# Patient Record
Sex: MALE | Race: WHITE | Employment: OTHER | URBAN - METROPOLITAN AREA
[De-identification: names, ages, dates, MRNs, and addresses within clinical notes are randomized per-mention and may not be internally consistent; named-entity substitution may affect disease eponyms.]

---

## 2017-01-19 ENCOUNTER — OFFICE VISIT (OUTPATIENT)
Dept: ONCOLOGY | Facility: CLINIC | Age: 57
End: 2017-01-19

## 2017-01-19 VITALS
RESPIRATION RATE: 20 BRPM | TEMPERATURE: 98 F | BODY MASS INDEX: 37.11 KG/M2 | WEIGHT: 304.9 LBS | DIASTOLIC BLOOD PRESSURE: 107 MMHG | HEART RATE: 92 BPM | SYSTOLIC BLOOD PRESSURE: 141 MMHG

## 2017-01-19 DIAGNOSIS — J41.0 SIMPLE CHRONIC BRONCHITIS (HCC): ICD-10-CM

## 2017-01-19 DIAGNOSIS — C34.31 MALIGNANT NEOPLASM OF LOWER LOBE OF RIGHT LUNG (HCC): Primary | ICD-10-CM

## 2017-01-19 PROCEDURE — 99214 OFFICE O/P EST MOD 30 MIN: CPT | Performed by: INTERNAL MEDICINE

## 2017-01-19 RX ORDER — OXYCODONE HYDROCHLORIDE AND ACETAMINOPHEN 5; 325 MG/1; MG/1
1 TABLET ORAL EVERY 6 HOURS PRN
Qty: 60 TABLET | Refills: 0 | Status: SHIPPED | OUTPATIENT
Start: 2017-01-19 | End: 2017-02-18

## 2017-01-25 ENCOUNTER — TELEPHONE (OUTPATIENT)
Dept: ONCOLOGY | Facility: CLINIC | Age: 57
End: 2017-01-25

## 2017-02-14 RX ORDER — SODIUM CHLORIDE 0.9 % (FLUSH) 0.9 %
20 SYRINGE (ML) INJECTION PRN
Status: CANCELLED | OUTPATIENT
Start: 2017-02-16

## 2017-02-16 ENCOUNTER — HOSPITAL ENCOUNTER (OUTPATIENT)
Dept: INFUSION THERAPY | Age: 57
Discharge: HOME | End: 2017-02-16
Payer: MEDICARE

## 2017-02-16 ENCOUNTER — OFFICE VISIT (OUTPATIENT)
Dept: ONCOLOGY | Facility: CLINIC | Age: 57
End: 2017-02-16

## 2017-02-16 VITALS
HEART RATE: 89 BPM | SYSTOLIC BLOOD PRESSURE: 126 MMHG | WEIGHT: 308.5 LBS | DIASTOLIC BLOOD PRESSURE: 82 MMHG | RESPIRATION RATE: 20 BRPM | BODY MASS INDEX: 37.55 KG/M2 | TEMPERATURE: 97.9 F

## 2017-02-16 DIAGNOSIS — F17.200 SMOKING ADDICTION: ICD-10-CM

## 2017-02-16 DIAGNOSIS — J41.0 SIMPLE CHRONIC BRONCHITIS (HCC): ICD-10-CM

## 2017-02-16 DIAGNOSIS — C34.31 MALIGNANT NEOPLASM OF LOWER LOBE OF RIGHT LUNG (HCC): ICD-10-CM

## 2017-02-16 DIAGNOSIS — C34.31 MALIGNANT NEOPLASM OF LOWER LOBE OF RIGHT LUNG (HCC): Primary | ICD-10-CM

## 2017-02-16 LAB
ABSOLUTE EOS #: 0.3 K/UL (ref 0–0.4)
ABSOLUTE LYMPH #: 2.2 K/UL (ref 1–4.8)
ABSOLUTE MONO #: 0.65 K/UL (ref 0.1–1.2)
ALBUMIN SERPL-MCNC: 4.1 G/DL (ref 3.5–5.2)
ALBUMIN/GLOBULIN RATIO: 1.1 (ref 1–2.5)
ALP BLD-CCNC: 75 U/L (ref 40–129)
ALT SERPL-CCNC: 27 U/L (ref 5–41)
ANION GAP SERPL CALCULATED.3IONS-SCNC: 14 MMOL/L (ref 9–17)
AST SERPL-CCNC: 22 U/L
BASOPHILS # BLD: 1 % (ref 0–2)
BASOPHILS ABSOLUTE: 0.08 K/UL (ref 0–0.2)
BILIRUB SERPL-MCNC: 0.27 MG/DL (ref 0.3–1.2)
BUN BLDV-MCNC: 26 MG/DL (ref 6–20)
BUN/CREAT BLD: ABNORMAL (ref 9–20)
CALCIUM SERPL-MCNC: 9.4 MG/DL (ref 8.6–10.4)
CHLORIDE BLD-SCNC: 99 MMOL/L (ref 98–107)
CO2: 25 MMOL/L (ref 20–31)
CREAT SERPL-MCNC: 1.12 MG/DL (ref 0.7–1.2)
DIFFERENTIAL TYPE: ABNORMAL
EOSINOPHILS RELATIVE PERCENT: 4 % (ref 1–4)
GFR AFRICAN AMERICAN: >60 ML/MIN
GFR NON-AFRICAN AMERICAN: >60 ML/MIN
GFR SERPL CREATININE-BSD FRML MDRD: ABNORMAL ML/MIN/{1.73_M2}
GFR SERPL CREATININE-BSD FRML MDRD: ABNORMAL ML/MIN/{1.73_M2}
GLUCOSE BLD-MCNC: 193 MG/DL (ref 70–99)
HCT VFR BLD CALC: 37.8 % (ref 41–53)
HEMOGLOBIN: 12.6 G/DL (ref 13.5–17.5)
LYMPHOCYTES # BLD: 28 % (ref 24–44)
MCH RBC QN AUTO: 31.6 PG (ref 26–34)
MCHC RBC AUTO-ENTMCNC: 33.3 G/DL (ref 31–37)
MCV RBC AUTO: 94.7 FL (ref 80–100)
MONOCYTES # BLD: 8 % (ref 2–11)
PDW BLD-RTO: 16.4 % (ref 12.5–15.4)
PLATELET # BLD: 166 K/UL (ref 140–450)
PLATELET ESTIMATE: ABNORMAL
PMV BLD AUTO: 9 FL (ref 8–14)
POTASSIUM SERPL-SCNC: 4.5 MMOL/L (ref 3.7–5.3)
RBC # BLD: 3.99 M/UL (ref 4.5–5.9)
RBC # BLD: ABNORMAL 10*6/UL
SEG NEUTROPHILS: 59 % (ref 36–66)
SEGMENTED NEUTROPHILS ABSOLUTE COUNT: 4.75 K/UL (ref 1.8–7.7)
SODIUM BLD-SCNC: 138 MMOL/L (ref 135–144)
TOTAL PROTEIN: 8 G/DL (ref 6.4–8.3)
WBC # BLD: 8 K/UL (ref 3.5–11)
WBC # BLD: ABNORMAL 10*3/UL

## 2017-02-16 PROCEDURE — 80053 COMPREHEN METABOLIC PANEL: CPT

## 2017-02-16 PROCEDURE — 85025 COMPLETE CBC W/AUTO DIFF WBC: CPT

## 2017-02-16 PROCEDURE — 2580000003 HC RX 258: Performed by: INTERNAL MEDICINE

## 2017-02-16 PROCEDURE — 36591 DRAW BLOOD OFF VENOUS DEVICE: CPT

## 2017-02-16 PROCEDURE — 99214 OFFICE O/P EST MOD 30 MIN: CPT | Performed by: INTERNAL MEDICINE

## 2017-02-16 PROCEDURE — 6360000002 HC RX W HCPCS: Performed by: INTERNAL MEDICINE

## 2017-02-16 RX ORDER — SODIUM CHLORIDE 0.9 % (FLUSH) 0.9 %
10 SYRINGE (ML) INJECTION PRN
Status: DISCONTINUED | OUTPATIENT
Start: 2017-02-16 | End: 2017-02-17 | Stop reason: HOSPADM

## 2017-02-16 RX ORDER — SODIUM CHLORIDE 0.9 % (FLUSH) 0.9 %
20 SYRINGE (ML) INJECTION PRN
Status: CANCELLED | OUTPATIENT
Start: 2017-02-16

## 2017-02-16 RX ORDER — SODIUM CHLORIDE 0.9 % (FLUSH) 0.9 %
10 SYRINGE (ML) INJECTION PRN
Status: CANCELLED | OUTPATIENT
Start: 2017-02-16

## 2017-02-16 RX ORDER — HEPARIN SODIUM (PORCINE) LOCK FLUSH IV SOLN 100 UNIT/ML 100 UNIT/ML
500 SOLUTION INTRAVENOUS PRN
Status: DISCONTINUED | OUTPATIENT
Start: 2017-02-16 | End: 2017-02-17 | Stop reason: HOSPADM

## 2017-02-16 RX ORDER — HEPARIN SODIUM (PORCINE) LOCK FLUSH IV SOLN 100 UNIT/ML 100 UNIT/ML
500 SOLUTION INTRAVENOUS PRN
Status: CANCELLED | OUTPATIENT
Start: 2017-02-16

## 2017-02-16 RX ADMIN — Medication 10 ML: at 10:15

## 2017-02-16 RX ADMIN — SODIUM CHLORIDE, PRESERVATIVE FREE 500 UNITS: 5 INJECTION INTRAVENOUS at 10:39

## 2017-02-16 RX ADMIN — Medication 10 ML: at 10:13

## 2017-02-16 NOTE — FLOWSHEET NOTE
encountered patient and family in the waiting area. Patient shared that he is feeling \"pretty good. \"  offered support and presence. Chaplains will remain available to offer spiritual and emotional support as needed.      02/16/17 1317   Encounter Summary   Services provided to: Patient and family together   Referral/Consult From: [facility]   Continue Visiting (02/16/17)   Complexity of Encounter Low   Length of Encounter 15 minutes   Routine   Type Follow up   Assessment Approachable   Intervention Active listening;Explored feelings, thoughts, concerns;Explored coping resources;Nurtured hope;Sustaining presence/ Ministry of presence   Outcome Engaged in conversation

## 2017-03-10 ENCOUNTER — HOSPITAL ENCOUNTER (OUTPATIENT)
Dept: INFUSION THERAPY | Age: 57
Discharge: HOME | End: 2017-03-10
Payer: MEDICARE

## 2017-03-10 DIAGNOSIS — C34.31 MALIGNANT NEOPLASM OF LOWER LOBE OF RIGHT LUNG (HCC): ICD-10-CM

## 2017-03-10 PROCEDURE — 2580000003 HC RX 258: Performed by: INTERNAL MEDICINE

## 2017-03-10 PROCEDURE — 6360000002 HC RX W HCPCS: Performed by: INTERNAL MEDICINE

## 2017-03-10 PROCEDURE — 96523 IRRIG DRUG DELIVERY DEVICE: CPT

## 2017-03-10 RX ORDER — SODIUM CHLORIDE 0.9 % (FLUSH) 0.9 %
10 SYRINGE (ML) INJECTION PRN
Status: DISCONTINUED | OUTPATIENT
Start: 2017-03-10 | End: 2017-03-11 | Stop reason: HOSPADM

## 2017-03-10 RX ORDER — SODIUM CHLORIDE 0.9 % (FLUSH) 0.9 %
10 SYRINGE (ML) INJECTION PRN
Status: CANCELLED | OUTPATIENT
Start: 2017-03-10

## 2017-03-10 RX ORDER — HEPARIN SODIUM (PORCINE) LOCK FLUSH IV SOLN 100 UNIT/ML 100 UNIT/ML
500 SOLUTION INTRAVENOUS PRN
Status: DISCONTINUED | OUTPATIENT
Start: 2017-03-10 | End: 2017-03-11 | Stop reason: HOSPADM

## 2017-03-10 RX ORDER — SODIUM CHLORIDE 0.9 % (FLUSH) 0.9 %
20 SYRINGE (ML) INJECTION PRN
Status: CANCELLED | OUTPATIENT
Start: 2017-03-10

## 2017-03-10 RX ORDER — HEPARIN SODIUM (PORCINE) LOCK FLUSH IV SOLN 100 UNIT/ML 100 UNIT/ML
500 SOLUTION INTRAVENOUS PRN
Status: CANCELLED | OUTPATIENT
Start: 2017-03-10

## 2017-03-10 RX ADMIN — Medication 10 ML: at 08:04

## 2017-03-10 RX ADMIN — Medication 10 ML: at 08:05

## 2017-03-10 RX ADMIN — SODIUM CHLORIDE, PRESERVATIVE FREE 500 UNITS: 5 INJECTION INTRAVENOUS at 08:05

## 2017-03-10 NOTE — PROGRESS NOTES
Pt is here for port flush. Denies any problems at this time. Pt was discharged in stable condition and will return in 6 weeks for port flush.

## 2017-04-18 ENCOUNTER — OFFICE VISIT (OUTPATIENT)
Dept: PRIMARY CARE CLINIC | Age: 57
End: 2017-04-18
Payer: MEDICARE

## 2017-04-18 VITALS
SYSTOLIC BLOOD PRESSURE: 138 MMHG | RESPIRATION RATE: 18 BRPM | DIASTOLIC BLOOD PRESSURE: 85 MMHG | HEIGHT: 76 IN | BODY MASS INDEX: 37.26 KG/M2 | HEART RATE: 76 BPM | WEIGHT: 306 LBS

## 2017-04-18 DIAGNOSIS — E11.9 TYPE 2 DIABETES MELLITUS WITHOUT COMPLICATION, WITHOUT LONG-TERM CURRENT USE OF INSULIN (HCC): ICD-10-CM

## 2017-04-18 DIAGNOSIS — E03.8 OTHER SPECIFIED HYPOTHYROIDISM: ICD-10-CM

## 2017-04-18 DIAGNOSIS — M62.838 MUSCLE SPASM: ICD-10-CM

## 2017-04-18 DIAGNOSIS — E11.59 TYPE 2 DIABETES MELLITUS WITH OTHER CIRCULATORY COMPLICATION: Primary | ICD-10-CM

## 2017-04-18 LAB — HBA1C MFR BLD: 6 %

## 2017-04-18 PROCEDURE — 99214 OFFICE O/P EST MOD 30 MIN: CPT | Performed by: INTERNAL MEDICINE

## 2017-04-18 PROCEDURE — 90732 PPSV23 VACC 2 YRS+ SUBQ/IM: CPT | Performed by: INTERNAL MEDICINE

## 2017-04-18 PROCEDURE — 83036 HEMOGLOBIN GLYCOSYLATED A1C: CPT | Performed by: INTERNAL MEDICINE

## 2017-04-18 PROCEDURE — G0009 ADMIN PNEUMOCOCCAL VACCINE: HCPCS | Performed by: INTERNAL MEDICINE

## 2017-04-18 RX ORDER — LISINOPRIL 5 MG/1
5 TABLET ORAL DAILY
Qty: 90 TABLET | Refills: 3 | Status: SHIPPED | OUTPATIENT
Start: 2017-04-18 | End: 2017-10-03 | Stop reason: SDUPTHER

## 2017-04-18 RX ORDER — CYCLOBENZAPRINE HCL 10 MG
10 TABLET ORAL 2 TIMES DAILY PRN
Qty: 60 TABLET | Refills: 3 | Status: SHIPPED | OUTPATIENT
Start: 2017-04-18 | End: 2017-10-10 | Stop reason: ALTCHOICE

## 2017-04-18 RX ORDER — LEVOTHYROXINE SODIUM 0.07 MG/1
75 TABLET ORAL DAILY
Qty: 60 TABLET | Refills: 3 | Status: SHIPPED | OUTPATIENT
Start: 2017-04-18 | End: 2018-05-21 | Stop reason: SDUPTHER

## 2017-04-18 RX ORDER — OXYCODONE HYDROCHLORIDE AND ACETAMINOPHEN 5; 325 MG/1; MG/1
1 TABLET ORAL EVERY 4 HOURS PRN
Status: CANCELLED | OUTPATIENT
Start: 2017-04-18

## 2017-04-18 RX ORDER — OXYCODONE HYDROCHLORIDE AND ACETAMINOPHEN 5; 325 MG/1; MG/1
1 TABLET ORAL EVERY 4 HOURS PRN
COMMUNITY
End: 2017-05-11 | Stop reason: SDUPTHER

## 2017-04-18 ASSESSMENT — PATIENT HEALTH QUESTIONNAIRE - PHQ9
9. THOUGHTS THAT YOU WOULD BE BETTER OFF DEAD, OR OF HURTING YOURSELF: 1
1. LITTLE INTEREST OR PLEASURE IN DOING THINGS: 1
8. MOVING OR SPEAKING SO SLOWLY THAT OTHER PEOPLE COULD HAVE NOTICED. OR THE OPPOSITE, BEING SO FIGETY OR RESTLESS THAT YOU HAVE BEEN MOVING AROUND A LOT MORE THAN USUAL: 0
3. TROUBLE FALLING OR STAYING ASLEEP: 1
4. FEELING TIRED OR HAVING LITTLE ENERGY: 3
SUM OF ALL RESPONSES TO PHQ9 QUESTIONS 1 & 2: 4
5. POOR APPETITE OR OVEREATING: 0
7. TROUBLE CONCENTRATING ON THINGS, SUCH AS READING THE NEWSPAPER OR WATCHING TELEVISION: 0
6. FEELING BAD ABOUT YOURSELF - OR THAT YOU ARE A FAILURE OR HAVE LET YOURSELF OR YOUR FAMILY DOWN: 1
10. IF YOU CHECKED OFF ANY PROBLEMS, HOW DIFFICULT HAVE THESE PROBLEMS MADE IT FOR YOU TO DO YOUR WORK, TAKE CARE OF THINGS AT HOME, OR GET ALONG WITH OTHER PEOPLE: 1
SUM OF ALL RESPONSES TO PHQ QUESTIONS 1-9: 10
2. FEELING DOWN, DEPRESSED OR HOPELESS: 3

## 2017-04-19 ASSESSMENT — ENCOUNTER SYMPTOMS
TROUBLE SWALLOWING: 0
VOMITING: 0
NAUSEA: 0
EYE DISCHARGE: 0
ABDOMINAL PAIN: 0
SHORTNESS OF BREATH: 0
EYE REDNESS: 0
COUGH: 0
SORE THROAT: 0
BACK PAIN: 0

## 2017-04-21 ENCOUNTER — HOSPITAL ENCOUNTER (OUTPATIENT)
Dept: INFUSION THERAPY | Age: 57
Discharge: HOME | End: 2017-04-21
Payer: MEDICARE

## 2017-04-21 DIAGNOSIS — C34.31 MALIGNANT NEOPLASM OF LOWER LOBE OF RIGHT LUNG (HCC): ICD-10-CM

## 2017-04-21 PROCEDURE — 2580000003 HC RX 258: Performed by: INTERNAL MEDICINE

## 2017-04-21 PROCEDURE — 6360000002 HC RX W HCPCS: Performed by: INTERNAL MEDICINE

## 2017-04-21 PROCEDURE — 96523 IRRIG DRUG DELIVERY DEVICE: CPT

## 2017-04-21 RX ORDER — HEPARIN SODIUM (PORCINE) LOCK FLUSH IV SOLN 100 UNIT/ML 100 UNIT/ML
500 SOLUTION INTRAVENOUS PRN
Status: ACTIVE | OUTPATIENT
Start: 2017-04-21 | End: 2017-04-22

## 2017-04-21 RX ORDER — SODIUM CHLORIDE 0.9 % (FLUSH) 0.9 %
20 SYRINGE (ML) INJECTION PRN
Status: CANCELLED | OUTPATIENT
Start: 2017-04-21

## 2017-04-21 RX ORDER — HEPARIN SODIUM (PORCINE) LOCK FLUSH IV SOLN 100 UNIT/ML 100 UNIT/ML
500 SOLUTION INTRAVENOUS PRN
Status: CANCELLED | OUTPATIENT
Start: 2017-04-21

## 2017-04-21 RX ORDER — SODIUM CHLORIDE 0.9 % (FLUSH) 0.9 %
10 SYRINGE (ML) INJECTION PRN
Status: CANCELLED | OUTPATIENT
Start: 2017-04-21

## 2017-04-21 RX ORDER — SODIUM CHLORIDE 0.9 % (FLUSH) 0.9 %
20 SYRINGE (ML) INJECTION PRN
Status: ACTIVE | OUTPATIENT
Start: 2017-04-21 | End: 2017-04-22

## 2017-04-21 RX ADMIN — Medication 20 ML: at 08:18

## 2017-04-21 RX ADMIN — SODIUM CHLORIDE, PRESERVATIVE FREE 500 UNITS: 5 INJECTION INTRAVENOUS at 08:18

## 2017-05-11 ENCOUNTER — HOSPITAL ENCOUNTER (OUTPATIENT)
Age: 57
Discharge: HOME | End: 2017-05-11
Payer: MEDICARE

## 2017-05-11 ENCOUNTER — OFFICE VISIT (OUTPATIENT)
Dept: ONCOLOGY | Age: 57
End: 2017-05-11
Payer: MEDICARE

## 2017-05-11 VITALS
TEMPERATURE: 97.7 F | SYSTOLIC BLOOD PRESSURE: 109 MMHG | HEART RATE: 80 BPM | BODY MASS INDEX: 37.49 KG/M2 | WEIGHT: 308 LBS | DIASTOLIC BLOOD PRESSURE: 81 MMHG

## 2017-05-11 DIAGNOSIS — C34.31 MALIGNANT NEOPLASM OF LOWER LOBE OF RIGHT LUNG (HCC): Primary | ICD-10-CM

## 2017-05-11 PROCEDURE — 99214 OFFICE O/P EST MOD 30 MIN: CPT | Performed by: INTERNAL MEDICINE

## 2017-05-11 RX ORDER — OXYCODONE HYDROCHLORIDE AND ACETAMINOPHEN 5; 325 MG/1; MG/1
1 TABLET ORAL EVERY 6 HOURS PRN
Qty: 60 TABLET | Refills: 0 | Status: SHIPPED | OUTPATIENT
Start: 2017-05-11 | End: 2017-08-09 | Stop reason: SDUPTHER

## 2017-05-18 ENCOUNTER — HOSPITAL ENCOUNTER (OUTPATIENT)
Dept: INFUSION THERAPY | Age: 57
Discharge: HOME | End: 2017-05-18
Payer: MEDICARE

## 2017-05-18 DIAGNOSIS — C34.31 MALIGNANT NEOPLASM OF LOWER LOBE OF RIGHT LUNG (HCC): ICD-10-CM

## 2017-05-18 DIAGNOSIS — F17.200 SMOKING ADDICTION: ICD-10-CM

## 2017-05-18 LAB
ABSOLUTE EOS #: 0.3 K/UL (ref 0–0.4)
ABSOLUTE LYMPH #: 2.4 K/UL (ref 1–4.8)
ABSOLUTE MONO #: 0.6 K/UL (ref 0.1–1.2)
ALBUMIN SERPL-MCNC: 4.2 G/DL (ref 3.5–5.2)
ALBUMIN/GLOBULIN RATIO: 1.1 (ref 1–2.5)
ALP BLD-CCNC: 67 U/L (ref 40–129)
ALT SERPL-CCNC: 26 U/L (ref 5–41)
ANION GAP SERPL CALCULATED.3IONS-SCNC: 13 MMOL/L (ref 9–17)
AST SERPL-CCNC: 21 U/L
BASOPHILS # BLD: 1 %
BASOPHILS ABSOLUTE: 0.1 K/UL (ref 0–0.2)
BILIRUB SERPL-MCNC: 0.25 MG/DL (ref 0.3–1.2)
BUN BLDV-MCNC: 22 MG/DL (ref 6–20)
BUN/CREAT BLD: ABNORMAL (ref 9–20)
CALCIUM SERPL-MCNC: 8.8 MG/DL (ref 8.6–10.4)
CHLORIDE BLD-SCNC: 105 MMOL/L (ref 98–107)
CO2: 24 MMOL/L (ref 20–31)
CREAT SERPL-MCNC: 1.2 MG/DL (ref 0.7–1.2)
DIFFERENTIAL TYPE: NORMAL
EOSINOPHILS RELATIVE PERCENT: 3 %
GFR AFRICAN AMERICAN: >60 ML/MIN
GFR NON-AFRICAN AMERICAN: >60 ML/MIN
GFR SERPL CREATININE-BSD FRML MDRD: ABNORMAL ML/MIN/{1.73_M2}
GFR SERPL CREATININE-BSD FRML MDRD: ABNORMAL ML/MIN/{1.73_M2}
GLUCOSE BLD-MCNC: 131 MG/DL (ref 70–99)
HCT VFR BLD CALC: 45.8 % (ref 41–53)
HEMOGLOBIN: 15.7 G/DL (ref 13.5–17.5)
LYMPHOCYTES # BLD: 27 %
MCH RBC QN AUTO: 33.7 PG (ref 26–34)
MCHC RBC AUTO-ENTMCNC: 34.3 G/DL (ref 31–37)
MCV RBC AUTO: 98.1 FL (ref 80–100)
MONOCYTES # BLD: 7 %
PDW BLD-RTO: 13.3 % (ref 12.5–15.4)
PLATELET # BLD: 182 K/UL (ref 140–450)
PLATELET ESTIMATE: NORMAL
PMV BLD AUTO: 7.3 FL (ref 6–12)
POTASSIUM SERPL-SCNC: 4.6 MMOL/L (ref 3.7–5.3)
RBC # BLD: 4.66 M/UL (ref 4.5–5.9)
RBC # BLD: NORMAL 10*6/UL
SEG NEUTROPHILS: 62 %
SEGMENTED NEUTROPHILS ABSOLUTE COUNT: 5.5 K/UL (ref 1.8–7.7)
SODIUM BLD-SCNC: 142 MMOL/L (ref 135–144)
TOTAL PROTEIN: 7.9 G/DL (ref 6.4–8.3)
WBC # BLD: 8.8 K/UL (ref 3.5–11)
WBC # BLD: NORMAL 10*3/UL

## 2017-05-18 PROCEDURE — 6360000002 HC RX W HCPCS: Performed by: INTERNAL MEDICINE

## 2017-05-18 PROCEDURE — 85025 COMPLETE CBC W/AUTO DIFF WBC: CPT

## 2017-05-18 PROCEDURE — 36591 DRAW BLOOD OFF VENOUS DEVICE: CPT

## 2017-05-18 PROCEDURE — 80053 COMPREHEN METABOLIC PANEL: CPT

## 2017-05-18 PROCEDURE — 2580000003 HC RX 258: Performed by: INTERNAL MEDICINE

## 2017-05-18 RX ORDER — SODIUM CHLORIDE 0.9 % (FLUSH) 0.9 %
20 SYRINGE (ML) INJECTION PRN
Status: CANCELLED | OUTPATIENT
Start: 2017-05-18

## 2017-05-18 RX ORDER — SODIUM CHLORIDE 0.9 % (FLUSH) 0.9 %
20 SYRINGE (ML) INJECTION PRN
Status: DISCONTINUED | OUTPATIENT
Start: 2017-05-18 | End: 2017-05-19 | Stop reason: HOSPADM

## 2017-05-18 RX ORDER — SODIUM CHLORIDE 0.9 % (FLUSH) 0.9 %
10 SYRINGE (ML) INJECTION PRN
Status: CANCELLED | OUTPATIENT
Start: 2017-05-18

## 2017-05-18 RX ORDER — HEPARIN SODIUM (PORCINE) LOCK FLUSH IV SOLN 100 UNIT/ML 100 UNIT/ML
500 SOLUTION INTRAVENOUS PRN
Status: DISCONTINUED | OUTPATIENT
Start: 2017-05-18 | End: 2017-05-19 | Stop reason: HOSPADM

## 2017-05-18 RX ORDER — HEPARIN SODIUM (PORCINE) LOCK FLUSH IV SOLN 100 UNIT/ML 100 UNIT/ML
500 SOLUTION INTRAVENOUS PRN
Status: CANCELLED | OUTPATIENT
Start: 2017-05-18

## 2017-05-18 RX ADMIN — Medication 20 ML: at 08:49

## 2017-05-18 RX ADMIN — SODIUM CHLORIDE, PRESERVATIVE FREE 500 UNITS: 5 INJECTION INTRAVENOUS at 08:49

## 2017-06-10 ENCOUNTER — HOSPITAL ENCOUNTER (INPATIENT)
Age: 57
LOS: 9 days | Discharge: HOME | DRG: 871 | End: 2017-06-19
Attending: EMERGENCY MEDICINE | Admitting: INTERNAL MEDICINE
Payer: COMMERCIAL

## 2017-06-10 ENCOUNTER — APPOINTMENT (OUTPATIENT)
Dept: GENERAL RADIOLOGY | Age: 57
DRG: 871 | End: 2017-06-10
Payer: COMMERCIAL

## 2017-06-10 DIAGNOSIS — J18.9 PNEUMONIA OF RIGHT LOWER LOBE DUE TO INFECTIOUS ORGANISM: Primary | ICD-10-CM

## 2017-06-10 PROBLEM — N17.9 ACUTE KIDNEY INJURY (HCC): Status: ACTIVE | Noted: 2017-06-10

## 2017-06-10 PROBLEM — R73.9 HYPERGLYCEMIA: Status: ACTIVE | Noted: 2017-06-10

## 2017-06-10 LAB
ABSOLUTE EOS #: 0 K/UL (ref 0–0.4)
ABSOLUTE LYMPH #: 1.2 K/UL (ref 1–4.8)
ABSOLUTE MONO #: 1 K/UL (ref 0.2–0.8)
ANION GAP SERPL CALCULATED.3IONS-SCNC: 17 MMOL/L (ref 9–17)
BASOPHILS # BLD: 0 %
BASOPHILS ABSOLUTE: 0 K/UL (ref 0–0.2)
BUN BLDV-MCNC: 24 MG/DL (ref 6–20)
BUN/CREAT BLD: 20 (ref 9–20)
CALCIUM SERPL-MCNC: 9.3 MG/DL (ref 8.6–10.4)
CHLORIDE BLD-SCNC: 95 MMOL/L (ref 98–107)
CO2: 21 MMOL/L (ref 20–31)
CREAT SERPL-MCNC: 1.21 MG/DL (ref 0.7–1.2)
DIFFERENTIAL TYPE: ABNORMAL
EOSINOPHILS RELATIVE PERCENT: 0 %
GFR AFRICAN AMERICAN: >60 ML/MIN
GFR NON-AFRICAN AMERICAN: >60 ML/MIN
GFR SERPL CREATININE-BSD FRML MDRD: ABNORMAL ML/MIN/{1.73_M2}
GFR SERPL CREATININE-BSD FRML MDRD: ABNORMAL ML/MIN/{1.73_M2}
GLUCOSE BLD-MCNC: 214 MG/DL (ref 75–110)
GLUCOSE BLD-MCNC: 225 MG/DL (ref 70–99)
HCT VFR BLD CALC: 44 % (ref 41–53)
HEMOGLOBIN: 14.9 G/DL (ref 13.5–17.5)
LACTIC ACID: 1.5 MMOL/L (ref 0.5–2.2)
LYMPHOCYTES # BLD: 6 %
MCH RBC QN AUTO: 33.3 PG (ref 26–34)
MCHC RBC AUTO-ENTMCNC: 33.8 G/DL (ref 31–37)
MCV RBC AUTO: 98.4 FL (ref 80–100)
MONOCYTES # BLD: 5 %
PDW BLD-RTO: 12.9 % (ref 11.5–14.5)
PLATELET # BLD: 192 K/UL (ref 130–400)
PLATELET ESTIMATE: ABNORMAL
PMV BLD AUTO: 6.7 FL (ref 6–12)
POTASSIUM SERPL-SCNC: 4.2 MMOL/L (ref 3.7–5.3)
RBC # BLD: 4.47 M/UL (ref 4.5–5.9)
RBC # BLD: ABNORMAL 10*6/UL
SEG NEUTROPHILS: 89 %
SEGMENTED NEUTROPHILS ABSOLUTE COUNT: 16.1 K/UL (ref 1.8–7.7)
SODIUM BLD-SCNC: 133 MMOL/L (ref 135–144)
WBC # BLD: 18.3 K/UL (ref 3.5–11)
WBC # BLD: ABNORMAL 10*3/UL

## 2017-06-10 PROCEDURE — 82043 UR ALBUMIN QUANTITATIVE: CPT

## 2017-06-10 PROCEDURE — 6360000002 HC RX W HCPCS: Performed by: INTERNAL MEDICINE

## 2017-06-10 PROCEDURE — 94761 N-INVAS EAR/PLS OXIMETRY MLT: CPT

## 2017-06-10 PROCEDURE — 96375 TX/PRO/DX INJ NEW DRUG ADDON: CPT

## 2017-06-10 PROCEDURE — 6360000002 HC RX W HCPCS: Performed by: EMERGENCY MEDICINE

## 2017-06-10 PROCEDURE — 85025 COMPLETE CBC W/AUTO DIFF WBC: CPT

## 2017-06-10 PROCEDURE — 2580000003 HC RX 258: Performed by: EMERGENCY MEDICINE

## 2017-06-10 PROCEDURE — 2700000000 HC OXYGEN THERAPY PER DAY

## 2017-06-10 PROCEDURE — 71020 XR CHEST STANDARD TWO VW: CPT

## 2017-06-10 PROCEDURE — 87205 SMEAR GRAM STAIN: CPT

## 2017-06-10 PROCEDURE — 96376 TX/PRO/DX INJ SAME DRUG ADON: CPT

## 2017-06-10 PROCEDURE — 82570 ASSAY OF URINE CREATININE: CPT

## 2017-06-10 PROCEDURE — 87077 CULTURE AEROBIC IDENTIFY: CPT

## 2017-06-10 PROCEDURE — 87186 SC STD MICRODIL/AGAR DIL: CPT

## 2017-06-10 PROCEDURE — 36415 COLL VENOUS BLD VENIPUNCTURE: CPT

## 2017-06-10 PROCEDURE — 2060000000 HC ICU INTERMEDIATE R&B

## 2017-06-10 PROCEDURE — 82947 ASSAY GLUCOSE BLOOD QUANT: CPT

## 2017-06-10 PROCEDURE — 87149 DNA/RNA DIRECT PROBE: CPT

## 2017-06-10 PROCEDURE — 83605 ASSAY OF LACTIC ACID: CPT

## 2017-06-10 PROCEDURE — 94640 AIRWAY INHALATION TREATMENT: CPT

## 2017-06-10 PROCEDURE — 6370000000 HC RX 637 (ALT 250 FOR IP): Performed by: INTERNAL MEDICINE

## 2017-06-10 PROCEDURE — 80048 BASIC METABOLIC PNL TOTAL CA: CPT

## 2017-06-10 PROCEDURE — 87040 BLOOD CULTURE FOR BACTERIA: CPT

## 2017-06-10 PROCEDURE — 99284 EMERGENCY DEPT VISIT MOD MDM: CPT

## 2017-06-10 PROCEDURE — 94760 N-INVAS EAR/PLS OXIMETRY 1: CPT

## 2017-06-10 PROCEDURE — 83036 HEMOGLOBIN GLYCOSYLATED A1C: CPT

## 2017-06-10 PROCEDURE — 96374 THER/PROPH/DIAG INJ IV PUSH: CPT

## 2017-06-10 RX ORDER — ALBUTEROL SULFATE 90 UG/1
2 AEROSOL, METERED RESPIRATORY (INHALATION)
Status: DISCONTINUED | OUTPATIENT
Start: 2017-06-10 | End: 2017-06-10

## 2017-06-10 RX ORDER — FLUOXETINE HYDROCHLORIDE 20 MG/1
40 CAPSULE ORAL DAILY
Status: DISCONTINUED | OUTPATIENT
Start: 2017-06-10 | End: 2017-06-19 | Stop reason: HOSPADM

## 2017-06-10 RX ORDER — OXYCODONE HYDROCHLORIDE AND ACETAMINOPHEN 5; 325 MG/1; MG/1
1 TABLET ORAL EVERY 6 HOURS PRN
Status: DISCONTINUED | OUTPATIENT
Start: 2017-06-10 | End: 2017-06-10

## 2017-06-10 RX ORDER — PANTOPRAZOLE SODIUM 40 MG/1
40 TABLET, DELAYED RELEASE ORAL
Status: DISCONTINUED | OUTPATIENT
Start: 2017-06-11 | End: 2017-06-19 | Stop reason: HOSPADM

## 2017-06-10 RX ORDER — ALBUTEROL SULFATE 2.5 MG/3ML
2.5 SOLUTION RESPIRATORY (INHALATION) EVERY 4 HOURS
Status: DISCONTINUED | OUTPATIENT
Start: 2017-06-10 | End: 2017-06-11

## 2017-06-10 RX ORDER — ONDANSETRON 4 MG/1
8 TABLET, ORALLY DISINTEGRATING ORAL EVERY 8 HOURS PRN
Status: DISCONTINUED | OUTPATIENT
Start: 2017-06-10 | End: 2017-06-19 | Stop reason: HOSPADM

## 2017-06-10 RX ORDER — CYCLOBENZAPRINE HCL 10 MG
10 TABLET ORAL 2 TIMES DAILY PRN
Status: DISCONTINUED | OUTPATIENT
Start: 2017-06-10 | End: 2017-06-19 | Stop reason: HOSPADM

## 2017-06-10 RX ORDER — MORPHINE SULFATE 4 MG/ML
4 INJECTION, SOLUTION INTRAMUSCULAR; INTRAVENOUS ONCE
Status: COMPLETED | OUTPATIENT
Start: 2017-06-10 | End: 2017-06-10

## 2017-06-10 RX ORDER — LEVOTHYROXINE SODIUM 0.07 MG/1
75 TABLET ORAL DAILY
Status: DISCONTINUED | OUTPATIENT
Start: 2017-06-11 | End: 2017-06-19 | Stop reason: HOSPADM

## 2017-06-10 RX ORDER — OMEPRAZOLE 20 MG/1
20 CAPSULE, DELAYED RELEASE ORAL DAILY
Status: DISCONTINUED | OUTPATIENT
Start: 2017-06-10 | End: 2017-06-10 | Stop reason: CLARIF

## 2017-06-10 RX ORDER — NICOTINE POLACRILEX 4 MG
15 LOZENGE BUCCAL PRN
Status: DISCONTINUED | OUTPATIENT
Start: 2017-06-10 | End: 2017-06-19 | Stop reason: HOSPADM

## 2017-06-10 RX ORDER — IPRATROPIUM BROMIDE AND ALBUTEROL SULFATE 2.5; .5 MG/3ML; MG/3ML
1 SOLUTION RESPIRATORY (INHALATION)
Status: DISCONTINUED | OUTPATIENT
Start: 2017-06-10 | End: 2017-06-10

## 2017-06-10 RX ORDER — DEXTROSE MONOHYDRATE 50 MG/ML
100 INJECTION, SOLUTION INTRAVENOUS PRN
Status: DISCONTINUED | OUTPATIENT
Start: 2017-06-10 | End: 2017-06-19 | Stop reason: HOSPADM

## 2017-06-10 RX ORDER — LISINOPRIL 5 MG/1
5 TABLET ORAL DAILY
Status: DISCONTINUED | OUTPATIENT
Start: 2017-06-10 | End: 2017-06-11

## 2017-06-10 RX ORDER — ALBUTEROL SULFATE 2.5 MG/3ML
5 SOLUTION RESPIRATORY (INHALATION)
Status: DISCONTINUED | OUTPATIENT
Start: 2017-06-10 | End: 2017-06-10

## 2017-06-10 RX ORDER — ALBUTEROL SULFATE 2.5 MG/3ML
2.5 SOLUTION RESPIRATORY (INHALATION)
Status: DISCONTINUED | OUTPATIENT
Start: 2017-06-10 | End: 2017-06-10

## 2017-06-10 RX ORDER — DEXTROSE MONOHYDRATE 25 G/50ML
12.5 INJECTION, SOLUTION INTRAVENOUS PRN
Status: DISCONTINUED | OUTPATIENT
Start: 2017-06-10 | End: 2017-06-19 | Stop reason: HOSPADM

## 2017-06-10 RX ORDER — OXYCODONE HYDROCHLORIDE AND ACETAMINOPHEN 5; 325 MG/1; MG/1
1 TABLET ORAL EVERY 4 HOURS PRN
Status: DISCONTINUED | OUTPATIENT
Start: 2017-06-10 | End: 2017-06-13

## 2017-06-10 RX ORDER — ALBUTEROL SULFATE 2.5 MG/3ML
2.5 SOLUTION RESPIRATORY (INHALATION) EVERY 6 HOURS PRN
Status: DISCONTINUED | OUTPATIENT
Start: 2017-06-10 | End: 2017-06-19 | Stop reason: HOSPADM

## 2017-06-10 RX ADMIN — ALBUTEROL SULFATE 2.5 MG: 2.5 SOLUTION RESPIRATORY (INHALATION) at 22:29

## 2017-06-10 RX ADMIN — ALBUTEROL SULFATE 5 MG: 5 SOLUTION RESPIRATORY (INHALATION) at 15:12

## 2017-06-10 RX ADMIN — MORPHINE SULFATE 4 MG: 4 INJECTION, SOLUTION INTRAMUSCULAR; INTRAVENOUS at 15:04

## 2017-06-10 RX ADMIN — MORPHINE SULFATE 4 MG: 4 INJECTION, SOLUTION INTRAMUSCULAR; INTRAVENOUS at 16:54

## 2017-06-10 RX ADMIN — OXYCODONE HYDROCHLORIDE AND ACETAMINOPHEN 1 TABLET: 5; 325 TABLET ORAL at 22:44

## 2017-06-10 RX ADMIN — INSULIN LISPRO 2 UNITS: 100 INJECTION, SOLUTION INTRAVENOUS; SUBCUTANEOUS at 21:34

## 2017-06-10 RX ADMIN — CEFTRIAXONE SODIUM 1 G: 1 INJECTION, POWDER, FOR SOLUTION INTRAMUSCULAR; INTRAVENOUS at 16:54

## 2017-06-10 RX ADMIN — AZITHROMYCIN MONOHYDRATE 500 MG: 500 INJECTION, POWDER, LYOPHILIZED, FOR SOLUTION INTRAVENOUS at 17:36

## 2017-06-10 RX ADMIN — ONDANSETRON 8 MG: 4 TABLET, ORALLY DISINTEGRATING ORAL at 18:46

## 2017-06-10 RX ADMIN — OXYCODONE HYDROCHLORIDE AND ACETAMINOPHEN 1 TABLET: 5; 325 TABLET ORAL at 18:45

## 2017-06-10 ASSESSMENT — PAIN DESCRIPTION - PAIN TYPE
TYPE: ACUTE PAIN
TYPE: CHRONIC PAIN
TYPE: ACUTE PAIN
TYPE: ACUTE PAIN;CHRONIC PAIN
TYPE: ACUTE PAIN

## 2017-06-10 ASSESSMENT — PAIN DESCRIPTION - FREQUENCY
FREQUENCY: INTERMITTENT
FREQUENCY: INTERMITTENT

## 2017-06-10 ASSESSMENT — PAIN DESCRIPTION - PROGRESSION
CLINICAL_PROGRESSION: GRADUALLY IMPROVING
CLINICAL_PROGRESSION: GRADUALLY IMPROVING

## 2017-06-10 ASSESSMENT — ENCOUNTER SYMPTOMS
SHORTNESS OF BREATH: 1
COUGH: 1
EYE REDNESS: 0
FACIAL SWELLING: 0
COLOR CHANGE: 0
WHEEZING: 1
CONSTIPATION: 0
DIARRHEA: 0
ABDOMINAL PAIN: 0
VOMITING: 0
EYE DISCHARGE: 0

## 2017-06-10 ASSESSMENT — PAIN SCALES - GENERAL
PAINLEVEL_OUTOF10: 7
PAINLEVEL_OUTOF10: 6
PAINLEVEL_OUTOF10: 5
PAINLEVEL_OUTOF10: 7
PAINLEVEL_OUTOF10: 9
PAINLEVEL_OUTOF10: 9
PAINLEVEL_OUTOF10: 5
PAINLEVEL_OUTOF10: 7

## 2017-06-10 ASSESSMENT — PAIN DESCRIPTION - LOCATION
LOCATION: CHEST;FLANK
LOCATION: CHEST
LOCATION: CHEST;FLANK

## 2017-06-10 ASSESSMENT — PAIN DESCRIPTION - ONSET
ONSET: ON-GOING
ONSET: ON-GOING

## 2017-06-10 ASSESSMENT — PAIN DESCRIPTION - DESCRIPTORS
DESCRIPTORS: SHARP
DESCRIPTORS: STABBING
DESCRIPTORS: ACHING;DISCOMFORT

## 2017-06-10 ASSESSMENT — PAIN DESCRIPTION - ORIENTATION
ORIENTATION: RIGHT
ORIENTATION: RIGHT

## 2017-06-11 PROBLEM — E11.29 MICROALBUMINURIA DUE TO TYPE 2 DIABETES MELLITUS (HCC): Status: ACTIVE | Noted: 2017-06-11

## 2017-06-11 PROBLEM — R80.9 MICROALBUMINURIA DUE TO TYPE 2 DIABETES MELLITUS (HCC): Status: ACTIVE | Noted: 2017-06-11

## 2017-06-11 LAB
ABSOLUTE EOS #: 0 K/UL (ref 0–0.4)
ABSOLUTE LYMPH #: 1.16 K/UL (ref 1–4.8)
ABSOLUTE MONO #: 0.87 K/UL (ref 0.2–0.8)
ANION GAP SERPL CALCULATED.3IONS-SCNC: 17 MMOL/L (ref 9–17)
BASOPHILS # BLD: 0 %
BASOPHILS ABSOLUTE: 0 K/UL (ref 0–0.2)
BUN BLDV-MCNC: 31 MG/DL (ref 6–20)
CHLORIDE BLD-SCNC: 92 MMOL/L (ref 98–107)
CHLORIDE, UR: 20 MMOL/L
CO2: 21 MMOL/L (ref 20–31)
COMPLEMENT C3: 140 MG/DL (ref 90–180)
COMPLEMENT C3: 149 MG/DL (ref 90–180)
COMPLEMENT C4: 22 MG/DL (ref 10–40)
COMPLEMENT C4: 24 MG/DL (ref 10–40)
CREAT SERPL-MCNC: 1.71 MG/DL (ref 0.7–1.2)
CREATININE URINE: 537.8 MG/DL (ref 39–259)
DIFFERENTIAL TYPE: ABNORMAL
EOSINOPHIL,URINE: NORMAL
EOSINOPHILS RELATIVE PERCENT: 0 %
ESTIMATED AVERAGE GLUCOSE: 154 MG/DL
GFR AFRICAN AMERICAN: 50 ML/MIN
GFR NON-AFRICAN AMERICAN: 42 ML/MIN
GFR SERPL CREATININE-BSD FRML MDRD: ABNORMAL ML/MIN/{1.73_M2}
GFR SERPL CREATININE-BSD FRML MDRD: ABNORMAL ML/MIN/{1.73_M2}
GLUCOSE BLD-MCNC: 186 MG/DL (ref 75–110)
GLUCOSE BLD-MCNC: 191 MG/DL (ref 75–110)
GLUCOSE BLD-MCNC: 201 MG/DL (ref 75–110)
GLUCOSE BLD-MCNC: 204 MG/DL (ref 75–110)
HBA1C MFR BLD: 7 % (ref 4–6)
HCT VFR BLD CALC: 45.5 % (ref 41–53)
HEMOGLOBIN: 15.3 G/DL (ref 13.5–17.5)
LACTIC ACID: 1 MMOL/L (ref 0.5–2.2)
LACTIC ACID: 1.3 MMOL/L (ref 0.5–2.2)
LACTIC ACID: 1.3 MMOL/L (ref 0.5–2.2)
LYMPHOCYTES # BLD: 4 %
MCH RBC QN AUTO: 33.1 PG (ref 26–34)
MCHC RBC AUTO-ENTMCNC: 33.6 G/DL (ref 31–37)
MCV RBC AUTO: 98.8 FL (ref 80–100)
MICROALBUMIN/CREAT 24H UR: 324 MG/L
MICROALBUMIN/CREAT UR-RTO: 60 MCG/MG CREAT
MONOCYTES # BLD: 3 %
MORPHOLOGY: ABNORMAL
PDW BLD-RTO: 13.3 % (ref 11.5–14.5)
PLATELET # BLD: 175 K/UL (ref 130–400)
PLATELET ESTIMATE: ABNORMAL
PMV BLD AUTO: 6.8 FL (ref 6–12)
POTASSIUM SERPL-SCNC: 4.7 MMOL/L (ref 3.7–5.3)
RBC # BLD: 4.6 M/UL (ref 4.5–5.9)
RBC # BLD: ABNORMAL 10*6/UL
SEDIMENTATION RATE, ERYTHROCYTE: 69 MM (ref 0–15)
SEG NEUTROPHILS: 93 %
SEGMENTED NEUTROPHILS ABSOLUTE COUNT: 26.87 K/UL (ref 1.8–7.7)
SODIUM BLD-SCNC: 130 MMOL/L (ref 135–144)
SODIUM,UR: <20 MMOL/L
WBC # BLD: 28.9 K/UL (ref 3.5–11)
WBC # BLD: ABNORMAL 10*3/UL

## 2017-06-11 PROCEDURE — 82436 ASSAY OF URINE CHLORIDE: CPT

## 2017-06-11 PROCEDURE — 94640 AIRWAY INHALATION TREATMENT: CPT

## 2017-06-11 PROCEDURE — 94667 MNPJ CHEST WALL 1ST: CPT

## 2017-06-11 PROCEDURE — 84300 ASSAY OF URINE SODIUM: CPT

## 2017-06-11 PROCEDURE — 86038 ANTINUCLEAR ANTIBODIES: CPT

## 2017-06-11 PROCEDURE — 2700000000 HC OXYGEN THERAPY PER DAY

## 2017-06-11 PROCEDURE — 84520 ASSAY OF UREA NITROGEN: CPT

## 2017-06-11 PROCEDURE — 82947 ASSAY GLUCOSE BLOOD QUANT: CPT

## 2017-06-11 PROCEDURE — 85651 RBC SED RATE NONAUTOMATED: CPT

## 2017-06-11 PROCEDURE — 2580000003 HC RX 258: Performed by: INTERNAL MEDICINE

## 2017-06-11 PROCEDURE — 87070 CULTURE OTHR SPECIMN AEROBIC: CPT

## 2017-06-11 PROCEDURE — 83605 ASSAY OF LACTIC ACID: CPT

## 2017-06-11 PROCEDURE — 6360000002 HC RX W HCPCS: Performed by: INTERNAL MEDICINE

## 2017-06-11 PROCEDURE — 85025 COMPLETE CBC W/AUTO DIFF WBC: CPT

## 2017-06-11 PROCEDURE — 36415 COLL VENOUS BLD VENIPUNCTURE: CPT

## 2017-06-11 PROCEDURE — 86160 COMPLEMENT ANTIGEN: CPT

## 2017-06-11 PROCEDURE — 82565 ASSAY OF CREATININE: CPT

## 2017-06-11 PROCEDURE — 6370000000 HC RX 637 (ALT 250 FOR IP): Performed by: INTERNAL MEDICINE

## 2017-06-11 PROCEDURE — 83516 IMMUNOASSAY NONANTIBODY: CPT

## 2017-06-11 PROCEDURE — 80051 ELECTROLYTE PANEL: CPT

## 2017-06-11 PROCEDURE — 2060000000 HC ICU INTERMEDIATE R&B

## 2017-06-11 PROCEDURE — 87205 SMEAR GRAM STAIN: CPT

## 2017-06-11 PROCEDURE — 94761 N-INVAS EAR/PLS OXIMETRY MLT: CPT

## 2017-06-11 RX ORDER — LEVOFLOXACIN 5 MG/ML
500 INJECTION, SOLUTION INTRAVENOUS EVERY 24 HOURS
Status: DISCONTINUED | OUTPATIENT
Start: 2017-06-11 | End: 2017-06-14

## 2017-06-11 RX ORDER — SODIUM CHLORIDE 9 MG/ML
INJECTION, SOLUTION INTRAVENOUS CONTINUOUS
Status: DISCONTINUED | OUTPATIENT
Start: 2017-06-11 | End: 2017-06-17

## 2017-06-11 RX ORDER — ALBUTEROL SULFATE 2.5 MG/3ML
2.5 SOLUTION RESPIRATORY (INHALATION)
Status: DISCONTINUED | OUTPATIENT
Start: 2017-06-12 | End: 2017-06-18

## 2017-06-11 RX ORDER — ALBUTEROL SULFATE 2.5 MG/3ML
2.5 SOLUTION RESPIRATORY (INHALATION)
Status: DISCONTINUED | OUTPATIENT
Start: 2017-06-11 | End: 2017-06-12

## 2017-06-11 RX ORDER — NICOTINE 21 MG/24HR
1 PATCH, TRANSDERMAL 24 HOURS TRANSDERMAL DAILY
Status: DISCONTINUED | OUTPATIENT
Start: 2017-06-11 | End: 2017-06-19 | Stop reason: HOSPADM

## 2017-06-11 RX ADMIN — Medication 4 UNITS: at 08:21

## 2017-06-11 RX ADMIN — PIPERACILLIN SODIUM,TAZOBACTAM SODIUM 3.38 G: 3; .375 INJECTION, POWDER, FOR SOLUTION INTRAVENOUS at 21:03

## 2017-06-11 RX ADMIN — PANTOPRAZOLE SODIUM 40 MG: 40 TABLET, DELAYED RELEASE ORAL at 06:14

## 2017-06-11 RX ADMIN — OXYCODONE HYDROCHLORIDE AND ACETAMINOPHEN 1 TABLET: 5; 325 TABLET ORAL at 08:20

## 2017-06-11 RX ADMIN — ALBUTEROL SULFATE 2.5 MG: 2.5 SOLUTION RESPIRATORY (INHALATION) at 03:00

## 2017-06-11 RX ADMIN — OXYCODONE HYDROCHLORIDE AND ACETAMINOPHEN 1 TABLET: 5; 325 TABLET ORAL at 03:06

## 2017-06-11 RX ADMIN — FLUOXETINE 40 MG: 20 CAPSULE ORAL at 08:22

## 2017-06-11 RX ADMIN — Medication 2 UNITS: at 12:35

## 2017-06-11 RX ADMIN — INSULIN LISPRO 2 UNITS: 100 INJECTION, SOLUTION INTRAVENOUS; SUBCUTANEOUS at 21:03

## 2017-06-11 RX ADMIN — OXYCODONE HYDROCHLORIDE AND ACETAMINOPHEN 1 TABLET: 5; 325 TABLET ORAL at 14:28

## 2017-06-11 RX ADMIN — LEVOTHYROXINE SODIUM 75 MCG: 75 TABLET ORAL at 06:14

## 2017-06-11 RX ADMIN — SODIUM CHLORIDE: 9 INJECTION, SOLUTION INTRAVENOUS at 06:14

## 2017-06-11 RX ADMIN — OXYCODONE HYDROCHLORIDE AND ACETAMINOPHEN 1 TABLET: 5; 325 TABLET ORAL at 19:31

## 2017-06-11 RX ADMIN — ALBUTEROL SULFATE 2.5 MG: 2.5 SOLUTION RESPIRATORY (INHALATION) at 09:40

## 2017-06-11 RX ADMIN — ALBUTEROL SULFATE 2.5 MG: 2.5 SOLUTION RESPIRATORY (INHALATION) at 19:12

## 2017-06-11 RX ADMIN — LEVOFLOXACIN 500 MG: 5 INJECTION, SOLUTION INTRAVENOUS at 12:36

## 2017-06-11 RX ADMIN — PIPERACILLIN SODIUM,TAZOBACTAM SODIUM 3.38 G: 3; .375 INJECTION, POWDER, FOR SOLUTION INTRAVENOUS at 14:39

## 2017-06-11 RX ADMIN — Medication 2 UNITS: at 17:43

## 2017-06-11 RX ADMIN — ALBUTEROL SULFATE 2.5 MG: 2.5 SOLUTION RESPIRATORY (INHALATION) at 15:07

## 2017-06-11 ASSESSMENT — PAIN SCALES - GENERAL
PAINLEVEL_OUTOF10: 4
PAINLEVEL_OUTOF10: 7
PAINLEVEL_OUTOF10: 5
PAINLEVEL_OUTOF10: 7
PAINLEVEL_OUTOF10: 5
PAINLEVEL_OUTOF10: 4

## 2017-06-11 ASSESSMENT — PAIN DESCRIPTION - ORIENTATION
ORIENTATION: RIGHT
ORIENTATION: RIGHT

## 2017-06-11 ASSESSMENT — ENCOUNTER SYMPTOMS
EYES NEGATIVE: 1
RESPIRATORY NEGATIVE: 1
GASTROINTESTINAL NEGATIVE: 1

## 2017-06-11 ASSESSMENT — PAIN DESCRIPTION - PROGRESSION: CLINICAL_PROGRESSION: GRADUALLY IMPROVING

## 2017-06-11 ASSESSMENT — PAIN DESCRIPTION - ONSET
ONSET: ON-GOING
ONSET: ON-GOING

## 2017-06-11 ASSESSMENT — PAIN DESCRIPTION - DESCRIPTORS
DESCRIPTORS: ACHING;DISCOMFORT
DESCRIPTORS: ACHING;DISCOMFORT

## 2017-06-11 ASSESSMENT — PAIN DESCRIPTION - LOCATION
LOCATION: CHEST;FLANK
LOCATION: FLANK
LOCATION: BACK

## 2017-06-11 ASSESSMENT — PAIN DESCRIPTION - FREQUENCY
FREQUENCY: INTERMITTENT
FREQUENCY: INTERMITTENT

## 2017-06-11 ASSESSMENT — PAIN DESCRIPTION - PAIN TYPE
TYPE: ACUTE PAIN
TYPE: ACUTE PAIN

## 2017-06-12 ENCOUNTER — APPOINTMENT (OUTPATIENT)
Dept: ULTRASOUND IMAGING | Age: 57
DRG: 871 | End: 2017-06-12
Payer: COMMERCIAL

## 2017-06-12 ENCOUNTER — APPOINTMENT (OUTPATIENT)
Dept: GENERAL RADIOLOGY | Age: 57
DRG: 871 | End: 2017-06-12
Payer: COMMERCIAL

## 2017-06-12 ENCOUNTER — APPOINTMENT (OUTPATIENT)
Dept: INTERVENTIONAL RADIOLOGY/VASCULAR | Age: 57
DRG: 871 | End: 2017-06-12
Payer: COMMERCIAL

## 2017-06-12 LAB
ABSOLUTE EOS #: 0.1 K/UL (ref 0–0.4)
ABSOLUTE LYMPH #: 1.1 K/UL (ref 1–4.8)
ABSOLUTE MONO #: 1 K/UL (ref 0.2–0.8)
ANION GAP SERPL CALCULATED.3IONS-SCNC: 14 MMOL/L (ref 9–17)
ANTI-NUCLEAR ANTIBODY (ANA): NEGATIVE
BASOPHILS # BLD: 1 %
BASOPHILS ABSOLUTE: 0.2 K/UL (ref 0–0.2)
BUN BLDV-MCNC: 24 MG/DL (ref 6–20)
CHLORIDE BLD-SCNC: 94 MMOL/L (ref 98–107)
CO2: 23 MMOL/L (ref 20–31)
CREAT SERPL-MCNC: 1.23 MG/DL (ref 0.7–1.2)
DIFFERENTIAL TYPE: ABNORMAL
DIRECT EXAM: NORMAL
DIRECT EXAM: NORMAL
EOSINOPHILS RELATIVE PERCENT: 1 %
GFR AFRICAN AMERICAN: >60 ML/MIN
GFR NON-AFRICAN AMERICAN: >60 ML/MIN
GFR SERPL CREATININE-BSD FRML MDRD: ABNORMAL ML/MIN/{1.73_M2}
GFR SERPL CREATININE-BSD FRML MDRD: ABNORMAL ML/MIN/{1.73_M2}
GLUCOSE BLD-MCNC: 152 MG/DL (ref 75–110)
GLUCOSE BLD-MCNC: 166 MG/DL (ref 75–110)
GLUCOSE BLD-MCNC: 182 MG/DL (ref 75–110)
GLUCOSE BLD-MCNC: 192 MG/DL (ref 75–110)
HCT VFR BLD CALC: 39.7 % (ref 41–53)
HEMOGLOBIN: 13.4 G/DL (ref 13.5–17.5)
INR BLD: 1.3
INR BLD: 1.3
LACTATE DEHYDROGENASE, FLUID: 961 U/L
LACTIC ACID: 1 MMOL/L (ref 0.5–2.2)
LACTIC ACID: 1.1 MMOL/L (ref 0.5–2.2)
LYMPHOCYTES # BLD: 5 %
Lab: NORMAL
MCH RBC QN AUTO: 33.4 PG (ref 26–34)
MCHC RBC AUTO-ENTMCNC: 33.7 G/DL (ref 31–37)
MCV RBC AUTO: 99.1 FL (ref 80–100)
MONOCYTES # BLD: 5 %
PARTIAL THROMBOPLASTIN TIME: 30.3 SEC (ref 23–31)
PARTIAL THROMBOPLASTIN TIME: 31.6 SEC (ref 23–31)
PDW BLD-RTO: 13.2 % (ref 11.5–14.5)
PLATELET # BLD: 187 K/UL (ref 130–400)
PLATELET ESTIMATE: ABNORMAL
PMV BLD AUTO: 6.7 FL (ref 6–12)
POTASSIUM SERPL-SCNC: 4.2 MMOL/L (ref 3.7–5.3)
PROTHROMBIN TIME: 13.6 SEC (ref 9.7–11.6)
PROTHROMBIN TIME: 13.9 SEC (ref 9.7–11.6)
RBC # BLD: 4 M/UL (ref 4.5–5.9)
RBC # BLD: ABNORMAL 10*6/UL
SEG NEUTROPHILS: 88 %
SEGMENTED NEUTROPHILS ABSOLUTE COUNT: 17.2 K/UL (ref 1.8–7.7)
SODIUM BLD-SCNC: 131 MMOL/L (ref 135–144)
SPECIMEN DESCRIPTION: NORMAL
SPECIMEN DESCRIPTION: NORMAL
SPECIMEN TYPE: NORMAL
SPECIMEN TYPE: NORMAL
STATUS: NORMAL
TOTAL PROTEIN, BODY FLUID: 5.9 G/DL
WBC # BLD: 19.6 K/UL (ref 3.5–11)
WBC # BLD: ABNORMAL 10*3/UL

## 2017-06-12 PROCEDURE — 88305 TISSUE EXAM BY PATHOLOGIST: CPT

## 2017-06-12 PROCEDURE — 87205 SMEAR GRAM STAIN: CPT

## 2017-06-12 PROCEDURE — 76775 US EXAM ABDO BACK WALL LIM: CPT

## 2017-06-12 PROCEDURE — 84520 ASSAY OF UREA NITROGEN: CPT

## 2017-06-12 PROCEDURE — 0W993ZZ DRAINAGE OF RIGHT PLEURAL CAVITY, PERCUTANEOUS APPROACH: ICD-10-PCS | Performed by: RADIOLOGY

## 2017-06-12 PROCEDURE — 87116 MYCOBACTERIA CULTURE: CPT

## 2017-06-12 PROCEDURE — 82565 ASSAY OF CREATININE: CPT

## 2017-06-12 PROCEDURE — 6360000002 HC RX W HCPCS: Performed by: INTERNAL MEDICINE

## 2017-06-12 PROCEDURE — 87075 CULTR BACTERIA EXCEPT BLOOD: CPT

## 2017-06-12 PROCEDURE — 71010 XR CHEST LIMITED: CPT

## 2017-06-12 PROCEDURE — 71010 XR CHEST PORTABLE: CPT

## 2017-06-12 PROCEDURE — 94761 N-INVAS EAR/PLS OXIMETRY MLT: CPT

## 2017-06-12 PROCEDURE — 85025 COMPLETE CBC W/AUTO DIFF WBC: CPT

## 2017-06-12 PROCEDURE — 80051 ELECTROLYTE PANEL: CPT

## 2017-06-12 PROCEDURE — C1729 CATH, DRAINAGE: HCPCS

## 2017-06-12 PROCEDURE — 2580000003 HC RX 258: Performed by: INTERNAL MEDICINE

## 2017-06-12 PROCEDURE — 87206 SMEAR FLUORESCENT/ACID STAI: CPT

## 2017-06-12 PROCEDURE — 36415 COLL VENOUS BLD VENIPUNCTURE: CPT

## 2017-06-12 PROCEDURE — 83605 ASSAY OF LACTIC ACID: CPT

## 2017-06-12 PROCEDURE — 2700000000 HC OXYGEN THERAPY PER DAY

## 2017-06-12 PROCEDURE — 32555 ASPIRATE PLEURA W/ IMAGING: CPT

## 2017-06-12 PROCEDURE — 87070 CULTURE OTHR SPECIMN AEROBIC: CPT

## 2017-06-12 PROCEDURE — 83615 LACTATE (LD) (LDH) ENZYME: CPT

## 2017-06-12 PROCEDURE — 94640 AIRWAY INHALATION TREATMENT: CPT

## 2017-06-12 PROCEDURE — 2060000000 HC ICU INTERMEDIATE R&B

## 2017-06-12 PROCEDURE — 85610 PROTHROMBIN TIME: CPT

## 2017-06-12 PROCEDURE — 82947 ASSAY GLUCOSE BLOOD QUANT: CPT

## 2017-06-12 PROCEDURE — 94760 N-INVAS EAR/PLS OXIMETRY 1: CPT

## 2017-06-12 PROCEDURE — 85730 THROMBOPLASTIN TIME PARTIAL: CPT

## 2017-06-12 PROCEDURE — 6370000000 HC RX 637 (ALT 250 FOR IP): Performed by: INTERNAL MEDICINE

## 2017-06-12 PROCEDURE — 88112 CYTOPATH CELL ENHANCE TECH: CPT

## 2017-06-12 PROCEDURE — 84157 ASSAY OF PROTEIN OTHER: CPT

## 2017-06-12 PROCEDURE — 99222 1ST HOSP IP/OBS MODERATE 55: CPT | Performed by: INTERNAL MEDICINE

## 2017-06-12 PROCEDURE — 87015 SPECIMEN INFECT AGNT CONCNTJ: CPT

## 2017-06-12 RX ADMIN — ALBUTEROL SULFATE 2.5 MG: 2.5 SOLUTION RESPIRATORY (INHALATION) at 11:01

## 2017-06-12 RX ADMIN — ALBUTEROL SULFATE 2.5 MG: 2.5 SOLUTION RESPIRATORY (INHALATION) at 15:32

## 2017-06-12 RX ADMIN — PIPERACILLIN SODIUM,TAZOBACTAM SODIUM 3.38 G: 3; .375 INJECTION, POWDER, FOR SOLUTION INTRAVENOUS at 09:00

## 2017-06-12 RX ADMIN — OXYCODONE HYDROCHLORIDE AND ACETAMINOPHEN 1 TABLET: 5; 325 TABLET ORAL at 09:32

## 2017-06-12 RX ADMIN — ALBUTEROL SULFATE 2.5 MG: 2.5 SOLUTION RESPIRATORY (INHALATION) at 20:02

## 2017-06-12 RX ADMIN — PANTOPRAZOLE SODIUM 40 MG: 40 TABLET, DELAYED RELEASE ORAL at 06:22

## 2017-06-12 RX ADMIN — OXYCODONE HYDROCHLORIDE AND ACETAMINOPHEN 1 TABLET: 5; 325 TABLET ORAL at 14:09

## 2017-06-12 RX ADMIN — Medication 2 UNITS: at 18:07

## 2017-06-12 RX ADMIN — LEVOFLOXACIN 500 MG: 5 INJECTION, SOLUTION INTRAVENOUS at 12:22

## 2017-06-12 RX ADMIN — FLUOXETINE 40 MG: 20 CAPSULE ORAL at 09:34

## 2017-06-12 RX ADMIN — OXYCODONE HYDROCHLORIDE AND ACETAMINOPHEN 1 TABLET: 5; 325 TABLET ORAL at 01:04

## 2017-06-12 RX ADMIN — ALBUTEROL SULFATE 2.5 MG: 2.5 SOLUTION RESPIRATORY (INHALATION) at 08:14

## 2017-06-12 RX ADMIN — PIPERACILLIN SODIUM,TAZOBACTAM SODIUM 3.38 G: 3; .375 INJECTION, POWDER, FOR SOLUTION INTRAVENOUS at 18:25

## 2017-06-12 RX ADMIN — LEVOTHYROXINE SODIUM 75 MCG: 75 TABLET ORAL at 06:22

## 2017-06-12 RX ADMIN — CYCLOBENZAPRINE HYDROCHLORIDE 10 MG: 10 TABLET, FILM COATED ORAL at 09:32

## 2017-06-12 RX ADMIN — CYCLOBENZAPRINE HYDROCHLORIDE 10 MG: 10 TABLET, FILM COATED ORAL at 01:01

## 2017-06-12 RX ADMIN — OXYCODONE HYDROCHLORIDE AND ACETAMINOPHEN 1 TABLET: 5; 325 TABLET ORAL at 18:43

## 2017-06-12 RX ADMIN — Medication 2 UNITS: at 12:14

## 2017-06-12 ASSESSMENT — PAIN SCALES - GENERAL
PAINLEVEL_OUTOF10: 6
PAINLEVEL_OUTOF10: 4
PAINLEVEL_OUTOF10: 7
PAINLEVEL_OUTOF10: 8
PAINLEVEL_OUTOF10: 8
PAINLEVEL_OUTOF10: 0

## 2017-06-13 ENCOUNTER — APPOINTMENT (OUTPATIENT)
Dept: CT IMAGING | Age: 57
DRG: 871 | End: 2017-06-13
Payer: COMMERCIAL

## 2017-06-13 PROBLEM — A41.9 SEPSIS (HCC): Status: ACTIVE | Noted: 2017-06-13

## 2017-06-13 PROBLEM — R78.81 BACTEREMIA: Status: ACTIVE | Noted: 2017-06-13

## 2017-06-13 LAB
ABSOLUTE EOS #: 0.1 K/UL (ref 0–0.4)
ABSOLUTE LYMPH #: 1 K/UL (ref 1–4.8)
ABSOLUTE MONO #: 0.8 K/UL (ref 0.2–0.8)
ANCA MYELOPEROXIDASE: 9 AU/ML
ANCA PROTEINASE 3: 10 AU/ML
ANION GAP SERPL CALCULATED.3IONS-SCNC: 14 MMOL/L (ref 9–17)
BASOPHILS # BLD: 0 %
BASOPHILS ABSOLUTE: 0 K/UL (ref 0–0.2)
BUN BLDV-MCNC: 20 MG/DL (ref 6–20)
CASE NUMBER:: NORMAL
CHLORIDE BLD-SCNC: 96 MMOL/L (ref 98–107)
CO2: 24 MMOL/L (ref 20–31)
CREAT SERPL-MCNC: 1.18 MG/DL (ref 0.7–1.2)
CULTURE: 12
CULTURE: ABNORMAL
CULTURE: NORMAL
CULTURE: NORMAL
DIFFERENTIAL TYPE: ABNORMAL
DIRECT EXAM: NORMAL
EOSINOPHILS RELATIVE PERCENT: 0 %
GFR AFRICAN AMERICAN: >60 ML/MIN
GFR NON-AFRICAN AMERICAN: >60 ML/MIN
GFR SERPL CREATININE-BSD FRML MDRD: NORMAL ML/MIN/{1.73_M2}
GFR SERPL CREATININE-BSD FRML MDRD: NORMAL ML/MIN/{1.73_M2}
GLUCOSE BLD-MCNC: 123 MG/DL (ref 75–110)
GLUCOSE BLD-MCNC: 140 MG/DL (ref 75–110)
GLUCOSE BLD-MCNC: 173 MG/DL (ref 75–110)
GLUCOSE BLD-MCNC: 99 MG/DL (ref 75–110)
HCT VFR BLD CALC: 38.6 % (ref 41–53)
HEMOGLOBIN: 13 G/DL (ref 13.5–17.5)
LACTIC ACID: 0.8 MMOL/L (ref 0.5–2.2)
LYMPHOCYTES # BLD: 7 %
Lab: ABNORMAL
Lab: ABNORMAL
Lab: NORMAL
MCH RBC QN AUTO: 33.4 PG (ref 26–34)
MCHC RBC AUTO-ENTMCNC: 33.7 G/DL (ref 31–37)
MCV RBC AUTO: 99 FL (ref 80–100)
MONOCYTES # BLD: 5 %
ORGANISM: ABNORMAL
PDW BLD-RTO: 13.3 % (ref 11.5–14.5)
PLATELET # BLD: 203 K/UL (ref 130–400)
PLATELET ESTIMATE: ABNORMAL
PMV BLD AUTO: 6.8 FL (ref 6–12)
POTASSIUM SERPL-SCNC: 4.6 MMOL/L (ref 3.7–5.3)
RBC # BLD: 3.9 M/UL (ref 4.5–5.9)
RBC # BLD: ABNORMAL 10*6/UL
SEG NEUTROPHILS: 88 %
SEGMENTED NEUTROPHILS ABSOLUTE COUNT: 13 K/UL (ref 1.8–7.7)
SODIUM BLD-SCNC: 134 MMOL/L (ref 135–144)
SPECIMEN DESCRIPTION: ABNORMAL
SPECIMEN DESCRIPTION: ABNORMAL
SPECIMEN DESCRIPTION: NORMAL
SPECIMEN DESCRIPTION: NORMAL
STATUS: ABNORMAL
STATUS: NORMAL
WBC # BLD: 14.9 K/UL (ref 3.5–11)
WBC # BLD: ABNORMAL 10*3/UL

## 2017-06-13 PROCEDURE — 6360000004 HC RX CONTRAST MEDICATION: Performed by: INTERNAL MEDICINE

## 2017-06-13 PROCEDURE — 0W9930Z DRAINAGE OF RIGHT PLEURAL CAVITY WITH DRAINAGE DEVICE, PERCUTANEOUS APPROACH: ICD-10-PCS | Performed by: RADIOLOGY

## 2017-06-13 PROCEDURE — 80051 ELECTROLYTE PANEL: CPT

## 2017-06-13 PROCEDURE — 2060000000 HC ICU INTERMEDIATE R&B

## 2017-06-13 PROCEDURE — 71260 CT THORAX DX C+: CPT

## 2017-06-13 PROCEDURE — 7100000001 HC PACU RECOVERY - ADDTL 15 MIN

## 2017-06-13 PROCEDURE — 6370000000 HC RX 637 (ALT 250 FOR IP): Performed by: INTERNAL MEDICINE

## 2017-06-13 PROCEDURE — 82947 ASSAY GLUCOSE BLOOD QUANT: CPT

## 2017-06-13 PROCEDURE — C1729 CATH, DRAINAGE: HCPCS

## 2017-06-13 PROCEDURE — 94760 N-INVAS EAR/PLS OXIMETRY 1: CPT

## 2017-06-13 PROCEDURE — 2700000000 HC OXYGEN THERAPY PER DAY

## 2017-06-13 PROCEDURE — 94640 AIRWAY INHALATION TREATMENT: CPT

## 2017-06-13 PROCEDURE — 6360000002 HC RX W HCPCS: Performed by: INTERNAL MEDICINE

## 2017-06-13 PROCEDURE — 99232 SBSQ HOSP IP/OBS MODERATE 35: CPT | Performed by: INTERNAL MEDICINE

## 2017-06-13 PROCEDURE — 2580000003 HC RX 258: Performed by: INTERNAL MEDICINE

## 2017-06-13 PROCEDURE — 85025 COMPLETE CBC W/AUTO DIFF WBC: CPT

## 2017-06-13 PROCEDURE — 84520 ASSAY OF UREA NITROGEN: CPT

## 2017-06-13 PROCEDURE — 83605 ASSAY OF LACTIC ACID: CPT

## 2017-06-13 PROCEDURE — 36415 COLL VENOUS BLD VENIPUNCTURE: CPT

## 2017-06-13 PROCEDURE — 94761 N-INVAS EAR/PLS OXIMETRY MLT: CPT

## 2017-06-13 PROCEDURE — 6360000002 HC RX W HCPCS: Performed by: RADIOLOGY

## 2017-06-13 PROCEDURE — 82565 ASSAY OF CREATININE: CPT

## 2017-06-13 PROCEDURE — 7100000000 HC PACU RECOVERY - FIRST 15 MIN

## 2017-06-13 PROCEDURE — 94668 MNPJ CHEST WALL SBSQ: CPT

## 2017-06-13 RX ORDER — FENTANYL CITRATE 50 UG/ML
INJECTION, SOLUTION INTRAMUSCULAR; INTRAVENOUS
Status: COMPLETED | OUTPATIENT
Start: 2017-06-13 | End: 2017-06-13

## 2017-06-13 RX ORDER — MIDAZOLAM HYDROCHLORIDE 1 MG/ML
INJECTION INTRAMUSCULAR; INTRAVENOUS
Status: COMPLETED | OUTPATIENT
Start: 2017-06-13 | End: 2017-06-13

## 2017-06-13 RX ORDER — IPRATROPIUM BROMIDE AND ALBUTEROL SULFATE 2.5; .5 MG/3ML; MG/3ML
1 SOLUTION RESPIRATORY (INHALATION) EVERY 6 HOURS PRN
COMMUNITY
End: 2018-03-15 | Stop reason: SDUPTHER

## 2017-06-13 RX ORDER — OXYCODONE HYDROCHLORIDE AND ACETAMINOPHEN 5; 325 MG/1; MG/1
2 TABLET ORAL EVERY 4 HOURS PRN
Status: DISCONTINUED | OUTPATIENT
Start: 2017-06-13 | End: 2017-06-19 | Stop reason: HOSPADM

## 2017-06-13 RX ADMIN — MIDAZOLAM HYDROCHLORIDE 1 MG: 1 INJECTION, SOLUTION INTRAMUSCULAR; INTRAVENOUS at 15:25

## 2017-06-13 RX ADMIN — ALBUTEROL SULFATE 2.5 MG: 2.5 SOLUTION RESPIRATORY (INHALATION) at 19:50

## 2017-06-13 RX ADMIN — MIDAZOLAM HYDROCHLORIDE 1 MG: 1 INJECTION, SOLUTION INTRAMUSCULAR; INTRAVENOUS at 15:32

## 2017-06-13 RX ADMIN — FENTANYL CITRATE 50 MCG: 50 INJECTION, SOLUTION INTRAMUSCULAR; INTRAVENOUS at 15:32

## 2017-06-13 RX ADMIN — INSULIN LISPRO 1 UNITS: 100 INJECTION, SOLUTION INTRAVENOUS; SUBCUTANEOUS at 21:43

## 2017-06-13 RX ADMIN — FENTANYL CITRATE 50 MCG: 50 INJECTION, SOLUTION INTRAMUSCULAR; INTRAVENOUS at 15:25

## 2017-06-13 RX ADMIN — PIPERACILLIN SODIUM,TAZOBACTAM SODIUM 3.38 G: 3; .375 INJECTION, POWDER, FOR SOLUTION INTRAVENOUS at 10:17

## 2017-06-13 RX ADMIN — IOVERSOL 100 ML: 741 INJECTION INTRA-ARTERIAL; INTRAVENOUS at 16:10

## 2017-06-13 RX ADMIN — OXYCODONE HYDROCHLORIDE AND ACETAMINOPHEN 2 TABLET: 5; 325 TABLET ORAL at 17:21

## 2017-06-13 RX ADMIN — OXYCODONE HYDROCHLORIDE AND ACETAMINOPHEN 1 TABLET: 5; 325 TABLET ORAL at 00:12

## 2017-06-13 RX ADMIN — PIPERACILLIN SODIUM,TAZOBACTAM SODIUM 3.38 G: 3; .375 INJECTION, POWDER, FOR SOLUTION INTRAVENOUS at 01:03

## 2017-06-13 RX ADMIN — OXYCODONE HYDROCHLORIDE AND ACETAMINOPHEN 2 TABLET: 5; 325 TABLET ORAL at 10:17

## 2017-06-13 RX ADMIN — OXYCODONE HYDROCHLORIDE AND ACETAMINOPHEN 2 TABLET: 5; 325 TABLET ORAL at 21:42

## 2017-06-13 RX ADMIN — FENTANYL CITRATE 50 MCG: 50 INJECTION, SOLUTION INTRAMUSCULAR; INTRAVENOUS at 15:19

## 2017-06-13 RX ADMIN — FLUOXETINE 40 MG: 20 CAPSULE ORAL at 09:17

## 2017-06-13 RX ADMIN — LEVOFLOXACIN 500 MG: 5 INJECTION, SOLUTION INTRAVENOUS at 17:31

## 2017-06-13 RX ADMIN — PANTOPRAZOLE SODIUM 40 MG: 40 TABLET, DELAYED RELEASE ORAL at 06:26

## 2017-06-13 RX ADMIN — LEVOTHYROXINE SODIUM 75 MCG: 75 TABLET ORAL at 06:26

## 2017-06-13 RX ADMIN — ALBUTEROL SULFATE 2.5 MG: 2.5 SOLUTION RESPIRATORY (INHALATION) at 11:29

## 2017-06-13 RX ADMIN — PIPERACILLIN SODIUM,TAZOBACTAM SODIUM 3.38 G: 3; .375 INJECTION, POWDER, FOR SOLUTION INTRAVENOUS at 18:31

## 2017-06-13 RX ADMIN — ALBUTEROL SULFATE 2.5 MG: 2.5 SOLUTION RESPIRATORY (INHALATION) at 07:38

## 2017-06-13 RX ADMIN — OXYCODONE HYDROCHLORIDE AND ACETAMINOPHEN 1 TABLET: 5; 325 TABLET ORAL at 06:26

## 2017-06-13 RX ADMIN — MIDAZOLAM HYDROCHLORIDE 1 MG: 1 INJECTION, SOLUTION INTRAMUSCULAR; INTRAVENOUS at 15:19

## 2017-06-13 RX ADMIN — CYCLOBENZAPRINE HYDROCHLORIDE 10 MG: 10 TABLET, FILM COATED ORAL at 09:26

## 2017-06-13 ASSESSMENT — PAIN DESCRIPTION - PAIN TYPE
TYPE: ACUTE PAIN
TYPE: ACUTE PAIN;SURGICAL PAIN
TYPE: ACUTE PAIN
TYPE: CHRONIC PAIN

## 2017-06-13 ASSESSMENT — PAIN SCALES - GENERAL
PAINLEVEL_OUTOF10: 3
PAINLEVEL_OUTOF10: 6
PAINLEVEL_OUTOF10: 7
PAINLEVEL_OUTOF10: 8
PAINLEVEL_OUTOF10: 6
PAINLEVEL_OUTOF10: 0
PAINLEVEL_OUTOF10: 7

## 2017-06-13 ASSESSMENT — PAIN DESCRIPTION - FREQUENCY: FREQUENCY: CONTINUOUS

## 2017-06-13 ASSESSMENT — PAIN DESCRIPTION - DESCRIPTORS
DESCRIPTORS: CONSTANT
DESCRIPTORS: CONSTANT

## 2017-06-13 ASSESSMENT — PAIN DESCRIPTION - LOCATION
LOCATION: BACK
LOCATION: GENERALIZED;BACK
LOCATION: BACK
LOCATION: BACK
LOCATION: CHEST
LOCATION: BACK

## 2017-06-13 ASSESSMENT — PAIN DESCRIPTION - ORIENTATION
ORIENTATION: RIGHT

## 2017-06-13 ASSESSMENT — PAIN DESCRIPTION - ONSET: ONSET: ON-GOING

## 2017-06-14 LAB
ABSOLUTE EOS #: 0.1 K/UL (ref 0–0.4)
ABSOLUTE LYMPH #: 1 K/UL (ref 1–4.8)
ABSOLUTE MONO #: 0.6 K/UL (ref 0.2–0.8)
ANION GAP SERPL CALCULATED.3IONS-SCNC: 14 MMOL/L (ref 9–17)
BASOPHILS # BLD: 0 %
BASOPHILS ABSOLUTE: 0.1 K/UL (ref 0–0.2)
BUN BLDV-MCNC: 18 MG/DL (ref 6–20)
CHLORIDE BLD-SCNC: 97 MMOL/L (ref 98–107)
CO2: 24 MMOL/L (ref 20–31)
CREAT SERPL-MCNC: 1.13 MG/DL (ref 0.7–1.2)
DIFFERENTIAL TYPE: ABNORMAL
EOSINOPHILS RELATIVE PERCENT: 1 %
GFR AFRICAN AMERICAN: >60 ML/MIN
GFR NON-AFRICAN AMERICAN: >60 ML/MIN
GFR SERPL CREATININE-BSD FRML MDRD: NORMAL ML/MIN/{1.73_M2}
GFR SERPL CREATININE-BSD FRML MDRD: NORMAL ML/MIN/{1.73_M2}
GLUCOSE BLD-MCNC: 122 MG/DL (ref 75–110)
GLUCOSE BLD-MCNC: 133 MG/DL (ref 75–110)
GLUCOSE BLD-MCNC: 137 MG/DL (ref 75–110)
GLUCOSE BLD-MCNC: 160 MG/DL (ref 75–110)
HCT VFR BLD CALC: 37.3 % (ref 41–53)
HEMOGLOBIN: 12.6 G/DL (ref 13.5–17.5)
LACTIC ACID: 1 MMOL/L (ref 0.5–2.2)
LYMPHOCYTES # BLD: 10 %
MCH RBC QN AUTO: 33.6 PG (ref 26–34)
MCHC RBC AUTO-ENTMCNC: 33.8 G/DL (ref 31–37)
MCV RBC AUTO: 99.2 FL (ref 80–100)
MONOCYTES # BLD: 6 %
PDW BLD-RTO: 13.3 % (ref 11.5–14.5)
PLATELET # BLD: 239 K/UL (ref 130–400)
PLATELET ESTIMATE: ABNORMAL
PMV BLD AUTO: ABNORMAL FL (ref 6–12)
POTASSIUM SERPL-SCNC: 3.9 MMOL/L (ref 3.7–5.3)
RBC # BLD: 3.76 M/UL (ref 4.5–5.9)
RBC # BLD: ABNORMAL 10*6/UL
SEG NEUTROPHILS: 83 %
SEGMENTED NEUTROPHILS ABSOLUTE COUNT: 8.9 K/UL (ref 1.8–7.7)
SODIUM BLD-SCNC: 135 MMOL/L (ref 135–144)
SURGICAL PATHOLOGY REPORT: NORMAL
WBC # BLD: 10.8 K/UL (ref 3.5–11)
WBC # BLD: ABNORMAL 10*3/UL

## 2017-06-14 PROCEDURE — 94640 AIRWAY INHALATION TREATMENT: CPT

## 2017-06-14 PROCEDURE — 85025 COMPLETE CBC W/AUTO DIFF WBC: CPT

## 2017-06-14 PROCEDURE — 94761 N-INVAS EAR/PLS OXIMETRY MLT: CPT

## 2017-06-14 PROCEDURE — 2700000000 HC OXYGEN THERAPY PER DAY

## 2017-06-14 PROCEDURE — 6360000002 HC RX W HCPCS: Performed by: INTERNAL MEDICINE

## 2017-06-14 PROCEDURE — 2060000000 HC ICU INTERMEDIATE R&B

## 2017-06-14 PROCEDURE — 82565 ASSAY OF CREATININE: CPT

## 2017-06-14 PROCEDURE — 99232 SBSQ HOSP IP/OBS MODERATE 35: CPT | Performed by: INTERNAL MEDICINE

## 2017-06-14 PROCEDURE — 6370000000 HC RX 637 (ALT 250 FOR IP): Performed by: INTERNAL MEDICINE

## 2017-06-14 PROCEDURE — 84520 ASSAY OF UREA NITROGEN: CPT

## 2017-06-14 PROCEDURE — 82947 ASSAY GLUCOSE BLOOD QUANT: CPT

## 2017-06-14 PROCEDURE — 2580000003 HC RX 258: Performed by: INTERNAL MEDICINE

## 2017-06-14 PROCEDURE — 80051 ELECTROLYTE PANEL: CPT

## 2017-06-14 PROCEDURE — 36415 COLL VENOUS BLD VENIPUNCTURE: CPT

## 2017-06-14 PROCEDURE — 94760 N-INVAS EAR/PLS OXIMETRY 1: CPT

## 2017-06-14 PROCEDURE — 83605 ASSAY OF LACTIC ACID: CPT

## 2017-06-14 RX ORDER — HYDROMORPHONE HCL 110MG/55ML
2 PATIENT CONTROLLED ANALGESIA SYRINGE INTRAVENOUS EVERY 4 HOURS PRN
Status: DISCONTINUED | OUTPATIENT
Start: 2017-06-14 | End: 2017-06-19 | Stop reason: HOSPADM

## 2017-06-14 RX ORDER — LEVOFLOXACIN 5 MG/ML
750 INJECTION, SOLUTION INTRAVENOUS EVERY 24 HOURS
Status: DISCONTINUED | OUTPATIENT
Start: 2017-06-15 | End: 2017-06-19

## 2017-06-14 RX ADMIN — OXYCODONE HYDROCHLORIDE AND ACETAMINOPHEN 2 TABLET: 5; 325 TABLET ORAL at 03:54

## 2017-06-14 RX ADMIN — OXYCODONE HYDROCHLORIDE AND ACETAMINOPHEN 2 TABLET: 5; 325 TABLET ORAL at 14:00

## 2017-06-14 RX ADMIN — FLUOXETINE 40 MG: 20 CAPSULE ORAL at 07:54

## 2017-06-14 RX ADMIN — OXYCODONE HYDROCHLORIDE AND ACETAMINOPHEN 2 TABLET: 5; 325 TABLET ORAL at 19:07

## 2017-06-14 RX ADMIN — OXYCODONE HYDROCHLORIDE AND ACETAMINOPHEN 2 TABLET: 5; 325 TABLET ORAL at 07:54

## 2017-06-14 RX ADMIN — ALBUTEROL SULFATE 2.5 MG: 2.5 SOLUTION RESPIRATORY (INHALATION) at 11:38

## 2017-06-14 RX ADMIN — ALBUTEROL SULFATE 2.5 MG: 2.5 SOLUTION RESPIRATORY (INHALATION) at 07:31

## 2017-06-14 RX ADMIN — HYDROMORPHONE HYDROCHLORIDE 2 MG: 2 INJECTION, SOLUTION INTRAMUSCULAR; INTRAVENOUS; SUBCUTANEOUS at 12:02

## 2017-06-14 RX ADMIN — PIPERACILLIN SODIUM,TAZOBACTAM SODIUM 3.38 G: 3; .375 INJECTION, POWDER, FOR SOLUTION INTRAVENOUS at 07:54

## 2017-06-14 RX ADMIN — PANTOPRAZOLE SODIUM 40 MG: 40 TABLET, DELAYED RELEASE ORAL at 05:51

## 2017-06-14 RX ADMIN — ALBUTEROL SULFATE 2.5 MG: 2.5 SOLUTION RESPIRATORY (INHALATION) at 21:50

## 2017-06-14 RX ADMIN — HYDROMORPHONE HYDROCHLORIDE 2 MG: 2 INJECTION, SOLUTION INTRAMUSCULAR; INTRAVENOUS; SUBCUTANEOUS at 16:59

## 2017-06-14 RX ADMIN — LEVOFLOXACIN 500 MG: 5 INJECTION, SOLUTION INTRAVENOUS at 12:02

## 2017-06-14 RX ADMIN — PIPERACILLIN SODIUM,TAZOBACTAM SODIUM 3.38 G: 3; .375 INJECTION, POWDER, FOR SOLUTION INTRAVENOUS at 16:59

## 2017-06-14 RX ADMIN — Medication 2 UNITS: at 12:31

## 2017-06-14 RX ADMIN — ALBUTEROL SULFATE 2.5 MG: 2.5 SOLUTION RESPIRATORY (INHALATION) at 15:16

## 2017-06-14 RX ADMIN — LEVOTHYROXINE SODIUM 75 MCG: 75 TABLET ORAL at 05:52

## 2017-06-14 RX ADMIN — PIPERACILLIN SODIUM,TAZOBACTAM SODIUM 3.38 G: 3; .375 INJECTION, POWDER, FOR SOLUTION INTRAVENOUS at 00:34

## 2017-06-14 ASSESSMENT — PAIN SCALES - GENERAL
PAINLEVEL_OUTOF10: 6
PAINLEVEL_OUTOF10: 6
PAINLEVEL_OUTOF10: 5
PAINLEVEL_OUTOF10: 1
PAINLEVEL_OUTOF10: 4
PAINLEVEL_OUTOF10: 5
PAINLEVEL_OUTOF10: 5
PAINLEVEL_OUTOF10: 6
PAINLEVEL_OUTOF10: 7
PAINLEVEL_OUTOF10: 4
PAINLEVEL_OUTOF10: 7
PAINLEVEL_OUTOF10: 6
PAINLEVEL_OUTOF10: 4
PAINLEVEL_OUTOF10: 5
PAINLEVEL_OUTOF10: 4

## 2017-06-14 ASSESSMENT — PAIN DESCRIPTION - LOCATION: LOCATION: CHEST

## 2017-06-15 PROBLEM — J13 PNEUMONIA DUE TO STREPTOCOCCUS PNEUMONIAE (HCC): Status: ACTIVE | Noted: 2017-06-10

## 2017-06-15 PROBLEM — J86.9 EMPYEMA LUNG (HCC): Status: ACTIVE | Noted: 2017-06-15

## 2017-06-15 LAB
ABSOLUTE EOS #: 0.27 K/UL (ref 0–0.4)
ABSOLUTE LYMPH #: 1.73 K/UL (ref 1–4.8)
ABSOLUTE MONO #: 0.4 K/UL (ref 0.2–0.8)
ANION GAP SERPL CALCULATED.3IONS-SCNC: 14 MMOL/L (ref 9–17)
BASOPHILS # BLD: 0 %
BASOPHILS ABSOLUTE: 0 K/UL (ref 0–0.2)
BUN BLDV-MCNC: 14 MG/DL (ref 6–20)
CHLORIDE BLD-SCNC: 98 MMOL/L (ref 98–107)
CO2: 25 MMOL/L (ref 20–31)
CREAT SERPL-MCNC: 1.28 MG/DL (ref 0.7–1.2)
DIFFERENTIAL TYPE: ABNORMAL
EOSINOPHILS RELATIVE PERCENT: 2 %
GFR AFRICAN AMERICAN: >60 ML/MIN
GFR NON-AFRICAN AMERICAN: 58 ML/MIN
GFR SERPL CREATININE-BSD FRML MDRD: ABNORMAL ML/MIN/{1.73_M2}
GFR SERPL CREATININE-BSD FRML MDRD: ABNORMAL ML/MIN/{1.73_M2}
GLUCOSE BLD-MCNC: 106 MG/DL (ref 75–110)
GLUCOSE BLD-MCNC: 140 MG/DL (ref 75–110)
GLUCOSE BLD-MCNC: 141 MG/DL (ref 75–110)
GLUCOSE BLD-MCNC: 150 MG/DL (ref 75–110)
HCT VFR BLD CALC: 39.3 % (ref 41–53)
HEMOGLOBIN: 12.9 G/DL (ref 13.5–17.5)
LACTIC ACID: 0.7 MMOL/L (ref 0.5–2.2)
LYMPHOCYTES # BLD: 13 %
MCH RBC QN AUTO: 32.2 PG (ref 26–34)
MCHC RBC AUTO-ENTMCNC: 32.8 G/DL (ref 31–37)
MCV RBC AUTO: 98.1 FL (ref 80–100)
MONOCYTES # BLD: 3 %
PDW BLD-RTO: 12.7 % (ref 11.5–14.5)
PLATELET # BLD: 315 K/UL (ref 130–400)
PLATELET ESTIMATE: ABNORMAL
PMV BLD AUTO: ABNORMAL FL (ref 6–12)
POTASSIUM SERPL-SCNC: 4.3 MMOL/L (ref 3.7–5.3)
RBC # BLD: 4.01 M/UL (ref 4.5–5.9)
RBC # BLD: ABNORMAL 10*6/UL
SEG NEUTROPHILS: 82 %
SEGMENTED NEUTROPHILS ABSOLUTE COUNT: 10.9 K/UL (ref 1.8–7.7)
SODIUM BLD-SCNC: 137 MMOL/L (ref 135–144)
WBC # BLD: 13.3 K/UL (ref 3.5–11)
WBC # BLD: ABNORMAL 10*3/UL

## 2017-06-15 PROCEDURE — 2060000000 HC ICU INTERMEDIATE R&B

## 2017-06-15 PROCEDURE — 94760 N-INVAS EAR/PLS OXIMETRY 1: CPT

## 2017-06-15 PROCEDURE — 85025 COMPLETE CBC W/AUTO DIFF WBC: CPT

## 2017-06-15 PROCEDURE — 6370000000 HC RX 637 (ALT 250 FOR IP): Performed by: INTERNAL MEDICINE

## 2017-06-15 PROCEDURE — 2700000000 HC OXYGEN THERAPY PER DAY

## 2017-06-15 PROCEDURE — 2580000003 HC RX 258: Performed by: INTERNAL MEDICINE

## 2017-06-15 PROCEDURE — 82565 ASSAY OF CREATININE: CPT

## 2017-06-15 PROCEDURE — 82947 ASSAY GLUCOSE BLOOD QUANT: CPT

## 2017-06-15 PROCEDURE — 6360000002 HC RX W HCPCS: Performed by: INTERNAL MEDICINE

## 2017-06-15 PROCEDURE — 99232 SBSQ HOSP IP/OBS MODERATE 35: CPT | Performed by: INTERNAL MEDICINE

## 2017-06-15 PROCEDURE — 84520 ASSAY OF UREA NITROGEN: CPT

## 2017-06-15 PROCEDURE — 83605 ASSAY OF LACTIC ACID: CPT

## 2017-06-15 PROCEDURE — 36415 COLL VENOUS BLD VENIPUNCTURE: CPT

## 2017-06-15 PROCEDURE — 80051 ELECTROLYTE PANEL: CPT

## 2017-06-15 PROCEDURE — 94640 AIRWAY INHALATION TREATMENT: CPT

## 2017-06-15 RX ADMIN — OXYCODONE HYDROCHLORIDE AND ACETAMINOPHEN 2 TABLET: 5; 325 TABLET ORAL at 16:22

## 2017-06-15 RX ADMIN — SODIUM CHLORIDE: 9 INJECTION, SOLUTION INTRAVENOUS at 04:05

## 2017-06-15 RX ADMIN — OXYCODONE HYDROCHLORIDE AND ACETAMINOPHEN 2 TABLET: 5; 325 TABLET ORAL at 01:25

## 2017-06-15 RX ADMIN — ALBUTEROL SULFATE 2.5 MG: 2.5 SOLUTION RESPIRATORY (INHALATION) at 15:24

## 2017-06-15 RX ADMIN — HYDROMORPHONE HYDROCHLORIDE 1 MG: 1 INJECTION, SOLUTION INTRAMUSCULAR; INTRAVENOUS; SUBCUTANEOUS at 14:30

## 2017-06-15 RX ADMIN — OXYCODONE HYDROCHLORIDE AND ACETAMINOPHEN 2 TABLET: 5; 325 TABLET ORAL at 06:22

## 2017-06-15 RX ADMIN — Medication 2 UNITS: at 12:54

## 2017-06-15 RX ADMIN — ALBUTEROL SULFATE 2.5 MG: 2.5 SOLUTION RESPIRATORY (INHALATION) at 07:42

## 2017-06-15 RX ADMIN — ALBUTEROL SULFATE 2.5 MG: 2.5 SOLUTION RESPIRATORY (INHALATION) at 20:20

## 2017-06-15 RX ADMIN — FLUOXETINE 40 MG: 20 CAPSULE ORAL at 08:22

## 2017-06-15 RX ADMIN — MAGNESIUM HYDROXIDE 30 ML: 400 SUSPENSION ORAL at 23:24

## 2017-06-15 RX ADMIN — LEVOTHYROXINE SODIUM 75 MCG: 75 TABLET ORAL at 06:03

## 2017-06-15 RX ADMIN — PANTOPRAZOLE SODIUM 40 MG: 40 TABLET, DELAYED RELEASE ORAL at 06:03

## 2017-06-15 RX ADMIN — OXYCODONE HYDROCHLORIDE AND ACETAMINOPHEN 2 TABLET: 5; 325 TABLET ORAL at 11:25

## 2017-06-15 RX ADMIN — HYDROMORPHONE HYDROCHLORIDE 1 MG: 1 INJECTION, SOLUTION INTRAMUSCULAR; INTRAVENOUS; SUBCUTANEOUS at 08:26

## 2017-06-15 RX ADMIN — OXYCODONE HYDROCHLORIDE AND ACETAMINOPHEN 2 TABLET: 5; 325 TABLET ORAL at 23:17

## 2017-06-15 RX ADMIN — LEVOFLOXACIN 750 MG: 5 INJECTION, SOLUTION INTRAVENOUS at 11:56

## 2017-06-15 RX ADMIN — Medication 2 UNITS: at 08:28

## 2017-06-15 RX ADMIN — ALBUTEROL SULFATE 2.5 MG: 2.5 SOLUTION RESPIRATORY (INHALATION) at 11:42

## 2017-06-15 RX ADMIN — ENOXAPARIN SODIUM 40 MG: 40 INJECTION SUBCUTANEOUS at 14:30

## 2017-06-15 ASSESSMENT — PAIN SCALES - GENERAL
PAINLEVEL_OUTOF10: 6
PAINLEVEL_OUTOF10: 5
PAINLEVEL_OUTOF10: 5
PAINLEVEL_OUTOF10: 6
PAINLEVEL_OUTOF10: 5
PAINLEVEL_OUTOF10: 5
PAINLEVEL_OUTOF10: 7
PAINLEVEL_OUTOF10: 7
PAINLEVEL_OUTOF10: 5
PAINLEVEL_OUTOF10: 5
PAINLEVEL_OUTOF10: 6
PAINLEVEL_OUTOF10: 5
PAINLEVEL_OUTOF10: 5
PAINLEVEL_OUTOF10: 7

## 2017-06-16 LAB
ABSOLUTE EOS #: 0.3 K/UL (ref 0–0.4)
ABSOLUTE LYMPH #: 1.4 K/UL (ref 1–4.8)
ABSOLUTE MONO #: 0.8 K/UL (ref 0.2–0.8)
ANION GAP SERPL CALCULATED.3IONS-SCNC: 14 MMOL/L (ref 9–17)
BASOPHILS # BLD: 1 %
BASOPHILS ABSOLUTE: 0.1 K/UL (ref 0–0.2)
BUN BLDV-MCNC: 13 MG/DL (ref 6–20)
CHLORIDE BLD-SCNC: 99 MMOL/L (ref 98–107)
CO2: 24 MMOL/L (ref 20–31)
CREAT SERPL-MCNC: 1.06 MG/DL (ref 0.7–1.2)
CULTURE: NORMAL
CULTURE: NORMAL
DIFFERENTIAL TYPE: ABNORMAL
EOSINOPHILS RELATIVE PERCENT: 2 %
GFR AFRICAN AMERICAN: >60 ML/MIN
GFR NON-AFRICAN AMERICAN: >60 ML/MIN
GFR SERPL CREATININE-BSD FRML MDRD: NORMAL ML/MIN/{1.73_M2}
GFR SERPL CREATININE-BSD FRML MDRD: NORMAL ML/MIN/{1.73_M2}
GLUCOSE BLD-MCNC: 124 MG/DL (ref 75–110)
GLUCOSE BLD-MCNC: 129 MG/DL (ref 75–110)
GLUCOSE BLD-MCNC: 136 MG/DL (ref 75–110)
GLUCOSE BLD-MCNC: 197 MG/DL (ref 75–110)
HCT VFR BLD CALC: 37.9 % (ref 41–53)
HEMOGLOBIN: 12.9 G/DL (ref 13.5–17.5)
LACTIC ACID: 1.1 MMOL/L (ref 0.5–2.2)
LYMPHOCYTES # BLD: 9 %
Lab: NORMAL
MCH RBC QN AUTO: 33.5 PG (ref 26–34)
MCHC RBC AUTO-ENTMCNC: 34 G/DL (ref 31–37)
MCV RBC AUTO: 98.4 FL (ref 80–100)
MONOCYTES # BLD: 6 %
PDW BLD-RTO: 13.4 % (ref 11.5–14.5)
PLATELET # BLD: 310 K/UL (ref 130–400)
PLATELET ESTIMATE: ABNORMAL
PMV BLD AUTO: 6.4 FL (ref 6–12)
POTASSIUM SERPL-SCNC: 4.4 MMOL/L (ref 3.7–5.3)
RBC # BLD: 3.86 M/UL (ref 4.5–5.9)
RBC # BLD: ABNORMAL 10*6/UL
SEG NEUTROPHILS: 82 %
SEGMENTED NEUTROPHILS ABSOLUTE COUNT: 11.9 K/UL (ref 1.8–7.7)
SODIUM BLD-SCNC: 137 MMOL/L (ref 135–144)
SPECIMEN DESCRIPTION: NORMAL
SPECIMEN DESCRIPTION: NORMAL
STATUS: NORMAL
WBC # BLD: 14.5 K/UL (ref 3.5–11)
WBC # BLD: ABNORMAL 10*3/UL

## 2017-06-16 PROCEDURE — 6360000002 HC RX W HCPCS: Performed by: INTERNAL MEDICINE

## 2017-06-16 PROCEDURE — 6370000000 HC RX 637 (ALT 250 FOR IP): Performed by: INTERNAL MEDICINE

## 2017-06-16 PROCEDURE — 94760 N-INVAS EAR/PLS OXIMETRY 1: CPT

## 2017-06-16 PROCEDURE — 99232 SBSQ HOSP IP/OBS MODERATE 35: CPT | Performed by: INTERNAL MEDICINE

## 2017-06-16 PROCEDURE — 94640 AIRWAY INHALATION TREATMENT: CPT

## 2017-06-16 PROCEDURE — 83605 ASSAY OF LACTIC ACID: CPT

## 2017-06-16 PROCEDURE — 82565 ASSAY OF CREATININE: CPT

## 2017-06-16 PROCEDURE — 84520 ASSAY OF UREA NITROGEN: CPT

## 2017-06-16 PROCEDURE — 80051 ELECTROLYTE PANEL: CPT

## 2017-06-16 PROCEDURE — 2060000000 HC ICU INTERMEDIATE R&B

## 2017-06-16 PROCEDURE — 36415 COLL VENOUS BLD VENIPUNCTURE: CPT

## 2017-06-16 PROCEDURE — 2580000003 HC RX 258: Performed by: INTERNAL MEDICINE

## 2017-06-16 PROCEDURE — 82947 ASSAY GLUCOSE BLOOD QUANT: CPT

## 2017-06-16 PROCEDURE — 85025 COMPLETE CBC W/AUTO DIFF WBC: CPT

## 2017-06-16 PROCEDURE — 2700000000 HC OXYGEN THERAPY PER DAY

## 2017-06-16 RX ADMIN — SODIUM CHLORIDE: 9 INJECTION, SOLUTION INTRAVENOUS at 05:55

## 2017-06-16 RX ADMIN — OXYCODONE HYDROCHLORIDE AND ACETAMINOPHEN 2 TABLET: 5; 325 TABLET ORAL at 16:44

## 2017-06-16 RX ADMIN — ENOXAPARIN SODIUM 40 MG: 40 INJECTION SUBCUTANEOUS at 08:55

## 2017-06-16 RX ADMIN — LEVOFLOXACIN 750 MG: 5 INJECTION, SOLUTION INTRAVENOUS at 12:15

## 2017-06-16 RX ADMIN — LEVOTHYROXINE SODIUM 75 MCG: 75 TABLET ORAL at 08:55

## 2017-06-16 RX ADMIN — ALBUTEROL SULFATE 2.5 MG: 2.5 SOLUTION RESPIRATORY (INHALATION) at 15:35

## 2017-06-16 RX ADMIN — PANTOPRAZOLE SODIUM 40 MG: 40 TABLET, DELAYED RELEASE ORAL at 08:55

## 2017-06-16 RX ADMIN — FLUOXETINE 40 MG: 20 CAPSULE ORAL at 08:55

## 2017-06-16 RX ADMIN — ALBUTEROL SULFATE 2.5 MG: 2.5 SOLUTION RESPIRATORY (INHALATION) at 11:37

## 2017-06-16 RX ADMIN — ALBUTEROL SULFATE 2.5 MG: 2.5 SOLUTION RESPIRATORY (INHALATION) at 07:48

## 2017-06-16 RX ADMIN — Medication 2 UNITS: at 12:13

## 2017-06-16 RX ADMIN — ALBUTEROL SULFATE 2.5 MG: 2.5 SOLUTION RESPIRATORY (INHALATION) at 20:05

## 2017-06-16 RX ADMIN — HYDROMORPHONE HYDROCHLORIDE 1 MG: 1 INJECTION, SOLUTION INTRAMUSCULAR; INTRAVENOUS; SUBCUTANEOUS at 20:39

## 2017-06-16 RX ADMIN — OXYCODONE HYDROCHLORIDE AND ACETAMINOPHEN 2 TABLET: 5; 325 TABLET ORAL at 05:54

## 2017-06-16 RX ADMIN — OXYCODONE HYDROCHLORIDE AND ACETAMINOPHEN 2 TABLET: 5; 325 TABLET ORAL at 12:12

## 2017-06-16 ASSESSMENT — PAIN SCALES - GENERAL
PAINLEVEL_OUTOF10: 6
PAINLEVEL_OUTOF10: 6
PAINLEVEL_OUTOF10: 5
PAINLEVEL_OUTOF10: 6
PAINLEVEL_OUTOF10: 5
PAINLEVEL_OUTOF10: 7
PAINLEVEL_OUTOF10: 6

## 2017-06-16 ASSESSMENT — PAIN DESCRIPTION - ORIENTATION: ORIENTATION: LEFT

## 2017-06-16 ASSESSMENT — PAIN DESCRIPTION - LOCATION: LOCATION: SHOULDER

## 2017-06-16 ASSESSMENT — PAIN DESCRIPTION - PAIN TYPE: TYPE: DEEP SOMATIC PAIN

## 2017-06-17 ENCOUNTER — APPOINTMENT (OUTPATIENT)
Dept: GENERAL RADIOLOGY | Age: 57
DRG: 871 | End: 2017-06-17
Payer: COMMERCIAL

## 2017-06-17 LAB
ABSOLUTE EOS #: 0.4 K/UL (ref 0–0.4)
ABSOLUTE LYMPH #: 1.4 K/UL (ref 1–4.8)
ABSOLUTE MONO #: 0.5 K/UL (ref 0.2–0.8)
ANION GAP SERPL CALCULATED.3IONS-SCNC: 13 MMOL/L (ref 9–17)
BASOPHILS # BLD: 0 %
BASOPHILS ABSOLUTE: 0 K/UL (ref 0–0.2)
BUN BLDV-MCNC: 15 MG/DL (ref 6–20)
CHLORIDE BLD-SCNC: 97 MMOL/L (ref 98–107)
CO2: 25 MMOL/L (ref 20–31)
CREAT SERPL-MCNC: 1.05 MG/DL (ref 0.7–1.2)
DIFFERENTIAL TYPE: ABNORMAL
EOSINOPHILS RELATIVE PERCENT: 3 %
GFR AFRICAN AMERICAN: >60 ML/MIN
GFR NON-AFRICAN AMERICAN: >60 ML/MIN
GFR SERPL CREATININE-BSD FRML MDRD: NORMAL ML/MIN/{1.73_M2}
GFR SERPL CREATININE-BSD FRML MDRD: NORMAL ML/MIN/{1.73_M2}
GLUCOSE BLD-MCNC: 111 MG/DL (ref 75–110)
GLUCOSE BLD-MCNC: 131 MG/DL (ref 75–110)
GLUCOSE BLD-MCNC: 143 MG/DL (ref 75–110)
HCT VFR BLD CALC: 37.2 % (ref 41–53)
HEMOGLOBIN: 12.7 G/DL (ref 13.5–17.5)
LACTIC ACID: 0.9 MMOL/L (ref 0.5–2.2)
LYMPHOCYTES # BLD: 9 %
MCH RBC QN AUTO: 33.2 PG (ref 26–34)
MCHC RBC AUTO-ENTMCNC: 34 G/DL (ref 31–37)
MCV RBC AUTO: 97.7 FL (ref 80–100)
MONOCYTES # BLD: 3 %
PDW BLD-RTO: 13.4 % (ref 11.5–14.5)
PLATELET # BLD: 346 K/UL (ref 130–400)
PLATELET ESTIMATE: ABNORMAL
PMV BLD AUTO: 6 FL (ref 6–12)
POTASSIUM SERPL-SCNC: 4.4 MMOL/L (ref 3.7–5.3)
RBC # BLD: 3.81 M/UL (ref 4.5–5.9)
RBC # BLD: ABNORMAL 10*6/UL
SEG NEUTROPHILS: 85 %
SEGMENTED NEUTROPHILS ABSOLUTE COUNT: 13 K/UL (ref 1.8–7.7)
SODIUM BLD-SCNC: 135 MMOL/L (ref 135–144)
WBC # BLD: 15.3 K/UL (ref 3.5–11)
WBC # BLD: ABNORMAL 10*3/UL

## 2017-06-17 PROCEDURE — 83605 ASSAY OF LACTIC ACID: CPT

## 2017-06-17 PROCEDURE — 94640 AIRWAY INHALATION TREATMENT: CPT

## 2017-06-17 PROCEDURE — 2060000000 HC ICU INTERMEDIATE R&B

## 2017-06-17 PROCEDURE — 85025 COMPLETE CBC W/AUTO DIFF WBC: CPT

## 2017-06-17 PROCEDURE — 6370000000 HC RX 637 (ALT 250 FOR IP): Performed by: INTERNAL MEDICINE

## 2017-06-17 PROCEDURE — 2580000003 HC RX 258: Performed by: INTERNAL MEDICINE

## 2017-06-17 PROCEDURE — 82565 ASSAY OF CREATININE: CPT

## 2017-06-17 PROCEDURE — 71010 XR CHEST PORTABLE: CPT

## 2017-06-17 PROCEDURE — 6360000002 HC RX W HCPCS: Performed by: INTERNAL MEDICINE

## 2017-06-17 PROCEDURE — 6360000002 HC RX W HCPCS: Performed by: NURSE PRACTITIONER

## 2017-06-17 PROCEDURE — 80051 ELECTROLYTE PANEL: CPT

## 2017-06-17 PROCEDURE — 99232 SBSQ HOSP IP/OBS MODERATE 35: CPT | Performed by: INTERNAL MEDICINE

## 2017-06-17 PROCEDURE — 82947 ASSAY GLUCOSE BLOOD QUANT: CPT

## 2017-06-17 PROCEDURE — 84520 ASSAY OF UREA NITROGEN: CPT

## 2017-06-17 PROCEDURE — 94760 N-INVAS EAR/PLS OXIMETRY 1: CPT

## 2017-06-17 PROCEDURE — 36415 COLL VENOUS BLD VENIPUNCTURE: CPT

## 2017-06-17 RX ORDER — FUROSEMIDE 10 MG/ML
20 INJECTION INTRAMUSCULAR; INTRAVENOUS ONCE
Status: COMPLETED | OUTPATIENT
Start: 2017-06-17 | End: 2017-06-17

## 2017-06-17 RX ORDER — NICOTINE 21 MG/24HR
1 PATCH, TRANSDERMAL 24 HOURS TRANSDERMAL DAILY
Qty: 30 PATCH | Refills: 3 | Status: SHIPPED | OUTPATIENT
Start: 2017-06-17 | End: 2017-08-17 | Stop reason: ALTCHOICE

## 2017-06-17 RX ORDER — LEVOFLOXACIN 750 MG/1
750 TABLET ORAL DAILY
Qty: 28 TABLET | Refills: 0 | Status: SHIPPED | OUTPATIENT
Start: 2017-06-17 | End: 2017-06-19 | Stop reason: HOSPADM

## 2017-06-17 RX ADMIN — HYDROMORPHONE HYDROCHLORIDE 1 MG: 1 INJECTION, SOLUTION INTRAMUSCULAR; INTRAVENOUS; SUBCUTANEOUS at 12:07

## 2017-06-17 RX ADMIN — PANTOPRAZOLE SODIUM 40 MG: 40 TABLET, DELAYED RELEASE ORAL at 09:46

## 2017-06-17 RX ADMIN — LEVOTHYROXINE SODIUM 75 MCG: 75 TABLET ORAL at 09:46

## 2017-06-17 RX ADMIN — SODIUM CHLORIDE: 9 INJECTION, SOLUTION INTRAVENOUS at 09:54

## 2017-06-17 RX ADMIN — ALBUTEROL SULFATE 2.5 MG: 2.5 SOLUTION RESPIRATORY (INHALATION) at 20:05

## 2017-06-17 RX ADMIN — HYDROMORPHONE HYDROCHLORIDE 1.5 MG: 2 INJECTION, SOLUTION INTRAMUSCULAR; INTRAVENOUS; SUBCUTANEOUS at 23:47

## 2017-06-17 RX ADMIN — FUROSEMIDE 20 MG: 10 INJECTION, SOLUTION INTRAVENOUS at 18:05

## 2017-06-17 RX ADMIN — OXYCODONE HYDROCHLORIDE AND ACETAMINOPHEN 2 TABLET: 5; 325 TABLET ORAL at 17:14

## 2017-06-17 RX ADMIN — ALBUTEROL SULFATE 2.5 MG: 2.5 SOLUTION RESPIRATORY (INHALATION) at 11:31

## 2017-06-17 RX ADMIN — Medication 2 UNITS: at 12:08

## 2017-06-17 RX ADMIN — OXYCODONE HYDROCHLORIDE AND ACETAMINOPHEN 2 TABLET: 5; 325 TABLET ORAL at 03:17

## 2017-06-17 RX ADMIN — OXYCODONE HYDROCHLORIDE AND ACETAMINOPHEN 2 TABLET: 5; 325 TABLET ORAL at 10:00

## 2017-06-17 RX ADMIN — HYDROMORPHONE HYDROCHLORIDE 1 MG: 1 INJECTION, SOLUTION INTRAMUSCULAR; INTRAVENOUS; SUBCUTANEOUS at 19:20

## 2017-06-17 RX ADMIN — ALBUTEROL SULFATE 2.5 MG: 2.5 SOLUTION RESPIRATORY (INHALATION) at 08:12

## 2017-06-17 RX ADMIN — LEVOFLOXACIN 750 MG: 5 INJECTION, SOLUTION INTRAVENOUS at 12:07

## 2017-06-17 RX ADMIN — Medication 2 UNITS: at 16:57

## 2017-06-17 RX ADMIN — ENOXAPARIN SODIUM 40 MG: 40 INJECTION SUBCUTANEOUS at 09:45

## 2017-06-17 RX ADMIN — ALBUTEROL SULFATE 2.5 MG: 2.5 SOLUTION RESPIRATORY (INHALATION) at 15:24

## 2017-06-17 RX ADMIN — FLUOXETINE 40 MG: 20 CAPSULE ORAL at 09:46

## 2017-06-17 ASSESSMENT — PAIN SCALES - GENERAL
PAINLEVEL_OUTOF10: 2
PAINLEVEL_OUTOF10: 8
PAINLEVEL_OUTOF10: 7
PAINLEVEL_OUTOF10: 4
PAINLEVEL_OUTOF10: 8
PAINLEVEL_OUTOF10: 5
PAINLEVEL_OUTOF10: 4
PAINLEVEL_OUTOF10: 6
PAINLEVEL_OUTOF10: 6
PAINLEVEL_OUTOF10: 4
PAINLEVEL_OUTOF10: 7

## 2017-06-17 ASSESSMENT — PAIN DESCRIPTION - LOCATION
LOCATION: CHEST
LOCATION: BACK
LOCATION: BACK
LOCATION: CHEST

## 2017-06-17 ASSESSMENT — PAIN DESCRIPTION - PAIN TYPE
TYPE: CHRONIC PAIN
TYPE: SURGICAL PAIN

## 2017-06-17 ASSESSMENT — PAIN DESCRIPTION - ORIENTATION
ORIENTATION: LOWER
ORIENTATION: RIGHT;POSTERIOR

## 2017-06-17 ASSESSMENT — PAIN DESCRIPTION - FREQUENCY
FREQUENCY: INTERMITTENT

## 2017-06-17 ASSESSMENT — PAIN DESCRIPTION - DESCRIPTORS
DESCRIPTORS: JABBING
DESCRIPTORS: ACHING;DISCOMFORT
DESCRIPTORS: ACHING;DISCOMFORT
DESCRIPTORS: SHARP

## 2017-06-17 ASSESSMENT — PAIN DESCRIPTION - PROGRESSION: CLINICAL_PROGRESSION: GRADUALLY IMPROVING

## 2017-06-17 ASSESSMENT — PAIN DESCRIPTION - ONSET: ONSET: ON-GOING

## 2017-06-18 LAB
ABSOLUTE EOS #: 0.2 K/UL (ref 0–0.4)
ABSOLUTE LYMPH #: 1.2 K/UL (ref 1–4.8)
ABSOLUTE MONO #: 0.6 K/UL (ref 0.2–0.8)
ANION GAP SERPL CALCULATED.3IONS-SCNC: 13 MMOL/L (ref 9–17)
BASOPHILS # BLD: 0 %
BASOPHILS ABSOLUTE: 0 K/UL (ref 0–0.2)
BUN BLDV-MCNC: 16 MG/DL (ref 6–20)
CHLORIDE BLD-SCNC: 96 MMOL/L (ref 98–107)
CO2: 25 MMOL/L (ref 20–31)
CREAT SERPL-MCNC: 1.1 MG/DL (ref 0.7–1.2)
DIFFERENTIAL TYPE: ABNORMAL
EOSINOPHILS RELATIVE PERCENT: 2 %
GFR AFRICAN AMERICAN: >60 ML/MIN
GFR NON-AFRICAN AMERICAN: >60 ML/MIN
GFR SERPL CREATININE-BSD FRML MDRD: NORMAL ML/MIN/{1.73_M2}
GFR SERPL CREATININE-BSD FRML MDRD: NORMAL ML/MIN/{1.73_M2}
GLUCOSE BLD-MCNC: 121 MG/DL (ref 75–110)
GLUCOSE BLD-MCNC: 141 MG/DL (ref 75–110)
GLUCOSE BLD-MCNC: 146 MG/DL (ref 75–110)
GLUCOSE BLD-MCNC: 154 MG/DL (ref 75–110)
GLUCOSE BLD-MCNC: 159 MG/DL (ref 75–110)
HCT VFR BLD CALC: 39.6 % (ref 41–53)
HEMOGLOBIN: 13.4 G/DL (ref 13.5–17.5)
LACTIC ACID: 1.2 MMOL/L (ref 0.5–2.2)
LYMPHOCYTES # BLD: 9 %
MCH RBC QN AUTO: 33.1 PG (ref 26–34)
MCHC RBC AUTO-ENTMCNC: 33.9 G/DL (ref 31–37)
MCV RBC AUTO: 97.8 FL (ref 80–100)
MONOCYTES # BLD: 4 %
PDW BLD-RTO: 13.4 % (ref 11.5–14.5)
PLATELET # BLD: 392 K/UL (ref 130–400)
PLATELET ESTIMATE: ABNORMAL
PMV BLD AUTO: 5.9 FL (ref 6–12)
POTASSIUM SERPL-SCNC: 4.4 MMOL/L (ref 3.7–5.3)
RBC # BLD: 4.05 M/UL (ref 4.5–5.9)
RBC # BLD: ABNORMAL 10*6/UL
SEG NEUTROPHILS: 85 %
SEGMENTED NEUTROPHILS ABSOLUTE COUNT: 11.9 K/UL (ref 1.8–7.7)
SODIUM BLD-SCNC: 134 MMOL/L (ref 135–144)
WBC # BLD: 13.9 K/UL (ref 3.5–11)
WBC # BLD: ABNORMAL 10*3/UL

## 2017-06-18 PROCEDURE — 85025 COMPLETE CBC W/AUTO DIFF WBC: CPT

## 2017-06-18 PROCEDURE — 6360000002 HC RX W HCPCS: Performed by: NURSE PRACTITIONER

## 2017-06-18 PROCEDURE — 6360000002 HC RX W HCPCS: Performed by: INTERNAL MEDICINE

## 2017-06-18 PROCEDURE — 36415 COLL VENOUS BLD VENIPUNCTURE: CPT

## 2017-06-18 PROCEDURE — 80051 ELECTROLYTE PANEL: CPT

## 2017-06-18 PROCEDURE — 6370000000 HC RX 637 (ALT 250 FOR IP): Performed by: INTERNAL MEDICINE

## 2017-06-18 PROCEDURE — 99232 SBSQ HOSP IP/OBS MODERATE 35: CPT | Performed by: INTERNAL MEDICINE

## 2017-06-18 PROCEDURE — 84520 ASSAY OF UREA NITROGEN: CPT

## 2017-06-18 PROCEDURE — 82947 ASSAY GLUCOSE BLOOD QUANT: CPT

## 2017-06-18 PROCEDURE — 83605 ASSAY OF LACTIC ACID: CPT

## 2017-06-18 PROCEDURE — 82565 ASSAY OF CREATININE: CPT

## 2017-06-18 PROCEDURE — 94760 N-INVAS EAR/PLS OXIMETRY 1: CPT

## 2017-06-18 PROCEDURE — 94640 AIRWAY INHALATION TREATMENT: CPT

## 2017-06-18 PROCEDURE — 2060000000 HC ICU INTERMEDIATE R&B

## 2017-06-18 RX ORDER — LEVOFLOXACIN 750 MG/1
750 TABLET ORAL DAILY
Qty: 22 TABLET | Refills: 0
Start: 2017-06-18 | End: 2017-07-10

## 2017-06-18 RX ORDER — ALBUTEROL SULFATE 2.5 MG/3ML
2.5 SOLUTION RESPIRATORY (INHALATION) 3 TIMES DAILY
Status: DISCONTINUED | OUTPATIENT
Start: 2017-06-18 | End: 2017-06-19 | Stop reason: HOSPADM

## 2017-06-18 RX ORDER — FUROSEMIDE 10 MG/ML
20 INJECTION INTRAMUSCULAR; INTRAVENOUS ONCE
Status: COMPLETED | OUTPATIENT
Start: 2017-06-18 | End: 2017-06-18

## 2017-06-18 RX ADMIN — HYDROMORPHONE HYDROCHLORIDE 2 MG: 2 INJECTION, SOLUTION INTRAMUSCULAR; INTRAVENOUS; SUBCUTANEOUS at 19:48

## 2017-06-18 RX ADMIN — ENOXAPARIN SODIUM 40 MG: 40 INJECTION SUBCUTANEOUS at 08:52

## 2017-06-18 RX ADMIN — Medication 2 UNITS: at 12:56

## 2017-06-18 RX ADMIN — FUROSEMIDE 20 MG: 10 INJECTION, SOLUTION INTRAVENOUS at 16:11

## 2017-06-18 RX ADMIN — OXYCODONE HYDROCHLORIDE AND ACETAMINOPHEN 2 TABLET: 5; 325 TABLET ORAL at 06:55

## 2017-06-18 RX ADMIN — LEVOFLOXACIN 750 MG: 5 INJECTION, SOLUTION INTRAVENOUS at 12:48

## 2017-06-18 RX ADMIN — OXYCODONE HYDROCHLORIDE AND ACETAMINOPHEN 2 TABLET: 5; 325 TABLET ORAL at 16:46

## 2017-06-18 RX ADMIN — OXYCODONE HYDROCHLORIDE AND ACETAMINOPHEN 2 TABLET: 5; 325 TABLET ORAL at 12:55

## 2017-06-18 RX ADMIN — ALBUTEROL SULFATE 2.5 MG: 2.5 SOLUTION RESPIRATORY (INHALATION) at 11:47

## 2017-06-18 RX ADMIN — FLUOXETINE 40 MG: 20 CAPSULE ORAL at 08:53

## 2017-06-18 RX ADMIN — PANTOPRAZOLE SODIUM 40 MG: 40 TABLET, DELAYED RELEASE ORAL at 06:52

## 2017-06-18 RX ADMIN — ALBUTEROL SULFATE 2.5 MG: 2.5 SOLUTION RESPIRATORY (INHALATION) at 07:53

## 2017-06-18 RX ADMIN — INSULIN LISPRO 1 UNITS: 100 INJECTION, SOLUTION INTRAVENOUS; SUBCUTANEOUS at 21:59

## 2017-06-18 RX ADMIN — ALBUTEROL SULFATE 2.5 MG: 2.5 SOLUTION RESPIRATORY (INHALATION) at 15:42

## 2017-06-18 RX ADMIN — Medication 2 UNITS: at 18:08

## 2017-06-18 RX ADMIN — LEVOTHYROXINE SODIUM 75 MCG: 75 TABLET ORAL at 06:52

## 2017-06-18 ASSESSMENT — PAIN SCALES - GENERAL
PAINLEVEL_OUTOF10: 4
PAINLEVEL_OUTOF10: 6
PAINLEVEL_OUTOF10: 7
PAINLEVEL_OUTOF10: 2
PAINLEVEL_OUTOF10: 5
PAINLEVEL_OUTOF10: 6
PAINLEVEL_OUTOF10: 6

## 2017-06-18 ASSESSMENT — PAIN DESCRIPTION - PAIN TYPE
TYPE: SURGICAL PAIN

## 2017-06-18 ASSESSMENT — PAIN DESCRIPTION - DESCRIPTORS: DESCRIPTORS: ACHING;DISCOMFORT

## 2017-06-18 ASSESSMENT — PAIN DESCRIPTION - LOCATION: LOCATION: CHEST

## 2017-06-19 ENCOUNTER — APPOINTMENT (OUTPATIENT)
Dept: GENERAL RADIOLOGY | Age: 57
DRG: 871 | End: 2017-06-19
Payer: COMMERCIAL

## 2017-06-19 VITALS
HEIGHT: 76 IN | DIASTOLIC BLOOD PRESSURE: 82 MMHG | TEMPERATURE: 98.2 F | OXYGEN SATURATION: 97 % | HEART RATE: 85 BPM | WEIGHT: 300 LBS | RESPIRATION RATE: 18 BRPM | SYSTOLIC BLOOD PRESSURE: 138 MMHG | BODY MASS INDEX: 36.53 KG/M2

## 2017-06-19 LAB
ABSOLUTE EOS #: 0.4 K/UL (ref 0–0.4)
ABSOLUTE LYMPH #: 2.1 K/UL (ref 1–4.8)
ABSOLUTE MONO #: 0.7 K/UL (ref 0.2–0.8)
ANION GAP SERPL CALCULATED.3IONS-SCNC: 13 MMOL/L (ref 9–17)
BASOPHILS # BLD: 0 %
BASOPHILS ABSOLUTE: 0 K/UL (ref 0–0.2)
BUN BLDV-MCNC: 19 MG/DL (ref 6–20)
CHLORIDE BLD-SCNC: 95 MMOL/L (ref 98–107)
CO2: 26 MMOL/L (ref 20–31)
CREAT SERPL-MCNC: 1.18 MG/DL (ref 0.7–1.2)
DIFFERENTIAL TYPE: ABNORMAL
EOSINOPHILS RELATIVE PERCENT: 3 %
GFR AFRICAN AMERICAN: >60 ML/MIN
GFR NON-AFRICAN AMERICAN: >60 ML/MIN
GFR SERPL CREATININE-BSD FRML MDRD: NORMAL ML/MIN/{1.73_M2}
GFR SERPL CREATININE-BSD FRML MDRD: NORMAL ML/MIN/{1.73_M2}
GLUCOSE BLD-MCNC: 112 MG/DL (ref 75–110)
GLUCOSE BLD-MCNC: 144 MG/DL (ref 75–110)
HCT VFR BLD CALC: 39.8 % (ref 41–53)
HEMOGLOBIN: 13.4 G/DL (ref 13.5–17.5)
LACTIC ACID: 1.3 MMOL/L (ref 0.5–2.2)
LYMPHOCYTES # BLD: 16 %
MCH RBC QN AUTO: 33 PG (ref 26–34)
MCHC RBC AUTO-ENTMCNC: 33.6 G/DL (ref 31–37)
MCV RBC AUTO: 98.1 FL (ref 80–100)
MONOCYTES # BLD: 6 %
PDW BLD-RTO: 13.3 % (ref 11.5–14.5)
PLATELET # BLD: 389 K/UL (ref 130–400)
PLATELET ESTIMATE: ABNORMAL
PMV BLD AUTO: 5.9 FL (ref 6–12)
POTASSIUM SERPL-SCNC: 4.3 MMOL/L (ref 3.7–5.3)
RBC # BLD: 4.06 M/UL (ref 4.5–5.9)
RBC # BLD: ABNORMAL 10*6/UL
SEG NEUTROPHILS: 75 %
SEGMENTED NEUTROPHILS ABSOLUTE COUNT: 9.6 K/UL (ref 1.8–7.7)
SODIUM BLD-SCNC: 134 MMOL/L (ref 135–144)
WBC # BLD: 12.8 K/UL (ref 3.5–11)
WBC # BLD: ABNORMAL 10*3/UL

## 2017-06-19 PROCEDURE — 99232 SBSQ HOSP IP/OBS MODERATE 35: CPT | Performed by: INTERNAL MEDICINE

## 2017-06-19 PROCEDURE — 85025 COMPLETE CBC W/AUTO DIFF WBC: CPT

## 2017-06-19 PROCEDURE — 71010 XR CHEST PORTABLE: CPT

## 2017-06-19 PROCEDURE — 6370000000 HC RX 637 (ALT 250 FOR IP): Performed by: INTERNAL MEDICINE

## 2017-06-19 PROCEDURE — 82947 ASSAY GLUCOSE BLOOD QUANT: CPT

## 2017-06-19 PROCEDURE — 94640 AIRWAY INHALATION TREATMENT: CPT

## 2017-06-19 PROCEDURE — 6360000002 HC RX W HCPCS: Performed by: INTERNAL MEDICINE

## 2017-06-19 PROCEDURE — 83605 ASSAY OF LACTIC ACID: CPT

## 2017-06-19 PROCEDURE — 80051 ELECTROLYTE PANEL: CPT

## 2017-06-19 PROCEDURE — 36415 COLL VENOUS BLD VENIPUNCTURE: CPT

## 2017-06-19 PROCEDURE — 84520 ASSAY OF UREA NITROGEN: CPT

## 2017-06-19 PROCEDURE — 82565 ASSAY OF CREATININE: CPT

## 2017-06-19 RX ORDER — LEVOFLOXACIN 750 MG/1
750 TABLET ORAL ONCE
Status: COMPLETED | OUTPATIENT
Start: 2017-06-19 | End: 2017-06-19

## 2017-06-19 RX ADMIN — OXYCODONE HYDROCHLORIDE AND ACETAMINOPHEN 2 TABLET: 5; 325 TABLET ORAL at 08:12

## 2017-06-19 RX ADMIN — FLUOXETINE 40 MG: 20 CAPSULE ORAL at 08:12

## 2017-06-19 RX ADMIN — ALBUTEROL SULFATE 2.5 MG: 2.5 SOLUTION RESPIRATORY (INHALATION) at 09:52

## 2017-06-19 RX ADMIN — PANTOPRAZOLE SODIUM 40 MG: 40 TABLET, DELAYED RELEASE ORAL at 08:12

## 2017-06-19 RX ADMIN — LEVOTHYROXINE SODIUM 75 MCG: 75 TABLET ORAL at 08:12

## 2017-06-19 RX ADMIN — ENOXAPARIN SODIUM 40 MG: 40 INJECTION SUBCUTANEOUS at 08:13

## 2017-06-19 RX ADMIN — HYDROMORPHONE HYDROCHLORIDE 2 MG: 2 INJECTION, SOLUTION INTRAMUSCULAR; INTRAVENOUS; SUBCUTANEOUS at 02:28

## 2017-06-19 RX ADMIN — Medication 2 UNITS: at 13:00

## 2017-06-19 RX ADMIN — LEVOFLOXACIN 750 MG: 750 TABLET, FILM COATED ORAL at 12:59

## 2017-06-19 ASSESSMENT — PAIN SCALES - GENERAL
PAINLEVEL_OUTOF10: 7
PAINLEVEL_OUTOF10: 4
PAINLEVEL_OUTOF10: 7

## 2017-06-19 ASSESSMENT — PAIN DESCRIPTION - LOCATION: LOCATION: BACK

## 2017-06-19 ASSESSMENT — PAIN DESCRIPTION - PAIN TYPE: TYPE: CHRONIC PAIN

## 2017-06-19 ASSESSMENT — PAIN DESCRIPTION - ORIENTATION: ORIENTATION: LOWER

## 2017-06-20 LAB
CULTURE: NORMAL
CULTURE: NORMAL
DIRECT EXAM: NORMAL
DIRECT EXAM: NORMAL
Lab: NORMAL
SPECIMEN DESCRIPTION: NORMAL
SPECIMEN DESCRIPTION: NORMAL
STATUS: NORMAL

## 2017-06-22 ENCOUNTER — HOSPITAL ENCOUNTER (OUTPATIENT)
Dept: INFUSION THERAPY | Age: 57
Discharge: HOME | End: 2017-06-22
Payer: COMMERCIAL

## 2017-06-22 ENCOUNTER — OFFICE VISIT (OUTPATIENT)
Dept: PRIMARY CARE CLINIC | Age: 57
End: 2017-06-22
Payer: MEDICARE

## 2017-06-22 VITALS
SYSTOLIC BLOOD PRESSURE: 122 MMHG | HEIGHT: 76 IN | DIASTOLIC BLOOD PRESSURE: 74 MMHG | HEART RATE: 96 BPM | WEIGHT: 296 LBS | BODY MASS INDEX: 36.04 KG/M2 | OXYGEN SATURATION: 98 %

## 2017-06-22 DIAGNOSIS — Z13.220 SCREENING FOR HYPERLIPIDEMIA: ICD-10-CM

## 2017-06-22 DIAGNOSIS — C34.31 MALIGNANT NEOPLASM OF LOWER LOBE OF RIGHT LUNG (HCC): ICD-10-CM

## 2017-06-22 DIAGNOSIS — Z12.11 SCREENING FOR COLON CANCER: ICD-10-CM

## 2017-06-22 DIAGNOSIS — Z11.59 NEED FOR HEPATITIS C SCREENING TEST: ICD-10-CM

## 2017-06-22 DIAGNOSIS — Z11.4 SCREENING FOR HIV WITHOUT PRESENCE OF RISK FACTORS: ICD-10-CM

## 2017-06-22 DIAGNOSIS — R07.81 CHEST PAIN, PLEURITIC: Primary | ICD-10-CM

## 2017-06-22 DIAGNOSIS — J42 CHRONIC BRONCHITIS, UNSPECIFIED CHRONIC BRONCHITIS TYPE (HCC): ICD-10-CM

## 2017-06-22 PROCEDURE — 6360000002 HC RX W HCPCS: Performed by: INTERNAL MEDICINE

## 2017-06-22 PROCEDURE — 96523 IRRIG DRUG DELIVERY DEVICE: CPT

## 2017-06-22 PROCEDURE — 2580000003 HC RX 258: Performed by: INTERNAL MEDICINE

## 2017-06-22 PROCEDURE — 99214 OFFICE O/P EST MOD 30 MIN: CPT | Performed by: INTERNAL MEDICINE

## 2017-06-22 RX ORDER — HEPARIN SODIUM (PORCINE) LOCK FLUSH IV SOLN 100 UNIT/ML 100 UNIT/ML
500 SOLUTION INTRAVENOUS PRN
Status: DISCONTINUED | OUTPATIENT
Start: 2017-06-22 | End: 2017-06-23 | Stop reason: HOSPADM

## 2017-06-22 RX ORDER — SODIUM CHLORIDE 0.9 % (FLUSH) 0.9 %
20 SYRINGE (ML) INJECTION PRN
Status: CANCELLED | OUTPATIENT
Start: 2017-06-22

## 2017-06-22 RX ORDER — HEPARIN SODIUM (PORCINE) LOCK FLUSH IV SOLN 100 UNIT/ML 100 UNIT/ML
500 SOLUTION INTRAVENOUS PRN
Status: CANCELLED | OUTPATIENT
Start: 2017-06-22

## 2017-06-22 RX ORDER — SODIUM CHLORIDE 0.9 % (FLUSH) 0.9 %
10 SYRINGE (ML) INJECTION PRN
Status: DISCONTINUED | OUTPATIENT
Start: 2017-06-22 | End: 2017-06-23 | Stop reason: HOSPADM

## 2017-06-22 RX ORDER — SODIUM CHLORIDE 0.9 % (FLUSH) 0.9 %
10 SYRINGE (ML) INJECTION PRN
Status: CANCELLED | OUTPATIENT
Start: 2017-06-22

## 2017-06-22 RX ORDER — HYDROCODONE BITARTRATE AND ACETAMINOPHEN 5; 325 MG/1; MG/1
1 TABLET ORAL EVERY 8 HOURS PRN
Qty: 15 TABLET | Refills: 0 | Status: SHIPPED | OUTPATIENT
Start: 2017-06-22 | End: 2017-06-29

## 2017-06-22 RX ADMIN — Medication 10 ML: at 08:09

## 2017-06-22 RX ADMIN — SODIUM CHLORIDE, PRESERVATIVE FREE 500 UNITS: 5 INJECTION INTRAVENOUS at 08:09

## 2017-06-22 RX ADMIN — Medication 10 ML: at 08:08

## 2017-06-22 ASSESSMENT — ENCOUNTER SYMPTOMS
VOMITING: 0
SORE THROAT: 0
ABDOMINAL PAIN: 0
SHORTNESS OF BREATH: 1
NAUSEA: 0
BACK PAIN: 0
EYE DISCHARGE: 0
TROUBLE SWALLOWING: 0
EYE REDNESS: 0
COUGH: 1

## 2017-06-22 NOTE — PROGRESS NOTES
Pt is here for port flush. Pt was discharged in stable condition and will return in 2 months for port access.

## 2017-07-18 DIAGNOSIS — F32.A DEPRESSION, UNSPECIFIED DEPRESSION TYPE: ICD-10-CM

## 2017-07-18 RX ORDER — FLUOXETINE HYDROCHLORIDE 40 MG/1
CAPSULE ORAL
Qty: 60 CAPSULE | Refills: 3 | Status: SHIPPED | OUTPATIENT
Start: 2017-07-18 | End: 2018-08-22 | Stop reason: SDUPTHER

## 2017-07-31 LAB
CULTURE: NORMAL
CULTURE: NORMAL
DIRECT EXAM: NORMAL
Lab: NORMAL
SPECIMEN DESCRIPTION: NORMAL
SPECIMEN DESCRIPTION: NORMAL
STATUS: NORMAL

## 2017-08-09 ENCOUNTER — HOSPITAL ENCOUNTER (OUTPATIENT)
Dept: CT IMAGING | Age: 57
Discharge: HOME | End: 2017-08-09
Payer: MEDICARE

## 2017-08-09 ENCOUNTER — HOSPITAL ENCOUNTER (OUTPATIENT)
Dept: INFUSION THERAPY | Age: 57
Discharge: HOME | End: 2017-08-09
Payer: MEDICARE

## 2017-08-09 VITALS
HEART RATE: 75 BPM | TEMPERATURE: 98.1 F | DIASTOLIC BLOOD PRESSURE: 86 MMHG | SYSTOLIC BLOOD PRESSURE: 132 MMHG | RESPIRATION RATE: 16 BRPM

## 2017-08-09 DIAGNOSIS — C34.31 MALIGNANT NEOPLASM OF LOWER LOBE OF RIGHT LUNG (HCC): ICD-10-CM

## 2017-08-09 DIAGNOSIS — F17.200 SMOKING ADDICTION: ICD-10-CM

## 2017-08-09 LAB
ABSOLUTE EOS #: 0.3 K/UL (ref 0–0.4)
ABSOLUTE LYMPH #: 2.4 K/UL (ref 1–4.8)
ABSOLUTE MONO #: 0.7 K/UL (ref 0.1–1.2)
ALBUMIN SERPL-MCNC: 3.9 G/DL (ref 3.5–5.2)
ALBUMIN/GLOBULIN RATIO: 0.9 (ref 1–2.5)
ALP BLD-CCNC: 74 U/L (ref 40–129)
ALT SERPL-CCNC: 19 U/L (ref 5–41)
ANION GAP SERPL CALCULATED.3IONS-SCNC: 15 MMOL/L (ref 9–17)
AST SERPL-CCNC: 16 U/L
BASOPHILS # BLD: 2 %
BASOPHILS ABSOLUTE: 0.2 K/UL (ref 0–0.2)
BILIRUB SERPL-MCNC: 0.18 MG/DL (ref 0.3–1.2)
BUN BLDV-MCNC: 21 MG/DL (ref 6–20)
BUN/CREAT BLD: ABNORMAL (ref 9–20)
CALCIUM SERPL-MCNC: 9.3 MG/DL (ref 8.6–10.4)
CHLORIDE BLD-SCNC: 103 MMOL/L (ref 98–107)
CO2: 23 MMOL/L (ref 20–31)
CREAT SERPL-MCNC: 1.03 MG/DL (ref 0.7–1.2)
DIFFERENTIAL TYPE: ABNORMAL
EOSINOPHILS RELATIVE PERCENT: 3 %
GFR AFRICAN AMERICAN: >60 ML/MIN
GFR NON-AFRICAN AMERICAN: >60 ML/MIN
GFR SERPL CREATININE-BSD FRML MDRD: ABNORMAL ML/MIN/{1.73_M2}
GFR SERPL CREATININE-BSD FRML MDRD: ABNORMAL ML/MIN/{1.73_M2}
GLUCOSE BLD-MCNC: 129 MG/DL (ref 70–99)
HCT VFR BLD CALC: 39.3 % (ref 41–53)
HEMOGLOBIN: 13.2 G/DL (ref 13.5–17.5)
LYMPHOCYTES # BLD: 23 %
MCH RBC QN AUTO: 31.2 PG (ref 26–34)
MCHC RBC AUTO-ENTMCNC: 33.5 G/DL (ref 31–37)
MCV RBC AUTO: 93.2 FL (ref 80–100)
MONOCYTES # BLD: 7 %
PDW BLD-RTO: 15.3 % (ref 12.5–15.4)
PLATELET # BLD: 227 K/UL (ref 140–450)
PLATELET ESTIMATE: ABNORMAL
PMV BLD AUTO: 6.9 FL (ref 6–12)
POTASSIUM SERPL-SCNC: 4.5 MMOL/L (ref 3.7–5.3)
RBC # BLD: 4.22 M/UL (ref 4.5–5.9)
RBC # BLD: ABNORMAL 10*6/UL
SEG NEUTROPHILS: 65 %
SEGMENTED NEUTROPHILS ABSOLUTE COUNT: 6.7 K/UL (ref 1.8–7.7)
SODIUM BLD-SCNC: 141 MMOL/L (ref 135–144)
TOTAL PROTEIN: 8.3 G/DL (ref 6.4–8.3)
WBC # BLD: 10.2 K/UL (ref 3.5–11)
WBC # BLD: ABNORMAL 10*3/UL

## 2017-08-09 PROCEDURE — 6360000002 HC RX W HCPCS: Performed by: INTERNAL MEDICINE

## 2017-08-09 PROCEDURE — 2580000003 HC RX 258: Performed by: INTERNAL MEDICINE

## 2017-08-09 PROCEDURE — 36591 DRAW BLOOD OFF VENOUS DEVICE: CPT

## 2017-08-09 PROCEDURE — 80053 COMPREHEN METABOLIC PANEL: CPT

## 2017-08-09 PROCEDURE — 85025 COMPLETE CBC W/AUTO DIFF WBC: CPT

## 2017-08-09 PROCEDURE — 71260 CT THORAX DX C+: CPT

## 2017-08-09 PROCEDURE — 6360000004 HC RX CONTRAST MEDICATION: Performed by: INTERNAL MEDICINE

## 2017-08-09 RX ORDER — 0.9 % SODIUM CHLORIDE 0.9 %
100 INTRAVENOUS SOLUTION INTRAVENOUS ONCE
Status: COMPLETED | OUTPATIENT
Start: 2017-08-09 | End: 2017-08-09

## 2017-08-09 RX ORDER — HEPARIN SODIUM (PORCINE) LOCK FLUSH IV SOLN 100 UNIT/ML 100 UNIT/ML
500 SOLUTION INTRAVENOUS PRN
Status: DISCONTINUED | OUTPATIENT
Start: 2017-08-09 | End: 2017-08-10 | Stop reason: HOSPADM

## 2017-08-09 RX ORDER — SODIUM CHLORIDE 0.9 % (FLUSH) 0.9 %
10 SYRINGE (ML) INJECTION PRN
Status: DISCONTINUED | OUTPATIENT
Start: 2017-08-09 | End: 2017-08-12 | Stop reason: HOSPADM

## 2017-08-09 RX ORDER — SODIUM CHLORIDE 0.9 % (FLUSH) 0.9 %
10 SYRINGE (ML) INJECTION PRN
Status: DISCONTINUED | OUTPATIENT
Start: 2017-08-09 | End: 2017-08-10 | Stop reason: HOSPADM

## 2017-08-09 RX ORDER — SODIUM CHLORIDE 0.9 % (FLUSH) 0.9 %
20 SYRINGE (ML) INJECTION PRN
Status: CANCELLED | OUTPATIENT
Start: 2017-08-09

## 2017-08-09 RX ORDER — HEPARIN SODIUM (PORCINE) LOCK FLUSH IV SOLN 100 UNIT/ML 100 UNIT/ML
500 SOLUTION INTRAVENOUS PRN
Status: CANCELLED | OUTPATIENT
Start: 2017-08-09

## 2017-08-09 RX ORDER — OXYCODONE HYDROCHLORIDE AND ACETAMINOPHEN 5; 325 MG/1; MG/1
1 TABLET ORAL EVERY 6 HOURS PRN
Qty: 60 TABLET | Refills: 0 | Status: SHIPPED | OUTPATIENT
Start: 2017-08-09 | End: 2017-09-28 | Stop reason: SDUPTHER

## 2017-08-09 RX ORDER — SODIUM CHLORIDE 0.9 % (FLUSH) 0.9 %
10 SYRINGE (ML) INJECTION PRN
Status: CANCELLED | OUTPATIENT
Start: 2017-08-09

## 2017-08-09 RX ADMIN — Medication 10 ML: at 09:21

## 2017-08-09 RX ADMIN — IOVERSOL 80 ML: 741 INJECTION INTRA-ARTERIAL; INTRAVENOUS at 09:18

## 2017-08-09 RX ADMIN — Medication 10 ML: at 09:31

## 2017-08-09 RX ADMIN — SODIUM CHLORIDE, PRESERVATIVE FREE 500 UNITS: 5 INJECTION INTRAVENOUS at 09:31

## 2017-08-09 RX ADMIN — Medication 10 ML: at 08:37

## 2017-08-09 RX ADMIN — Medication 20 ML: at 08:38

## 2017-08-09 RX ADMIN — SODIUM CHLORIDE 60 ML: 9 INJECTION, SOLUTION INTRAVENOUS at 09:21

## 2017-08-09 ASSESSMENT — PAIN DESCRIPTION - FREQUENCY: FREQUENCY: INTERMITTENT

## 2017-08-09 ASSESSMENT — PAIN DESCRIPTION - PAIN TYPE: TYPE: SURGICAL PAIN

## 2017-08-09 ASSESSMENT — PAIN DESCRIPTION - PROGRESSION: CLINICAL_PROGRESSION: GRADUALLY WORSENING

## 2017-08-09 ASSESSMENT — PAIN DESCRIPTION - LOCATION: LOCATION: CHEST

## 2017-08-09 ASSESSMENT — PAIN DESCRIPTION - DESCRIPTORS: DESCRIPTORS: ACHING

## 2017-08-09 ASSESSMENT — PAIN DESCRIPTION - ORIENTATION: ORIENTATION: RIGHT

## 2017-08-09 ASSESSMENT — PAIN SCALES - GENERAL: PAINLEVEL_OUTOF10: 4

## 2017-08-09 NOTE — PROGRESS NOTES
Patient left chest port was accessed for CT without difficulty and was flushed, heparinized and de-accessed after CT. Patient requested refill on Percocet when he was here. Refill request sent to Dr. Tahir Hutchins. Patient discharged with C/O. Will return on 8/17/17 for appt.  with Dr. Tahir Hutchins

## 2017-08-17 ENCOUNTER — OFFICE VISIT (OUTPATIENT)
Dept: ONCOLOGY | Age: 57
End: 2017-08-17
Payer: MEDICARE

## 2017-08-17 VITALS
HEART RATE: 76 BPM | TEMPERATURE: 98 F | WEIGHT: 296.7 LBS | BODY MASS INDEX: 36.12 KG/M2 | DIASTOLIC BLOOD PRESSURE: 80 MMHG | SYSTOLIC BLOOD PRESSURE: 128 MMHG

## 2017-08-17 DIAGNOSIS — C34.31 MALIGNANT NEOPLASM OF LOWER LOBE OF RIGHT LUNG (HCC): Primary | ICD-10-CM

## 2017-08-17 PROCEDURE — 99214 OFFICE O/P EST MOD 30 MIN: CPT | Performed by: INTERNAL MEDICINE

## 2017-08-17 PROCEDURE — 99211 OFF/OP EST MAY X REQ PHY/QHP: CPT

## 2017-09-20 ENCOUNTER — HOSPITAL ENCOUNTER (OUTPATIENT)
Dept: INFUSION THERAPY | Age: 57
Discharge: HOME | End: 2017-09-20
Payer: MEDICARE

## 2017-09-20 DIAGNOSIS — C34.31 MALIGNANT NEOPLASM OF LOWER LOBE OF RIGHT LUNG (HCC): ICD-10-CM

## 2017-09-20 PROCEDURE — 96523 IRRIG DRUG DELIVERY DEVICE: CPT

## 2017-09-20 PROCEDURE — 6360000002 HC RX W HCPCS: Performed by: INTERNAL MEDICINE

## 2017-09-20 PROCEDURE — 2580000003 HC RX 258: Performed by: INTERNAL MEDICINE

## 2017-09-20 RX ORDER — HEPARIN SODIUM (PORCINE) LOCK FLUSH IV SOLN 100 UNIT/ML 100 UNIT/ML
500 SOLUTION INTRAVENOUS PRN
Status: CANCELLED | OUTPATIENT
Start: 2017-09-20

## 2017-09-20 RX ORDER — SODIUM CHLORIDE 0.9 % (FLUSH) 0.9 %
20 SYRINGE (ML) INJECTION PRN
Status: CANCELLED | OUTPATIENT
Start: 2017-09-20

## 2017-09-20 RX ORDER — HEPARIN SODIUM (PORCINE) LOCK FLUSH IV SOLN 100 UNIT/ML 100 UNIT/ML
500 SOLUTION INTRAVENOUS PRN
Status: DISCONTINUED | OUTPATIENT
Start: 2017-09-20 | End: 2017-09-21 | Stop reason: HOSPADM

## 2017-09-20 RX ORDER — SODIUM CHLORIDE 0.9 % (FLUSH) 0.9 %
10 SYRINGE (ML) INJECTION PRN
Status: CANCELLED | OUTPATIENT
Start: 2017-09-20

## 2017-09-20 RX ORDER — SODIUM CHLORIDE 0.9 % (FLUSH) 0.9 %
20 SYRINGE (ML) INJECTION PRN
Status: DISCONTINUED | OUTPATIENT
Start: 2017-09-20 | End: 2017-09-21 | Stop reason: HOSPADM

## 2017-09-20 RX ADMIN — Medication 20 ML: at 08:20

## 2017-09-20 RX ADMIN — SODIUM CHLORIDE, PRESERVATIVE FREE 500 UNITS: 5 INJECTION INTRAVENOUS at 08:20

## 2017-09-20 NOTE — PROGRESS NOTES
Pt here today for port flush port was flushed and in good working order. Pt d/c in stable condition.

## 2017-09-28 RX ORDER — OXYCODONE HYDROCHLORIDE AND ACETAMINOPHEN 5; 325 MG/1; MG/1
1 TABLET ORAL EVERY 6 HOURS PRN
Qty: 60 TABLET | Refills: 0 | Status: SHIPPED | OUTPATIENT
Start: 2017-09-28 | End: 2017-11-16 | Stop reason: SDUPTHER

## 2017-10-03 ENCOUNTER — OFFICE VISIT (OUTPATIENT)
Dept: PRIMARY CARE CLINIC | Age: 57
End: 2017-10-03
Payer: MEDICARE

## 2017-10-03 VITALS
HEART RATE: 68 BPM | DIASTOLIC BLOOD PRESSURE: 80 MMHG | OXYGEN SATURATION: 98 % | WEIGHT: 295.2 LBS | SYSTOLIC BLOOD PRESSURE: 132 MMHG | HEIGHT: 76 IN | RESPIRATION RATE: 16 BRPM | BODY MASS INDEX: 35.95 KG/M2

## 2017-10-03 DIAGNOSIS — L40.9 SCALP PSORIASIS: ICD-10-CM

## 2017-10-03 DIAGNOSIS — I10 ESSENTIAL HYPERTENSION: ICD-10-CM

## 2017-10-03 DIAGNOSIS — G89.29 CHRONIC LOW BACK PAIN, UNSPECIFIED BACK PAIN LATERALITY, WITH SCIATICA PRESENCE UNSPECIFIED: ICD-10-CM

## 2017-10-03 DIAGNOSIS — E11.9 TYPE 2 DIABETES MELLITUS WITHOUT COMPLICATION, WITHOUT LONG-TERM CURRENT USE OF INSULIN (HCC): Primary | ICD-10-CM

## 2017-10-03 DIAGNOSIS — Z23 NEED FOR INFLUENZA VACCINATION: ICD-10-CM

## 2017-10-03 DIAGNOSIS — M54.5 CHRONIC LOW BACK PAIN, UNSPECIFIED BACK PAIN LATERALITY, WITH SCIATICA PRESENCE UNSPECIFIED: ICD-10-CM

## 2017-10-03 PROCEDURE — G0008 ADMIN INFLUENZA VIRUS VAC: HCPCS | Performed by: INTERNAL MEDICINE

## 2017-10-03 PROCEDURE — 99214 OFFICE O/P EST MOD 30 MIN: CPT | Performed by: INTERNAL MEDICINE

## 2017-10-03 PROCEDURE — 90688 IIV4 VACCINE SPLT 0.5 ML IM: CPT | Performed by: INTERNAL MEDICINE

## 2017-10-03 RX ORDER — CLOBETASOL PROPIONATE 0.5 MG/G
AEROSOL, FOAM TOPICAL
Qty: 100 G | Refills: 3 | Status: SHIPPED | OUTPATIENT
Start: 2017-10-03 | End: 2017-10-10

## 2017-10-03 RX ORDER — ALBUTEROL SULFATE 2.5 MG/3ML
SOLUTION RESPIRATORY (INHALATION)
Refills: 0 | COMMUNITY
Start: 2017-09-28 | End: 2021-01-01 | Stop reason: ALTCHOICE

## 2017-10-03 RX ORDER — OXYCODONE HYDROCHLORIDE AND ACETAMINOPHEN 5; 325 MG/1; MG/1
1 TABLET ORAL EVERY 6 HOURS PRN
Qty: 60 TABLET | Refills: 0 | Status: CANCELLED | OUTPATIENT
Start: 2017-10-03

## 2017-10-03 RX ORDER — LISINOPRIL 20 MG/1
20 TABLET ORAL DAILY
Qty: 90 TABLET | Refills: 3 | Status: SHIPPED | OUTPATIENT
Start: 2017-10-03 | End: 2020-06-30 | Stop reason: SDUPTHER

## 2017-10-03 RX ORDER — TIZANIDINE 4 MG/1
4 TABLET ORAL EVERY 8 HOURS PRN
Qty: 60 TABLET | Refills: 3 | Status: SHIPPED | OUTPATIENT
Start: 2017-10-03 | End: 2018-04-16 | Stop reason: ALTCHOICE

## 2017-10-03 ASSESSMENT — ENCOUNTER SYMPTOMS
COUGH: 0
EYE DISCHARGE: 0
EYE REDNESS: 0
VOMITING: 0
SORE THROAT: 0
NAUSEA: 0
ABDOMINAL PAIN: 0
SHORTNESS OF BREATH: 0
BACK PAIN: 0
TROUBLE SWALLOWING: 0

## 2017-10-03 NOTE — PROGRESS NOTES
anesthesia     SOB (shortness of breath)     Urine frequency       Past Surgical History:   Procedure Laterality Date    LAMINECTOMY      LUNG REMOVAL, PARTIAL Right 2016    lower lobe    OTHER SURGICAL HISTORY Right 06/13/2017    CT guided placement of right pleural drainage catheter     TESTICLE REMOVAL Left 1982       Family History   Problem Relation Age of Onset    Cancer Mother     Cancer Father     Mental Illness Sister     Mental Illness Brother     Mental Illness Sister     Mental Illness Sister     Mental Illness Sister     Mental Illness Brother     Other Brother        Social History   Substance Use Topics    Smoking status: Current Every Day Smoker     Packs/day: 0.25     Years: 40.00     Types: Cigarettes    Smokeless tobacco: Never Used    Alcohol use No      Current Outpatient Prescriptions   Medication Sig Dispense Refill    albuterol (PROVENTIL) (2.5 MG/3ML) 0.083% nebulizer solution   0    lisinopril (PRINIVIL;ZESTRIL) 20 MG tablet Take 1 tablet by mouth daily 90 tablet 3    tiZANidine (ZANAFLEX) 4 MG tablet Take 1 tablet by mouth every 8 hours as needed (muscle spasm) 60 tablet 3    clobetasol (OLUX) 0.05 % foam Apply topically 2 times daily. 100 g 3    oxyCODONE-acetaminophen (PERCOCET) 5-325 MG per tablet Take 1 tablet by mouth every 6 hours as needed for Pain .  60 tablet 0    FLUoxetine (PROZAC) 40 MG capsule take 1 capsule by mouth once daily 60 capsule 3    ipratropium-albuterol (DUONEB) 0.5-2.5 (3) MG/3ML SOLN nebulizer solution Inhale 1 vial into the lungs every 6 hours as needed for Shortness of Breath      lovastatin (ALTOPREV) 60 MG extended release tablet Take 60 mg by mouth nightly      metFORMIN (GLUCOPHAGE) 500 MG tablet Take 1 tablet by mouth 2 times daily (with meals) 120 tablet 3    levothyroxine (SYNTHROID) 75 MCG tablet Take 1 tablet by mouth daily 60 tablet 3    cyclobenzaprine (FLEXERIL) 10 MG tablet Take 1 tablet by mouth 2 times daily as needed for Muscle spasms 60 tablet 3    omeprazole (PRILOSEC) 20 MG delayed release capsule Take 1 capsule by mouth Daily 60 capsule 3     No current facility-administered medications for this visit. Allergies   Allergen Reactions    Emend [Fosaprepitant Dimeglumine] Other (See Comments)     Reported back pain, warmth all over, nausea & SOB       Health Maintenance   Topic Date Due    Hepatitis C screen  1960    Diabetic retinal exam  12/02/1970    Lipid screen  12/02/1970    HIV screen  12/02/1975    Colon cancer screen colonoscopy  12/02/2010    DTaP/Tdap/Td vaccine (1 - Tdap) 10/25/2017 (Originally 12/2/1979)    Diabetic hemoglobin A1C test  06/10/2018    Diabetic foot exam  10/03/2018    Flu vaccine  Completed    Pneumococcal med risk  Completed       Subjective:      Review of Systems   Constitutional: Negative for activity change, appetite change, fatigue, fever and unexpected weight change. HENT: Negative for postnasal drip, sore throat and trouble swallowing. Scalp itching sec to scalp psoriasis    Eyes: Negative for discharge and redness. Respiratory: Negative for cough and shortness of breath. Cardiovascular: Negative for chest pain and palpitations. Gastrointestinal: Negative for abdominal pain, nausea and vomiting. Endocrine: Negative for cold intolerance and heat intolerance. Genitourinary: Negative for difficulty urinating and dysuria. Musculoskeletal: Negative for arthralgias, back pain and myalgias. Skin: Negative for rash and wound. Neurological: Negative for dizziness and headaches. Hematological: Negative for adenopathy. Psychiatric/Behavioral: Negative for behavioral problems. The patient is not nervous/anxious. Objective:     Physical Exam   Constitutional: He is oriented to person, place, and time. He appears well-developed and well-nourished. No distress. HENT:   Head: Normocephalic and atraumatic.    Right Ear: External ear normal.   Left Answer:   12 hr fasting    Hemoglobin A1C     Standing Status:   Future     Standing Expiration Date:   10/3/2018   Bud Sandy MD, Pain Management [place]*     Referral Priority:   Routine     Referral Type:   Consult for Advice and Opinion     Referral Reason:   Specialty Services Required     Referred to Provider:   Guerline Gaspar MD     Requested Specialty:   Pain Management     Number of Visits Requested:   1     DIABETES FOOT EXAM     Orders Placed This Encounter   Medications    lisinopril (PRINIVIL;ZESTRIL) 20 MG tablet     Sig: Take 1 tablet by mouth daily     Dispense:  90 tablet     Refill:  3    tiZANidine (ZANAFLEX) 4 MG tablet     Sig: Take 1 tablet by mouth every 8 hours as needed (muscle spasm)     Dispense:  60 tablet     Refill:  3    clobetasol (OLUX) 0.05 % foam     Sig: Apply topically 2 times daily. Dispense:  100 g     Refill:  3       Patient given educational materials - see patient instructions. Discussed use, benefit, and side effects of prescribed medications. All patient questions answered. Pt voiced understanding. Reviewed health maintenance. Instructed to continue current medications, diet and exercise. Patient agreed with treatment plan. Follow up as directed.      Electronically signed by Yue Rees MD on 10/3/2017 at 12:37 PM

## 2017-10-03 NOTE — MR AVS SNAPSHOT
After Visit Summary             Ricky Hayes   10/3/2017 10:40 AM   Office Visit    Description:  Male : 1960   Provider:  Willi Sloan MD   Department:  [facility] Internal Medicine              Your Follow-Up and Future Appointments         Below is a list of your follow-up and future appointments. This may not be a complete list as you may have made appointments directly with providers that we are not aware of or your providers may have made some for you. Please call your providers to confirm appointments. It is important to keep your appointments. Please bring your current insurance card, photo ID, co-pay, and all medication bottles to your appointment. If self-pay, payment is expected at the time of service. Your To-Do List     Future Appointments Provider Department Dept Phone    2017 8:00 AM MICHAEL ALEJANDRA MED ONC CHAIR 03 [facility] Patrick Med Onc [phone]    2017 8:00 AM Mulu Rayo MD [address] [facility] [phone]    Please arrive 15 minutes prior to appointment, bring photo ID and insurance card. 2017 8:00 AM Willi Sloan MD [facility] Internal Medicine [phone]    Please arrive 15 minutes prior to appointment, bring photo ID and insurance card. Future Orders Complete By Expires    Hemoglobin A1C [LAB90 Custom]  12/3/2017 10/3/2018    Lipid Panel [LAB18 Custom]  12/3/2017 10/3/2018    Follow-Up    Return if symptoms worsen or fail to improve.          Information from Your Visit        Department     Name Address Phone [facility] Internal Medicine [address] [place] [address]  [address]  [facility] [address] [facility] [phone]      You Were Seen for:         Comments    Type 2 diabetes mellitus without complication, without long-term current use of insulin ([facility]   [[id number]]         Vital Signs     Blood Pressure Pulse Respirations Height Weight Oxygen Saturation    132/80 68 16 6' 4\" (1.93 m) 295 lb 3.2 oz (133.9 kg) 98% Body Mass Index Smoking Status                35.93 kg/m2 Current Every Day Smoker          Additional Information about your Body Mass Index (BMI)           Your BMI as listed above is considered obese (30 or more). BMI is an estimate of body fat, calculated from your height and weight. The higher your BMI, the greater your risk of heart disease, high blood pressure, type 2 diabetes, stroke, gallstones, arthritis, sleep apnea, and certain cancers. BMI is not perfect. It may overestimate body fat in athletes and people who are more muscular. Even a small weight loss (between 5 and 10 percent of your current weight) by decreasing your calorie intake and becoming more physically active will help lower your risk of developing or worsening diseases associated with obesity. Learn more at: [employer]             Today's Medication Changes          These changes are accurate as of: 10/3/17 11:11 AM.  If you have any questions, ask your nurse or doctor. START taking these medications           clobetasol 0.05 % foam   Commonly known as:  OLUX   Instructions:  Apply topically 2 times daily. Quantity:  100 g   Refills:  3   Started by:  Anthony Salas MD       tiZANidine 4 MG tablet   Commonly known as:  ZANAFLEX   Instructions: Take 1 tablet by mouth every 8 hours as needed (muscle spasm)   Quantity:  60 tablet   Refills:  3   Started by:  Anthony Salas MD         CHANGE how you take these medications           lisinopril 20 MG tablet   Commonly known as:  PRINIVIL;ZESTRIL   Instructions:   Take 1 tablet by mouth daily   Quantity:  90 tablet   Refills:  3   What changed:    - medication strength  - how much to take   Changed by:  Anthony Salas MD            Where to Get Your Medications      These medications were sent to [address] [place] [address] [phone] - [address] [facility] [address]     Phone:  [phone]     clobetasol 0.05 % foam    lisinopril 20 MG tablet    tiZANidine 4 MG tablet               Your Current Medications Are              albuterol (PROVENTIL) (2.5 MG/3ML) 0.083% nebulizer solution     lisinopril (PRINIVIL;ZESTRIL) 20 MG tablet Take 1 tablet by mouth daily    tiZANidine (ZANAFLEX) 4 MG tablet Take 1 tablet by mouth every 8 hours as needed (muscle spasm)    clobetasol (OLUX) 0.05 % foam Apply topically 2 times daily. oxyCODONE-acetaminophen (PERCOCET) 5-325 MG per tablet Take 1 tablet by mouth every 6 hours as needed for Pain . FLUoxetine (PROZAC) 40 MG capsule take 1 capsule by mouth once daily    ipratropium-albuterol (DUONEB) 0.5-2.5 (3) MG/3ML SOLN nebulizer solution Inhale 1 vial into the lungs every 6 hours as needed for Shortness of Breath    lovastatin (ALTOPREV) 60 MG extended release tablet Take 60 mg by mouth nightly    metFORMIN (GLUCOPHAGE) 500 MG tablet Take 1 tablet by mouth 2 times daily (with meals)    levothyroxine (SYNTHROID) 75 MCG tablet Take 1 tablet by mouth daily    cyclobenzaprine (FLEXERIL) 10 MG tablet Take 1 tablet by mouth 2 times daily as needed for Muscle spasms    omeprazole (PRILOSEC) 20 MG delayed release capsule Take 1 capsule by mouth Daily      Allergies              Emend [Fosaprepitant Dimeglumine] Other (See Comments)    Reported back pain, warmth all over, nausea & SOB      We Ordered/Performed the following            DIABETES FOOT EXAM     INFLUENZA, QUADV, 3 YRS AND OLDER, IM, MDV, 0.5ML (Tarah Gar)     [facility] Coby Palma MD, Pain Management [place]*     Scheduling Instructions:    [address] [address], [address] [place] [address], [address] [place] [address], [address] [facility]  [phone]    Comments: The patient can be scheduled with any member of the group, including the provider with the first available appointments. Additional Information        Basic Information     Date Of Birth Sex Race Ethnicity Preferred Language    1960 Male White Non-/Non  English      Problem List as of 10/3/2017  Date Reviewed: 6/22/2017                Pneumonia of right lower lobe due to infectious organism    Empyema lung ([facility]    Bacteremia    Sepsis ([address]    Microalbuminuria due to type 2 diabetes mellitus ([address]    Pneumonia of lower lobe due to Streptococcus pneumoniae ([address]    Acute kidney injury ([address]    Diabetes type 2, uncontrolled ([address]    Simple chronic bronchitis ([address]    Malignant neoplasm of lower lobe of right lung ([address]    Smoking addiction      Immunizations as of 10/3/2017     Name Date    Influenza Vaccine, unspecified formulation 9/24/2015, 10/15/2012, 12/12/2011    Influenza, Julio C Mauricio, 3 Years and older, IM 10/3/2017, 9/23/2016    Pneumococcal 13-valent Conjugate (Dneidgn51) 8/2/2016    Pneumococcal Polysaccharide (Frhcinrmb67) 4/18/2017      Preventive Care        Date Due    Hepatitis C screening is recommended for all adults regardless of risk factors born between [facility] at least once (lifetime) who have never been tested. 1960    Eye Exam By An Eye Doctor 12/2/1970    Cholesterol Screening 12/2/1970    HIV screening is recommended for all people regardless of risk factors  aged 15-65 years at least once (lifetime) who have never been HIV tested. 12/2/1975    Colonoscopy 12/2/2010    Tetanus Combination Vaccine (1 - Tdap) 10/25/2017 (Originally 12/2/1979)    Hemoglobin A1C (Test For Long-Term Glucose Control) 6/10/2018    Diabetic Foot Exam 10/3/2018            [employer] Signup           Our records indicate that you have an active [employer] account. You can view your After Visit Summary by going to [email or web address] and logging in with your [employer] username and password.       If you don't have a [employer] username and password but a parent or

## 2017-10-10 ENCOUNTER — INITIAL CONSULT (OUTPATIENT)
Dept: PAIN MANAGEMENT | Age: 57
End: 2017-10-10
Payer: MEDICARE

## 2017-10-10 VITALS
TEMPERATURE: 97.9 F | DIASTOLIC BLOOD PRESSURE: 80 MMHG | HEART RATE: 84 BPM | SYSTOLIC BLOOD PRESSURE: 131 MMHG | OXYGEN SATURATION: 98 % | BODY MASS INDEX: 36.17 KG/M2 | WEIGHT: 297 LBS | HEIGHT: 76 IN | RESPIRATION RATE: 16 BRPM

## 2017-10-10 DIAGNOSIS — M96.1 FAILED BACK SYNDROME: ICD-10-CM

## 2017-10-10 DIAGNOSIS — Z85.118 H/O: LUNG CANCER: ICD-10-CM

## 2017-10-10 DIAGNOSIS — M54.9 INTRACTABLE BACK PAIN: Primary | ICD-10-CM

## 2017-10-10 PROCEDURE — 99205 OFFICE O/P NEW HI 60 MIN: CPT | Performed by: ANESTHESIOLOGY

## 2017-10-10 ASSESSMENT — ENCOUNTER SYMPTOMS
EYES NEGATIVE: 1
APNEA: 1
SHORTNESS OF BREATH: 1
ALLERGIC/IMMUNOLOGIC NEGATIVE: 1
BACK PAIN: 1
CONSTIPATION: 1

## 2017-10-10 NOTE — PROGRESS NOTES
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 He followed up with oncology after surgery and was treated with chemotherapy. Currently on adjuvant chemotherapy. Developed shortness of breath and right-sided back pain and was diagnosed with pleural effusion and empyema underwent chest tube placement in June 2017. OA RRS report was reviewed showed a recent opioid prescription of 60 Percocet from oncology. Past history is also significant for previous lumbar spine surgeries. Main issue is right-sided mid and lower back pain.

## 2017-10-10 NOTE — PROGRESS NOTES
Subjective:      Patient ID: Lena Chow is a 64 y.o. male. HPI   Requesting physician for the evaluation of Lena Chow 1960:   Dr Ovidio Bennett    Pain History  Pain score today  5  1. Location:lower back and right side   2. Radiation: occasionally into the left groin  3. Character:nagging, aching, throbbing, shooting, sharp  5. Duration:constant   6. Onset: 2010  7. Did an injury cause pain: hurt his back from work on more than one occasion  8. Aggravating factors:walking, most movement, mornings are really bad, getting out of a chair  9. Alleviating factors:nothing, meds take the edge off  10. Associated symptoms (numbness / tingling / weakness):  Yes in the hands   -Down into finger tips or toes (specify which finger or toes):whole hand and fingers   -constant or intermitting: intermittant  11. Red Flags: (weight loss / chills / loss of bladder or bowel control):no    Previous management history  1. Previous diagnostic workup: (Imaging/EMG)   CT, MRI, or Xray: multiple radiology in [facility] everywhere  What part of the body:multiple  [facility] facility [facility]  What year or specific date:2012 to 2017  EMG:  Yes by chiropractor per pt , unsure of his name     2. Previous non interventional treatments tried:  chiropractor or physical therapy: chiropractor and had PT 3+ times   What part of the body:back   What facility was it done at:[facility]   How long ago was it last tried:a few years ago [id number]  Did it work:no, it aggravated the pain  Did they complete it:no     3.  Previous Medications tried  NSAID's: yes  Neurontin: yes  Lyrica: yes  Trycyclic antidepressant (Ellavil / Pamelor ): Prozac    Cymbalta: no  Opioids (Ultram / Vicodin / Percocet / Morphine / Dilaudid / Oramorph/ Fentanyl etc.):Percocet, muscle relaxants, Ultram and  Vicodin causes headaches and heavy sedation, lidocaine patches  Last Pain medication taken (name of med and date): Percocet this am 108/10/17    4. Previous Interventional pain procedures tried: went to Dr Penny Sands 2016 only saw her once or twice    What kind of injection:none     5. Previous surgeries for pain  What part of the body did they have the surgery: back, Laminectomy   What physician did the surgery:Dr Tran [facility] did they have the surgery done:  [facility]  Date of Surgery:2012    Social History:  Marital status:    Employment History:delivery truck drive   Working  No  Full time Or Part time: n/a  Disability  Yes   Legal Issues related to pain complaint: No     Pain Disability Index score : 59    Lab Results   Component Value Date    LABA1C 7.0 (H) 06/10/2017     Lab Results   Component Value Date     06/10/2017         Informant: patient and Ex wife     Past Medical History, Past Surgical History, Social History, Allergies and Medications reviewed and updated in EPIC as indicated    Family History reviewed and is noncontributory. Consultation letter was sent to the requesting physician. HPI:    - hx of chronic lower back pain, located in the lumbosacral area describes as constant aching throbbing pain sensation with heaviness and numbness in both legs.   Pain aggravates with physical activity and alleviates with rest.  He denies any loss of bladder or bowel control no history of fevers chills or weight loss.   had 2 spine surgeries by Dr Dahlia Rosado in 2012 at [facility]  No diagnostic work up Imaging / EMG since surgery  Completed therapy 3 times last 2015  No hx of bladder or bowel incontinence  Was prescribed percocet, find it very little helpful      Past Medical History:   Diagnosis Date    Anxiety     BiPAP (biphasic positive airway pressure) dependence     Cancer ([address]     Chronic back pain     Clinical trial exam 12/28/2016    COPD (chronic obstructive pulmonary disease) ([address]     Diabetes ([address]     Hyperlipidemia     Hypertension     Hypothyroidism     Lung cancer ([address]     Neuropathy ([address] bilat feet    Sleep apnea     Slow to wake up after anesthesia     SOB (shortness of breath)     Urine frequency      Past Surgical History:   Procedure Laterality Date    LAMINECTOMY      LUNG REMOVAL, PARTIAL Right 2016    lower lobe    OTHER SURGICAL HISTORY Right 06/13/2017    CT guided placement of right pleural drainage catheter     TESTICLE REMOVAL Left 1982     Allergies   Allergen Reactions    Emend [Fosaprepitant Dimeglumine] Other (See Comments)     Reported back pain, warmth all over, nausea & SOB     Current Outpatient Prescriptions   Medication Sig Dispense Refill    albuterol (PROVENTIL) (2.5 MG/3ML) 0.083% nebulizer solution   0    lisinopril (PRINIVIL;ZESTRIL) 20 MG tablet Take 1 tablet by mouth daily 90 tablet 3    tiZANidine (ZANAFLEX) 4 MG tablet Take 1 tablet by mouth every 8 hours as needed (muscle spasm) 60 tablet 3    oxyCODONE-acetaminophen (PERCOCET) 5-325 MG per tablet Take 1 tablet by mouth every 6 hours as needed for Pain . 60 tablet 0    FLUoxetine (PROZAC) 40 MG capsule take 1 capsule by mouth once daily 60 capsule 3    ipratropium-albuterol (DUONEB) 0.5-2.5 (3) MG/3ML SOLN nebulizer solution Inhale 1 vial into the lungs every 6 hours as needed for Shortness of Breath      lovastatin (ALTOPREV) 60 MG extended release tablet Take 60 mg by mouth nightly      metFORMIN (GLUCOPHAGE) 500 MG tablet Take 1 tablet by mouth 2 times daily (with meals) 120 tablet 3    levothyroxine (SYNTHROID) 75 MCG tablet Take 1 tablet by mouth daily 60 tablet 3    omeprazole (PRILOSEC) 20 MG delayed release capsule Take 1 capsule by mouth Daily 60 capsule 3     No current facility-administered medications for this visit.         Social History     Social History    Marital status:      Spouse name: N/A    Number of children: N/A    Years of education: N/A     Social History Main Topics    Smoking status: Current Every Day Smoker     Packs/day: 0.25     Years: 40.00     Types: Cigarettes    Smokeless tobacco: Never Used    Alcohol use No    Drug use: No    Sexual activity: Not Currently     Other Topics Concern    None     Social History Narrative    None     Family History   Problem Relation Age of Onset    Cancer Mother    Kira Stack Cancer Father     Mental Illness Sister     Mental Illness Brother     Mental Illness Sister     Mental Illness Sister     Mental Illness Sister     Mental Illness Brother     Other Brother        Review of Systems   Constitutional: Positive for diaphoresis and fatigue. HENT: Positive for hearing loss. Eyes: Negative. Respiratory: Positive for apnea (sleep apnea) and shortness of breath. COPD on meds    Cardiovascular: Negative. Gastrointestinal: Positive for constipation. Endocrine: Negative. Genitourinary: Positive for urgency. Musculoskeletal: Positive for back pain and joint swelling. Allergic/Immunologic: Negative. Neurological: Positive for dizziness, tremors (body), weakness and numbness (hands and feet). Hematological: Negative. Psychiatric/Behavioral: Positive for agitation, decreased concentration, dysphoric mood and sleep disturbance (occasional). The patient is nervous/anxious and is hyperactive. Objective:   Physical Exam   Constitutional: He is oriented to person, place, and time. He appears well-developed and well-nourished. No distress. HENT:   Head: Normocephalic and atraumatic. Eyes: Conjunctivae and EOM are normal. Pupils are equal, round, and reactive to light. Cardiovascular: Normal rate, regular rhythm and normal heart sounds. Pulmonary/Chest: Effort normal and breath sounds normal.   Musculoskeletal:        Lumbar back: He exhibits decreased range of motion, tenderness and pain. Back:    Neurological: He is alert and oriented to person, place, and time. He has normal strength. He displays no atrophy and no tremor. He exhibits normal muscle tone.  He displays no seizure involved in worsening of his pain issues. I discussed the to possible option for pain management #1 spinal cord stimulation and #2 intrathecal therapy. I also had a detailed discussion with patient regarding the use of opioids for managing pain in his situation considering severe COPD and severe sleep apnea requiring BiPAP and his ongoing noncompliance with the use of BiPAP. I think he will be a very high risk for opioid-related respiratory depression. Patient voiced complete understanding. Orders Placed This Encounter   Procedures    MRI Lumbar Spine W WO Contrast    CT CHEST W CONTRAST     Standing Status:   Future     Standing Expiration Date:   10/10/2018    EMG     Standing Status:   Future     Standing Expiration Date:   12/9/2017     Order Specific Question:   Which body part? Answer:   both legs     No orders of the defined types were placed in this encounter. Controlled Substances Monitoring:     Attestation: The Prescription Monitoring Report for this patient was reviewed today. Anthony Hatch MD)  Documentation: No signs of potential drug abuse or diversion identified. , Random urine drug screen sent today.  Anthony Hatch MD)

## 2017-10-11 DIAGNOSIS — Z12.11 ENCOUNTER FOR SCREENING FECAL OCCULT BLOOD TESTING: Primary | ICD-10-CM

## 2017-11-01 ENCOUNTER — HOSPITAL ENCOUNTER (OUTPATIENT)
Age: 57
Discharge: HOME | End: 2017-11-01
Payer: MEDICARE

## 2017-11-01 ENCOUNTER — HOSPITAL ENCOUNTER (OUTPATIENT)
Dept: INFUSION THERAPY | Age: 57
Discharge: HOME | End: 2017-11-01
Payer: MEDICARE

## 2017-11-01 ENCOUNTER — HOSPITAL ENCOUNTER (OUTPATIENT)
Dept: GENERAL RADIOLOGY | Age: 57
Discharge: HOME | End: 2017-11-01
Payer: MEDICARE

## 2017-11-01 DIAGNOSIS — C34.31 MALIGNANT NEOPLASM OF LOWER LOBE OF RIGHT LUNG (HCC): ICD-10-CM

## 2017-11-01 DIAGNOSIS — E11.9 TYPE 2 DIABETES MELLITUS WITHOUT COMPLICATION, WITHOUT LONG-TERM CURRENT USE OF INSULIN (HCC): ICD-10-CM

## 2017-11-01 LAB
CHOLESTEROL/HDL RATIO: 8.9
CHOLESTEROL: 286 MG/DL
HDLC SERPL-MCNC: 32 MG/DL
LDL CHOLESTEROL DIRECT: 187 MG/DL
LDL CHOLESTEROL: ABNORMAL MG/DL (ref 0–130)
TRIGL SERPL-MCNC: 465 MG/DL
VLDLC SERPL CALC-MCNC: ABNORMAL MG/DL (ref 1–30)

## 2017-11-01 PROCEDURE — 6360000002 HC RX W HCPCS: Performed by: INTERNAL MEDICINE

## 2017-11-01 PROCEDURE — 83721 ASSAY OF BLOOD LIPOPROTEIN: CPT

## 2017-11-01 PROCEDURE — 2580000003 HC RX 258: Performed by: INTERNAL MEDICINE

## 2017-11-01 PROCEDURE — 80061 LIPID PANEL: CPT

## 2017-11-01 PROCEDURE — 83036 HEMOGLOBIN GLYCOSYLATED A1C: CPT

## 2017-11-01 PROCEDURE — 36591 DRAW BLOOD OFF VENOUS DEVICE: CPT

## 2017-11-01 PROCEDURE — 71020 XR CHEST STANDARD TWO VW: CPT

## 2017-11-01 RX ORDER — SODIUM CHLORIDE 0.9 % (FLUSH) 0.9 %
10 SYRINGE (ML) INJECTION PRN
Status: CANCELLED | OUTPATIENT
Start: 2017-11-01

## 2017-11-01 RX ORDER — HEPARIN SODIUM (PORCINE) LOCK FLUSH IV SOLN 100 UNIT/ML 100 UNIT/ML
500 SOLUTION INTRAVENOUS PRN
Status: DISCONTINUED | OUTPATIENT
Start: 2017-11-01 | End: 2017-11-02 | Stop reason: HOSPADM

## 2017-11-01 RX ORDER — SODIUM CHLORIDE 0.9 % (FLUSH) 0.9 %
10 SYRINGE (ML) INJECTION PRN
Status: DISCONTINUED | OUTPATIENT
Start: 2017-11-01 | End: 2017-11-02 | Stop reason: HOSPADM

## 2017-11-01 RX ORDER — HEPARIN SODIUM (PORCINE) LOCK FLUSH IV SOLN 100 UNIT/ML 100 UNIT/ML
500 SOLUTION INTRAVENOUS PRN
Status: CANCELLED | OUTPATIENT
Start: 2017-11-01

## 2017-11-01 RX ORDER — SODIUM CHLORIDE 0.9 % (FLUSH) 0.9 %
20 SYRINGE (ML) INJECTION PRN
Status: CANCELLED | OUTPATIENT
Start: 2017-11-01

## 2017-11-01 RX ADMIN — Medication 10 ML: at 08:13

## 2017-11-01 RX ADMIN — SODIUM CHLORIDE, PRESERVATIVE FREE 500 UNITS: 5 INJECTION INTRAVENOUS at 08:14

## 2017-11-01 NOTE — PROGRESS NOTES
Pt here for port flush/draw. Pt denies any issues at this time. Pt d/c'd in stable condition. Will return 11/16 for MD visit.

## 2017-11-02 DIAGNOSIS — E78.2 MIXED HYPERLIPIDEMIA: Primary | ICD-10-CM

## 2017-11-02 LAB
ESTIMATED AVERAGE GLUCOSE: 154 MG/DL
HBA1C MFR BLD: 7 % (ref 4–6)

## 2017-11-02 RX ORDER — ATORVASTATIN CALCIUM 80 MG/1
80 TABLET, FILM COATED ORAL DAILY
Qty: 30 TABLET | Refills: 3 | Status: SHIPPED | OUTPATIENT
Start: 2017-11-02 | End: 2018-03-16 | Stop reason: SDUPTHER

## 2017-11-03 ENCOUNTER — TELEPHONE (OUTPATIENT)
Dept: PRIMARY CARE CLINIC | Age: 57
End: 2017-11-03

## 2017-11-03 NOTE — TELEPHONE ENCOUNTER
----- Message from Arsenio Keen MD sent at 11/2/2017  3:44 PM EDT -----  Recent blood workup reviewed - cholesterol high , discontinued current statin medication . Started on lipitor high dose 80 mg od po . Please inform the Pt . Thanks.

## 2017-11-10 DIAGNOSIS — E11.9 TYPE 2 DIABETES MELLITUS WITHOUT COMPLICATION, WITHOUT LONG-TERM CURRENT USE OF INSULIN (HCC): ICD-10-CM

## 2017-11-10 NOTE — TELEPHONE ENCOUNTER
Last OV 10/3/17    Health Maintenance   Topic Date Due    Hepatitis C screen  1960    Diabetic retinal exam  12/02/1970    HIV screen  12/02/1975    DTaP/Tdap/Td vaccine (1 - Tdap) 12/02/1979    Colon cancer screen colonoscopy  12/02/2010    Diabetic foot exam  10/03/2018    Diabetic hemoglobin A1C test  11/01/2018    Lipid screen  11/01/2018    Flu vaccine  Completed    Pneumococcal med risk  Completed             (applicable per patient's age: Cancer Screenings, Depression Screening, Fall Risk Screening, Immunizations)    Hemoglobin A1C (%)   Date Value   11/01/2017 7.0 (H)   06/10/2017 7.0 (H)   04/18/2017 6.0     Microalb/Crt.  Ratio (mcg/mg creat)   Date Value   06/10/2017 60 (H)     LDL Cholesterol (mg/dL)   Date Value   11/01/2017          AST (U/L)   Date Value   08/09/2017 16     ALT (U/L)   Date Value   08/09/2017 19     BUN (mg/dL)   Date Value   08/09/2017 21 (H)      (goal A1C is < 7)   (goal LDL is <100) need 30-50% reduction from baseline     BP Readings from Last 3 Encounters:   10/10/17 131/80   10/03/17 132/80   08/17/17 128/80    (goal /80)      All Future Testing planned in [facility]:  Lab Frequency Next Occurrence   EMG Once 10/10/2017   CT CHEST W CONTRAST Once 10/10/2017   POCT Fecal Immunochemical Test (FIT) Once 11/01/2017       Next Visit Date:  Future Appointments  Date Time Provider Sukhjinder Coley   11/16/2017 8:00 AM Sariah Hampton MD [facility]   12/5/2017 8:00 AM [facility] ONC CHAIR 07 ST[facility] None   12/12/2017 8:00 AM Gino Williamson MD [facility]            Patient Active Problem List:     Malignant neoplasm of lower lobe of right lung (HCC)     Smoking addiction     Simple chronic bronchitis ([address]     Pneumonia of lower lobe due to Streptococcus pneumoniae ([address]     Acute kidney injury ([address]     Diabetes type 2, uncontrolled ([address]     Microalbuminuria due to type 2 diabetes mellitus ([address]     Bacteremia     Sepsis ([facility]

## 2017-11-15 ENCOUNTER — OFFICE VISIT (OUTPATIENT)
Dept: PRIMARY CARE CLINIC | Age: 57
End: 2017-11-15
Payer: MEDICARE

## 2017-11-15 VITALS
HEIGHT: 76 IN | DIASTOLIC BLOOD PRESSURE: 70 MMHG | SYSTOLIC BLOOD PRESSURE: 100 MMHG | TEMPERATURE: 97.9 F | WEIGHT: 286 LBS | BODY MASS INDEX: 34.83 KG/M2

## 2017-11-15 DIAGNOSIS — J20.8 ACUTE BRONCHITIS DUE TO OTHER SPECIFIED ORGANISMS: Primary | ICD-10-CM

## 2017-11-15 DIAGNOSIS — R06.2 WHEEZING: ICD-10-CM

## 2017-11-15 PROCEDURE — 99213 OFFICE O/P EST LOW 20 MIN: CPT | Performed by: INTERNAL MEDICINE

## 2017-11-15 RX ORDER — PREDNISONE 20 MG/1
20 TABLET ORAL DAILY
Qty: 10 TABLET | Refills: 0 | Status: SHIPPED | OUTPATIENT
Start: 2017-11-15 | End: 2017-11-25

## 2017-11-15 RX ORDER — LEVOFLOXACIN 500 MG/1
500 TABLET, FILM COATED ORAL DAILY
Qty: 5 TABLET | Refills: 0 | Status: SHIPPED | OUTPATIENT
Start: 2017-11-15 | End: 2017-11-20

## 2017-11-15 ASSESSMENT — ENCOUNTER SYMPTOMS
SORE THROAT: 0
COUGH: 1
ABDOMINAL PAIN: 0
NAUSEA: 0
WHEEZING: 1
EYE REDNESS: 0
TROUBLE SWALLOWING: 0
SHORTNESS OF BREATH: 1
BACK PAIN: 0
VOMITING: 0
EYE DISCHARGE: 0

## 2017-11-15 NOTE — PROGRESS NOTES
[facility]  [facility]  [address]  [address] [facility] [address]  Dept: [phone]  Dept Fax: [phone]    Kaila Moses is a 64 y.o. male who presents today for his medical conditions/complaints as noted below. Kaila Moses is c/o of   Chief Complaint   Patient presents with    Chest Congestion     c/o chest congestion x 3 days         HPI:     Cough   This is a recurrent problem. The current episode started in the past 7 days. The problem has been gradually worsening. The problem occurs every few minutes. The cough is productive of purulent sputum. Associated symptoms include nasal congestion, shortness of breath and wheezing. Pertinent negatives include no chest pain, eye redness, fever, headaches, myalgias, postnasal drip, rash or sore throat. He has tried a beta-agonist inhaler for the symptoms. The treatment provided no relief. His past medical history is significant for bronchitis, COPD, emphysema and pneumonia. Hemoglobin A1C (%)   Date Value   11/01/2017 7.0 (H)   06/10/2017 7.0 (H)   04/18/2017 6.0             ( goal A1C is < 7)   Microalb/Crt.  Ratio (mcg/mg creat)   Date Value   06/10/2017 60 (H)     LDL Cholesterol (mg/dL)   Date Value   11/01/2017            (goal LDL is <100)   AST (U/L)   Date Value   08/09/2017 16     ALT (U/L)   Date Value   08/09/2017 19     BUN (mg/dL)   Date Value   08/09/2017 21 (H)     BP Readings from Last 3 Encounters:   11/15/17 100/70   10/10/17 131/80   10/03/17 132/80          (goal 120/80)    Past Medical History:   Diagnosis Date    Anxiety     BiPAP (biphasic positive airway pressure) dependence     Cancer (HCC)     Chronic back pain     Clinical trial exam 12/28/2016    COPD (chronic obstructive pulmonary disease) (HCC)     Diabetes (HCC)     Hyperlipidemia     Hypertension     Hypothyroidism     Lung cancer (HCC)     Neuropathy ([address]     bilat feet    Sleep apnea     Slow to wake up after is clear and moist. No oropharyngeal exudate. Eyes: Conjunctivae are normal. Right eye exhibits no discharge. Left eye exhibits no discharge. No scleral icterus. Neck: Neck supple. Cardiovascular: Normal rate, regular rhythm and normal heart sounds. No murmur heard. Pulmonary/Chest: Effort normal and breath sounds normal. No respiratory distress. He has no wheezes. Abdominal: Soft. Bowel sounds are normal. He exhibits no distension. There is no tenderness. Musculoskeletal: He exhibits no edema or tenderness. Lymphadenopathy:     He has no cervical adenopathy. Neurological: He is alert and oriented to person, place, and time. Skin: Skin is warm and dry. No rash noted. He is not diaphoretic. Psychiatric: Judgment and thought content normal.   Nursing note and vitals reviewed. /70 (Site: Right Arm, Position: Sitting)   Temp 97.9 °F (36.6 °C) (Oral)   Ht 6' 4\" (1.93 m)   Wt 286 lb (129.7 kg)   BMI 34.81 kg/m²     Assessment:      1. Acute bronchitis due to other specified organisms  levofloxacin (LEVAQUIN) 500 MG tablet   2. Wheezing  predniSONE (DELTASONE) 20 MG tablet    3. Recent CXR reviewed - discussed with Pt . Plan:      Return if symptoms worsen or fail to improve. Continue current inhalers. F/u Eugene Murrieta tomorrow     Orders Placed This Encounter   Medications    levofloxacin (LEVAQUIN) 500 MG tablet     Sig: Take 1 tablet by mouth daily for 5 days     Dispense:  5 tablet     Refill:  0    predniSONE (DELTASONE) 20 MG tablet     Sig: Take 1 tablet by mouth daily for 10 days     Dispense:  10 tablet     Refill:  0       Patient given educational materials - see patient instructions. Discussed use, benefit, and side effects of prescribed medications. All patient questions answered. Pt voiced understanding. Reviewed health maintenance. Instructed to continue current medications, diet and exercise. Patient agreed with treatment plan. Follow up as directed.

## 2017-11-16 ENCOUNTER — OFFICE VISIT (OUTPATIENT)
Dept: ONCOLOGY | Age: 57
End: 2017-11-16
Payer: MEDICARE

## 2017-11-16 VITALS
TEMPERATURE: 97.8 F | HEART RATE: 87 BPM | BODY MASS INDEX: 35.48 KG/M2 | SYSTOLIC BLOOD PRESSURE: 119 MMHG | DIASTOLIC BLOOD PRESSURE: 90 MMHG | WEIGHT: 291.5 LBS

## 2017-11-16 DIAGNOSIS — C34.31 MALIGNANT NEOPLASM OF LOWER LOBE OF RIGHT LUNG (HCC): Primary | ICD-10-CM

## 2017-11-16 DIAGNOSIS — F17.200 SMOKING ADDICTION: ICD-10-CM

## 2017-11-16 DIAGNOSIS — J41.0 SIMPLE CHRONIC BRONCHITIS (HCC): ICD-10-CM

## 2017-11-16 PROCEDURE — 99214 OFFICE O/P EST MOD 30 MIN: CPT | Performed by: INTERNAL MEDICINE

## 2017-11-16 PROCEDURE — 99211 OFF/OP EST MAY X REQ PHY/QHP: CPT

## 2017-11-16 RX ORDER — OXYCODONE HYDROCHLORIDE AND ACETAMINOPHEN 5; 325 MG/1; MG/1
1 TABLET ORAL EVERY 6 HOURS PRN
Qty: 60 TABLET | Refills: 0 | Status: SHIPPED | OUTPATIENT
Start: 2017-11-16 | End: 2018-01-16 | Stop reason: SDUPTHER

## 2017-11-16 NOTE — PROGRESS NOTES
DIAGNOSIS:   1. Adenocarcinoma of the right lower lobe of lung. Path stage is T2N1M0. Stage 2 disease. 2. COPD  3. Allergic reaction to Emend   CURRENT THERAPY:  S/P right lower lobectomy. 9/2016  started adjuvant chemotherapy, with Cisplatin and Alimta on 11/17/16 , completed in February/2017  BRIEF CASE HISTORY      Beba Parada is a very pleasant 64 y.o. male who is referred to us for management recommendation of his recently resected lung cancer. He actually underwent a screening CT scan because of his smoking habits. CT scans showed right lower lobe spiculated mass measuring 2.9 x 2.5 cm abutting the medial pleura. There was another 4 mm pulmonary nodule that has actually been stable from previous imaging. No adenopathy was appreciated. More testing was done but ultimately the patient was taken to surgery. Prior to surgery, the clinical stage was stage I disease. The patient underwent right lower lobe lobectomy on 9/13/16 and pathology showed invasive moderately differentiated adenocarcinoma of the lung measuring 4 cm in greatest dimension, the tumor invaded the visceral pleura. All margins were negative. He had one positive intrapulmonary lymph node. Representative nodes from stations 4, 7, 9 were all negative. Final pathological staging is (T2 N1 M0) stage II disease  He is sent to us for a consultation, recovering from surgery. He continues to have some pain in the surgical area but otherwise has no complaints. He has minimal shortness of breath, no cough or hemoptysis. No headache or seizures or loss of weight . Performance status is ECOG 1, we discussed adjuvant chemotherapy with Cisplatin and Alimta   After completion of chemotherapy, we started surveillance. He required bronchoscopy to clear thick mucous. INTERIM HISTORY: The patient comes in today for a follow up. His mother recently passed away from lung cancer.   He is tearful but accepting   He reports he has some congestion and has cough with clear sputum, slightly sore throat. He continues to smoke but struggles with full cessation. He had visit with PCP yesterday and received antibiotics. PAST MEDICAL HISTORY: has a past medical history of Anxiety; BiPAP (biphasic positive airway pressure) dependence; [facility] ([facility] Chronic back pain; Clinical trial exam; COPD (chronic obstructive pulmonary disease) ([facility] Diabetes ([facility] Hyperlipidemia; Hypertension; Hypothyroidism; Lung cancer ([facility] Neuropathy ([facility] Sleep apnea; Slow to wake up after anesthesia; SOB (shortness of breath); and Urine frequency. PAST SURGICAL HISTORY: has a past surgical history that includes laminectomy; Testicle removal (Left, 1982); Lung removal, partial (Right, 2016); and other surgical history (Right, 06/13/2017). CURRENT MEDICATIONS:  has a current medication list which includes the following prescription(s): oxycodone-acetaminophen, levofloxacin, prednisone, metformin, atorvastatin, albuterol, lisinopril, tizanidine, fluoxetine, ipratropium-albuterol, levothyroxine, and omeprazole. ALLERGIES:  is allergic to [facility] dimeglumine]. FAMILY HISTORY: Negative for any hematological or oncological conditions. SOCIAL HISTORY:  reports that he has been smoking Cigarettes. He has a 10.00 pack-year smoking history. He has never used smokeless tobacco. He reports that he does not drink alcohol or use drugs. REVIEW OF SYSTEMS:   General: no fever or night sweats, Weight is stable. ENT: No double or blurred vision, no tinnitus or hearing problem, no dysphagia or sore throat. Some chemo induced eye dryness. Respiratory: chronic shortness of breath and cough - some clear sputum, no hemoptysis, chest discomfort in the surgical area, chest congestion  Cardiovascular: Denies central chest pain, PND or orthopnea. No L E swelling or palpitations. Gastrointestinal: No nausea, no vomiting, abdominal pain, diarrhea or constipation. 08/09/2017    MCV 93.2 08/09/2017     08/09/2017       Chemistry        Component Value Date/Time     08/09/2017 0837    K 4.5 08/09/2017 0837     08/09/2017 0837    CO2 23 08/09/2017 0837    BUN 21 (H) 08/09/2017 0837    CREATININE 1.03 08/09/2017 0837        Component Value Date/Time    CALCIUM 9.3 08/09/2017 0837    ALKPHOS 74 08/09/2017 0837    AST 16 08/09/2017 0837    ALT 19 08/09/2017 0837    BILITOT 0.18 (L) 08/09/2017 0837              IMPRESSION:   1. Adenocarcinoma of the right lower lobe of lung. Path stage is T2N1M0. Stage 2 disease. 2. S/P lobectomy. 3. Currently on  adjuvant chemotherapy, with cisplatin and Alimta . Plan to finish 4 cycles of adjuvant chemotherapy and then observe. He is enrolled in a clinical trial with different therapy might be recommended depending on mutational analysis, EGFR and ALK mutation testing is negative. PDL is pending  4. COPD  5. Completed 4 cycles of chemo    6. Smoking addiction, unable to quit     PLAN:  1. We discussed his current Xray which showed continued fluid in base of lungs. 2. The patient has a lot of lung secretions and I asked him to use his nebulizer daily and his inhalers more regularly. 3. I explained the underlying cause is COPD exacerbated by smoking. 4. I counseled him on smoking cessation, his insurance does not cover Chantix, he will try patches. 5. I advised him to increase activity and to lose weight. 6. I advised him to continue with antibiotics from PCP. 7. I am writing refill for pain medication. 8. I am ordering CT to be done prior to next visit. 9. Return in 4 months.

## 2017-11-16 NOTE — LETTER
After completion of chemotherapy, we started surveillance. He required bronchoscopy to clear thick mucous. INTERIM HISTORY: The patient comes in today for a follow up. His mother recently . He reports he has some congestion and has cough with clear sputum, slightly sore throat. He continues to smoke but struggles with full cessation. He had visit with PCP yesterday and received antibiotics. PAST MEDICAL HISTORY: has a past medical history of Anxiety; BiPAP (biphasic positive airway pressure) dependence; Cancer ([facility] Chronic back pain; Clinical trial exam; COPD (chronic obstructive pulmonary disease) ([address] Diabetes ([address] Hyperlipidemia; Hypertension; Hypothyroidism; Lung cancer ([facility] Neuropathy ([facility] Sleep apnea; Slow to wake up after anesthesia; SOB (shortness of breath); and Urine frequency. PAST SURGICAL HISTORY: has a past surgical history that includes laminectomy; Testicle removal (Left, ); Lung removal, partial (Right, ); and other surgical history (Right, 2017). CURRENT MEDICATIONS:  has a current medication list which includes the following prescription(s): oxycodone-acetaminophen, levofloxacin, prednisone, metformin, atorvastatin, albuterol, lisinopril, tizanidine, fluoxetine, ipratropium-albuterol, levothyroxine, and omeprazole. ALLERGIES:  is allergic to [facility] dimeglumine]. FAMILY HISTORY: Negative for any hematological or oncological conditions. SOCIAL HISTORY:  reports that he has been smoking Cigarettes. He has a 10.00 pack-year smoking history. He has never used smokeless tobacco. He reports that he does not drink alcohol or use drugs. REVIEW OF SYSTEMS:   General: no fever or night sweats, Weight is stable. ENT: No double or blurred vision, no tinnitus or hearing problem, no dysphagia or sore throat. Some chemo induced eye dryness.    Respiratory: chronic shortness of breath and cough - some clear sputum, no If you have questions, please do not hesitate to call me. I look forward to following Joann Maynard along with you.     Sincerely,    Reza Nieves MD  Hematology/ Oncology  Cell [phone]

## 2017-12-05 ENCOUNTER — HOSPITAL ENCOUNTER (OUTPATIENT)
Dept: INFUSION THERAPY | Age: 57
Discharge: HOME | End: 2017-12-05
Payer: MEDICARE

## 2017-12-05 DIAGNOSIS — C34.31 MALIGNANT NEOPLASM OF LOWER LOBE OF RIGHT LUNG (HCC): ICD-10-CM

## 2017-12-05 PROCEDURE — 96523 IRRIG DRUG DELIVERY DEVICE: CPT

## 2017-12-05 PROCEDURE — 6360000002 HC RX W HCPCS: Performed by: INTERNAL MEDICINE

## 2017-12-05 PROCEDURE — 2580000003 HC RX 258: Performed by: INTERNAL MEDICINE

## 2017-12-05 RX ORDER — SODIUM CHLORIDE 0.9 % (FLUSH) 0.9 %
10 SYRINGE (ML) INJECTION PRN
Status: CANCELLED | OUTPATIENT
Start: 2017-12-05

## 2017-12-05 RX ORDER — HEPARIN SODIUM (PORCINE) LOCK FLUSH IV SOLN 100 UNIT/ML 100 UNIT/ML
500 SOLUTION INTRAVENOUS PRN
Status: DISCONTINUED | OUTPATIENT
Start: 2017-12-05 | End: 2017-12-06 | Stop reason: HOSPADM

## 2017-12-05 RX ORDER — SODIUM CHLORIDE 0.9 % (FLUSH) 0.9 %
10 SYRINGE (ML) INJECTION PRN
Status: DISCONTINUED | OUTPATIENT
Start: 2017-12-05 | End: 2017-12-06 | Stop reason: HOSPADM

## 2017-12-05 RX ORDER — HEPARIN SODIUM (PORCINE) LOCK FLUSH IV SOLN 100 UNIT/ML 100 UNIT/ML
500 SOLUTION INTRAVENOUS PRN
Status: CANCELLED | OUTPATIENT
Start: 2017-12-05

## 2017-12-05 RX ORDER — SODIUM CHLORIDE 0.9 % (FLUSH) 0.9 %
20 SYRINGE (ML) INJECTION PRN
Status: CANCELLED | OUTPATIENT
Start: 2017-12-05

## 2017-12-05 RX ADMIN — SODIUM CHLORIDE, PRESERVATIVE FREE 500 UNITS: 5 INJECTION INTRAVENOUS at 08:07

## 2017-12-05 RX ADMIN — Medication 10 ML: at 08:06

## 2017-12-05 NOTE — PROGRESS NOTES
Pt here for port flush. Denies any issues at this time. Pt discharged in stable condition. Pt will return 1/16 for port flush.

## 2017-12-12 ENCOUNTER — OFFICE VISIT (OUTPATIENT)
Dept: PRIMARY CARE CLINIC | Age: 57
End: 2017-12-12
Payer: MEDICARE

## 2017-12-12 VITALS
OXYGEN SATURATION: 98 % | BODY MASS INDEX: 35.36 KG/M2 | DIASTOLIC BLOOD PRESSURE: 86 MMHG | HEART RATE: 70 BPM | SYSTOLIC BLOOD PRESSURE: 132 MMHG | HEIGHT: 76 IN | WEIGHT: 290.4 LBS | RESPIRATION RATE: 16 BRPM

## 2017-12-12 DIAGNOSIS — N32.81 OAB (OVERACTIVE BLADDER): ICD-10-CM

## 2017-12-12 DIAGNOSIS — Z12.5 ENCOUNTER FOR SCREENING FOR MALIGNANT NEOPLASM OF PROSTATE: ICD-10-CM

## 2017-12-12 DIAGNOSIS — R05.9 COUGH: Primary | ICD-10-CM

## 2017-12-12 PROBLEM — A41.9 SEPSIS (HCC): Status: RESOLVED | Noted: 2017-06-13 | Resolved: 2017-12-12

## 2017-12-12 PROBLEM — R78.81 BACTEREMIA: Status: RESOLVED | Noted: 2017-06-13 | Resolved: 2017-12-12

## 2017-12-12 PROBLEM — N17.9 ACUTE KIDNEY INJURY (HCC): Status: RESOLVED | Noted: 2017-06-10 | Resolved: 2017-12-12

## 2017-12-12 PROCEDURE — 99214 OFFICE O/P EST MOD 30 MIN: CPT | Performed by: INTERNAL MEDICINE

## 2017-12-12 RX ORDER — GUAIFENESIN AND CODEINE PHOSPHATE 100; 10 MG/5ML; MG/5ML
5 SOLUTION ORAL 3 TIMES DAILY PRN
Qty: 118 ML | Refills: 0 | Status: SHIPPED | OUTPATIENT
Start: 2017-12-12 | End: 2017-12-19

## 2017-12-12 RX ORDER — OXYCODONE HYDROCHLORIDE AND ACETAMINOPHEN 5; 325 MG/1; MG/1
1 TABLET ORAL EVERY 6 HOURS PRN
Qty: 60 TABLET | Refills: 0 | Status: CANCELLED | OUTPATIENT
Start: 2017-12-12

## 2017-12-12 RX ORDER — OXYBUTYNIN CHLORIDE 10 MG/1
10 TABLET, EXTENDED RELEASE ORAL DAILY
Qty: 30 TABLET | Refills: 3 | Status: SHIPPED | OUTPATIENT
Start: 2017-12-12 | End: 2018-10-12

## 2017-12-12 ASSESSMENT — ENCOUNTER SYMPTOMS
NAUSEA: 0
SORE THROAT: 0
COUGH: 0
ABDOMINAL PAIN: 0
EYE DISCHARGE: 0
TROUBLE SWALLOWING: 0
VOMITING: 0
EYE REDNESS: 0
SHORTNESS OF BREATH: 0
BACK PAIN: 0

## 2017-12-12 NOTE — PROGRESS NOTES
Hypertension     Hypothyroidism     Lung cancer ([facility]     Neuropathy ([facility] [address]     bilat feet    Sleep apnea     Slow to wake up after anesthesia     SOB (shortness of breath)     Urine frequency       Past Surgical History:   Procedure Laterality Date    LAMINECTOMY      LUNG REMOVAL, PARTIAL Right 2016    lower lobe    OTHER SURGICAL HISTORY Right 06/13/2017    CT guided placement of right pleural drainage catheter     TESTICLE REMOVAL Left 1982       Family History   Problem Relation Age of Onset    Cancer Mother     Cancer Father     Mental Illness Sister     Mental Illness Brother     Mental Illness Sister     Mental Illness Sister     Mental Illness Sister     Mental Illness Brother     Other Brother        Social History   Substance Use Topics    Smoking status: Current Every Day Smoker     Packs/day: 0.25     Years: 40.00     Types: Cigarettes    Smokeless tobacco: Never Used    Alcohol use No      Current Outpatient Prescriptions   Medication Sig Dispense Refill    guaiFENesin-codeine (TUSSI-ORGANIDIN NR) 100-10 MG/5ML syrup Take 5 mLs by mouth 3 times daily as needed for Cough . 118 mL 0    oxybutynin (DITROPAN XL) 10 MG extended release tablet Take 1 tablet by mouth daily 30 tablet 3    oxyCODONE-acetaminophen (PERCOCET) 5-325 MG per tablet Take 1 tablet by mouth every 6 hours as needed for Pain .  60 tablet 0    metFORMIN (GLUCOPHAGE) 500 MG tablet take 1 tablet by mouth twice a day with meals 180 tablet 1    atorvastatin (LIPITOR) 80 MG tablet Take 1 tablet by mouth daily 30 tablet 3    albuterol (PROVENTIL) (2.5 MG/3ML) 0.083% nebulizer solution   0    lisinopril (PRINIVIL;ZESTRIL) 20 MG tablet Take 1 tablet by mouth daily 90 tablet 3    tiZANidine (ZANAFLEX) 4 MG tablet Take 1 tablet by mouth every 8 hours as needed (muscle spasm) 60 tablet 3    FLUoxetine (PROZAC) 40 MG capsule take 1 capsule by mouth once daily 60 capsule 3    ipratropium-albuterol (DUONEB) 0.5-2.5 (3) MG/3ML SOLN nebulizer solution Inhale 1 vial into the lungs every 6 hours as needed for Shortness of Breath      levothyroxine (SYNTHROID) 75 MCG tablet Take 1 tablet by mouth daily 60 tablet 3    omeprazole (PRILOSEC) 20 MG delayed release capsule Take 1 capsule by mouth Daily 60 capsule 3     No current facility-administered medications for this visit. Allergies   Allergen Reactions    Emend [Fosaprepitant Dimeglumine] Other (See Comments)     Reported back pain, warmth all over, nausea & SOB       Health Maintenance   Topic Date Due    Hepatitis C screen  1960    Diabetic retinal exam  12/02/1970    HIV screen  12/02/1975    DTaP/Tdap/Td vaccine (1 - Tdap) 12/02/1979    Colon cancer screen colonoscopy  12/02/2010    Diabetic foot exam  10/03/2018    Diabetic hemoglobin A1C test  11/01/2018    Lipid screen  11/01/2018    Flu vaccine  Completed    Pneumococcal med risk  Completed       Subjective:      Review of Systems   Constitutional: Negative for activity change, appetite change, fatigue, fever and unexpected weight change. HENT: Negative for postnasal drip, sore throat and trouble swallowing. Eyes: Negative for discharge and redness. Respiratory: Negative for cough and shortness of breath. Cardiovascular: Negative for chest pain and palpitations. Gastrointestinal: Negative for abdominal pain, nausea and vomiting. Endocrine: Negative for cold intolerance and heat intolerance. Genitourinary: Positive for frequency and urgency. Negative for difficulty urinating and dysuria. Musculoskeletal: Negative for arthralgias, back pain and myalgias. Skin: Negative for rash and wound. Neurological: Negative for dizziness and headaches. Hematological: Negative for adenopathy. Psychiatric/Behavioral: Negative for behavioral problems. The patient is not nervous/anxious. Objective:     Physical Exam   Constitutional: He is oriented to person, place, and time.  He appears well-developed and well-nourished. No distress. HENT:   Head: Normocephalic and atraumatic. Right Ear: External ear normal.   Left Ear: External ear normal.   Nose: Nose normal.   Mouth/Throat: Oropharynx is clear and moist. No oropharyngeal exudate. Eyes: Conjunctivae are normal. Right eye exhibits no discharge. Left eye exhibits no discharge. No scleral icterus. Neck: Neck supple. Cardiovascular: Normal rate, regular rhythm and normal heart sounds. No murmur heard. Pulmonary/Chest: Effort normal and breath sounds normal. No respiratory distress. He has no wheezes. Abdominal: Soft. Bowel sounds are normal. He exhibits no distension. There is no tenderness. Musculoskeletal: He exhibits no edema or tenderness. Lymphadenopathy:     He has no cervical adenopathy. Neurological: He is alert and oriented to person, place, and time. Skin: Skin is warm and dry. No rash noted. He is not diaphoretic. Psychiatric: Judgment and thought content normal.   Nursing note and vitals reviewed. /86   Pulse 70   Resp 16   Ht 6' 4\" (1.93 m)   Wt 290 lb 6.4 oz (131.7 kg)   SpO2 98%   BMI 35.35 kg/m²     Assessment:      1. Cough  guaiFENesin-codeine (TUSSI-ORGANIDIN NR) 100-10 MG/5ML syrup   2. OAB (overactive bladder)  PSA Screening   3. Encounter for screening for malignant neoplasm of prostate   PSA Screening    4. Smoking - counseled to quit   5. HTN - controlled - continue ACE I           Plan:      Return in about 5 months (around 5/12/2018), or if symptoms worsen or fail to improve, for hypertension, diabetes. Continue current meds   Will monitor and control BP   Will follow PSA       Orders Placed This Encounter   Procedures    PSA Screening     Standing Status:   Future     Standing Expiration Date:   12/12/2018     Orders Placed This Encounter   Medications    guaiFENesin-codeine (TUSSI-ORGANIDIN NR) 100-10 MG/5ML syrup     Sig: Take 5 mLs by mouth 3 times daily as needed for Cough .

## 2018-01-16 ENCOUNTER — HOSPITAL ENCOUNTER (OUTPATIENT)
Dept: INFUSION THERAPY | Age: 58
Discharge: HOME | End: 2018-01-16
Payer: MEDICARE

## 2018-01-16 DIAGNOSIS — C34.31 MALIGNANT NEOPLASM OF LOWER LOBE OF RIGHT LUNG (HCC): ICD-10-CM

## 2018-01-16 DIAGNOSIS — C34.31 MALIGNANT NEOPLASM OF LOWER LOBE OF RIGHT LUNG (HCC): Primary | ICD-10-CM

## 2018-01-16 PROCEDURE — 96523 IRRIG DRUG DELIVERY DEVICE: CPT

## 2018-01-16 PROCEDURE — 2580000003 HC RX 258: Performed by: INTERNAL MEDICINE

## 2018-01-16 PROCEDURE — 6360000002 HC RX W HCPCS: Performed by: INTERNAL MEDICINE

## 2018-01-16 RX ORDER — SODIUM CHLORIDE 0.9 % (FLUSH) 0.9 %
10 SYRINGE (ML) INJECTION PRN
Status: CANCELLED | OUTPATIENT
Start: 2018-01-16

## 2018-01-16 RX ORDER — HEPARIN SODIUM (PORCINE) LOCK FLUSH IV SOLN 100 UNIT/ML 100 UNIT/ML
500 SOLUTION INTRAVENOUS PRN
Status: CANCELLED | OUTPATIENT
Start: 2018-01-16

## 2018-01-16 RX ORDER — HEPARIN SODIUM (PORCINE) LOCK FLUSH IV SOLN 100 UNIT/ML 100 UNIT/ML
500 SOLUTION INTRAVENOUS PRN
Status: DISCONTINUED | OUTPATIENT
Start: 2018-01-16 | End: 2018-01-17 | Stop reason: HOSPADM

## 2018-01-16 RX ORDER — SODIUM CHLORIDE 0.9 % (FLUSH) 0.9 %
10 SYRINGE (ML) INJECTION PRN
Status: DISCONTINUED | OUTPATIENT
Start: 2018-01-16 | End: 2018-01-17 | Stop reason: HOSPADM

## 2018-01-16 RX ORDER — SODIUM CHLORIDE 0.9 % (FLUSH) 0.9 %
20 SYRINGE (ML) INJECTION PRN
Status: CANCELLED | OUTPATIENT
Start: 2018-01-16

## 2018-01-16 RX ADMIN — Medication 10 ML: at 08:44

## 2018-01-16 RX ADMIN — SODIUM CHLORIDE, PRESERVATIVE FREE 500 UNITS: 5 INJECTION INTRAVENOUS at 08:47

## 2018-01-16 RX ADMIN — Medication 10 ML: at 08:47

## 2018-01-16 NOTE — PROGRESS NOTES
Pt here for port flush. Denies any issues at this time. Pt was discharged stable and will return 2/27 for port flush.

## 2018-01-17 RX ORDER — OXYCODONE HYDROCHLORIDE AND ACETAMINOPHEN 5; 325 MG/1; MG/1
1 TABLET ORAL EVERY 6 HOURS PRN
Qty: 60 TABLET | Refills: 0 | Status: SHIPPED | OUTPATIENT
Start: 2018-01-17 | End: 2018-02-01

## 2018-02-02 DIAGNOSIS — C34.31 MALIGNANT NEOPLASM OF LOWER LOBE OF RIGHT LUNG (HCC): Primary | ICD-10-CM

## 2018-02-16 ENCOUNTER — HOSPITAL ENCOUNTER (OUTPATIENT)
Dept: CT IMAGING | Age: 58
Discharge: HOME | End: 2018-02-18
Payer: MEDICARE

## 2018-02-16 ENCOUNTER — HOSPITAL ENCOUNTER (OUTPATIENT)
Dept: INFUSION THERAPY | Age: 58
Discharge: HOME | End: 2018-02-16
Payer: MEDICARE

## 2018-02-16 DIAGNOSIS — C34.31 MALIGNANT NEOPLASM OF LOWER LOBE OF RIGHT LUNG (HCC): ICD-10-CM

## 2018-02-16 DIAGNOSIS — C34.31 MALIGNANT NEOPLASM OF LOWER LOBE OF RIGHT LUNG (HCC): Primary | ICD-10-CM

## 2018-02-16 LAB
CREAT SERPL-MCNC: 1.05 MG/DL (ref 0.7–1.2)
GFR AFRICAN AMERICAN: >60 ML/MIN
GFR NON-AFRICAN AMERICAN: >60 ML/MIN
GFR SERPL CREATININE-BSD FRML MDRD: NORMAL ML/MIN/{1.73_M2}
GFR SERPL CREATININE-BSD FRML MDRD: NORMAL ML/MIN/{1.73_M2}

## 2018-02-16 PROCEDURE — 2580000003 HC RX 258: Performed by: INTERNAL MEDICINE

## 2018-02-16 PROCEDURE — 6360000004 HC RX CONTRAST MEDICATION: Performed by: INTERNAL MEDICINE

## 2018-02-16 PROCEDURE — 6360000002 HC RX W HCPCS: Performed by: INTERNAL MEDICINE

## 2018-02-16 PROCEDURE — 71260 CT THORAX DX C+: CPT

## 2018-02-16 PROCEDURE — 36591 DRAW BLOOD OFF VENOUS DEVICE: CPT

## 2018-02-16 PROCEDURE — 82565 ASSAY OF CREATININE: CPT

## 2018-02-16 RX ORDER — SODIUM CHLORIDE 0.9 % (FLUSH) 0.9 %
10 SYRINGE (ML) INJECTION PRN
Status: DISCONTINUED | OUTPATIENT
Start: 2018-02-16 | End: 2018-02-19 | Stop reason: HOSPADM

## 2018-02-16 RX ORDER — SODIUM CHLORIDE 0.9 % (FLUSH) 0.9 %
20 SYRINGE (ML) INJECTION PRN
Status: CANCELLED | OUTPATIENT
Start: 2018-02-16

## 2018-02-16 RX ORDER — 0.9 % SODIUM CHLORIDE 0.9 %
80 INTRAVENOUS SOLUTION INTRAVENOUS ONCE
Status: COMPLETED | OUTPATIENT
Start: 2018-02-16 | End: 2018-02-16

## 2018-02-16 RX ORDER — HEPARIN SODIUM (PORCINE) LOCK FLUSH IV SOLN 100 UNIT/ML 100 UNIT/ML
500 SOLUTION INTRAVENOUS PRN
Status: CANCELLED | OUTPATIENT
Start: 2018-02-16

## 2018-02-16 RX ORDER — HEPARIN SODIUM (PORCINE) LOCK FLUSH IV SOLN 100 UNIT/ML 100 UNIT/ML
500 SOLUTION INTRAVENOUS PRN
Status: DISCONTINUED | OUTPATIENT
Start: 2018-02-16 | End: 2018-02-17 | Stop reason: HOSPADM

## 2018-02-16 RX ORDER — SODIUM CHLORIDE 0.9 % (FLUSH) 0.9 %
10 SYRINGE (ML) INJECTION PRN
Status: CANCELLED | OUTPATIENT
Start: 2018-02-16

## 2018-02-16 RX ORDER — SODIUM CHLORIDE 0.9 % (FLUSH) 0.9 %
10 SYRINGE (ML) INJECTION PRN
Status: DISCONTINUED | OUTPATIENT
Start: 2018-02-16 | End: 2018-02-17 | Stop reason: HOSPADM

## 2018-02-16 RX ADMIN — SODIUM CHLORIDE, PRESERVATIVE FREE 500 UNITS: 5 INJECTION INTRAVENOUS at 09:22

## 2018-02-16 RX ADMIN — Medication 10 ML: at 09:18

## 2018-02-16 RX ADMIN — Medication 10 ML: at 09:21

## 2018-02-16 RX ADMIN — SODIUM CHLORIDE 80 ML: 9 INJECTION, SOLUTION INTRAVENOUS at 09:17

## 2018-02-16 RX ADMIN — IOPAMIDOL 100 ML: 755 INJECTION, SOLUTION INTRAVENOUS at 09:17

## 2018-02-16 RX ADMIN — Medication 20 ML: at 08:26

## 2018-02-16 RX ADMIN — Medication 10 ML: at 08:25

## 2018-02-16 NOTE — PROGRESS NOTES
Port accessed for CT without difficulty and Creatinine level drawn. Port flushed with 20ml 0.9NS and capped. Patient returned after CT to have [facility] needle DC'd. Port flushed with 0.9NS and heparinized then [facility] needle DC'd. Patient will return on 3/15/18 for appt. with Dr. Michelle Car.

## 2018-02-27 ENCOUNTER — HOSPITAL ENCOUNTER (OUTPATIENT)
Dept: INFUSION THERAPY | Age: 58
Discharge: HOME | End: 2018-02-27
Payer: MEDICARE

## 2018-02-27 DIAGNOSIS — C34.31 MALIGNANT NEOPLASM OF LOWER LOBE OF RIGHT LUNG (HCC): ICD-10-CM

## 2018-02-27 PROCEDURE — 2580000003 HC RX 258: Performed by: INTERNAL MEDICINE

## 2018-02-27 PROCEDURE — 6360000002 HC RX W HCPCS: Performed by: INTERNAL MEDICINE

## 2018-02-27 PROCEDURE — 96523 IRRIG DRUG DELIVERY DEVICE: CPT

## 2018-02-27 RX ORDER — SODIUM CHLORIDE 0.9 % (FLUSH) 0.9 %
20 SYRINGE (ML) INJECTION PRN
Status: CANCELLED | OUTPATIENT
Start: 2018-02-27

## 2018-02-27 RX ORDER — SODIUM CHLORIDE 0.9 % (FLUSH) 0.9 %
10 SYRINGE (ML) INJECTION PRN
Status: DISCONTINUED | OUTPATIENT
Start: 2018-02-27 | End: 2018-02-28 | Stop reason: HOSPADM

## 2018-02-27 RX ORDER — SODIUM CHLORIDE 0.9 % (FLUSH) 0.9 %
10 SYRINGE (ML) INJECTION PRN
Status: CANCELLED | OUTPATIENT
Start: 2018-02-27

## 2018-02-27 RX ORDER — HEPARIN SODIUM (PORCINE) LOCK FLUSH IV SOLN 100 UNIT/ML 100 UNIT/ML
500 SOLUTION INTRAVENOUS PRN
Status: CANCELLED | OUTPATIENT
Start: 2018-02-27

## 2018-02-27 RX ORDER — HEPARIN SODIUM (PORCINE) LOCK FLUSH IV SOLN 100 UNIT/ML 100 UNIT/ML
500 SOLUTION INTRAVENOUS PRN
Status: DISCONTINUED | OUTPATIENT
Start: 2018-02-27 | End: 2018-02-28 | Stop reason: HOSPADM

## 2018-02-27 RX ADMIN — Medication 10 ML: at 08:40

## 2018-02-27 RX ADMIN — SODIUM CHLORIDE, PRESERVATIVE FREE 500 UNITS: 5 INJECTION INTRAVENOUS at 08:40

## 2018-02-27 RX ADMIN — Medication 10 ML: at 08:39

## 2018-02-27 NOTE — PROGRESS NOTES
Pt is here for port flush. Denies any problems at this time. Pt was discharged in stable condition and will return on 3/15 for an office visit.

## 2018-03-14 DIAGNOSIS — C34.31 MALIGNANT NEOPLASM OF LOWER LOBE OF RIGHT LUNG (HCC): Primary | ICD-10-CM

## 2018-03-15 ENCOUNTER — OFFICE VISIT (OUTPATIENT)
Dept: ONCOLOGY | Age: 58
End: 2018-03-15
Payer: MEDICARE

## 2018-03-15 ENCOUNTER — HOSPITAL ENCOUNTER (OUTPATIENT)
Dept: INFUSION THERAPY | Age: 58
Discharge: HOME | End: 2018-03-15
Payer: MEDICARE

## 2018-03-15 ENCOUNTER — TELEPHONE (OUTPATIENT)
Dept: ONCOLOGY | Age: 58
End: 2018-03-15

## 2018-03-15 VITALS
HEART RATE: 69 BPM | WEIGHT: 300.7 LBS | TEMPERATURE: 97.9 F | DIASTOLIC BLOOD PRESSURE: 110 MMHG | BODY MASS INDEX: 36.6 KG/M2 | SYSTOLIC BLOOD PRESSURE: 147 MMHG

## 2018-03-15 DIAGNOSIS — C34.31 MALIGNANT NEOPLASM OF LOWER LOBE OF RIGHT LUNG (HCC): ICD-10-CM

## 2018-03-15 DIAGNOSIS — F17.200 SMOKING ADDICTION: ICD-10-CM

## 2018-03-15 DIAGNOSIS — C34.31 MALIGNANT NEOPLASM OF LOWER LOBE OF RIGHT LUNG (HCC): Primary | ICD-10-CM

## 2018-03-15 DIAGNOSIS — I10 ESSENTIAL HYPERTENSION: ICD-10-CM

## 2018-03-15 DIAGNOSIS — J41.0 SIMPLE CHRONIC BRONCHITIS (HCC): ICD-10-CM

## 2018-03-15 LAB
ABSOLUTE EOS #: 0.3 K/UL (ref 0–0.4)
ABSOLUTE IMMATURE GRANULOCYTE: NORMAL K/UL (ref 0–0.3)
ABSOLUTE LYMPH #: 3 K/UL (ref 1–4.8)
ABSOLUTE MONO #: 0.6 K/UL (ref 0.1–1.2)
ALBUMIN SERPL-MCNC: 4.5 G/DL (ref 3.5–5.2)
ALBUMIN/GLOBULIN RATIO: 1.4 (ref 1–2.5)
ALP BLD-CCNC: 68 U/L (ref 40–129)
ALT SERPL-CCNC: 37 U/L (ref 5–41)
ANION GAP SERPL CALCULATED.3IONS-SCNC: 14 MMOL/L (ref 9–17)
AST SERPL-CCNC: 25 U/L
BASOPHILS # BLD: 1 % (ref 0–2)
BASOPHILS ABSOLUTE: 0.1 K/UL (ref 0–0.2)
BILIRUB SERPL-MCNC: 0.25 MG/DL (ref 0.3–1.2)
BUN BLDV-MCNC: 25 MG/DL (ref 6–20)
BUN/CREAT BLD: ABNORMAL (ref 9–20)
CALCIUM SERPL-MCNC: 9.1 MG/DL (ref 8.6–10.4)
CHLORIDE BLD-SCNC: 100 MMOL/L (ref 98–107)
CO2: 24 MMOL/L (ref 20–31)
CREAT SERPL-MCNC: 1.03 MG/DL (ref 0.7–1.2)
DIFFERENTIAL TYPE: NORMAL
EOSINOPHILS RELATIVE PERCENT: 3 % (ref 1–4)
GFR AFRICAN AMERICAN: >60 ML/MIN
GFR NON-AFRICAN AMERICAN: >60 ML/MIN
GFR SERPL CREATININE-BSD FRML MDRD: ABNORMAL ML/MIN/{1.73_M2}
GFR SERPL CREATININE-BSD FRML MDRD: ABNORMAL ML/MIN/{1.73_M2}
GLUCOSE BLD-MCNC: 168 MG/DL (ref 70–99)
HCT VFR BLD CALC: 44.3 % (ref 41–53)
HEMOGLOBIN: 15.5 G/DL (ref 13.5–17.5)
IMMATURE GRANULOCYTES: NORMAL %
LYMPHOCYTES # BLD: 31 % (ref 24–44)
MCH RBC QN AUTO: 33.4 PG (ref 26–34)
MCHC RBC AUTO-ENTMCNC: 35 G/DL (ref 31–37)
MCV RBC AUTO: 95.7 FL (ref 80–100)
MONOCYTES # BLD: 6 % (ref 2–11)
NRBC AUTOMATED: NORMAL PER 100 WBC
PDW BLD-RTO: 14.1 % (ref 12.5–15.4)
PLATELET # BLD: 172 K/UL (ref 140–450)
PLATELET ESTIMATE: NORMAL
PMV BLD AUTO: 7 FL (ref 6–12)
POTASSIUM SERPL-SCNC: 4.6 MMOL/L (ref 3.7–5.3)
RBC # BLD: 4.63 M/UL (ref 4.5–5.9)
RBC # BLD: NORMAL 10*6/UL
SEG NEUTROPHILS: 59 % (ref 36–66)
SEGMENTED NEUTROPHILS ABSOLUTE COUNT: 5.6 K/UL (ref 1.8–7.7)
SODIUM BLD-SCNC: 138 MMOL/L (ref 135–144)
TOTAL PROTEIN: 7.8 G/DL (ref 6.4–8.3)
WBC # BLD: 9.6 K/UL (ref 3.5–11)
WBC # BLD: NORMAL 10*3/UL

## 2018-03-15 PROCEDURE — 2580000003 HC RX 258: Performed by: INTERNAL MEDICINE

## 2018-03-15 PROCEDURE — 6360000002 HC RX W HCPCS: Performed by: INTERNAL MEDICINE

## 2018-03-15 PROCEDURE — 36591 DRAW BLOOD OFF VENOUS DEVICE: CPT

## 2018-03-15 PROCEDURE — 85025 COMPLETE CBC W/AUTO DIFF WBC: CPT

## 2018-03-15 PROCEDURE — 80053 COMPREHEN METABOLIC PANEL: CPT

## 2018-03-15 PROCEDURE — 99214 OFFICE O/P EST MOD 30 MIN: CPT | Performed by: INTERNAL MEDICINE

## 2018-03-15 RX ORDER — OXYCODONE HYDROCHLORIDE AND ACETAMINOPHEN 5; 325 MG/1; MG/1
1 TABLET ORAL EVERY 6 HOURS PRN
Qty: 60 TABLET | Refills: 0 | Status: SHIPPED | OUTPATIENT
Start: 2018-03-15 | End: 2018-04-14

## 2018-03-15 RX ORDER — OXYCODONE HYDROCHLORIDE AND ACETAMINOPHEN 5; 325 MG/1; MG/1
1 TABLET ORAL EVERY 4 HOURS PRN
COMMUNITY
End: 2018-03-15 | Stop reason: SDUPTHER

## 2018-03-15 RX ORDER — SODIUM CHLORIDE 0.9 % (FLUSH) 0.9 %
20 SYRINGE (ML) INJECTION PRN
Status: CANCELLED | OUTPATIENT
Start: 2018-03-15

## 2018-03-15 RX ORDER — AMLODIPINE BESYLATE 5 MG/1
5 TABLET ORAL DAILY
Qty: 30 TABLET | Refills: 3 | Status: SHIPPED | OUTPATIENT
Start: 2018-03-15 | End: 2018-04-16 | Stop reason: SDUPTHER

## 2018-03-15 RX ORDER — SODIUM CHLORIDE 0.9 % (FLUSH) 0.9 %
20 SYRINGE (ML) INJECTION PRN
Status: DISCONTINUED | OUTPATIENT
Start: 2018-03-15 | End: 2018-03-16 | Stop reason: HOSPADM

## 2018-03-15 RX ORDER — HEPARIN SODIUM (PORCINE) LOCK FLUSH IV SOLN 100 UNIT/ML 100 UNIT/ML
500 SOLUTION INTRAVENOUS PRN
Status: DISCONTINUED | OUTPATIENT
Start: 2018-03-15 | End: 2018-03-16 | Stop reason: HOSPADM

## 2018-03-15 RX ORDER — IPRATROPIUM BROMIDE AND ALBUTEROL SULFATE 2.5; .5 MG/3ML; MG/3ML
1 SOLUTION RESPIRATORY (INHALATION) EVERY 6 HOURS PRN
Qty: 360 ML | Refills: 2 | Status: SHIPPED | OUTPATIENT
Start: 2018-03-15 | End: 2021-01-01 | Stop reason: SDUPTHER

## 2018-03-15 RX ORDER — HEPARIN SODIUM (PORCINE) LOCK FLUSH IV SOLN 100 UNIT/ML 100 UNIT/ML
500 SOLUTION INTRAVENOUS PRN
Status: CANCELLED | OUTPATIENT
Start: 2018-03-15

## 2018-03-15 RX ORDER — SODIUM CHLORIDE 0.9 % (FLUSH) 0.9 %
10 SYRINGE (ML) INJECTION PRN
Status: CANCELLED | OUTPATIENT
Start: 2018-03-15

## 2018-03-15 RX ADMIN — Medication 20 ML: at 08:31

## 2018-03-15 RX ADMIN — SODIUM CHLORIDE, PRESERVATIVE FREE 500 UNITS: 5 INJECTION INTRAVENOUS at 08:31

## 2018-03-15 NOTE — LETTER
After completion of chemotherapy, we started surveillance. He required bronchoscopy to clear thick mucous. INTERIM HISTORY: The patient comes in today for a follow up. He continues to smoke and his breathing is unchanged. He would like port removed. He has had pain in neck extending down arm. He did not take blood pressure medication this morning. PAST MEDICAL HISTORY: has a past medical history of Anxiety; BiPAP (biphasic positive airway pressure) dependence; Cancer ([facility] [address] Chronic back pain; Clinical trial exam; COPD (chronic obstructive pulmonary disease) ([facility] [address] Diabetes ([address] Hyperlipidemia; Hypertension; Hypothyroidism; Lung cancer ([facility] [address] Neuropathy ([facility] [address] Sleep apnea; Slow to wake up after anesthesia; SOB (shortness of breath); and Urine frequency. PAST SURGICAL HISTORY: has a past surgical history that includes laminectomy; Testicle removal (Left, 1982); Lung removal, partial (Right, 2016); and other surgical history (Right, 06/13/2017). CURRENT MEDICATIONS:  has a current medication list which includes the following prescription(s): oxycodone-acetaminophen, oxybutynin, metformin, atorvastatin, albuterol, lisinopril, tizanidine, fluoxetine, ipratropium-albuterol, levothyroxine, and omeprazole, and the following Facility-Administered Medications: sodium chloride flush and heparin flush. ALLERGIES:  [facility] FAMILY HISTORY: Negative for any hematological or oncological conditions. SOCIAL HISTORY:  reports that he has been smoking Cigarettes. He has a 10.00 pack-year smoking history. He has never used smokeless tobacco. He reports that he does not drink alcohol or use drugs. REVIEW OF SYSTEMS:   General: no fever or night sweats, Weight is stable. ENT: No double or blurred vision, no tinnitus or hearing problem, no dysphagia or sore throat. Some chemo induced eye dryness. 1.  Mild mediastinal and right hilar adenopathy measurements provided above,   unchanged from prior study.       2.   Reduction in right pleural effusion which contains some air bubbles. Mild compressive atelectasis of the right lung base is noted.  No significant   left effusion is seen.       3.   No discrete intraparenchymal lung nodules.       4.   Stable right adrenal hypodense mass, likely an adenoma. REVIEW OF LABORATORY RESULTS:   Lab Results   Component Value Date    WBC 9.6 03/15/2018    HGB 15.5 03/15/2018    HCT 44.3 03/15/2018    MCV 95.7 03/15/2018     03/15/2018       Chemistry        Component Value Date/Time     08/09/2017 0837    K 4.5 08/09/2017 0837     08/09/2017 0837    CO2 23 08/09/2017 0837    BUN 21 (H) 08/09/2017 0837    CREATININE 1.05 02/16/2018 0831        Component Value Date/Time    CALCIUM 9.3 08/09/2017 0837    ALKPHOS 74 08/09/2017 0837    AST 16 08/09/2017 0837    ALT 19 08/09/2017 0837    BILITOT 0.18 (L) 08/09/2017 0837              IMPRESSION:   1. Adenocarcinoma of the right lower lobe of lung. Path stage is T2N1M0. Stage 2 disease. 2. S/P lobectomy. 3. Currently on  adjuvant chemotherapy, with cisplatin and Alimta . Plan to finish 4 cycles of adjuvant chemotherapy and then observe. He is enrolled in a clinical trial with different therapy might be recommended depending on mutational analysis, EGFR and ALK mutation testing is negative. PDL is pending  4. COPD  5. Completed 4 cycles of chemo    6. Smoking addiction, unable to quit   7. HTN uncontrolled. PLAN:  1. The long discussion with the patient, he continues to be in remission from his lung cancer. It is now  more than one year since his chemotherapy. CT scan shows ongoing remission. 2. His COPD he continues to be his major comorbidities, unfortunately he is unwilling and unable to quit smoking. 3. Blood pressure is extremely high, we will add Norvasc.   We'll discuss

## 2018-03-15 NOTE — PROGRESS NOTES
DIAGNOSIS:   1. Adenocarcinoma of the right lower lobe of lung. Path stage is T2N1M0. Stage 2 disease. 2. COPD  3. HTN   4. Smoking addiction   CURRENT THERAPY:  S/P right lower lobectomy. 9/2016  started adjuvant chemotherapy, with Cisplatin and Alimta on 11/17/16 , completed in February/2017  BRIEF CASE HISTORY      Zana Abraham is a very pleasant 62 y.o. male who is referred to us for management recommendation of his recently resected lung cancer. He actually underwent a screening CT scan because of his smoking habits. CT scans showed right lower lobe spiculated mass measuring 2.9 x 2.5 cm abutting the medial pleura. There was another 4 mm pulmonary nodule that has actually been stable from previous imaging. No adenopathy was appreciated. More testing was done but ultimately the patient was taken to surgery. Prior to surgery, the clinical stage was stage I disease. The patient underwent right lower lobe lobectomy on 9/13/16 and pathology showed invasive moderately differentiated adenocarcinoma of the lung measuring 4 cm in greatest dimension, the tumor invaded the visceral pleura. All margins were negative. He had one positive intrapulmonary lymph node. Representative nodes from stations 4, 7, 9 were all negative. Final pathological staging is (T2 N1 M0) stage II disease  He is sent to us for a consultation, recovering from surgery. He continues to have some pain in the surgical area but otherwise has no complaints. He has minimal shortness of breath, no cough or hemoptysis. No headache or seizures or loss of weight . Performance status is ECOG 1, we discussed adjuvant chemotherapy with Cisplatin and Alimta   After completion of chemotherapy, we started surveillance. He required bronchoscopy to clear thick mucous. INTERIM HISTORY: The patient comes in today for a follow up. He continues to smoke and his breathing is unchanged. He would like port removed.  He has had pain in neck extending down arm. He did not take blood pressure medication this morning. PAST MEDICAL HISTORY: has a past medical history of Anxiety; BiPAP (biphasic positive airway pressure) dependence; Cancer ([facility] [address] Chronic back pain; Clinical trial exam; COPD (chronic obstructive pulmonary disease) ([facility] [address] Diabetes ([facility] [address] Hyperlipidemia; Hypertension; Hypothyroidism; Lung cancer ([facility] [address] Neuropathy ([facility] [address] Sleep apnea; Slow to wake up after anesthesia; SOB (shortness of breath); and Urine frequency. PAST SURGICAL HISTORY: has a past surgical history that includes laminectomy; Testicle removal (Left, 1982); Lung removal, partial (Right, 2016); and other surgical history (Right, 06/13/2017). CURRENT MEDICATIONS:  has a current medication list which includes the following prescription(s): oxycodone-acetaminophen, oxybutynin, metformin, atorvastatin, albuterol, lisinopril, tizanidine, fluoxetine, ipratropium-albuterol, levothyroxine, and omeprazole, and the following Facility-Administered Medications: sodium chloride flush and heparin flush. ALLERGIES:  is allergic to [facility] dimeglumine]. FAMILY HISTORY: Negative for any hematological or oncological conditions. SOCIAL HISTORY:  reports that he has been smoking Cigarettes. He has a 10.00 pack-year smoking history. He has never used smokeless tobacco. He reports that he does not drink alcohol or use drugs. REVIEW OF SYSTEMS:   General: no fever or night sweats, Weight is stable. ENT: No double or blurred vision, no tinnitus or hearing problem, no dysphagia or sore throat. Some chemo induced eye dryness. Respiratory: chronic shortness of breath and cough - some clear sputum, no hemoptysis, chest discomfort in the surgical area, chest congestion  Cardiovascular: Denies central chest pain, PND or orthopnea. No L E swelling or palpitations. Gastrointestinal: No nausea, no vomiting, abdominal pain, diarrhea or constipation.    Genitourinary: Denies dysuria, hematuria, frequency, urgency or incontinence. Neurological: Denies headaches, decreased LOC, no sensory or motor focal deficits. Grade 1 neuropathy in hands and feet, predates chemo, diabetes related. Musculoskeletal:  Diffuse aches and pains, no joint swelling. Lumbar back pain as noted above. Skin: There are no rashes or bleeding. Psychiatric:  History of bipolar disorder. Anxiety   Endocrine: No thyroid disease. Hematologic: No bleeding , no adenopathy. PHYSICAL EXAM: Shows a well appearing 62y.o.-year-old male who is not in pain or distress. Vital Signs: Blood pressure (!) 147/110, pulse 69, temperature 97.9 °F (36.6 °C), temperature source Oral, weight (!) 300 lb 11.2 oz (136.4 kg). HEENT: Slight redness in the throat. Normocephalic and atraumatic. Pupils are equal, round, reactive to light and accommodation. Extraocular muscles are intact. Neck: Showed no JVD, no carotid bruit . Lungs: Decreased air entry especially in the right lower lobe area. Ongoing wheezing appreciated both sides - primarily in right base, scattered rhonchi both sides. Postoperative changes in the chest wall are healed well without any evidence of infection. Heart: Regular without any murmur. Abdomen: Soft, nontender. No hepatosplenomegaly. Extremities: Lower extremities show no edema, clubbing, or cyanosis. Neuro exam: intact cranial nerves bilaterally no motor or sensory deficit, gait is normal. Lymphatic: no adenopathy appreciated in the supraclavicular, axillary, cervical or inguinal area    REVIEW OF RADIOLOGICAL RESULTS:   02/16/2018 CT CHEST W CONTRAST IMPRESSION:  1.  Mild mediastinal and right hilar adenopathy measurements provided above,   unchanged from prior study.       2.   Reduction in right pleural effusion which contains some air bubbles. Mild compressive atelectasis of the right lung base is noted.  No significant   left effusion is seen.       3.   No discrete intraparenchymal lung nodules.

## 2018-03-16 DIAGNOSIS — E78.2 MIXED HYPERLIPIDEMIA: ICD-10-CM

## 2018-03-16 RX ORDER — ATORVASTATIN CALCIUM 80 MG/1
TABLET, FILM COATED ORAL
Qty: 90 TABLET | Refills: 0 | Status: SHIPPED | OUTPATIENT
Start: 2018-03-16 | End: 2020-06-30

## 2018-03-23 ENCOUNTER — HOSPITAL ENCOUNTER (OUTPATIENT)
Dept: INTERVENTIONAL RADIOLOGY/VASCULAR | Age: 58
Discharge: HOME | End: 2018-03-25
Payer: MEDICARE

## 2018-03-23 VITALS
RESPIRATION RATE: 20 BRPM | OXYGEN SATURATION: 98 % | HEART RATE: 65 BPM | SYSTOLIC BLOOD PRESSURE: 134 MMHG | DIASTOLIC BLOOD PRESSURE: 88 MMHG

## 2018-03-23 DIAGNOSIS — C34.90 MALIGNANT NEOPLASM OF LUNG, UNSPECIFIED LATERALITY, UNSPECIFIED PART OF LUNG (HCC): ICD-10-CM

## 2018-03-23 LAB
INR BLD: 1.1
PARTIAL THROMBOPLASTIN TIME: 25.7 SEC (ref 23–31)
PLATELET # BLD: 202 K/UL (ref 130–400)
PROTHROMBIN TIME: 11.7 SEC (ref 9.7–11.6)

## 2018-03-23 PROCEDURE — 36415 COLL VENOUS BLD VENIPUNCTURE: CPT

## 2018-03-23 PROCEDURE — 2500000003 HC RX 250 WO HCPCS: Performed by: RADIOLOGY

## 2018-03-23 PROCEDURE — 85730 THROMBOPLASTIN TIME PARTIAL: CPT

## 2018-03-23 PROCEDURE — 85049 AUTOMATED PLATELET COUNT: CPT

## 2018-03-23 PROCEDURE — 77001 FLUOROGUIDE FOR VEIN DEVICE: CPT | Performed by: RADIOLOGY

## 2018-03-23 PROCEDURE — 85610 PROTHROMBIN TIME: CPT

## 2018-03-23 PROCEDURE — 36590 REMOVAL TUNNELED CV CATH: CPT | Performed by: RADIOLOGY

## 2018-03-23 RX ORDER — LIDOCAINE HYDROCHLORIDE 10 MG/ML
INJECTION, SOLUTION INFILTRATION; PERINEURAL
Status: COMPLETED | OUTPATIENT
Start: 2018-03-23 | End: 2018-03-23

## 2018-03-23 RX ADMIN — LIDOCAINE HYDROCHLORIDE 5 ML: 10 INJECTION, SOLUTION INFILTRATION; PERINEURAL at 10:56

## 2018-04-10 ENCOUNTER — HOSPITAL ENCOUNTER (OUTPATIENT)
Dept: INFUSION THERAPY | Age: 58
End: 2018-04-10
Payer: MEDICARE

## 2018-04-16 ENCOUNTER — OFFICE VISIT (OUTPATIENT)
Dept: PRIMARY CARE CLINIC | Age: 58
End: 2018-04-16
Payer: MEDICARE

## 2018-04-16 VITALS
HEART RATE: 83 BPM | SYSTOLIC BLOOD PRESSURE: 122 MMHG | WEIGHT: 292 LBS | RESPIRATION RATE: 16 BRPM | BODY MASS INDEX: 35.56 KG/M2 | HEIGHT: 76 IN | OXYGEN SATURATION: 98 % | DIASTOLIC BLOOD PRESSURE: 78 MMHG

## 2018-04-16 DIAGNOSIS — J41.0 SIMPLE CHRONIC BRONCHITIS (HCC): ICD-10-CM

## 2018-04-16 DIAGNOSIS — G89.29 NECK PAIN, CHRONIC: ICD-10-CM

## 2018-04-16 DIAGNOSIS — I10 ESSENTIAL HYPERTENSION: ICD-10-CM

## 2018-04-16 DIAGNOSIS — E11.65 TYPE 2 DIABETES MELLITUS WITH HYPERGLYCEMIA, WITHOUT LONG-TERM CURRENT USE OF INSULIN (HCC): Primary | ICD-10-CM

## 2018-04-16 DIAGNOSIS — M54.2 NECK PAIN, CHRONIC: ICD-10-CM

## 2018-04-16 LAB — HBA1C MFR BLD: 6.7 %

## 2018-04-16 PROCEDURE — 83036 HEMOGLOBIN GLYCOSYLATED A1C: CPT | Performed by: INTERNAL MEDICINE

## 2018-04-16 PROCEDURE — 99214 OFFICE O/P EST MOD 30 MIN: CPT | Performed by: INTERNAL MEDICINE

## 2018-04-16 RX ORDER — CYCLOBENZAPRINE HCL 10 MG
10 TABLET ORAL 3 TIMES DAILY PRN
Qty: 60 TABLET | Refills: 3 | Status: SHIPPED | OUTPATIENT
Start: 2018-04-16 | End: 2019-08-20

## 2018-04-16 RX ORDER — AMLODIPINE BESYLATE 5 MG/1
5 TABLET ORAL DAILY
Qty: 90 TABLET | Refills: 1 | Status: SHIPPED | OUTPATIENT
Start: 2018-04-16 | End: 2020-06-30 | Stop reason: SDUPTHER

## 2018-04-16 ASSESSMENT — ENCOUNTER SYMPTOMS
COUGH: 0
ABDOMINAL PAIN: 0
TROUBLE SWALLOWING: 0
SORE THROAT: 0
EYE REDNESS: 0
SHORTNESS OF BREATH: 0
VOMITING: 0
NAUSEA: 0
EYE DISCHARGE: 0
BACK PAIN: 0

## 2018-04-26 ENCOUNTER — INITIAL CONSULT (OUTPATIENT)
Dept: PAIN MANAGEMENT | Age: 58
End: 2018-04-26
Payer: MEDICARE

## 2018-04-26 VITALS
HEIGHT: 76 IN | OXYGEN SATURATION: 96 % | BODY MASS INDEX: 36.51 KG/M2 | SYSTOLIC BLOOD PRESSURE: 124 MMHG | HEART RATE: 84 BPM | WEIGHT: 299.8 LBS | RESPIRATION RATE: 16 BRPM | TEMPERATURE: 97.4 F | DIASTOLIC BLOOD PRESSURE: 76 MMHG

## 2018-04-26 DIAGNOSIS — J98.4 LUNG DISEASE: ICD-10-CM

## 2018-04-26 DIAGNOSIS — C34.31 MALIGNANT NEOPLASM OF LOWER LOBE OF RIGHT LUNG (HCC): ICD-10-CM

## 2018-04-26 DIAGNOSIS — M96.1 LUMBAR POST-LAMINECTOMY SYNDROME: Primary | ICD-10-CM

## 2018-04-26 PROCEDURE — 99214 OFFICE O/P EST MOD 30 MIN: CPT | Performed by: ANESTHESIOLOGY

## 2018-04-26 ASSESSMENT — ENCOUNTER SYMPTOMS
EYES NEGATIVE: 1
BACK PAIN: 1
GASTROINTESTINAL NEGATIVE: 1
COUGH: 1
ALLERGIC/IMMUNOLOGIC NEGATIVE: 1

## 2018-05-21 DIAGNOSIS — E03.8 OTHER SPECIFIED HYPOTHYROIDISM: ICD-10-CM

## 2018-05-22 RX ORDER — LEVOTHYROXINE SODIUM 0.07 MG/1
TABLET ORAL
Qty: 90 TABLET | Refills: 3 | Status: SHIPPED | OUTPATIENT
Start: 2018-05-22 | End: 2019-06-07 | Stop reason: SDUPTHER

## 2018-06-08 DIAGNOSIS — C34.31 MALIGNANT NEOPLASM OF LOWER LOBE OF RIGHT LUNG (HCC): Primary | ICD-10-CM

## 2018-06-14 ENCOUNTER — OFFICE VISIT (OUTPATIENT)
Dept: ONCOLOGY | Age: 58
End: 2018-06-14
Payer: MEDICARE

## 2018-06-14 ENCOUNTER — HOSPITAL ENCOUNTER (OUTPATIENT)
Age: 58
Discharge: HOME | End: 2018-06-14
Payer: MEDICARE

## 2018-06-14 VITALS
WEIGHT: 293.19 LBS | DIASTOLIC BLOOD PRESSURE: 96 MMHG | HEART RATE: 73 BPM | BODY MASS INDEX: 35.69 KG/M2 | TEMPERATURE: 98.2 F | SYSTOLIC BLOOD PRESSURE: 142 MMHG

## 2018-06-14 DIAGNOSIS — C34.31 MALIGNANT NEOPLASM OF LOWER LOBE OF RIGHT LUNG (HCC): ICD-10-CM

## 2018-06-14 DIAGNOSIS — F17.200 SMOKING ADDICTION: Primary | ICD-10-CM

## 2018-06-14 DIAGNOSIS — M54.6 CHRONIC MIDLINE THORACIC BACK PAIN: ICD-10-CM

## 2018-06-14 DIAGNOSIS — J41.0 SIMPLE CHRONIC BRONCHITIS (HCC): ICD-10-CM

## 2018-06-14 DIAGNOSIS — G89.29 CHRONIC MIDLINE THORACIC BACK PAIN: ICD-10-CM

## 2018-06-14 LAB
ABSOLUTE EOS #: 0.3 K/UL (ref 0–0.4)
ABSOLUTE IMMATURE GRANULOCYTE: NORMAL K/UL (ref 0–0.3)
ABSOLUTE LYMPH #: 2.6 K/UL (ref 1–4.8)
ABSOLUTE MONO #: 0.5 K/UL (ref 0.1–1.2)
ALBUMIN SERPL-MCNC: 4.3 G/DL (ref 3.5–5.2)
ALBUMIN/GLOBULIN RATIO: 1.3 (ref 1–2.5)
ALP BLD-CCNC: 64 U/L (ref 40–129)
ALT SERPL-CCNC: 33 U/L (ref 5–41)
ANION GAP SERPL CALCULATED.3IONS-SCNC: 11 MMOL/L (ref 9–17)
AST SERPL-CCNC: 24 U/L
BASOPHILS # BLD: 1 % (ref 0–2)
BASOPHILS ABSOLUTE: 0.1 K/UL (ref 0–0.2)
BILIRUB SERPL-MCNC: 0.35 MG/DL (ref 0.3–1.2)
BUN BLDV-MCNC: 17 MG/DL (ref 6–20)
BUN/CREAT BLD: ABNORMAL (ref 9–20)
CALCIUM SERPL-MCNC: 9 MG/DL (ref 8.6–10.4)
CHLORIDE BLD-SCNC: 101 MMOL/L (ref 98–107)
CO2: 26 MMOL/L (ref 20–31)
CREAT SERPL-MCNC: 1.09 MG/DL (ref 0.7–1.2)
DIFFERENTIAL TYPE: NORMAL
EOSINOPHILS RELATIVE PERCENT: 4 % (ref 1–4)
GFR AFRICAN AMERICAN: >60 ML/MIN
GFR NON-AFRICAN AMERICAN: >60 ML/MIN
GFR SERPL CREATININE-BSD FRML MDRD: ABNORMAL ML/MIN/{1.73_M2}
GFR SERPL CREATININE-BSD FRML MDRD: ABNORMAL ML/MIN/{1.73_M2}
GLUCOSE BLD-MCNC: 145 MG/DL (ref 70–99)
HCT VFR BLD CALC: 45 % (ref 41–53)
HEMOGLOBIN: 15.7 G/DL (ref 13.5–17.5)
IMMATURE GRANULOCYTES: NORMAL %
LYMPHOCYTES # BLD: 32 % (ref 24–44)
MCH RBC QN AUTO: 33.5 PG (ref 26–34)
MCHC RBC AUTO-ENTMCNC: 34.8 G/DL (ref 31–37)
MCV RBC AUTO: 96.4 FL (ref 80–100)
MONOCYTES # BLD: 6 % (ref 2–11)
NRBC AUTOMATED: NORMAL PER 100 WBC
PDW BLD-RTO: 13.6 % (ref 12.5–15.4)
PLATELET # BLD: 183 K/UL (ref 140–450)
PLATELET ESTIMATE: NORMAL
PMV BLD AUTO: 7.4 FL (ref 6–12)
POTASSIUM SERPL-SCNC: 4.4 MMOL/L (ref 3.7–5.3)
RBC # BLD: 4.67 M/UL (ref 4.5–5.9)
RBC # BLD: NORMAL 10*6/UL
SEG NEUTROPHILS: 57 % (ref 36–66)
SEGMENTED NEUTROPHILS ABSOLUTE COUNT: 4.7 K/UL (ref 1.8–7.7)
SODIUM BLD-SCNC: 138 MMOL/L (ref 135–144)
TOTAL PROTEIN: 7.7 G/DL (ref 6.4–8.3)
WBC # BLD: 8.2 K/UL (ref 3.5–11)
WBC # BLD: NORMAL 10*3/UL

## 2018-06-14 PROCEDURE — 36415 COLL VENOUS BLD VENIPUNCTURE: CPT

## 2018-06-14 PROCEDURE — 80053 COMPREHEN METABOLIC PANEL: CPT

## 2018-06-14 PROCEDURE — 99214 OFFICE O/P EST MOD 30 MIN: CPT | Performed by: INTERNAL MEDICINE

## 2018-06-14 PROCEDURE — 85025 COMPLETE CBC W/AUTO DIFF WBC: CPT

## 2018-06-14 PROCEDURE — 99211 OFF/OP EST MAY X REQ PHY/QHP: CPT | Performed by: INTERNAL MEDICINE

## 2018-06-14 RX ORDER — MELOXICAM 15 MG/1
15 TABLET ORAL DAILY
Qty: 30 TABLET | Refills: 3 | Status: SHIPPED | OUTPATIENT
Start: 2018-06-14 | End: 2019-08-20 | Stop reason: ALTCHOICE

## 2018-06-14 RX ORDER — BUDESONIDE AND FORMOTEROL FUMARATE DIHYDRATE 160; 4.5 UG/1; UG/1
1 AEROSOL RESPIRATORY (INHALATION) 2 TIMES DAILY
COMMUNITY
End: 2019-01-31

## 2018-06-20 DIAGNOSIS — Z12.11 SCREENING FOR COLON CANCER: Primary | ICD-10-CM

## 2018-08-22 DIAGNOSIS — F32.A DEPRESSION, UNSPECIFIED DEPRESSION TYPE: ICD-10-CM

## 2018-08-23 RX ORDER — FLUOXETINE HYDROCHLORIDE 40 MG/1
CAPSULE ORAL
Qty: 60 CAPSULE | Refills: 3 | Status: SHIPPED | OUTPATIENT
Start: 2018-08-23 | End: 2019-07-16 | Stop reason: SDUPTHER

## 2018-08-23 NOTE — TELEPHONE ENCOUNTER
Last OV 04/16/18      Health Maintenance   Topic Date Due    Hepatitis C screen  1960    Diabetic retinal exam  12/02/1970    HIV screen  12/02/1975    DTaP/Tdap/Td vaccine (1 - Tdap) 12/02/1979    Shingles Vaccine (1 of 2 - 2 Dose Series) 12/02/2010    Colon cancer screen colonoscopy  12/02/2010    Flu vaccine (1) 09/01/2018    Diabetic foot exam  10/03/2018    Lipid screen  11/01/2018    A1C test (Diabetic or Prediabetic)  04/16/2019    Potassium monitoring  06/14/2019    Creatinine monitoring  06/14/2019    Pneumococcal med risk  Completed             (applicable per patient's age: Cancer Screenings, Depression Screening, Fall Risk Screening, Immunizations)    Hemoglobin A1C (%)   Date Value   04/16/2018 6.7   11/01/2017 7.0 (H)   06/10/2017 7.0 (H)     Microalb/Crt.  Ratio (mcg/mg creat)   Date Value   06/10/2017 60 (H)     LDL Cholesterol (mg/dL)   Date Value   11/01/2017          AST (U/L)   Date Value   06/14/2018 24     ALT (U/L)   Date Value   06/14/2018 33     BUN (mg/dL)   Date Value   06/14/2018 17      (goal A1C is < 7)   (goal LDL is <100) need 30-50% reduction from baseline     BP Readings from Last 3 Encounters:   06/14/18 (!) 142/96   04/26/18 124/76   04/16/18 122/78    (goal /80)      All Future Testing planned in [facility]:  Lab Frequency Next Occurrence   CT CHEST W CONTRAST Once 10/10/2017   POCT Fecal Immunochemical Test (FIT) Once 11/01/2017   PSA Screening Once 03/12/2018   XR CERVICAL SPINE (2-3 VIEWS) Once 07/16/2018   PT aquatic therapy Once 04/27/2018   XR Lumbar Spine Ap Lateral Flexion and Extension and Oblique Once 04/26/2018   CT Chest W Contrast Once 09/14/2018   Creatinine, Serum     CBC Auto Differential     Comprehensive Metabolic Panel         Next Visit Date:  Future Appointments  Date Time Provider Sukhjinder Coley   9/4/2018 8:00 AM [facility] [place] [facility] RM [facility] CT [facility] [place]   9/13/2018 8:40 AM Haleigh Hampton MD [address] [place] [address]

## 2018-09-04 ENCOUNTER — HOSPITAL ENCOUNTER (OUTPATIENT)
Dept: CT IMAGING | Age: 58
Discharge: HOME | End: 2018-09-06
Payer: MEDICARE

## 2018-09-04 DIAGNOSIS — F17.200 SMOKING ADDICTION: ICD-10-CM

## 2018-09-04 DIAGNOSIS — M54.6 CHRONIC MIDLINE THORACIC BACK PAIN: ICD-10-CM

## 2018-09-04 DIAGNOSIS — C34.31 MALIGNANT NEOPLASM OF LOWER LOBE OF RIGHT LUNG (HCC): ICD-10-CM

## 2018-09-04 DIAGNOSIS — J41.0 SIMPLE CHRONIC BRONCHITIS (HCC): ICD-10-CM

## 2018-09-04 DIAGNOSIS — G89.29 CHRONIC MIDLINE THORACIC BACK PAIN: ICD-10-CM

## 2018-09-04 LAB
CREAT SERPL-MCNC: 0.99 MG/DL (ref 0.7–1.2)
GFR AFRICAN AMERICAN: >60 ML/MIN
GFR NON-AFRICAN AMERICAN: >60 ML/MIN
GFR SERPL CREATININE-BSD FRML MDRD: NORMAL ML/MIN/{1.73_M2}
GFR SERPL CREATININE-BSD FRML MDRD: NORMAL ML/MIN/{1.73_M2}

## 2018-09-04 PROCEDURE — 71260 CT THORAX DX C+: CPT

## 2018-09-04 PROCEDURE — 6360000004 HC RX CONTRAST MEDICATION: Performed by: INTERNAL MEDICINE

## 2018-09-04 PROCEDURE — 2580000003 HC RX 258: Performed by: INTERNAL MEDICINE

## 2018-09-04 PROCEDURE — 36415 COLL VENOUS BLD VENIPUNCTURE: CPT

## 2018-09-04 PROCEDURE — 82565 ASSAY OF CREATININE: CPT

## 2018-09-04 RX ORDER — SODIUM CHLORIDE 0.9 % (FLUSH) 0.9 %
10 SYRINGE (ML) INJECTION PRN
Status: DISCONTINUED | OUTPATIENT
Start: 2018-09-04 | End: 2018-09-07 | Stop reason: HOSPADM

## 2018-09-04 RX ORDER — 0.9 % SODIUM CHLORIDE 0.9 %
60 INTRAVENOUS SOLUTION INTRAVENOUS ONCE
Status: COMPLETED | OUTPATIENT
Start: 2018-09-04 | End: 2018-09-04

## 2018-09-04 RX ADMIN — Medication 10 ML: at 08:59

## 2018-09-04 RX ADMIN — IOPAMIDOL 100 ML: 755 INJECTION, SOLUTION INTRAVENOUS at 09:00

## 2018-09-04 RX ADMIN — SODIUM CHLORIDE 60 ML: 9 INJECTION, SOLUTION INTRAVENOUS at 08:57

## 2018-09-11 ENCOUNTER — HOSPITAL ENCOUNTER (OUTPATIENT)
Age: 58
Discharge: HOME | End: 2018-09-11
Payer: MEDICARE

## 2018-09-11 DIAGNOSIS — J41.0 SIMPLE CHRONIC BRONCHITIS (HCC): ICD-10-CM

## 2018-09-11 DIAGNOSIS — F17.200 SMOKING ADDICTION: ICD-10-CM

## 2018-09-11 DIAGNOSIS — M54.6 CHRONIC MIDLINE THORACIC BACK PAIN: ICD-10-CM

## 2018-09-11 DIAGNOSIS — C34.31 MALIGNANT NEOPLASM OF LOWER LOBE OF RIGHT LUNG (HCC): ICD-10-CM

## 2018-09-11 DIAGNOSIS — G89.29 CHRONIC MIDLINE THORACIC BACK PAIN: ICD-10-CM

## 2018-09-11 LAB
ABSOLUTE EOS #: 0.4 K/UL (ref 0–0.4)
ABSOLUTE IMMATURE GRANULOCYTE: ABNORMAL K/UL (ref 0–0.3)
ABSOLUTE LYMPH #: 2.3 K/UL (ref 1–4.8)
ABSOLUTE MONO #: 0.4 K/UL (ref 0.1–1.2)
ALBUMIN SERPL-MCNC: 4.5 G/DL (ref 3.5–5.2)
ALBUMIN/GLOBULIN RATIO: 1.6 (ref 1–2.5)
ALP BLD-CCNC: 71 U/L (ref 40–129)
ALT SERPL-CCNC: 32 U/L (ref 5–41)
ANION GAP SERPL CALCULATED.3IONS-SCNC: 14 MMOL/L (ref 9–17)
AST SERPL-CCNC: 24 U/L
BASOPHILS # BLD: 1 % (ref 0–2)
BASOPHILS ABSOLUTE: 0.1 K/UL (ref 0–0.2)
BILIRUB SERPL-MCNC: 0.33 MG/DL (ref 0.3–1.2)
BUN BLDV-MCNC: 17 MG/DL (ref 6–20)
BUN/CREAT BLD: ABNORMAL (ref 9–20)
CALCIUM SERPL-MCNC: 9.4 MG/DL (ref 8.6–10.4)
CHLORIDE BLD-SCNC: 102 MMOL/L (ref 98–107)
CO2: 24 MMOL/L (ref 20–31)
CREAT SERPL-MCNC: 1.11 MG/DL (ref 0.7–1.2)
DIFFERENTIAL TYPE: ABNORMAL
EOSINOPHILS RELATIVE PERCENT: 5 % (ref 1–4)
GFR AFRICAN AMERICAN: >60 ML/MIN
GFR NON-AFRICAN AMERICAN: >60 ML/MIN
GFR SERPL CREATININE-BSD FRML MDRD: ABNORMAL ML/MIN/{1.73_M2}
GFR SERPL CREATININE-BSD FRML MDRD: ABNORMAL ML/MIN/{1.73_M2}
GLUCOSE BLD-MCNC: 161 MG/DL (ref 70–99)
HCT VFR BLD CALC: 44.8 % (ref 41–53)
HEMOGLOBIN: 15.5 G/DL (ref 13.5–17.5)
IMMATURE GRANULOCYTES: ABNORMAL %
LYMPHOCYTES # BLD: 27 % (ref 24–44)
MCH RBC QN AUTO: 33.3 PG (ref 26–34)
MCHC RBC AUTO-ENTMCNC: 34.6 G/DL (ref 31–37)
MCV RBC AUTO: 96.3 FL (ref 80–100)
MONOCYTES # BLD: 5 % (ref 2–11)
NRBC AUTOMATED: ABNORMAL PER 100 WBC
PDW BLD-RTO: 13.5 % (ref 12.5–15.4)
PLATELET # BLD: 185 K/UL (ref 140–450)
PLATELET ESTIMATE: ABNORMAL
PMV BLD AUTO: 7.2 FL (ref 6–12)
POTASSIUM SERPL-SCNC: 4.6 MMOL/L (ref 3.7–5.3)
RBC # BLD: 4.65 M/UL (ref 4.5–5.9)
RBC # BLD: ABNORMAL 10*6/UL
SEG NEUTROPHILS: 62 % (ref 36–66)
SEGMENTED NEUTROPHILS ABSOLUTE COUNT: 5.3 K/UL (ref 1.8–7.7)
SODIUM BLD-SCNC: 140 MMOL/L (ref 135–144)
TOTAL PROTEIN: 7.4 G/DL (ref 6.4–8.3)
WBC # BLD: 8.5 K/UL (ref 3.5–11)
WBC # BLD: ABNORMAL 10*3/UL

## 2018-09-11 PROCEDURE — 80053 COMPREHEN METABOLIC PANEL: CPT

## 2018-09-11 PROCEDURE — 85025 COMPLETE CBC W/AUTO DIFF WBC: CPT

## 2018-09-11 PROCEDURE — 36415 COLL VENOUS BLD VENIPUNCTURE: CPT

## 2018-09-12 DIAGNOSIS — E11.9 TYPE 2 DIABETES MELLITUS WITHOUT COMPLICATION, WITHOUT LONG-TERM CURRENT USE OF INSULIN (HCC): ICD-10-CM

## 2018-09-12 NOTE — TELEPHONE ENCOUNTER
Last OV 04/16/18      Health Maintenance   Topic Date Due    Hepatitis C screen  1960    Diabetic retinal exam  12/02/1970    HIV screen  12/02/1975    DTaP/Tdap/Td vaccine (1 - Tdap) 12/02/1979    Shingles Vaccine (1 of 2 - 2 Dose Series) 12/02/2010    Colon cancer screen colonoscopy  12/02/2010    Flu vaccine (1) 09/01/2018    Diabetic foot exam  10/03/2018    Lipid screen  11/01/2018    A1C test (Diabetic or Prediabetic)  04/16/2019    Potassium monitoring  09/11/2019    Creatinine monitoring  09/11/2019    Pneumococcal med risk  Completed             (applicable per patient's age: Cancer Screenings, Depression Screening, Fall Risk Screening, Immunizations)    Hemoglobin A1C (%)   Date Value   04/16/2018 6.7   11/01/2017 7.0 (H)   06/10/2017 7.0 (H)     Microalb/Crt.  Ratio (mcg/mg creat)   Date Value   06/10/2017 60 (H)     LDL Cholesterol (mg/dL)   Date Value   11/01/2017          AST (U/L)   Date Value   09/11/2018 24     ALT (U/L)   Date Value   09/11/2018 32     BUN (mg/dL)   Date Value   09/11/2018 17      (goal A1C is < 7)   (goal LDL is <100) need 30-50% reduction from baseline     BP Readings from Last 3 Encounters:   06/14/18 (!) 142/96   04/26/18 124/76   04/16/18 122/78    (goal /80)      All Future Testing planned in [facility]:  Lab Frequency Next Occurrence   CT CHEST W CONTRAST Once 10/10/2017   POCT Fecal Immunochemical Test (FIT) Once 11/01/2017   PSA Screening Once 03/12/2018   XR CERVICAL SPINE (2-3 VIEWS) Once 07/16/2018   PT aquatic therapy Once 04/27/2018   XR Lumbar Spine Ap Lateral Flexion and Extension and Oblique Once 04/26/2018   Creatinine, Serum     CBC Auto Differential     Comprehensive Metabolic Panel         Next Visit Date:  Future Appointments  Date Time Provider Sukhjinder Coley   9/13/2018 8:40 AM Gagan Conner MD [address] [place] [address] 22   10/12/2018 7:40 AM [facility]            Patient Active Problem List:     Malignant neoplasm of lower lobe of right lung (HCC)     Smoking addiction     Simple chronic bronchitis (HCC)     Pneumonia of lower lobe due to Streptococcus pneumoniae ([address]     Diabetes type 2, uncontrolled ([address]     Microalbuminuria due to type 2 diabetes mellitus ([address]     Empyema lung ([facility] [address]     Pneumonia of right lower lobe due to infectious organism [facility])     Essential hypertension     Chronic midline thoracic back pain

## 2018-09-13 ENCOUNTER — OFFICE VISIT (OUTPATIENT)
Dept: ONCOLOGY | Age: 58
End: 2018-09-13
Payer: MEDICARE

## 2018-09-13 ENCOUNTER — TELEPHONE (OUTPATIENT)
Dept: ONCOLOGY | Age: 58
End: 2018-09-13

## 2018-09-13 VITALS
TEMPERATURE: 98.1 F | BODY MASS INDEX: 35.17 KG/M2 | RESPIRATION RATE: 16 BRPM | DIASTOLIC BLOOD PRESSURE: 91 MMHG | WEIGHT: 288.9 LBS | HEART RATE: 67 BPM | SYSTOLIC BLOOD PRESSURE: 125 MMHG

## 2018-09-13 DIAGNOSIS — G89.29 CHRONIC MIDLINE THORACIC BACK PAIN: Primary | ICD-10-CM

## 2018-09-13 DIAGNOSIS — M54.6 CHRONIC MIDLINE THORACIC BACK PAIN: Primary | ICD-10-CM

## 2018-09-13 DIAGNOSIS — C34.31 MALIGNANT NEOPLASM OF LOWER LOBE OF RIGHT LUNG (HCC): ICD-10-CM

## 2018-09-13 DIAGNOSIS — F17.200 SMOKING ADDICTION: ICD-10-CM

## 2018-09-13 DIAGNOSIS — Z87.891 PERSONAL HISTORY OF NICOTINE DEPENDENCE: ICD-10-CM

## 2018-09-13 PROCEDURE — 99407 BEHAV CHNG SMOKING > 10 MIN: CPT | Performed by: INTERNAL MEDICINE

## 2018-09-13 PROCEDURE — 99211 OFF/OP EST MAY X REQ PHY/QHP: CPT | Performed by: INTERNAL MEDICINE

## 2018-09-13 PROCEDURE — 99214 OFFICE O/P EST MOD 30 MIN: CPT | Performed by: INTERNAL MEDICINE

## 2018-09-13 RX ORDER — VARENICLINE TARTRATE 25 MG
KIT ORAL
Qty: 60 TABLET | Refills: 0 | Status: SHIPPED | OUTPATIENT
Start: 2018-09-13 | End: 2019-01-31

## 2018-09-13 NOTE — TELEPHONE ENCOUNTER
Per Dr Rojelio Zheng AVS 9/13/18-pt to have CT chest, labs before rv in December-pt scheduled for CT at [address] on 12/6/18 with a f/u with MD on 12/20/18-pt will get labs done the day of CT

## 2018-09-13 NOTE — PROGRESS NOTES
DIAGNOSIS:   1. Adenocarcinoma of the right lower lobe of lung. Path stage is T2N1M0. Stage 2 disease. 2. COPD  3. HTN   4. Smoking addiction   CURRENT THERAPY:  S/P right lower lobectomy. 9/2016  Received adjuvant chemotherapy, with Cisplatin and Alimta on 11/17/16, completed in February/2017    BRIEF CASE HISTORY:      Nikita Pandey is a very pleasant 62 y.o. male who is referred to us for management recommendation of his recently resected lung cancer. He actually underwent a screening CT scan because of his smoking habits. CT scans showed right lower lobe spiculated mass measuring 2.9 x 2.5 cm abutting the medial pleura. There was another 4 mm pulmonary nodule that has actually been stable from previous imaging. No adenopathy was appreciated. More testing was done but ultimately the patient was taken to surgery. Prior to surgery, the clinical stage was stage I disease. The patient underwent right lower lobe lobectomy on 9/13/16 and pathology showed invasive moderately differentiated adenocarcinoma of the lung measuring 4 cm in greatest dimension, the tumor invaded the visceral pleura. All margins were negative. He had one positive intrapulmonary lymph node. Representative nodes from stations 4, 7, 9 were all negative. Final pathological staging is (T2 N1 M0) stage II disease  He is sent to us for a consultation, recovering from surgery. He continues to have some pain in the surgical area but otherwise has no complaints. He has minimal shortness of breath, no cough or hemoptysis. No headache or seizures or loss of weight . Performance status is ECOG 1, we discussed adjuvant chemotherapy with Cisplatin and Alimta   After completion of chemotherapy, we started surveillance. He required bronchoscopy to clear thick mucous. INTERIM HISTORY: The patient comes in today for a follow up. His right trapezoid pain continues intermittently unchanged. He has diffused aches with stiffness.  He continues to smoke. He has some throat congestion. He has some pain in lung affected by surgery and previous pneumonia. PAST MEDICAL HISTORY: has a past medical history of Anxiety; BiPAP (biphasic positive airway pressure) dependence; Cancer ([facility] [address] Chronic back pain; Clinical trial exam; COPD (chronic obstructive pulmonary disease) ([facility] [address] Diabetes ([facility] [address] Hyperlipidemia; Hypertension; Hypothyroidism; Lung cancer ([facility] [address] Neuropathy; Sleep apnea; Slow to wake up after anesthesia; SOB (shortness of breath); and Urine frequency. PAST SURGICAL HISTORY: has a past surgical history that includes laminectomy (12/2012); Testicle removal (Left, 1982); Lung removal, partial (Right, 2016); other surgical history (Right, 06/13/2017); and back surgery (01/2013). CURRENT MEDICATIONS:  has a current medication list which includes the following prescription(s): metformin, fluoxetine, budesonide-formoterol, meloxicam, levothyroxine, amlodipine, cyclobenzaprine, atorvastatin, ipratropium-albuterol, oxybutynin, metformin, albuterol, lisinopril, and omeprazole. ALLERGIES:  is allergic to [facility] dimeglumine]. FAMILY HISTORY: Negative for any hematological or oncological conditions. SOCIAL HISTORY:  reports that he has been smoking Cigarettes. He has a 10.00 pack-year smoking history. He has never used smokeless tobacco. He reports that he does not drink alcohol or use drugs. REVIEW OF SYSTEMS:   General: No fever or night sweats. Weight is stable. ENT: No double or blurred vision, no tinnitus or hearing problem, no dysphagia or sore throat. Respiratory: Chronic shortness of breath and cough - some clear sputum, no hemoptysis, chest discomfort in the surgical area, chest congestion  Cardiovascular: Denies central chest pain, PND or orthopnea. No L E swelling or palpitations. Gastrointestinal: No nausea, no vomiting, abdominal pain, diarrhea or constipation.    Genitourinary: Denies dysuria, hematuria, frequency, urgency or incontinence. Neurological: Denies headaches, decreased LOC, no sensory or motor focal deficits. Grade 1 neuropathy in hands and feet, predates chemo, diabetes related. Musculoskeletal: Diffuse aches and pains, no joint swelling. Trapezoid pain as noted above  Skin: There are no rashes or bleeding. Psychiatric:  History of bipolar disorder. Anxiety   Endocrine: No thyroid disease. Hematologic: No bleeding, no adenopathy. PHYSICAL EXAM: Shows a well appearing 62y.o.-year-old male who is not in pain or distress. Vital Signs: Blood pressure (!) 125/91, pulse 67, temperature 98.1 °F (36.7 °C), temperature source Oral, resp. rate 16, weight 288 lb 14.4 oz (131 kg). HEENT: Slight redness in the throat. Normocephalic and atraumatic. Pupils are equal, round, reactive to light and accommodation. Extraocular muscles are intact. Neck: Showed no JVD, no carotid bruit . Lungs: Decreased air entry especially in the right lower lobe area. Scattered wheezing. Postoperative changes in the chest wall are healed well without any evidence of infection. Heart: Regular without any murmur. Abdomen: Soft, nontender. No hepatosplenomegaly. Extremities: Lower extremities show no edema, clubbing, or cyanosis. Neuro exam: intact cranial nerves bilaterally no motor or sensory deficit, gait is normal. Lymphatic: no adenopathy appreciated in the supraclavicular, axillary, cervical or inguinal area    REVIEW OF RADIOLOGICAL RESULTS:   09/04/2018 CT CHEST W CONTRAST IMPRESSION:  Status post right lower lobectomy with small residual right effusion and   dependent atelectasis.  No new airspace disease identified.       Progressive decrease in size of the small anterior mediastinal lymph nodes.       Stable right adrenal nodule since the earliest available examination.    Presumably this is a benign lesion given its stability since at least 2016.       Stable lucent area in the T8 vertebral body, also presumably a benign process   given its stability since at least 2016. REVIEW OF LABORATORY RESULTS:   Lab Results   Component Value Date    WBC 8.5 09/11/2018    HGB 15.5 09/11/2018    HCT 44.8 09/11/2018    MCV 96.3 09/11/2018     09/11/2018       Chemistry        Component Value Date/Time     09/11/2018 1113    K 4.6 09/11/2018 1113     09/11/2018 1113    CO2 24 09/11/2018 1113    BUN 17 09/11/2018 1113    CREATININE 1.11 09/11/2018 1113        Component Value Date/Time    CALCIUM 9.4 09/11/2018 1113    ALKPHOS 71 09/11/2018 1113    AST 24 09/11/2018 1113    ALT 32 09/11/2018 1113    BILITOT 0.33 09/11/2018 1113              IMPRESSION:   1. Adenocarcinoma of the right lower lobe of lung. Path stage is T2N1M0. Stage 2 disease. 2. S/P lobectomy. 3. Currently on adjuvant chemotherapy, with cisplatin and Alimta. Plan to finish 4 cycles of adjuvant chemotherapy and then observe. He is enrolled in a clinical trial with different therapy might be recommended depending on mutational analysis, EGFR and ALK mutation testing is negative. PDL is pending  4. COPD  5. Completed 4 cycles of chemo    6. Smoking addiction, unable to quit   7. HTN uncontrolled. PLAN:  1. We reviewed his current CT which showed stable findings over previous, continue to monitor mediastinal lymph nodes. 2. We discussed his lab work which shows stable counts within range. 3. He continues in remission. 4. We had discussion regarding management of his trapezoid pain and I recommend a topical analgesic. 5. Continue with surveillance. 6. I counseled him on smoking cessation, he will try Chantix and I will write for it. 7. I am writing for Voltaren gel. 8. Plan for lab work and CT prior to next visit. 9. Return in 3 months.

## 2018-09-27 ENCOUNTER — TELEPHONE (OUTPATIENT)
Dept: ONCOLOGY | Age: 58
End: 2018-09-27

## 2018-10-12 ENCOUNTER — OFFICE VISIT (OUTPATIENT)
Dept: PRIMARY CARE CLINIC | Age: 58
End: 2018-10-12
Payer: MEDICARE

## 2018-10-12 VITALS
HEIGHT: 76 IN | OXYGEN SATURATION: 98 % | HEART RATE: 72 BPM | WEIGHT: 277.8 LBS | RESPIRATION RATE: 14 BRPM | SYSTOLIC BLOOD PRESSURE: 132 MMHG | BODY MASS INDEX: 33.83 KG/M2 | DIASTOLIC BLOOD PRESSURE: 76 MMHG

## 2018-10-12 DIAGNOSIS — G89.29 CHRONIC MIDLINE THORACIC BACK PAIN: ICD-10-CM

## 2018-10-12 DIAGNOSIS — E11.65 TYPE 2 DIABETES MELLITUS WITH HYPERGLYCEMIA, WITHOUT LONG-TERM CURRENT USE OF INSULIN (HCC): Primary | ICD-10-CM

## 2018-10-12 DIAGNOSIS — F17.200 SMOKING ADDICTION: ICD-10-CM

## 2018-10-12 DIAGNOSIS — J41.0 SIMPLE CHRONIC BRONCHITIS (HCC): ICD-10-CM

## 2018-10-12 DIAGNOSIS — I10 ESSENTIAL HYPERTENSION: ICD-10-CM

## 2018-10-12 DIAGNOSIS — M54.6 CHRONIC MIDLINE THORACIC BACK PAIN: ICD-10-CM

## 2018-10-12 DIAGNOSIS — Z23 NEED FOR INFLUENZA VACCINATION: ICD-10-CM

## 2018-10-12 LAB — HBA1C MFR BLD: 6 %

## 2018-10-12 PROCEDURE — 99214 OFFICE O/P EST MOD 30 MIN: CPT | Performed by: INTERNAL MEDICINE

## 2018-10-12 PROCEDURE — 83036 HEMOGLOBIN GLYCOSYLATED A1C: CPT | Performed by: INTERNAL MEDICINE

## 2018-10-12 PROCEDURE — G0008 ADMIN INFLUENZA VIRUS VAC: HCPCS | Performed by: INTERNAL MEDICINE

## 2018-10-12 PROCEDURE — 90688 IIV4 VACCINE SPLT 0.5 ML IM: CPT | Performed by: INTERNAL MEDICINE

## 2018-10-12 ASSESSMENT — ENCOUNTER SYMPTOMS
EYE REDNESS: 0
SHORTNESS OF BREATH: 0
NAUSEA: 0
VOMITING: 0
SORE THROAT: 0
TROUBLE SWALLOWING: 0
ABDOMINAL PAIN: 0
EYE DISCHARGE: 0
COUGH: 1
BACK PAIN: 0

## 2018-10-12 ASSESSMENT — PATIENT HEALTH QUESTIONNAIRE - PHQ9
SUM OF ALL RESPONSES TO PHQ QUESTIONS 1-9: 0
SUM OF ALL RESPONSES TO PHQ QUESTIONS 1-9: 0
1. LITTLE INTEREST OR PLEASURE IN DOING THINGS: 0
SUM OF ALL RESPONSES TO PHQ9 QUESTIONS 1 & 2: 0
2. FEELING DOWN, DEPRESSED OR HOPELESS: 0

## 2018-10-12 NOTE — PATIENT INSTRUCTIONS
Patient Education   Patient Education   Patient Education        Chronic Obstructive Pulmonary Disease (COPD): Care Instructions  Your Care Instructions    Chronic obstructive pulmonary disease (COPD) is a general term for a group of lung diseases, including emphysema and chronic bronchitis. People with COPD have decreased airflow in and out of the lungs, which makes it hard to breathe. The airways also can get clogged with thick mucus. Cigarette smoking is a major cause of COPD. Although there is no cure for COPD, you can slow its progress. Following your treatment plan and taking care of yourself can help you feel better and live longer. Follow-up care is a key part of your treatment and safety. Be sure to make and go to all appointments, and call your doctor if you are having problems. It's also a good idea to know your test results and keep a list of the medicines you take. How can you care for yourself at home?   Staying healthy    · Do not smoke. This is the most important step you can take to prevent more damage to your lungs. If you need help quitting, talk to your doctor about stop-smoking programs and medicines. These can increase your chances of quitting for good.     · Avoid colds and flu. Get a pneumococcal vaccine shot. If you have had one before, ask your doctor whether you need a second dose. Get the flu vaccine every fall. If you must be around people with colds or the flu, wash your hands often.     · Avoid secondhand smoke, air pollution, and high altitudes. Also avoid cold, dry air and hot, humid air. Stay at home with your windows closed when air pollution is bad.    Medicines and oxygen therapy    · Take your medicines exactly as prescribed. Call your doctor if you think you are having a problem with your medicine.     · You may be taking medicines such as:  ¨ Bronchodilators. These help open your airways and make breathing easier.  Bronchodilators are either short-acting (work for 6 to 9 hours) or long-acting (work for 24 hours). You inhale most bronchodilators, so they start to act quickly. Always carry your quick-relief inhaler with you in case you need it while you are away from home. ¨ Corticosteroids (prednisone, budesonide). These reduce airway inflammation. They come in pill or inhaled form. You must take these medicines every day for them to work well.     · A spacer may help you get more inhaled medicine to your lungs. Ask your doctor or pharmacist if a spacer is right for you. If it is, ask how to use it properly.     · Do not take any vitamins, over-the-counter medicine, or herbal products without talking to your doctor first.     · If your doctor prescribed antibiotics, take them as directed. Do not stop taking them just because you feel better. You need to take the full course of antibiotics.     · Oxygen therapy boosts the amount of oxygen in your blood and helps you breathe easier. Use the flow rate your doctor has recommended, and do not change it without talking to your doctor first.   Activity    · Get regular exercise. Walking is an easy way to get exercise. Start out slowly, and walk a little more each day.     · Pay attention to your breathing. You are exercising too hard if you cannot talk while you are exercising.     · Take short rest breaks when doing household chores and other activities.     · Learn breathing methods-such as breathing through pursed lips-to help you become less short of breath.     · If your doctor has not set you up with a pulmonary rehabilitation program, talk to him or her about whether rehab is right for you. Rehab includes exercise programs, education about your disease and how to manage it, help with diet and other changes, and emotional support. Diet    · Eat regular, healthy meals. Use bronchodilators about 1 hour before you eat to make it easier to eat. Eat several small meals instead of three large ones.  Drink beverages at the end of the meal. other protein food on one-quarter of the plate, and a grain or starchy vegetable in the final quarter of the plate. To this you can add a small piece of fruit and 1 cup of milk or yogurt, depending on how many carbs you are supposed to eat at a meal.  · Try to eat about the same amount of carbs at each meal. Do not \"save up\" your daily allowance of carbs to eat at one meal.  · Proteins have very little or no carbs per serving. Examples of proteins are beef, chicken, turkey, fish, eggs, tofu, cheese, cottage cheese, and peanut butter. A serving size of meat is 3 ounces, which is about the size of a deck of cards. Examples of meat substitute serving sizes (equal to 1 ounce of meat) are 1/4 cup of cottage cheese, 1 egg, 1 tablespoon of peanut butter, and ½ cup of tofu. How can you eat out and still eat healthy? · Learn to estimate the serving sizes of foods that have carbohydrate. If you measure food at home, it will be easier to estimate the amount in a serving of restaurant food. · If the meal you order has too much carbohydrate (such as potatoes, corn, or baked beans), ask to have a low-carbohydrate food instead. Ask for a salad or green vegetables. · If you use insulin, check your blood sugar before and after eating out to help you plan how much to eat in the future. · If you eat more carbohydrate at a meal than you had planned, take a walk or do other exercise. This will help lower your blood sugar. What else should you know? · Limit saturated fat, such as the fat from meat and dairy products. This is a healthy choice because people who have diabetes are at higher risk of heart disease. So choose lean cuts of meat and nonfat or low-fat dairy products. Use olive or canola oil instead of butter or shortening when cooking. · Don't skip meals. Your blood sugar may drop too low if you skip meals and take insulin or certain medicines for diabetes. · Check with your doctor before you drink alcohol.  Alcohol can lower blood pressure even further than just the DASH diet alone. Follow-up care is a key part of your treatment and safety. Be sure to make and go to all appointments, and call your doctor if you are having problems. It's also a good idea to know your test results and keep a list of the medicines you take. How can you care for yourself at home? Following the DASH diet  · Eat 4 to 5 servings of fruit each day. A serving is 1 medium-sized piece of fruit, ½ cup chopped or canned fruit, 1/4 cup dried fruit, or 4 ounces (½ cup) of fruit juice. Choose fruit more often than fruit juice. · Eat 4 to 5 servings of vegetables each day. A serving is 1 cup of lettuce or raw leafy vegetables, ½ cup of chopped or cooked vegetables, or 4 ounces (½ cup) of vegetable juice. Choose vegetables more often than vegetable juice. · Get 2 to 3 servings of low-fat and fat-free dairy each day. A serving is 8 ounces of milk, 1 cup of yogurt, or 1 ½ ounces of cheese. · Eat 6 to 8 servings of grains each day. A serving is 1 slice of bread, 1 ounce of dry cereal, or ½ cup of cooked rice, pasta, or cooked cereal. Try to choose whole-grain products as much as possible. · Limit lean meat, poultry, and fish to 2 servings each day. A serving is 3 ounces, about the size of a deck of cards. · Eat 4 to 5 servings of nuts, seeds, and legumes (cooked dried beans, lentils, and split peas) each week. A serving is 1/3 cup of nuts, 2 tablespoons of seeds, or ½ cup of cooked beans or peas. · Limit fats and oils to 2 to 3 servings each day. A serving is 1 teaspoon of vegetable oil or 2 tablespoons of salad dressing. · Limit sweets and added sugars to 5 servings or less a week. A serving is 1 tablespoon jelly or jam, ½ cup sorbet, or 1 cup of lemonade. · Eat less than 2,300 milligrams (mg) of sodium a day. If you limit your sodium to 1,500 mg a day, you can lower your blood pressure even more. Tips for success  · Start small.  Do not try to make

## 2018-10-12 NOTE — PROGRESS NOTES
[facility]  [address] [place] [address] [address]  [place] [address]  Dept: [phone]  Dept Fax: [phone]    Cheryl Mackey is a 62 y.o. male who presents today for his medical conditions/complaints as noted below. Cheryl Record is c/o of  Chief Complaint   Patient presents with    Diabetes     Patient has no complaints or concerns    [facility] Maintenance     patient recieved flu shot-patient denied other screenings and vaccines          HPI:     HPI   He is here for chronic medical problems check  Denied any complaints today   Still smokes around 25 cig per day   Called 1-800-QUIT -NOW - starting this Sunday try to stop smoking . chantix not  Covered by insurance . Not interested to try Nicotine patch     he was known to have hypertension - takes meds regularly , compliant with salt restricted diet , denied headache , dizziness , chest pain , palpitations. Well controlled . Denied any SE of BP medication . Type 2 DM well controlled on monotherapy metformin 500mg bid po     COPD - stable on current inhalers   Adenocarcinoma of the right lower lobe of lung. Path stage is T2N1M0. Stage 2 disease. Reviewed recent oncologist OV note   Reviewed CT lungs done in sept - no new lesions     Hemoglobin A1C (%)   Date Value   04/16/2018 6.7   11/01/2017 7.0 (H)   06/10/2017 7.0 (H)             ( goal A1C is < 7)   Microalb/Crt.  Ratio (mcg/mg creat)   Date Value   06/10/2017 60 (H)     LDL Cholesterol (mg/dL)   Date Value   11/01/2017            (goal LDL is <100)   AST (U/L)   Date Value   09/11/2018 24     ALT (U/L)   Date Value   09/11/2018 32     BUN (mg/dL)   Date Value   09/11/2018 17     BP Readings from Last 3 Encounters:   10/12/18 132/76   09/13/18 (!) 125/91   06/14/18 (!) 142/96          (goal 120/80)    Past Medical History:   Diagnosis Date    Anxiety     BiPAP (biphasic positive airway pressure) dependence     Cancer (HCC)     Chronic back pain

## 2018-12-06 ENCOUNTER — HOSPITAL ENCOUNTER (OUTPATIENT)
Dept: CT IMAGING | Age: 58
Discharge: HOME | End: 2018-12-08
Payer: MEDICARE

## 2018-12-06 ENCOUNTER — HOSPITAL ENCOUNTER (OUTPATIENT)
Age: 58
Discharge: HOME | End: 2018-12-06
Payer: MEDICARE

## 2018-12-06 DIAGNOSIS — C34.31 MALIGNANT NEOPLASM OF LOWER LOBE OF RIGHT LUNG (HCC): ICD-10-CM

## 2018-12-06 DIAGNOSIS — G89.29 CHRONIC MIDLINE THORACIC BACK PAIN: ICD-10-CM

## 2018-12-06 DIAGNOSIS — M54.6 CHRONIC MIDLINE THORACIC BACK PAIN: ICD-10-CM

## 2018-12-06 DIAGNOSIS — F17.200 SMOKING ADDICTION: ICD-10-CM

## 2018-12-06 DIAGNOSIS — J41.0 SIMPLE CHRONIC BRONCHITIS (HCC): ICD-10-CM

## 2018-12-06 LAB
ABSOLUTE EOS #: 0.25 K/UL (ref 0–0.44)
ABSOLUTE IMMATURE GRANULOCYTE: 0.03 K/UL (ref 0–0.3)
ABSOLUTE LYMPH #: 2.21 K/UL (ref 1.1–3.7)
ABSOLUTE MONO #: 0.44 K/UL (ref 0.1–1.2)
ALBUMIN SERPL-MCNC: 5.2 G/DL (ref 3.5–5.2)
ALBUMIN/GLOBULIN RATIO: 1.7 (ref 1–2.5)
ALP BLD-CCNC: 71 U/L (ref 40–129)
ALT SERPL-CCNC: 32 U/L (ref 5–41)
ANION GAP SERPL CALCULATED.3IONS-SCNC: 19 MMOL/L (ref 9–17)
AST SERPL-CCNC: 23 U/L
BASOPHILS # BLD: 1 % (ref 0–2)
BASOPHILS ABSOLUTE: 0.1 K/UL (ref 0–0.2)
BILIRUB SERPL-MCNC: 0.48 MG/DL (ref 0.3–1.2)
BUN BLDV-MCNC: 20 MG/DL (ref 6–20)
BUN/CREAT BLD: ABNORMAL (ref 9–20)
CALCIUM SERPL-MCNC: 9.9 MG/DL (ref 8.6–10.4)
CHLORIDE BLD-SCNC: 103 MMOL/L (ref 98–107)
CO2: 19 MMOL/L (ref 20–31)
CREAT SERPL-MCNC: 1 MG/DL (ref 0.7–1.2)
CREAT SERPL-MCNC: 1 MG/DL (ref 0.7–1.2)
DIFFERENTIAL TYPE: NORMAL
EOSINOPHILS RELATIVE PERCENT: 3 % (ref 1–4)
GFR AFRICAN AMERICAN: >60 ML/MIN
GFR AFRICAN AMERICAN: >60 ML/MIN
GFR NON-AFRICAN AMERICAN: >60 ML/MIN
GFR NON-AFRICAN AMERICAN: >60 ML/MIN
GFR SERPL CREATININE-BSD FRML MDRD: ABNORMAL ML/MIN/{1.73_M2}
GFR SERPL CREATININE-BSD FRML MDRD: ABNORMAL ML/MIN/{1.73_M2}
GFR SERPL CREATININE-BSD FRML MDRD: NORMAL ML/MIN/{1.73_M2}
GFR SERPL CREATININE-BSD FRML MDRD: NORMAL ML/MIN/{1.73_M2}
GLUCOSE BLD-MCNC: 137 MG/DL (ref 70–99)
HCT VFR BLD CALC: 49 % (ref 40.7–50.3)
HEMOGLOBIN: 16.7 G/DL (ref 13–17)
IMMATURE GRANULOCYTES: 0 %
LYMPHOCYTES # BLD: 27 % (ref 24–43)
MCH RBC QN AUTO: 32.6 PG (ref 25.2–33.5)
MCHC RBC AUTO-ENTMCNC: 34.1 G/DL (ref 28.4–34.8)
MCV RBC AUTO: 95.7 FL (ref 82.6–102.9)
MONOCYTES # BLD: 5 % (ref 3–12)
NRBC AUTOMATED: 0 PER 100 WBC
PDW BLD-RTO: 13.2 % (ref 11.8–14.4)
PLATELET # BLD: 185 K/UL (ref 138–453)
PLATELET ESTIMATE: NORMAL
PMV BLD AUTO: 9.7 FL (ref 8.1–13.5)
POTASSIUM SERPL-SCNC: 4.6 MMOL/L (ref 3.7–5.3)
RBC # BLD: 5.12 M/UL (ref 4.21–5.77)
RBC # BLD: NORMAL 10*6/UL
SEG NEUTROPHILS: 64 % (ref 36–65)
SEGMENTED NEUTROPHILS ABSOLUTE COUNT: 5.09 K/UL (ref 1.5–8.1)
SODIUM BLD-SCNC: 141 MMOL/L (ref 135–144)
TOTAL PROTEIN: 8.3 G/DL (ref 6.4–8.3)
WBC # BLD: 8.1 K/UL (ref 3.5–11.3)
WBC # BLD: NORMAL 10*3/UL

## 2018-12-06 PROCEDURE — 6360000004 HC RX CONTRAST MEDICATION: Performed by: INTERNAL MEDICINE

## 2018-12-06 PROCEDURE — 71260 CT THORAX DX C+: CPT

## 2018-12-06 PROCEDURE — 2580000003 HC RX 258: Performed by: INTERNAL MEDICINE

## 2018-12-06 PROCEDURE — 80053 COMPREHEN METABOLIC PANEL: CPT

## 2018-12-06 PROCEDURE — 36415 COLL VENOUS BLD VENIPUNCTURE: CPT

## 2018-12-06 PROCEDURE — 85025 COMPLETE CBC W/AUTO DIFF WBC: CPT

## 2018-12-06 RX ORDER — 0.9 % SODIUM CHLORIDE 0.9 %
45 INTRAVENOUS SOLUTION INTRAVENOUS ONCE
Status: COMPLETED | OUTPATIENT
Start: 2018-12-06 | End: 2018-12-06

## 2018-12-06 RX ORDER — SODIUM CHLORIDE 0.9 % (FLUSH) 0.9 %
10 SYRINGE (ML) INJECTION PRN
Status: DISCONTINUED | OUTPATIENT
Start: 2018-12-06 | End: 2018-12-09 | Stop reason: HOSPADM

## 2018-12-06 RX ADMIN — SODIUM CHLORIDE 45 ML: 9 INJECTION, SOLUTION INTRAVENOUS at 11:23

## 2018-12-06 RX ADMIN — IOPAMIDOL 100 ML: 755 INJECTION, SOLUTION INTRAVENOUS at 11:23

## 2018-12-06 RX ADMIN — Medication 10 ML: at 11:24

## 2018-12-20 ENCOUNTER — OFFICE VISIT (OUTPATIENT)
Dept: ONCOLOGY | Age: 58
End: 2018-12-20
Payer: MEDICARE

## 2018-12-20 ENCOUNTER — TELEPHONE (OUTPATIENT)
Dept: ONCOLOGY | Age: 58
End: 2018-12-20

## 2018-12-20 VITALS
BODY MASS INDEX: 33.83 KG/M2 | SYSTOLIC BLOOD PRESSURE: 133 MMHG | HEART RATE: 74 BPM | TEMPERATURE: 97.6 F | DIASTOLIC BLOOD PRESSURE: 93 MMHG | WEIGHT: 277.9 LBS | RESPIRATION RATE: 18 BRPM

## 2018-12-20 DIAGNOSIS — M54.6 CHRONIC MIDLINE THORACIC BACK PAIN: Primary | ICD-10-CM

## 2018-12-20 DIAGNOSIS — C34.31 MALIGNANT NEOPLASM OF LOWER LOBE OF RIGHT LUNG (HCC): ICD-10-CM

## 2018-12-20 DIAGNOSIS — G89.29 CHRONIC MIDLINE THORACIC BACK PAIN: Primary | ICD-10-CM

## 2018-12-20 PROCEDURE — 99211 OFF/OP EST MAY X REQ PHY/QHP: CPT | Performed by: INTERNAL MEDICINE

## 2018-12-20 PROCEDURE — 99214 OFFICE O/P EST MOD 30 MIN: CPT | Performed by: INTERNAL MEDICINE

## 2018-12-20 NOTE — PROGRESS NOTES
and continues to smoke. He feels his back pain is not being addressed, he had consult with pain management, cream was too expensive. PAST MEDICAL HISTORY: has a past medical history of Anxiety; BiPAP (biphasic positive airway pressure) dependence; Cancer ([facility] [address] Chronic back pain; Clinical trial exam; COPD (chronic obstructive pulmonary disease) ([facility] [address] Diabetes ([facility] [address] Hyperlipidemia; Hypertension; Hypothyroidism; [facility] Neuropathy; Sleep apnea; Slow to wake up after anesthesia; SOB (shortness of breath); and Urine frequency. PAST SURGICAL HISTORY: has a past surgical history that includes laminectomy (12/2012); Testicle removal (Left, 1982); Lung removal, partial (Right, 2016); other surgical history (Right, 06/13/2017); and back surgery (01/2013). CURRENT MEDICATIONS:  has a current medication list which includes the following prescription(s): metformin, fluoxetine, budesonide-formoterol, meloxicam, levothyroxine, amlodipine, cyclobenzaprine, atorvastatin, ipratropium-albuterol, metformin, albuterol, lisinopril, omeprazole, diclofenac sodium, and varenicline. ALLERGIES:  is allergic to [facility] dimeglumine]. FAMILY HISTORY: Negative for any hematological or oncological conditions. SOCIAL HISTORY:  reports that he has been smoking Cigarettes. He has a 10.00 pack-year smoking history. He has never used smokeless tobacco. He reports that he does not drink alcohol or use drugs. REVIEW OF SYSTEMS:   General: No fever or night sweats. Weight is stable. ENT: No double or blurred vision, no tinnitus or hearing problem, no dysphagia or sore throat. Respiratory: Chronic shortness of breath and cough - some clear sputum, no hemoptysis, chest discomfort in the surgical area, chest congestion  Cardiovascular: Denies central chest pain, PND or orthopnea. No L E swelling or palpitations. Gastrointestinal: No nausea, no vomiting, abdominal pain, diarrhea or constipation.

## 2019-01-31 ENCOUNTER — OFFICE VISIT (OUTPATIENT)
Dept: PRIMARY CARE CLINIC | Age: 59
End: 2019-01-31
Payer: MEDICARE

## 2019-01-31 VITALS
HEIGHT: 76 IN | DIASTOLIC BLOOD PRESSURE: 88 MMHG | WEIGHT: 278.6 LBS | BODY MASS INDEX: 33.93 KG/M2 | OXYGEN SATURATION: 97 % | HEART RATE: 68 BPM | RESPIRATION RATE: 18 BRPM | SYSTOLIC BLOOD PRESSURE: 138 MMHG

## 2019-01-31 DIAGNOSIS — F32.4 MAJOR DEPRESSIVE DISORDER WITH SINGLE EPISODE, IN PARTIAL REMISSION (HCC): ICD-10-CM

## 2019-01-31 DIAGNOSIS — F17.200 SMOKING ADDICTION: ICD-10-CM

## 2019-01-31 DIAGNOSIS — J41.0 SIMPLE CHRONIC BRONCHITIS (HCC): ICD-10-CM

## 2019-01-31 DIAGNOSIS — E11.9 TYPE 2 DIABETES MELLITUS WITHOUT COMPLICATION, WITHOUT LONG-TERM CURRENT USE OF INSULIN (HCC): Primary | ICD-10-CM

## 2019-01-31 DIAGNOSIS — E11.29 MICROALBUMINURIA DUE TO TYPE 2 DIABETES MELLITUS (HCC): ICD-10-CM

## 2019-01-31 DIAGNOSIS — E03.9 ACQUIRED HYPOTHYROIDISM: ICD-10-CM

## 2019-01-31 DIAGNOSIS — I10 ESSENTIAL HYPERTENSION: ICD-10-CM

## 2019-01-31 DIAGNOSIS — E78.2 MIXED HYPERLIPIDEMIA: ICD-10-CM

## 2019-01-31 DIAGNOSIS — C34.31 MALIGNANT NEOPLASM OF LOWER LOBE OF RIGHT LUNG (HCC): ICD-10-CM

## 2019-01-31 DIAGNOSIS — R80.9 MICROALBUMINURIA DUE TO TYPE 2 DIABETES MELLITUS (HCC): ICD-10-CM

## 2019-01-31 PROBLEM — J86.9 EMPYEMA LUNG (HCC): Status: RESOLVED | Noted: 2017-06-15 | Resolved: 2019-01-31

## 2019-01-31 PROBLEM — J13: Status: RESOLVED | Noted: 2017-06-10 | Resolved: 2019-01-31

## 2019-01-31 LAB — HBA1C MFR BLD: 6.1 %

## 2019-01-31 PROCEDURE — 99214 OFFICE O/P EST MOD 30 MIN: CPT | Performed by: NURSE PRACTITIONER

## 2019-01-31 PROCEDURE — 83036 HEMOGLOBIN GLYCOSYLATED A1C: CPT | Performed by: NURSE PRACTITIONER

## 2019-01-31 ASSESSMENT — ENCOUNTER SYMPTOMS
TROUBLE SWALLOWING: 0
SHORTNESS OF BREATH: 0
SORE THROAT: 0
CONSTIPATION: 0
ABDOMINAL PAIN: 0
BLOOD IN STOOL: 0
WHEEZING: 1
VOMITING: 0
COUGH: 0
NAUSEA: 0
SINUS PRESSURE: 0
DIARRHEA: 0

## 2019-05-22 ENCOUNTER — OFFICE VISIT (OUTPATIENT)
Dept: PRIMARY CARE CLINIC | Age: 59
End: 2019-05-22
Payer: MEDICARE

## 2019-05-22 VITALS
RESPIRATION RATE: 16 BRPM | HEART RATE: 78 BPM | OXYGEN SATURATION: 98 % | SYSTOLIC BLOOD PRESSURE: 136 MMHG | WEIGHT: 282.2 LBS | BODY MASS INDEX: 34.35 KG/M2 | DIASTOLIC BLOOD PRESSURE: 88 MMHG

## 2019-05-22 DIAGNOSIS — R21 RASH AND NONSPECIFIC SKIN ERUPTION: ICD-10-CM

## 2019-05-22 DIAGNOSIS — L08.9 SKIN PUSTULE: Primary | ICD-10-CM

## 2019-05-22 PROCEDURE — 99214 OFFICE O/P EST MOD 30 MIN: CPT | Performed by: INTERNAL MEDICINE

## 2019-05-22 RX ORDER — CEPHALEXIN 500 MG/1
500 CAPSULE ORAL 2 TIMES DAILY
Qty: 20 CAPSULE | Refills: 0 | Status: SHIPPED | OUTPATIENT
Start: 2019-05-22 | End: 2019-06-01

## 2019-05-22 RX ORDER — CLOBETASOL PROPIONATE 0.5 MG/G
OINTMENT TOPICAL
Qty: 60 G | Refills: 0 | Status: SHIPPED | OUTPATIENT
Start: 2019-05-22 | End: 2019-08-20 | Stop reason: ALTCHOICE

## 2019-05-22 ASSESSMENT — ENCOUNTER SYMPTOMS
COUGH: 0
EYE REDNESS: 0
BACK PAIN: 0
ABDOMINAL PAIN: 0
NAUSEA: 0
SHORTNESS OF BREATH: 0
VOMITING: 0
SORE THROAT: 0
TROUBLE SWALLOWING: 0
EYE DISCHARGE: 0

## 2019-05-22 NOTE — PROGRESS NOTES
[facility]  [address] [place] [address] [address]  [place] [address]  Dept: [phone]  Dept Fax: [phone]    Jessica Rodriges is a 62 y.o. male who presents today for his medical conditions/complaints as noted below. Jessica Rodriges is c/o of  Chief Complaint   Patient presents with    Other     blisters on both arms       HPI:     Rash: Jessica Rodriges is a 62 y.o. male who presents with a 4 week history of a scattered rash. Rash first presented on the bilateral forearm and has not spread. Rash is brown, pink, reddish blue, yellow and is pruritic, painful, raised, scabbed and pustular. Associated symptoms include none. Patient has tried systemic antihistamine - benadryl . New exposures: animal- dog bite . Patient has not had contacts with similar symptoms. Prior history of similar symptoms: no.             Hemoglobin A1C (%)   Date Value   01/31/2019 6.1   10/12/2018 6.0   04/16/2018 6.7             ( goal A1C is < 7)   Microalb/Crt.  Ratio (mcg/mg creat)   Date Value   06/10/2017 60 (H)     LDL Cholesterol (mg/dL)   Date Value   11/01/2017            (goal LDL is <100)   AST (U/L)   Date Value   12/06/2018 23     ALT (U/L)   Date Value   12/06/2018 32     BUN (mg/dL)   Date Value   12/06/2018 20     BP Readings from Last 3 Encounters:   05/22/19 136/88   01/31/19 138/88   12/20/18 (!) 133/93          (goal 120/80)    Past Medical History:   Diagnosis Date    Anxiety     BiPAP (biphasic positive airway pressure) dependence     Cancer (HCC)     Chronic back pain     Clinical trial exam 12/28/2016    COPD (chronic obstructive pulmonary disease) (HCC)     Diabetes (HCC)     Hyperlipidemia     Hypertension     Hypothyroidism     Lung cancer ([facility] [address]     Neuropathy     bilat feet    Sleep apnea     Slow to wake up after anesthesia     SOB (shortness of breath)     Urine frequency       Past Surgical History:   Procedure Laterality Date    BACK (NORVASC) 5 MG tablet Take 1 tablet by mouth daily 90 tablet 1    ipratropium-albuterol (DUONEB) 0.5-2.5 (3) MG/3ML SOLN nebulizer solution Inhale 3 mLs into the lungs every 6 hours as needed for Shortness of Breath 360 mL 2     No current facility-administered medications for this visit. Allergies   Allergen Reactions    Emend [Fosaprepitant Dimeglumine] Other (See Comments)     Reported back pain, warmth all over, nausea & SOB       Health Maintenance   Topic Date Due    TSH testing  1960    Hepatitis C screen  1960    HIV screen  12/02/1975    Hepatitis B Vaccine (1 of 3 - Risk 3-dose series) 12/02/1979    DTaP/Tdap/Td vaccine (1 - Tdap) 12/02/1979    Shingles Vaccine (1 of 2) 12/02/2010    Colon cancer screen colonoscopy  12/02/2010    Lipid screen  11/01/2018    Potassium monitoring  12/06/2019    Creatinine monitoring  12/06/2019    Diabetic foot exam  01/31/2020    A1C test (Diabetic or Prediabetic)  01/31/2020    Diabetic retinal exam  01/31/2020    Flu vaccine  Completed    Pneumococcal 0-64 years Vaccine  Completed       Subjective:      Review of Systems   Constitutional: Negative for activity change, appetite change, fatigue, fever and unexpected weight change. HENT: Negative for postnasal drip, sore throat and trouble swallowing. Eyes: Negative for discharge and redness. Respiratory: Negative for cough and shortness of breath. Cardiovascular: Negative for chest pain and palpitations. Gastrointestinal: Negative for abdominal pain, nausea and vomiting. Endocrine: Negative for cold intolerance and heat intolerance. Genitourinary: Negative for difficulty urinating and dysuria. Musculoskeletal: Negative for arthralgias, back pain and myalgias. Skin: Positive for rash. Negative for wound. Neurological: Negative for dizziness and headaches. Hematological: Negative for adenopathy. Psychiatric/Behavioral: Negative for behavioral problems.  The patient is not nervous/anxious. Objective:     Physical Exam   Constitutional: He is oriented to person, place, and time. He appears well-developed and well-nourished. No distress. HENT:   Head: Normocephalic and atraumatic. Right Ear: External ear normal.   Left Ear: External ear normal.   Nose: Nose normal.   Mouth/Throat: Oropharynx is clear and moist. No oropharyngeal exudate. Eyes: Conjunctivae are normal. Right eye exhibits no discharge. Left eye exhibits no discharge. No scleral icterus. Neck: Neck supple. Cardiovascular: Normal rate, regular rhythm and normal heart sounds. No murmur heard. Pulmonary/Chest: Effort normal and breath sounds normal. No respiratory distress. He has no wheezes. Abdominal: Soft. Bowel sounds are normal. He exhibits no distension. There is no tenderness. Musculoskeletal: He exhibits no edema or tenderness. Lymphadenopathy:     He has no cervical adenopathy. Neurological: He is alert and oriented to person, place, and time. Skin: Skin is warm and dry. Rash noted. Rash is maculopapular and pustular. He is not diaphoretic. Psychiatric: Judgment and thought content normal.   Nursing note and vitals reviewed. /88   Pulse 78   Resp 16   Wt 282 lb 3.2 oz (128 kg)   SpO2 98%   BMI 34.35 kg/m²     Assessment:      1. Rash and nonspecific skin eruption  - clobetasol (TEMOVATE) 0.05 % ointment; Apply topically 2 times daily. Dispense: 60 g; Refill: 0    2. Skin pustule , left forearm   - cephALEXin (KEFLEX) 500 MG capsule; Take 1 capsule by mouth 2 times daily for 10 days  Dispense: 20 capsule; Refill: 0           Plan:      Return if symptoms worsen or fail to improve. No orders of the defined types were placed in this encounter. Orders Placed This Encounter   Medications    clobetasol (TEMOVATE) 0.05 % ointment     Sig: Apply topically 2 times daily.      Dispense:  60 g     Refill:  0    cephALEXin (KEFLEX) 500 MG capsule     Sig: Take 1

## 2019-05-22 NOTE — PROGRESS NOTES
Visit Information    Have you changed or started any medications since your last visit including any over-the-counter medicines, vitamins, or herbal medicines? no   Are you having any side effects from any of your medications? -  no  Have you stopped taking any of your medications? Is so, why? -  no    Have you seen any other physician or provider since your last visit? No  Have you had any other diagnostic tests since your last visit? No  Have you been seen in the emergency room and/or had an admission to a hospital since we last saw you? No  Have you had your routine dental cleaning in the past 6 months? no    Have you activated your [employer] account? If not, what are your barriers?  Yes     Patient Care Team:  OSIEL Bañuelos CNP as PCP - General (Family Nurse Practitioner)  Alejandro Vance MD as PCP - S Attributed Provider    Medical History Review  Past Medical, Family, and Social History reviewed and does not contribute to the patient presenting condition    Health Maintenance   Topic Date Due    TSH testing  1960    Hepatitis C screen  1960    HIV screen  12/02/1975    Hepatitis B Vaccine (1 of 3 - Risk 3-dose series) 12/02/1979    DTaP/Tdap/Td vaccine (1 - Tdap) 12/02/1979    Shingles Vaccine (1 of 2) 12/02/2010    Colon cancer screen colonoscopy  12/02/2010    Lipid screen  11/01/2018    Potassium monitoring  12/06/2019    Creatinine monitoring  12/06/2019    Diabetic foot exam  01/31/2020    A1C test (Diabetic or Prediabetic)  01/31/2020    Diabetic retinal exam  01/31/2020    Flu vaccine  Completed    Pneumococcal 0-64 years Vaccine  Completed

## 2019-06-07 DIAGNOSIS — E03.8 OTHER SPECIFIED HYPOTHYROIDISM: ICD-10-CM

## 2019-06-07 RX ORDER — LEVOTHYROXINE SODIUM 0.07 MG/1
TABLET ORAL
Qty: 90 TABLET | Refills: 3 | Status: SHIPPED | OUTPATIENT
Start: 2019-06-07 | End: 2019-07-15 | Stop reason: SDUPTHER

## 2019-07-10 DIAGNOSIS — C34.31 MALIGNANT NEOPLASM OF LOWER LOBE OF RIGHT LUNG (HCC): Primary | ICD-10-CM

## 2019-07-11 ENCOUNTER — HOSPITAL ENCOUNTER (OUTPATIENT)
Age: 59
Discharge: HOME | End: 2019-07-11
Payer: MEDICARE

## 2019-07-11 DIAGNOSIS — E11.9 TYPE 2 DIABETES MELLITUS WITHOUT COMPLICATION, WITHOUT LONG-TERM CURRENT USE OF INSULIN (HCC): ICD-10-CM

## 2019-07-11 DIAGNOSIS — C34.31 MALIGNANT NEOPLASM OF LOWER LOBE OF RIGHT LUNG (HCC): ICD-10-CM

## 2019-07-11 DIAGNOSIS — I10 ESSENTIAL HYPERTENSION: ICD-10-CM

## 2019-07-11 DIAGNOSIS — E03.9 ACQUIRED HYPOTHYROIDISM: ICD-10-CM

## 2019-07-11 DIAGNOSIS — E78.2 MIXED HYPERLIPIDEMIA: ICD-10-CM

## 2019-07-11 LAB
ABSOLUTE EOS #: 0.3 K/UL (ref 0–0.4)
ABSOLUTE IMMATURE GRANULOCYTE: NORMAL K/UL (ref 0–0.3)
ABSOLUTE LYMPH #: 2.3 K/UL (ref 1–4.8)
ABSOLUTE MONO #: 0.4 K/UL (ref 0.1–1.2)
ALBUMIN SERPL-MCNC: 4.7 G/DL (ref 3.5–5.2)
ALBUMIN/GLOBULIN RATIO: 1.6 (ref 1–2.5)
ALP BLD-CCNC: 67 U/L (ref 40–129)
ALT SERPL-CCNC: 26 U/L (ref 5–41)
ANION GAP SERPL CALCULATED.3IONS-SCNC: 10 MMOL/L (ref 9–17)
AST SERPL-CCNC: 21 U/L
BASOPHILS # BLD: 1 % (ref 0–2)
BASOPHILS ABSOLUTE: 0.1 K/UL (ref 0–0.2)
BILIRUB SERPL-MCNC: 0.56 MG/DL (ref 0.3–1.2)
BUN BLDV-MCNC: 18 MG/DL (ref 6–20)
BUN/CREAT BLD: ABNORMAL (ref 9–20)
CALCIUM SERPL-MCNC: 9.3 MG/DL (ref 8.6–10.4)
CHLORIDE BLD-SCNC: 101 MMOL/L (ref 98–107)
CO2: 25 MMOL/L (ref 20–31)
CREAT SERPL-MCNC: 1.17 MG/DL (ref 0.7–1.2)
DIFFERENTIAL TYPE: NORMAL
EOSINOPHILS RELATIVE PERCENT: 4 % (ref 1–4)
GFR AFRICAN AMERICAN: >60 ML/MIN
GFR NON-AFRICAN AMERICAN: >60 ML/MIN
GFR SERPL CREATININE-BSD FRML MDRD: ABNORMAL ML/MIN/{1.73_M2}
GFR SERPL CREATININE-BSD FRML MDRD: ABNORMAL ML/MIN/{1.73_M2}
GLUCOSE BLD-MCNC: 148 MG/DL (ref 70–99)
HCT VFR BLD CALC: 47.9 % (ref 41–53)
HEMOGLOBIN: 16.9 G/DL (ref 13.5–17.5)
IMMATURE GRANULOCYTES: NORMAL %
LYMPHOCYTES # BLD: 29 % (ref 24–44)
MCH RBC QN AUTO: 33.8 PG (ref 26–34)
MCHC RBC AUTO-ENTMCNC: 35.2 G/DL (ref 31–37)
MCV RBC AUTO: 96.1 FL (ref 80–100)
MONOCYTES # BLD: 5 % (ref 2–11)
NRBC AUTOMATED: NORMAL PER 100 WBC
PDW BLD-RTO: 13.2 % (ref 12.5–15.4)
PLATELET # BLD: 182 K/UL (ref 140–450)
PLATELET ESTIMATE: NORMAL
PMV BLD AUTO: 7.4 FL (ref 6–12)
POTASSIUM SERPL-SCNC: 4.7 MMOL/L (ref 3.7–5.3)
RBC # BLD: 4.98 M/UL (ref 4.5–5.9)
RBC # BLD: NORMAL 10*6/UL
SEG NEUTROPHILS: 61 % (ref 36–66)
SEGMENTED NEUTROPHILS ABSOLUTE COUNT: 4.8 K/UL (ref 1.8–7.7)
SODIUM BLD-SCNC: 136 MMOL/L (ref 135–144)
TOTAL PROTEIN: 7.6 G/DL (ref 6.4–8.3)
WBC # BLD: 7.8 K/UL (ref 3.5–11)
WBC # BLD: NORMAL 10*3/UL

## 2019-07-11 PROCEDURE — 80061 LIPID PANEL: CPT

## 2019-07-11 PROCEDURE — 84443 ASSAY THYROID STIM HORMONE: CPT

## 2019-07-11 PROCEDURE — 36415 COLL VENOUS BLD VENIPUNCTURE: CPT

## 2019-07-11 PROCEDURE — 80053 COMPREHEN METABOLIC PANEL: CPT

## 2019-07-11 PROCEDURE — 85025 COMPLETE CBC W/AUTO DIFF WBC: CPT

## 2019-07-15 DIAGNOSIS — E03.8 OTHER SPECIFIED HYPOTHYROIDISM: ICD-10-CM

## 2019-07-15 LAB
CHOLESTEROL/HDL RATIO: 7.4
CHOLESTEROL/HDL RATIO: NORMAL
CHOLESTEROL: 289 MG/DL
CHOLESTEROL: NORMAL MG/DL
HDLC SERPL-MCNC: 39 MG/DL
HDLC SERPL-MCNC: NORMAL MG/DL
LDL CHOLESTEROL: 192 MG/DL (ref 0–130)
LDL CHOLESTEROL: NORMAL MG/DL (ref 0–130)
TRIGL SERPL-MCNC: 290 MG/DL
TRIGL SERPL-MCNC: NORMAL MG/DL
TSH SERPL DL<=0.05 MIU/L-ACNC: 5.26 MIU/L (ref 0.3–5)
TSH SERPL DL<=0.05 MIU/L-ACNC: NORMAL MIU/L (ref 0.3–5)
VLDLC SERPL CALC-MCNC: ABNORMAL MG/DL (ref 1–30)
VLDLC SERPL CALC-MCNC: NORMAL MG/DL (ref 1–30)

## 2019-07-15 RX ORDER — LEVOTHYROXINE SODIUM 88 UG/1
88 TABLET ORAL DAILY
Qty: 30 TABLET | Refills: 5 | Status: SHIPPED | OUTPATIENT
Start: 2019-07-15 | End: 2020-06-15

## 2019-07-16 ENCOUNTER — OFFICE VISIT (OUTPATIENT)
Dept: ONCOLOGY | Age: 59
End: 2019-07-16
Payer: MEDICARE

## 2019-07-16 ENCOUNTER — TELEPHONE (OUTPATIENT)
Dept: ONCOLOGY | Age: 59
End: 2019-07-16

## 2019-07-16 ENCOUNTER — TELEPHONE (OUTPATIENT)
Dept: CASE MANAGEMENT | Age: 59
End: 2019-07-16

## 2019-07-16 VITALS
DIASTOLIC BLOOD PRESSURE: 88 MMHG | SYSTOLIC BLOOD PRESSURE: 133 MMHG | HEART RATE: 62 BPM | TEMPERATURE: 98 F | WEIGHT: 272.8 LBS | BODY MASS INDEX: 33.21 KG/M2

## 2019-07-16 DIAGNOSIS — F17.200 SMOKING ADDICTION: ICD-10-CM

## 2019-07-16 DIAGNOSIS — F17.200 SMOKING ADDICTION: Primary | ICD-10-CM

## 2019-07-16 DIAGNOSIS — Z87.891 PERSONAL HISTORY OF NICOTINE DEPENDENCE: ICD-10-CM

## 2019-07-16 DIAGNOSIS — F32.A DEPRESSION, UNSPECIFIED DEPRESSION TYPE: ICD-10-CM

## 2019-07-16 DIAGNOSIS — C34.31 MALIGNANT NEOPLASM OF LOWER LOBE OF RIGHT LUNG (HCC): Primary | ICD-10-CM

## 2019-07-16 PROCEDURE — 99407 BEHAV CHNG SMOKING > 10 MIN: CPT | Performed by: INTERNAL MEDICINE

## 2019-07-16 PROCEDURE — 99214 OFFICE O/P EST MOD 30 MIN: CPT | Performed by: INTERNAL MEDICINE

## 2019-07-16 PROCEDURE — 99211 OFF/OP EST MAY X REQ PHY/QHP: CPT

## 2019-07-16 RX ORDER — FLUOXETINE HYDROCHLORIDE 40 MG/1
40 CAPSULE ORAL DAILY
Qty: 30 CAPSULE | Refills: 5 | Status: SHIPPED | OUTPATIENT
Start: 2019-07-16 | End: 2020-02-19

## 2019-07-16 NOTE — TELEPHONE ENCOUNTER
Per avs, RV in 6 months/scheduled for 1/14/19 @ 0900. CT scheduled for 12/16/19 @ [facility] arrival. Labs given to pt, to be done at same time of CT scan.

## 2019-07-16 NOTE — PROGRESS NOTES
1030        Component Value Date/Time    CALCIUM 9.3 07/11/2019 1030    ALKPHOS 67 07/11/2019 1030    AST 21 07/11/2019 1030    ALT 26 07/11/2019 1030    BILITOT 0.56 07/11/2019 1030              IMPRESSION:   1. Adenocarcinoma of the right lower lobe of lung. Path stage is T2N1M0. Stage 2 disease. 2. S/P lobectomy. 3. Currently on adjuvant chemotherapy, with cisplatin and Alimta. Plan to finish 4 cycles of adjuvant chemotherapy and then observe. He is enrolled in a clinical trial with different therapy might be recommended depending on mutational analysis, EGFR and ALK mutation testing is negative. PDL is pending  4. COPD  5. Completed 4 cycles of chemo    6. Smoking addiction, unable to quit   7. HTN uncontrolled. PLAN:  1. We had detailed discussion regarding smoking cessation and strategy to taper down. 2. I am referring him to the smoking cessation program.  Phone call was made today and they will reach out to him  3. We discussed his lab work, his cholesterol remains elevated but stable, his other counts are stable. 4. I reviewed and discussed his TSH, I am deferring to PCP for management. 5. Plan for CT prior to next visit. He is almost a 3-year kaylah from his surgery. We talked about the guidelines for further surveillance. 6. NCCN guidelines for survivorship were discussed   7. We discussed weight loss strategy. 8. Return in 6 months.

## 2019-07-16 NOTE — TELEPHONE ENCOUNTER
Name: Willie Grewal  : 1960  MRN: [id number]    Oncology Navigation Follow-Up Note    Contact Type:  Telephone    Subjective:     Objective:     Assistance Needed:     Receptive to Advanced Care Planning / Palliative Care:      Referrals:     Education:     Notes: navigator received call from MD in hopes to assist pt. To quit smoking or at least decrease number of cigarettes per day. Pt. Has tried Chantix, but not aware of his Hx with medication? Navigator placed referral for smoking cessation and forwarded message to Haroldo Viramontes.  Plan to follow-up as Lung Navigator    Electronically signed by Karlo Carrillo RN on 2019 at 2:31 PM

## 2019-07-19 ENCOUNTER — TELEPHONE (OUTPATIENT)
Dept: PRIMARY CARE CLINIC | Age: 59
End: 2019-07-19

## 2019-07-29 ENCOUNTER — TELEPHONE (OUTPATIENT)
Dept: PHARMACY | Age: 59
End: 2019-07-29

## 2019-07-29 NOTE — TELEPHONE ENCOUNTER
Received new referral for Smoking Cessation from Dr. Armida Lopez. Called patient and left voicemail to schedule initial appointment.

## 2019-08-07 ENCOUNTER — TELEPHONE (OUTPATIENT)
Dept: SURGERY | Age: 59
End: 2019-08-07

## 2019-08-07 NOTE — TELEPHONE ENCOUNTER
Attempted to reach patient to schedule screening colonoscopy visit with provider. Advised to contact the office to schedule at [phone].   ATTEMPT: 1

## 2019-08-20 ENCOUNTER — OFFICE VISIT (OUTPATIENT)
Dept: PRIMARY CARE CLINIC | Age: 59
End: 2019-08-20
Payer: MEDICARE

## 2019-08-20 VITALS
RESPIRATION RATE: 18 BRPM | SYSTOLIC BLOOD PRESSURE: 136 MMHG | BODY MASS INDEX: 33.29 KG/M2 | DIASTOLIC BLOOD PRESSURE: 80 MMHG | TEMPERATURE: 98 F | HEIGHT: 76 IN | HEART RATE: 72 BPM | WEIGHT: 273.4 LBS

## 2019-08-20 DIAGNOSIS — G89.29 CHRONIC MIDLINE LOW BACK PAIN WITHOUT SCIATICA: ICD-10-CM

## 2019-08-20 DIAGNOSIS — M54.50 CHRONIC MIDLINE LOW BACK PAIN WITHOUT SCIATICA: ICD-10-CM

## 2019-08-20 DIAGNOSIS — Z98.890 HISTORY OF LUMBAR LAMINECTOMY: ICD-10-CM

## 2019-08-20 DIAGNOSIS — I10 ESSENTIAL HYPERTENSION: ICD-10-CM

## 2019-08-20 DIAGNOSIS — M54.41 ACUTE RIGHT-SIDED LOW BACK PAIN WITH RIGHT-SIDED SCIATICA: ICD-10-CM

## 2019-08-20 DIAGNOSIS — E78.2 MIXED HYPERLIPIDEMIA: ICD-10-CM

## 2019-08-20 DIAGNOSIS — F32.4 MAJOR DEPRESSIVE DISORDER WITH SINGLE EPISODE, IN PARTIAL REMISSION (HCC): ICD-10-CM

## 2019-08-20 DIAGNOSIS — E03.9 ACQUIRED HYPOTHYROIDISM: ICD-10-CM

## 2019-08-20 DIAGNOSIS — E11.9 TYPE 2 DIABETES MELLITUS WITHOUT COMPLICATION, WITHOUT LONG-TERM CURRENT USE OF INSULIN (HCC): Primary | ICD-10-CM

## 2019-08-20 DIAGNOSIS — K21.9 GASTROESOPHAGEAL REFLUX DISEASE, ESOPHAGITIS PRESENCE NOT SPECIFIED: ICD-10-CM

## 2019-08-20 DIAGNOSIS — H60.392 OTHER INFECTIVE ACUTE OTITIS EXTERNA OF LEFT EAR: ICD-10-CM

## 2019-08-20 DIAGNOSIS — C34.31 MALIGNANT NEOPLASM OF LOWER LOBE OF RIGHT LUNG (HCC): ICD-10-CM

## 2019-08-20 LAB — HBA1C MFR BLD: 6 %

## 2019-08-20 PROCEDURE — 83036 HEMOGLOBIN GLYCOSYLATED A1C: CPT | Performed by: NURSE PRACTITIONER

## 2019-08-20 PROCEDURE — 96372 THER/PROPH/DIAG INJ SC/IM: CPT | Performed by: NURSE PRACTITIONER

## 2019-08-20 PROCEDURE — 99215 OFFICE O/P EST HI 40 MIN: CPT | Performed by: NURSE PRACTITIONER

## 2019-08-20 RX ORDER — CELECOXIB 100 MG/1
100 CAPSULE ORAL 2 TIMES DAILY
Qty: 60 CAPSULE | Refills: 5 | Status: SHIPPED | OUTPATIENT
Start: 2019-08-20 | End: 2020-06-30

## 2019-08-20 RX ORDER — METHYLPREDNISOLONE ACETATE 80 MG/ML
80 INJECTION, SUSPENSION INTRA-ARTICULAR; INTRALESIONAL; INTRAMUSCULAR; SOFT TISSUE ONCE
Status: COMPLETED | OUTPATIENT
Start: 2019-08-20 | End: 2019-08-20

## 2019-08-20 RX ORDER — OMEPRAZOLE 20 MG/1
20 CAPSULE, DELAYED RELEASE ORAL DAILY
Qty: 60 CAPSULE | Refills: 3 | Status: SHIPPED | OUTPATIENT
Start: 2019-08-20 | End: 2021-01-01 | Stop reason: SDUPTHER

## 2019-08-20 RX ORDER — PREDNISONE 20 MG/1
20 TABLET ORAL 2 TIMES DAILY
Qty: 10 TABLET | Refills: 0 | Status: SHIPPED | OUTPATIENT
Start: 2019-08-20 | End: 2020-01-09 | Stop reason: SDUPTHER

## 2019-08-20 RX ORDER — AMOXICILLIN AND CLAVULANATE POTASSIUM 875; 125 MG/1; MG/1
1 TABLET, FILM COATED ORAL 2 TIMES DAILY
Qty: 20 TABLET | Refills: 0 | Status: SHIPPED | OUTPATIENT
Start: 2019-08-20 | End: 2019-08-30

## 2019-08-20 RX ORDER — CIPROFLOXACIN AND DEXAMETHASONE 3; 1 MG/ML; MG/ML
4 SUSPENSION/ DROPS AURICULAR (OTIC) 2 TIMES DAILY
Qty: 1 BOTTLE | Refills: 1 | Status: SHIPPED | OUTPATIENT
Start: 2019-08-20 | End: 2021-01-01 | Stop reason: SDUPTHER

## 2019-08-20 RX ADMIN — METHYLPREDNISOLONE ACETATE 80 MG: 80 INJECTION, SUSPENSION INTRA-ARTICULAR; INTRALESIONAL; INTRAMUSCULAR; SOFT TISSUE at 09:20

## 2019-08-20 ASSESSMENT — ENCOUNTER SYMPTOMS
ABDOMINAL PAIN: 0
BLOOD IN STOOL: 0
SHORTNESS OF BREATH: 0
NAUSEA: 0
VOMITING: 0
BACK PAIN: 1
COUGH: 0
DIARRHEA: 0
TROUBLE SWALLOWING: 0
CONSTIPATION: 0
SORE THROAT: 0
SINUS PRESSURE: 0
WHEEZING: 0

## 2019-08-20 NOTE — PROGRESS NOTES
after lamenectomy, surgery was done in same area to remove Staff infection i    LAMINECTOMY  12/2012    LUNG REMOVAL, PARTIAL Right 2016    lower lobe    OTHER SURGICAL HISTORY Right 06/13/2017    CT guided placement of right pleural drainage catheter     TESTICLE REMOVAL Left 1982       Family History   Problem Relation Age of Onset    Cancer Mother     Cancer Father     Mental Illness Sister     Mental Illness Brother     Mental Illness Sister     Mental Illness Sister     Mental Illness Sister     Mental Illness Brother     Other Brother           Social History     Tobacco Use    Smoking status: Current Every Day Smoker     Packs/day: 0.25     Years: 40.00     Pack years: 10.00     Types: Cigarettes    Smokeless tobacco: Never Used   Substance Use Topics    Alcohol use: No      Current Outpatient Medications   Medication Sig Dispense Refill    Fluticasone-Umeclidin-Vilant ([facility] ELLIPTA) 100-62.5-25 MCG/INH AEPB Inhale into the lungs      omeprazole (PRILOSEC) 20 MG delayed release capsule Take 1 capsule by mouth Daily 60 capsule 3    amoxicillin-clavulanate (AUGMENTIN) 875-125 MG per tablet Take 1 tablet by mouth 2 times daily for 10 days 20 tablet 0    ciprofloxacin-dexamethasone (CIPRODEX) 0.3-0.1 % otic suspension Place 4 drops into the left ear 2 times daily 1 Bottle 1    celecoxib (CELEBREX) 100 MG capsule Take 1 capsule by mouth 2 times daily 60 capsule 5    predniSONE (DELTASONE) 20 MG tablet Take 1 tablet by mouth 2 times daily for 5 days 10 tablet 0    FLUoxetine (PROZAC) 40 MG capsule Take 1 capsule by mouth daily 30 capsule 5    levothyroxine (SYNTHROID) 88 MCG tablet Take 1 tablet by mouth Daily 30 tablet 5    amLODIPine (NORVASC) 5 MG tablet Take 1 tablet by mouth daily 90 tablet 1    atorvastatin (LIPITOR) 80 MG tablet take 1 tablet by mouth once daily *REQUESTING 90 DAY SUPPLY, PLEASE 90 tablet 0    ipratropium-albuterol (DUONEB) 0.5-2.5 (3) MG/3ML SOLN nebulizer solution Inhale 3 mLs into the lungs every 6 hours as needed for Shortness of Breath 360 mL 2    metFORMIN (GLUCOPHAGE) 500 MG tablet take 1 tablet by mouth twice a day with meals 180 tablet 1    albuterol (PROVENTIL) (2.5 MG/3ML) 0.083% nebulizer solution   0    lisinopril (PRINIVIL;ZESTRIL) 20 MG tablet Take 1 tablet by mouth daily 90 tablet 3     No current facility-administered medications for this visit. Allergies   Allergen Reactions    Emend [Fosaprepitant Dimeglumine] Other (See Comments)     Reported back pain, warmth all over, nausea & SOB       Health Maintenance   Topic Date Due    Hepatitis C screen  1960    HIV screen  12/02/1975    Hepatitis B Vaccine (1 of 3 - Risk 3-dose series) 12/02/1979    DTaP/Tdap/Td vaccine (1 - Tdap) 12/02/1979    Shingles Vaccine (1 of 2) 12/02/2010    Colon cancer screen colonoscopy  12/02/2010    Flu vaccine (1) 09/01/2019    Diabetic foot exam  01/31/2020    Diabetic retinal exam  01/31/2020    Lipid screen  07/11/2020    TSH testing  07/11/2020    Potassium monitoring  07/11/2020    Creatinine monitoring  07/11/2020    A1C test (Diabetic or Prediabetic)  08/20/2020    Pneumococcal 0-64 years Vaccine  Completed       Subjective:      Review of Systems   Constitutional: Negative for activity change, appetite change, chills, fatigue, fever and unexpected weight change. HENT: Positive for ear discharge. Negative for congestion, ear pain, hearing loss, sinus pressure, sore throat and trouble swallowing. Eyes: Negative for visual disturbance. Respiratory: Negative for cough, shortness of breath and wheezing. Cardiovascular: Negative for chest pain, palpitations and leg swelling. Gastrointestinal: Negative for abdominal pain, blood in stool, constipation, diarrhea, nausea and vomiting. Endocrine: Negative for cold intolerance, heat intolerance, polydipsia, polyphagia and polyuria.    Genitourinary: Negative for difficulty urinating, frequency, hematuria and urgency. Musculoskeletal: Positive for arthralgias and back pain. Negative for myalgias. Skin: Negative for rash. Allergic/Immunologic: Negative for environmental allergies. Neurological: Positive for paresthesias (has neuropathy). Negative for dizziness, tingling, weakness, light-headedness, numbness and headaches. Psychiatric/Behavioral: Negative for confusion. The patient is not nervous/anxious. Objective:     Physical Exam   Constitutional: He is oriented to person, place, and time. He appears well-developed and well-nourished. HENT:   Head: Normocephalic. Right Ear: There is drainage, swelling and tenderness. Tympanic membrane is erythematous. A middle ear effusion is present. Decreased hearing is noted. Left Ear: No tenderness. Tympanic membrane is not erythematous. No middle ear effusion. Eyes: Pupils are equal, round, and reactive to light. Conjunctivae and EOM are normal.   Neck: Normal range of motion. Cardiovascular: Normal rate, regular rhythm, normal heart sounds and intact distal pulses. No murmur heard. Pulmonary/Chest: Effort normal and breath sounds normal. He has no wheezes. Abdominal: Soft. Bowel sounds are normal. He exhibits no distension. Musculoskeletal:        Lumbar back: He exhibits decreased range of motion and tenderness. Exhibits facial grimacing and is very slow when going from sitting to standing due to back pain   Neurological: He is alert and oriented to person, place, and time. Skin: Skin is warm and dry. Psychiatric: He has a normal mood and affect. His behavior is normal. Judgment and thought content normal.     /80 (Site: Left Upper Arm, Position: Sitting, Cuff Size: Large Adult)   Pulse 72   Temp 98 °F (36.7 °C)   Resp 18   Ht 6' 4\" (1.93 m)   Wt 273 lb 6.4 oz (124 kg)   BMI 33.28 kg/m²     Assessment:       Diagnosis Orders   1.  Type 2 diabetes mellitus without complication, without long-term current use of insulin (HCC)  POCT glycosylated hemoglobin (Hb A1C)   2. Gastroesophageal reflux disease, esophagitis presence not specified  omeprazole (PRILOSEC) 20 MG delayed release capsule   3. Essential hypertension     4. Mixed hyperlipidemia     5. Major depressive disorder with single episode, in partial remission ([facility] [address]     6. Acquired hypothyroidism     7. Malignant neoplasm of lower lobe of right lung ([facility] [address]     8. Acute right-sided low back pain with right-sided sciatica  XR LUMBAR SPINE (2-3 VIEWS)    methylPREDNISolone acetate (DEPO-MEDROL) injection 80 mg    predniSONE (DELTASONE) 20 MG tablet   9. History of lumbar laminectomy  celecoxib (CELEBREX) 100 MG capsule   10. Chronic midline low back pain without sciatica  celecoxib (CELEBREX) 100 MG capsule   11. Other infective acute otitis externa of left ear  amoxicillin-clavulanate (AUGMENTIN) 875-125 MG per tablet    ciprofloxacin-dexamethasone (CIPRODEX) 0.3-0.1 % otic suspension             Plan:      Return in about 6 months (around 2/20/2020) for diabetes check, back pain.     Orders Placed This Encounter   Procedures    XR LUMBAR SPINE (2-3 VIEWS)     Standing Status:   Future     Standing Expiration Date:   8/20/2020     Order Specific Question:   Reason for exam:     Answer:   increased chronic pain, radiating into right groin and thigh    POCT glycosylated hemoglobin (Hb A1C)        Orders Placed This Encounter   Medications    omeprazole (PRILOSEC) 20 MG delayed release capsule     Sig: Take 1 capsule by mouth Daily     Dispense:  60 capsule     Refill:  3    amoxicillin-clavulanate (AUGMENTIN) 875-125 MG per tablet     Sig: Take 1 tablet by mouth 2 times daily for 10 days     Dispense:  20 tablet     Refill:  0    ciprofloxacin-dexamethasone (CIPRODEX) 0.3-0.1 % otic suspension     Sig: Place 4 drops into the left ear 2 times daily     Dispense:  1 Bottle     Refill:  1    celecoxib (CELEBREX) 100 MG capsule     Sig: Take 1 capsule by mouth 2

## 2019-08-27 ENCOUNTER — TELEPHONE (OUTPATIENT)
Dept: CASE MANAGEMENT | Age: 59
End: 2019-08-27

## 2019-12-10 DIAGNOSIS — E11.9 TYPE 2 DIABETES MELLITUS WITHOUT COMPLICATION, WITHOUT LONG-TERM CURRENT USE OF INSULIN (HCC): ICD-10-CM

## 2019-12-16 ENCOUNTER — HOSPITAL ENCOUNTER (OUTPATIENT)
Age: 59
Discharge: HOME | End: 2019-12-16
Payer: MEDICARE

## 2019-12-16 ENCOUNTER — HOSPITAL ENCOUNTER (OUTPATIENT)
Dept: CT IMAGING | Age: 59
Discharge: HOME | End: 2019-12-18
Payer: MEDICARE

## 2019-12-16 DIAGNOSIS — E03.8 OTHER SPECIFIED HYPOTHYROIDISM: ICD-10-CM

## 2019-12-16 DIAGNOSIS — C34.31 MALIGNANT NEOPLASM OF LOWER LOBE OF RIGHT LUNG (HCC): ICD-10-CM

## 2019-12-16 LAB
ABSOLUTE EOS #: 0.28 K/UL (ref 0–0.44)
ABSOLUTE IMMATURE GRANULOCYTE: 0.05 K/UL (ref 0–0.3)
ABSOLUTE LYMPH #: 2.51 K/UL (ref 1.1–3.7)
ABSOLUTE MONO #: 0.52 K/UL (ref 0.1–1.2)
ALBUMIN SERPL-MCNC: 5.1 G/DL (ref 3.5–5.2)
ALBUMIN/GLOBULIN RATIO: 1.8 (ref 1–2.5)
ALP BLD-CCNC: 69 U/L (ref 40–129)
ALT SERPL-CCNC: 28 U/L (ref 5–41)
ANION GAP SERPL CALCULATED.3IONS-SCNC: 12 MMOL/L (ref 9–17)
AST SERPL-CCNC: 24 U/L
BASOPHILS # BLD: 1 % (ref 0–2)
BASOPHILS ABSOLUTE: 0.1 K/UL (ref 0–0.2)
BILIRUB SERPL-MCNC: 0.46 MG/DL (ref 0.3–1.2)
BUN BLDV-MCNC: 21 MG/DL (ref 6–20)
BUN/CREAT BLD: ABNORMAL (ref 9–20)
CALCIUM SERPL-MCNC: 9.7 MG/DL (ref 8.6–10.4)
CHLORIDE BLD-SCNC: 101 MMOL/L (ref 98–107)
CO2: 24 MMOL/L (ref 20–31)
CREAT SERPL-MCNC: 1.11 MG/DL (ref 0.7–1.2)
DIFFERENTIAL TYPE: ABNORMAL
EOSINOPHILS RELATIVE PERCENT: 3 % (ref 1–4)
GFR AFRICAN AMERICAN: >60 ML/MIN
GFR NON-AFRICAN AMERICAN: >60 ML/MIN
GFR SERPL CREATININE-BSD FRML MDRD: ABNORMAL ML/MIN/{1.73_M2}
GFR SERPL CREATININE-BSD FRML MDRD: ABNORMAL ML/MIN/{1.73_M2}
GLUCOSE BLD-MCNC: 141 MG/DL (ref 70–99)
HCT VFR BLD CALC: 48.4 % (ref 40.7–50.3)
HEMOGLOBIN: 16.4 G/DL (ref 13–17)
IMMATURE GRANULOCYTES: 1 %
LYMPHOCYTES # BLD: 25 % (ref 24–43)
MCH RBC QN AUTO: 33.4 PG (ref 25.2–33.5)
MCHC RBC AUTO-ENTMCNC: 33.9 G/DL (ref 28.4–34.8)
MCV RBC AUTO: 98.6 FL (ref 82.6–102.9)
MONOCYTES # BLD: 5 % (ref 3–12)
NRBC AUTOMATED: 0 PER 100 WBC
PDW BLD-RTO: 12.9 % (ref 11.8–14.4)
PLATELET # BLD: 190 K/UL (ref 138–453)
PLATELET ESTIMATE: ABNORMAL
PMV BLD AUTO: 9.6 FL (ref 8.1–13.5)
POTASSIUM SERPL-SCNC: 4.3 MMOL/L (ref 3.7–5.3)
RBC # BLD: 4.91 M/UL (ref 4.21–5.77)
RBC # BLD: ABNORMAL 10*6/UL
SEG NEUTROPHILS: 65 % (ref 36–65)
SEGMENTED NEUTROPHILS ABSOLUTE COUNT: 6.68 K/UL (ref 1.5–8.1)
SODIUM BLD-SCNC: 137 MMOL/L (ref 135–144)
TOTAL PROTEIN: 8 G/DL (ref 6.4–8.3)
TSH SERPL DL<=0.05 MIU/L-ACNC: 3.84 MIU/L (ref 0.3–5)
WBC # BLD: 10.1 K/UL (ref 3.5–11.3)
WBC # BLD: ABNORMAL 10*3/UL

## 2019-12-16 PROCEDURE — 85025 COMPLETE CBC W/AUTO DIFF WBC: CPT

## 2019-12-16 PROCEDURE — 80053 COMPREHEN METABOLIC PANEL: CPT

## 2019-12-16 PROCEDURE — 36415 COLL VENOUS BLD VENIPUNCTURE: CPT

## 2019-12-16 PROCEDURE — 6360000004 HC RX CONTRAST MEDICATION: Performed by: INTERNAL MEDICINE

## 2019-12-16 PROCEDURE — 84443 ASSAY THYROID STIM HORMONE: CPT

## 2019-12-16 PROCEDURE — 2580000003 HC RX 258: Performed by: INTERNAL MEDICINE

## 2019-12-16 PROCEDURE — 71260 CT THORAX DX C+: CPT

## 2019-12-16 RX ORDER — 0.9 % SODIUM CHLORIDE 0.9 %
80 INTRAVENOUS SOLUTION INTRAVENOUS ONCE
Status: COMPLETED | OUTPATIENT
Start: 2019-12-16 | End: 2019-12-16

## 2019-12-16 RX ORDER — SODIUM CHLORIDE 0.9 % (FLUSH) 0.9 %
10 SYRINGE (ML) INJECTION PRN
Status: DISCONTINUED | OUTPATIENT
Start: 2019-12-16 | End: 2019-12-19 | Stop reason: HOSPADM

## 2019-12-16 RX ADMIN — IOVERSOL 75 ML: 741 INJECTION INTRA-ARTERIAL; INTRAVENOUS at 09:02

## 2019-12-16 RX ADMIN — Medication 10 ML: at 09:02

## 2019-12-16 RX ADMIN — SODIUM CHLORIDE 80 ML: 9 INJECTION, SOLUTION INTRAVENOUS at 09:01

## 2020-01-01 ENCOUNTER — HOSPITAL ENCOUNTER (OUTPATIENT)
Dept: PHARMACY | Age: 60
Setting detail: THERAPIES SERIES
Discharge: HOME | End: 2020-12-09
Payer: MEDICARE

## 2020-01-01 ENCOUNTER — HOSPITAL ENCOUNTER (OUTPATIENT)
Dept: ULTRASOUND IMAGING | Age: 60
Discharge: HOME | End: 2020-12-23
Payer: MEDICARE

## 2020-01-01 ENCOUNTER — HOSPITAL ENCOUNTER (OUTPATIENT)
Dept: NUCLEAR MEDICINE | Age: 60
Discharge: HOME | End: 2020-12-06
Payer: MEDICARE

## 2020-01-01 ENCOUNTER — APPOINTMENT (OUTPATIENT)
Dept: PHARMACY | Age: 60
End: 2020-01-01
Payer: MEDICARE

## 2020-01-01 ENCOUNTER — OFFICE VISIT (OUTPATIENT)
Dept: ONCOLOGY | Age: 60
End: 2020-01-01
Payer: MEDICARE

## 2020-01-01 ENCOUNTER — TELEPHONE (OUTPATIENT)
Dept: PHARMACY | Age: 60
End: 2020-01-01

## 2020-01-01 VITALS
TEMPERATURE: 97.8 F | DIASTOLIC BLOOD PRESSURE: 85 MMHG | BODY MASS INDEX: 27.58 KG/M2 | HEART RATE: 80 BPM | SYSTOLIC BLOOD PRESSURE: 136 MMHG | WEIGHT: 226.6 LBS

## 2020-01-01 VITALS
BODY MASS INDEX: 28.22 KG/M2 | TEMPERATURE: 98 F | SYSTOLIC BLOOD PRESSURE: 136 MMHG | DIASTOLIC BLOOD PRESSURE: 90 MMHG | HEART RATE: 65 BPM | OXYGEN SATURATION: 97 % | WEIGHT: 231.8 LBS

## 2020-01-01 VITALS
OXYGEN SATURATION: 98 % | HEART RATE: 73 BPM | RESPIRATION RATE: 18 BRPM | SYSTOLIC BLOOD PRESSURE: 128 MMHG | DIASTOLIC BLOOD PRESSURE: 97 MMHG

## 2020-01-01 LAB
GLUCOSE BLD-MCNC: 91 MG/DL (ref 75–110)
SURGICAL PATHOLOGY REPORT: NORMAL
SURGICAL PATHOLOGY REPORT: NORMAL

## 2020-01-01 PROCEDURE — 76942 ECHO GUIDE FOR BIOPSY: CPT

## 2020-01-01 PROCEDURE — 2580000003 HC RX 258: Performed by: INTERNAL MEDICINE

## 2020-01-01 PROCEDURE — 78815 PET IMAGE W/CT SKULL-THIGH: CPT

## 2020-01-01 PROCEDURE — 88177 CYTP FNA EVAL EA ADDL: CPT

## 2020-01-01 PROCEDURE — 99212 OFFICE O/P EST SF 10 MIN: CPT

## 2020-01-01 PROCEDURE — 3017F COLORECTAL CA SCREEN DOC REV: CPT | Performed by: INTERNAL MEDICINE

## 2020-01-01 PROCEDURE — 88341 IMHCHEM/IMCYTCHM EA ADD ANTB: CPT

## 2020-01-01 PROCEDURE — G8484 FLU IMMUNIZE NO ADMIN: HCPCS | Performed by: INTERNAL MEDICINE

## 2020-01-01 PROCEDURE — 4004F PT TOBACCO SCREEN RCVD TLK: CPT | Performed by: INTERNAL MEDICINE

## 2020-01-01 PROCEDURE — 88172 CYTP DX EVAL FNA 1ST EA SITE: CPT

## 2020-01-01 PROCEDURE — A9552 F18 FDG: HCPCS | Performed by: INTERNAL MEDICINE

## 2020-01-01 PROCEDURE — 88342 IMHCHEM/IMCYTCHM 1ST ANTB: CPT

## 2020-01-01 PROCEDURE — G8417 CALC BMI ABV UP PARAM F/U: HCPCS | Performed by: INTERNAL MEDICINE

## 2020-01-01 PROCEDURE — 88305 TISSUE EXAM BY PATHOLOGIST: CPT

## 2020-01-01 PROCEDURE — 99214 OFFICE O/P EST MOD 30 MIN: CPT | Performed by: INTERNAL MEDICINE

## 2020-01-01 PROCEDURE — 88333 PATH CONSLTJ SURG CYTO XM 1: CPT

## 2020-01-01 PROCEDURE — 88173 CYTOPATH EVAL FNA REPORT: CPT

## 2020-01-01 PROCEDURE — 38505 NEEDLE BIOPSY LYMPH NODES: CPT

## 2020-01-01 PROCEDURE — G8427 DOCREV CUR MEDS BY ELIG CLIN: HCPCS | Performed by: INTERNAL MEDICINE

## 2020-01-01 PROCEDURE — 99211 OFF/OP EST MAY X REQ PHY/QHP: CPT | Performed by: INTERNAL MEDICINE

## 2020-01-01 PROCEDURE — 3430000000 HC RX DIAGNOSTIC RADIOPHARMACEUTICAL: Performed by: INTERNAL MEDICINE

## 2020-01-01 PROCEDURE — 82947 ASSAY GLUCOSE BLOOD QUANT: CPT

## 2020-01-01 RX ORDER — LEVOFLOXACIN 500 MG/1
500 TABLET, FILM COATED ORAL DAILY
Qty: 7 TABLET | Refills: 0 | Status: SHIPPED | OUTPATIENT
Start: 2020-01-01 | End: 2020-01-01

## 2020-01-01 RX ORDER — FLUDEOXYGLUCOSE F 18 200 MCI/ML
10 INJECTION, SOLUTION INTRAVENOUS
Status: COMPLETED | OUTPATIENT
Start: 2020-01-01 | End: 2020-01-01

## 2020-01-01 RX ORDER — OXYCODONE HYDROCHLORIDE AND ACETAMINOPHEN 5; 325 MG/1; MG/1
1 TABLET ORAL EVERY 6 HOURS PRN
Qty: 60 TABLET | Refills: 0 | Status: SHIPPED | OUTPATIENT
Start: 2020-01-01 | End: 2021-01-01 | Stop reason: SDUPTHER

## 2020-01-01 RX ORDER — SODIUM CHLORIDE 0.9 % (FLUSH) 0.9 %
10 SYRINGE (ML) INJECTION ONCE
Status: COMPLETED | OUTPATIENT
Start: 2020-01-01 | End: 2020-01-01

## 2020-01-01 RX ADMIN — Medication 10 ML: at 07:49

## 2020-01-01 RX ADMIN — FLUDEOXYGLUCOSE F 18 14.5 MILLICURIE: 200 INJECTION, SOLUTION INTRAVENOUS at 07:49

## 2020-01-09 ENCOUNTER — OFFICE VISIT (OUTPATIENT)
Dept: PRIMARY CARE CLINIC | Age: 60
End: 2020-01-09
Payer: MEDICARE

## 2020-01-09 VITALS
HEART RATE: 83 BPM | SYSTOLIC BLOOD PRESSURE: 124 MMHG | WEIGHT: 260 LBS | HEIGHT: 76 IN | OXYGEN SATURATION: 96 % | BODY MASS INDEX: 31.66 KG/M2 | DIASTOLIC BLOOD PRESSURE: 84 MMHG

## 2020-01-09 PROCEDURE — 99214 OFFICE O/P EST MOD 30 MIN: CPT | Performed by: NURSE PRACTITIONER

## 2020-01-09 PROCEDURE — 96372 THER/PROPH/DIAG INJ SC/IM: CPT | Performed by: NURSE PRACTITIONER

## 2020-01-09 PROCEDURE — G8926 SPIRO NO PERF OR DOC: HCPCS | Performed by: NURSE PRACTITIONER

## 2020-01-09 PROCEDURE — G8417 CALC BMI ABV UP PARAM F/U: HCPCS | Performed by: NURSE PRACTITIONER

## 2020-01-09 PROCEDURE — 2022F DILAT RTA XM EVC RTNOPTHY: CPT | Performed by: NURSE PRACTITIONER

## 2020-01-09 PROCEDURE — 83036 HEMOGLOBIN GLYCOSYLATED A1C: CPT | Performed by: NURSE PRACTITIONER

## 2020-01-09 PROCEDURE — G8427 DOCREV CUR MEDS BY ELIG CLIN: HCPCS | Performed by: NURSE PRACTITIONER

## 2020-01-09 PROCEDURE — 3023F SPIROM DOC REV: CPT | Performed by: NURSE PRACTITIONER

## 2020-01-09 PROCEDURE — 3017F COLORECTAL CA SCREEN DOC REV: CPT | Performed by: NURSE PRACTITIONER

## 2020-01-09 PROCEDURE — 3046F HEMOGLOBIN A1C LEVEL >9.0%: CPT | Performed by: NURSE PRACTITIONER

## 2020-01-09 PROCEDURE — G8484 FLU IMMUNIZE NO ADMIN: HCPCS | Performed by: NURSE PRACTITIONER

## 2020-01-09 PROCEDURE — 4004F PT TOBACCO SCREEN RCVD TLK: CPT | Performed by: NURSE PRACTITIONER

## 2020-01-09 RX ORDER — METHYLPREDNISOLONE ACETATE 80 MG/ML
80 INJECTION, SUSPENSION INTRA-ARTICULAR; INTRALESIONAL; INTRAMUSCULAR; SOFT TISSUE ONCE
Status: COMPLETED | OUTPATIENT
Start: 2020-01-09 | End: 2020-01-09

## 2020-01-09 RX ORDER — PREDNISONE 20 MG/1
20 TABLET ORAL 2 TIMES DAILY
Qty: 10 TABLET | Refills: 0 | Status: SHIPPED | OUTPATIENT
Start: 2020-01-09 | End: 2020-04-14

## 2020-01-09 RX ORDER — AZITHROMYCIN 250 MG/1
TABLET, FILM COATED ORAL
Qty: 1 PACKET | Refills: 0 | Status: SHIPPED | OUTPATIENT
Start: 2020-01-09 | End: 2020-02-18 | Stop reason: ALTCHOICE

## 2020-01-09 RX ADMIN — METHYLPREDNISOLONE ACETATE 80 MG: 80 INJECTION, SUSPENSION INTRA-ARTICULAR; INTRALESIONAL; INTRAMUSCULAR; SOFT TISSUE at 10:00

## 2020-01-09 ASSESSMENT — ENCOUNTER SYMPTOMS
CONSTIPATION: 0
SINUS PRESSURE: 0
COUGH: 0
SORE THROAT: 0
DIARRHEA: 0
BLOOD IN STOOL: 0
SHORTNESS OF BREATH: 1
ABDOMINAL PAIN: 0
VOMITING: 0
NAUSEA: 0
TROUBLE SWALLOWING: 0
WHEEZING: 0

## 2020-01-09 NOTE — PROGRESS NOTES
breath. Negative for cough and wheezing. Cardiovascular: Negative for chest pain, palpitations and leg swelling. Gastrointestinal: Negative for abdominal pain, blood in stool, constipation, diarrhea, nausea and vomiting. Endocrine: Negative for cold intolerance, heat intolerance, polydipsia, polyphagia and polyuria. Genitourinary: Negative for difficulty urinating, frequency, hematuria and urgency. Musculoskeletal: Negative for arthralgias and myalgias. Skin: Negative for rash. Allergic/Immunologic: Negative for environmental allergies. Neurological: Negative for dizziness, weakness, light-headedness and headaches. Psychiatric/Behavioral: Negative for confusion. The patient is not nervous/anxious. Objective:     Physical Exam  Constitutional:       Appearance: He is well-developed. HENT:      Head: Normocephalic. Eyes:      Conjunctiva/sclera: Conjunctivae normal.      Pupils: Pupils are equal, round, and reactive to light. Neck:      Musculoskeletal: Normal range of motion. Cardiovascular:      Rate and Rhythm: Normal rate and regular rhythm. Heart sounds: Normal heart sounds. No murmur. Pulmonary:      Effort: Pulmonary effort is normal.      Breath sounds: Examination of the right-upper field reveals wheezing and rhonchi. Examination of the left-upper field reveals wheezing and rhonchi. Decreased breath sounds (bilateral bases), wheezing and rhonchi present. Abdominal:      General: Bowel sounds are normal. There is no distension. Palpations: Abdomen is soft. Musculoskeletal: Normal range of motion. Skin:     General: Skin is warm and dry. Neurological:      Mental Status: He is alert and oriented to person, place, and time. Psychiatric:         Behavior: Behavior normal.         Thought Content:  Thought content normal.         Judgment: Judgment normal.       /84 (Site: Left Upper Arm, Position: Sitting, Cuff Size: Large Adult)   Pulse 83   Ht 6' 4\"

## 2020-01-13 ENCOUNTER — TELEPHONE (OUTPATIENT)
Dept: PHARMACY | Age: 60
End: 2020-01-13

## 2020-01-14 ENCOUNTER — OFFICE VISIT (OUTPATIENT)
Dept: ONCOLOGY | Age: 60
End: 2020-01-14
Payer: MEDICARE

## 2020-01-14 VITALS
TEMPERATURE: 98 F | OXYGEN SATURATION: 96 % | BODY MASS INDEX: 31.89 KG/M2 | HEART RATE: 79 BPM | DIASTOLIC BLOOD PRESSURE: 98 MMHG | WEIGHT: 262 LBS | SYSTOLIC BLOOD PRESSURE: 149 MMHG

## 2020-01-14 PROCEDURE — 99999 PR OFFICE/OUTPT VISIT,PROCEDURE ONLY: CPT | Performed by: INTERNAL MEDICINE

## 2020-01-14 PROCEDURE — 99211 OFF/OP EST MAY X REQ PHY/QHP: CPT | Performed by: INTERNAL MEDICINE

## 2020-02-10 NOTE — TELEPHONE ENCOUNTER
Called again to schedule appt for smoking cessation visit. Spoke with patient and scheduled for 2/18/20 at 8am.  He states he has been having a really rough time lately with a relationship breakup but has been meaning to call back to schedule and realizes he needs to quit.

## 2020-02-18 ENCOUNTER — HOSPITAL ENCOUNTER (OUTPATIENT)
Dept: PHARMACY | Age: 60
Setting detail: THERAPIES SERIES
Discharge: HOME | End: 2020-02-18
Payer: MEDICARE

## 2020-02-18 VITALS
DIASTOLIC BLOOD PRESSURE: 95 MMHG | BODY MASS INDEX: 31.49 KG/M2 | HEART RATE: 78 BPM | TEMPERATURE: 97.2 F | SYSTOLIC BLOOD PRESSURE: 150 MMHG | WEIGHT: 258.7 LBS | OXYGEN SATURATION: 98 %

## 2020-02-18 PROCEDURE — 99212 OFFICE O/P EST SF 10 MIN: CPT

## 2020-02-18 RX ORDER — CLONAZEPAM 1 MG/1
1 TABLET ORAL DAILY
COMMUNITY
Start: 2020-02-12 | End: 2020-09-15 | Stop reason: SDUPTHER

## 2020-02-18 NOTE — PROGRESS NOTES
Patient notes using Chantix on a couple of occassions but did not complete entire course of therapy. He states he felt increased feelings of agitation though it is not clear if this was caused by situational changes versus the medication. He is not opposed to trying again but would like to start with NRT instead for now.       Motivation to Quit:   - Health reasons - lungs not getting any better  Potential Barriers:    - Life/Money/Relationship stresses are excessive right now and smoking is coping mechanism    OBJECTIVE     Past Medical History:    Past Medical History:   Diagnosis Date    Anxiety     BiPAP (biphasic positive airway pressure) dependence     Cancer (HCC)     Chronic back pain     Clinical trial exam 12/28/2016    [facility]     Diabetes ([facility]     Hyperlipidemia     Hypertension     Hypothyroidism     Lung cancer ([facility] [address]     Neuropathy     bilat feet    Sleep apnea     Slow to wake up after anesthesia     SOB (shortness of breath)     Urine frequency        Current Medications:    Current Outpatient Medications:     azithromycin (ZITHROMAX) 250 MG tablet, Take 2 tabs (500 mg) on Day 1, and take 1 tab (250 mg) on days 2 through 5., Disp: 1 packet, Rfl: 0    metFORMIN (GLUCOPHAGE) 500 MG tablet, take 1 tablet by mouth twice a day with meals, Disp: 180 tablet, Rfl: 1    Fluticasone-Umeclidin-Vilant (TRELEGY ELLIPTA) 100-62.5-25 MCG/INH AEPB, Inhale into the lungs, Disp: , Rfl:     omeprazole (PRILOSEC) 20 MG delayed release capsule, Take 1 capsule by mouth Daily, Disp: 60 capsule, Rfl: 3    ciprofloxacin-dexamethasone (CIPRODEX) 0.3-0.1 % otic suspension, Place 4 drops into the left ear 2 times daily, Disp: 1 Bottle, Rfl: 1    celecoxib (CELEBREX) 100 MG capsule, Take 1 capsule by mouth 2 times daily (Patient not taking: Reported on 1/14/2020), Disp: 60 capsule, Rfl: 5    FLUoxetine (PROZAC) 40 MG capsule, Take 1 capsule by mouth daily, Disp: 30 capsule, Rfl: 5    levothyroxine (SYNTHROID) 88 MCG tablet, Take 1 tablet by mouth Daily, Disp: 30 tablet, Rfl: 5    amLODIPine (NORVASC) 5 MG tablet, Take 1 tablet by mouth daily, Disp: 90 tablet, Rfl: 1    atorvastatin (LIPITOR) 80 MG tablet, take 1 tablet by mouth once daily *REQUESTING 90 DAY SUPPLY, PLEASE, Disp: 90 tablet, Rfl: 0    ipratropium-albuterol (DUONEB) 0.5-2.5 (3) MG/3ML SOLN nebulizer solution, Inhale 3 mLs into the lungs every 6 hours as needed for Shortness of Breath, Disp: 360 mL, Rfl: 2    albuterol (PROVENTIL) (2.5 MG/3ML) 0.083% nebulizer solution, , Disp: , Rfl: 0    lisinopril (PRINIVIL;ZESTRIL) 20 MG tablet, Take 1 tablet by mouth daily, Disp: 90 tablet, Rfl: 3    ASSESSMENT     Motivation to Quit:  1 (Not Motivated) -10 (Very Motivated):  Forgot to assess    Confidence in Ability to Quit: 1 (Not Confident) - 10 (Very Confident): Forgot to assess    Motivation for Change:   Action - pt ready to quit smoking, ready for assistance today     Madi & Manolo Assessments:  Patient will be completing these at home and bringing to follow up visit next week. PLAN     Education:   - Discussed physical and psychological dependence associated with smoking   - Discussed nicotine replacement and pharmacological options in combination with behavioral changes   - Reviewed [facility]) Smoking Cessation packet with patient in clinic. Medication Therapy Plan:  - Initiate nicotine replacement patches (patient will purchase OTC as he has a cash card from his insurance to use for this)   **>10 cigarettes/day**   - 21 mg patch QAM x 6 weeks    - 14 mg patch QAM x 2 weeks    - 7 mg patch QAM x 2 weeks   -PRN nicotine gum or lozenges (OTC) - educated patient to avoid acidic beverages and park in the cheek to allow nicotine to be absorbed. Behavioral/Lifestyle Modification Plan:  - Patient is going to ask himself prior to each cigarette - \"Do I really need this now? \"  - If the answer above is yes, patient is to only smoke partial cigarette   - Patient is to read the [facility] Smoking Cessation packet at home and fill out the worksheet to identify triggers and possible ways to overcome them. We will discuss at follow up visit. - PharmD to follow up 1 week. -Spent more than 60 minutes discussing smoking cessation with patient during visit.     Electronically signed by Germaine Soto, [address] [place] [address] on 2/18/2020 at 8:24 AM

## 2020-02-25 ENCOUNTER — HOSPITAL ENCOUNTER (OUTPATIENT)
Dept: PHARMACY | Age: 60
Setting detail: THERAPIES SERIES
Discharge: HOME | End: 2020-02-25
Payer: MEDICARE

## 2020-02-25 VITALS
TEMPERATURE: 96.5 F | SYSTOLIC BLOOD PRESSURE: 127 MMHG | OXYGEN SATURATION: 98 % | WEIGHT: 251.7 LBS | BODY MASS INDEX: 30.64 KG/M2 | DIASTOLIC BLOOD PRESSURE: 77 MMHG | HEART RATE: 89 BPM

## 2020-02-25 PROCEDURE — 99211 OFF/OP EST MAY X REQ PHY/QHP: CPT

## 2020-02-25 NOTE — PROGRESS NOTES
[facility]  Smoking Cessation Program      Negar Carvalho          1960  Scooby Wiley, OSIEL - CNP    Negar Carvalho is a 61 y.o. male has been referred to our service by Dr. Anuja Crowell for Smoking Cessation Counseling and Medication Management per Consult Agreement. Patient acknowledges working in consult agreement with clinical pharmacist and referring physician. SUBJECTIVE     Previous Goal/Quit Date: Cut back    Previous Goal/Quit Date Achieved: Yes    Has the patient smoked ANY cigarettes (even 1 puff) in the last 7 days?: Yes    Current PPD: 1-1½    Smoking Reduction Percent: 25%    Pharmacological Agent used: NRT + PRN    Compliant with regimen: Yes    Medication Adverse Effects: Patent does not like the taste of the cherry lozenges. He states he does not finish the whole lozenge due to this. Advised patient if he wants to have the full amount of nicotine to release the lozenge needs to be completely dissolved. Also advised he try to add a sugar free cherry Lifesaver (or other candy) to see if that helps. OBJECTIVE     Past Medical History:      Past Medical History:   Diagnosis Date    Anxiety     BiPAP (biphasic positive airway pressure) dependence     Cancer (HCC)     Chronic back pain     Clinical trial exam 12/28/2016    COPD (chronic obstructive pulmonary disease) (HCC)     Diabetes (HCC)     Hyperlipidemia     Hypertension     Hypothyroidism     Lung cancer (HCC)     Neuropathy     bilat feet    Sleep apnea     Slow to wake up after anesthesia     SOB (shortness of breath)     Urine frequency      Vitals:  BP: 127/77  HR: 89  Weight: 251.7 lb  Temp: 96.5    Wt Readings from Last 3 Encounters:   02/25/20 251 lb 11.2 oz (114.2 kg)   02/18/20 258 lb 11.2 oz (117.3 kg)   01/14/20 262 lb (118.8 kg)     ASSESSMENT     Current Smoking Status: Cut Back -Down from 2 packs per day to 1-1½ packs    Lifestyle modifications:   What has worked well?  Using the Nicotine patch and lozenge has been helpful  What hasnt worked well? Nothing tried really other than the NRT    Behavior modifications:    What has worked well? Nothing really tried  What hasnt worked well? Nothing really tried    If Cut Back or Relapsed:  What continues to trigger you? EMOTIONAL STRESS IS #1,  Coffee, Meals    How are you avoiding triggers or planning for how to deal with them? Seeking help from a counselor    How Motivated are you to quit on a scale from 1-10? Forgot to assess    How Confident are you that you can quit on a scale from 1-10? Forgot to assess    Patient did return assessments today that were given to him to complete at our last visit. (Below)  These reflect patient is highly dependent on nicotine. Main reasons for smoking include: Stimulation, Relaxation/Tension Reduction, and Craving. Withdrawal sxs? Other concerns: None    PLAN       Education:   - Discussed physical and psychological dependence associated with smoking   - Discussed and reinforced importance of smoking cessation and correlated with concurrent disease states   - Discussed methods for continued trigger avoidance and methods to fight withdrawal symptoms    Medication Therapy:   - Continue nicotine replacement patches    *>10 cigarettes/day*    - 21 mg patch QAM x 6 weeks    - 14 mg patch QAM x 2 weeks    - 7 mg patch QAM x 2 weeks   - Continue nicotine lozenges prn for cravings    - Counseled regarding nausea (take with food)/nightmares (can take evening dose around suppertime rather than at bedtime)    - PharmD to follow up 1 week. - Spent more than 60 minutes discussing smoking cessation with patient during visit.     Electronically signed by Andres Barlow [facility] on 2/25/2020 at 10:39 AM

## 2020-03-03 ENCOUNTER — HOSPITAL ENCOUNTER (OUTPATIENT)
Dept: PHARMACY | Age: 60
Setting detail: THERAPIES SERIES
Discharge: HOME | End: 2020-03-03
Payer: MEDICARE

## 2020-03-03 VITALS
SYSTOLIC BLOOD PRESSURE: 131 MMHG | TEMPERATURE: 97.3 F | HEART RATE: 77 BPM | WEIGHT: 250.6 LBS | BODY MASS INDEX: 30.5 KG/M2 | OXYGEN SATURATION: 98 % | DIASTOLIC BLOOD PRESSURE: 94 MMHG

## 2020-03-03 PROCEDURE — 99212 OFFICE O/P EST SF 10 MIN: CPT

## 2020-03-03 NOTE — PROGRESS NOTES
[facility]  Smoking Cessation Program      Otis Emery          1960  [place] Patrick, APRN - CNP    Otis Emery is a 61 y.o. male has been referred to our service by Dr. Matt Alcaraz for Smoking Cessation Counseling and Medication Management per Consult Agreement. Patient acknowledges working in consult agreement with clinical pharmacist and referring physician. Patient presents today for follow up visit 1. SUBJECTIVE     Previous Goal/Quit Date: Cut back    Previous Goal/Quit Date Achieved: Yes    Has the patient smoked ANY cigarettes (even 1 puff) in the last 7 days?: Yes    Current PPD: 0.75 - 15 cigarettes    Smoking Reduction Percent: 50%    Pharmacological Agent used: NRT + PRN    Compliant with regimen: Yes    Medication Adverse Effects: How do you feel? No different right now - will take time  []  Improved breathing  []  Improved sense if smell  []  Improved sense of taste  []  Increase in energy  []  Other    OBJECTIVE     Past Medical History:    Past Medical History:   Diagnosis Date    Anxiety     BiPAP (biphasic positive airway pressure) dependence     Cancer (HCC)     Chronic back pain     Clinical trial exam 12/28/2016    COPD (chronic obstructive pulmonary disease) (HCC)     Diabetes (HCC)     Hyperlipidemia     Hypertension     Hypothyroidism     Lung cancer (HCC)     Neuropathy     bilat feet    Sleep apnea     Slow to wake up after anesthesia     SOB (shortness of breath)     Urine frequency      Vitals:  BP: 131/94   HR: 77  Weight: 250.6 lb  Temp: 97.3    Wt Readings from Last 3 Encounters:   03/03/20 250 lb 9.6 oz (113.7 kg)   02/25/20 251 lb 11.2 oz (114.2 kg)   02/18/20 258 lb 11.2 oz (117.3 kg)     ASSESSMENT     Current Smoking Status: Cut Back -15 cigarettes per day right now    Lifestyle modifications:   What has worked well? Patches and lozenges have helped  What hasnt worked well?  None    Behavior modifications:    What has worked well? Smoking partial cigarettes, Waiting longer to smoke first cigarette (watch TV, wait for coffee)  What hasnt worked well? Logging cigarettes    If Cut Back or Relapsed:  What continues to trigger you? Morning time is hardest and heaviest smoking time    How are you avoiding triggers or planning for how to deal with them? Trying to get brother out of house/moving and not taking brother with him    How Motivated are you to quit on a scale from 1-10? 7-8    How Confident are you that you can quit on a scale from 1-10? 4 - stress    Withdrawal sxs? Other concerns: Weight gain - appetite increased    PLAN     Education:   - Discussed physical and psychological dependence associated with smoking   - Discussed and reinforced importance of smoking cessation and correlated with concurrent disease states   - Discussed methods for continued trigger avoidance and methods to fight withdrawal symptoms    Medication Therapy:  - *Chantix*   -Chantix 0.5mg QD x 3 days then 0.5mg BID x 4 days then 1mg BID thereafter - QUIT DAY 4/1/20   -Quit approaches:    -Flexible Quit Approach: pick a quit date between 8 and 35 days from the date you start Chantix. Chantix therapy will continue for 12 weeks.    -Counseled regarding nausea (take with food)/nightmares (can take evening dose around suppertime rather than at bedtime)    -Provided QUIT calendar to patient to guide quit journey. Patient will also use to tally cigarette smoked and doses taken of Chantix. - PharmD to follow up 1 week phone call to assess tolerability of Chantix. Clinic visit in 2 weeks. -Spent more than 60 minutes discussing smoking cessation with patient during visit.     Electronically signed by Sean Ortega [facility] on 3/3/2020 at 7:23 AM

## 2020-03-10 ENCOUNTER — TELEPHONE (OUTPATIENT)
Dept: PHARMACY | Age: 60
End: 2020-03-10

## 2020-03-10 NOTE — TELEPHONE ENCOUNTER
Called patient to check on how patient is tolerating his Chantix as he has been on it for about 1 week. LVM requesting call back.

## 2020-03-16 ENCOUNTER — TELEPHONE (OUTPATIENT)
Dept: PHARMACY | Age: 60
End: 2020-03-16

## 2020-03-16 NOTE — TELEPHONE ENCOUNTER
Patient called and LVM earlier today to cancel smoking cessation appt. We will reach out to him when able to reschedule due to [address] circumstances. I informed patient that if he has any questions for Sukhwinder Frazier he can call clinic as needed until we are able to see him.

## 2020-04-14 RX ORDER — PREDNISONE 20 MG/1
TABLET ORAL
Qty: 10 TABLET | Refills: 0 | Status: SHIPPED | OUTPATIENT
Start: 2020-04-14 | End: 2020-06-30 | Stop reason: ALTCHOICE

## 2020-04-17 ENCOUNTER — TELEPHONE (OUTPATIENT)
Dept: PRIMARY CARE CLINIC | Age: 60
End: 2020-04-17

## 2020-05-07 ENCOUNTER — TELEPHONE (OUTPATIENT)
Dept: PRIMARY CARE CLINIC | Age: 60
End: 2020-05-07

## 2020-06-30 ENCOUNTER — OFFICE VISIT (OUTPATIENT)
Dept: PRIMARY CARE CLINIC | Age: 60
End: 2020-06-30
Payer: MEDICARE

## 2020-06-30 VITALS
DIASTOLIC BLOOD PRESSURE: 98 MMHG | HEART RATE: 72 BPM | WEIGHT: 242 LBS | TEMPERATURE: 96.9 F | BODY MASS INDEX: 29.46 KG/M2 | SYSTOLIC BLOOD PRESSURE: 146 MMHG | OXYGEN SATURATION: 97 %

## 2020-06-30 LAB — HBA1C MFR BLD: 5.8 %

## 2020-06-30 PROCEDURE — 99214 OFFICE O/P EST MOD 30 MIN: CPT | Performed by: NURSE PRACTITIONER

## 2020-06-30 PROCEDURE — 96372 THER/PROPH/DIAG INJ SC/IM: CPT | Performed by: NURSE PRACTITIONER

## 2020-06-30 PROCEDURE — G8417 CALC BMI ABV UP PARAM F/U: HCPCS | Performed by: NURSE PRACTITIONER

## 2020-06-30 PROCEDURE — 83036 HEMOGLOBIN GLYCOSYLATED A1C: CPT | Performed by: NURSE PRACTITIONER

## 2020-06-30 PROCEDURE — 4004F PT TOBACCO SCREEN RCVD TLK: CPT | Performed by: NURSE PRACTITIONER

## 2020-06-30 PROCEDURE — 2022F DILAT RTA XM EVC RTNOPTHY: CPT | Performed by: NURSE PRACTITIONER

## 2020-06-30 PROCEDURE — G8427 DOCREV CUR MEDS BY ELIG CLIN: HCPCS | Performed by: NURSE PRACTITIONER

## 2020-06-30 PROCEDURE — 3017F COLORECTAL CA SCREEN DOC REV: CPT | Performed by: NURSE PRACTITIONER

## 2020-06-30 PROCEDURE — 3044F HG A1C LEVEL LT 7.0%: CPT | Performed by: NURSE PRACTITIONER

## 2020-06-30 RX ORDER — ATORVASTATIN CALCIUM 80 MG/1
80 TABLET, FILM COATED ORAL DAILY
Qty: 90 TABLET | Refills: 3 | Status: SHIPPED | OUTPATIENT
Start: 2020-06-30 | End: 2021-01-01 | Stop reason: SDUPTHER

## 2020-06-30 RX ORDER — AMLODIPINE BESYLATE 5 MG/1
5 TABLET ORAL DAILY
Qty: 90 TABLET | Refills: 1 | Status: SHIPPED | OUTPATIENT
Start: 2020-06-30 | End: 2021-01-01 | Stop reason: ALTCHOICE

## 2020-06-30 RX ORDER — METHYLPREDNISOLONE ACETATE 80 MG/ML
80 INJECTION, SUSPENSION INTRA-ARTICULAR; INTRALESIONAL; INTRAMUSCULAR; SOFT TISSUE ONCE
Status: COMPLETED | OUTPATIENT
Start: 2020-06-30 | End: 2020-06-30

## 2020-06-30 RX ORDER — FLUTICASONE PROPIONATE 50 MCG
2 SPRAY, SUSPENSION (ML) NASAL DAILY
Qty: 1 BOTTLE | Refills: 5 | Status: SHIPPED | OUTPATIENT
Start: 2020-06-30 | End: 2021-01-01 | Stop reason: ALTCHOICE

## 2020-06-30 RX ORDER — LISINOPRIL 20 MG/1
20 TABLET ORAL DAILY
Qty: 90 TABLET | Refills: 3 | Status: SHIPPED | OUTPATIENT
Start: 2020-06-30 | End: 2021-01-01 | Stop reason: SDUPTHER

## 2020-06-30 RX ADMIN — METHYLPREDNISOLONE ACETATE 80 MG: 80 INJECTION, SUSPENSION INTRA-ARTICULAR; INTRALESIONAL; INTRAMUSCULAR; SOFT TISSUE at 08:23

## 2020-06-30 ASSESSMENT — ENCOUNTER SYMPTOMS
TROUBLE SWALLOWING: 0
SHORTNESS OF BREATH: 1
BACK PAIN: 1
COUGH: 0
VOMITING: 0
ABDOMINAL PAIN: 0
CONSTIPATION: 0
SORE THROAT: 0
SINUS PRESSURE: 0
WHEEZING: 0
NAUSEA: 0
DIARRHEA: 0
BLOOD IN STOOL: 0

## 2020-06-30 NOTE — PROGRESS NOTES
glucose trend. An ACE inhibitor/angiotensin II receptor blocker is being taken. Back Pain   This is a chronic problem. The current episode started more than 1 year ago. The problem occurs daily. The problem has been waxing and waning since onset. The pain is present in the lumbar spine. The pain is moderate. The symptoms are aggravated by bending and position. Pertinent negatives include no abdominal pain, chest pain, fever, headaches, numbness, tingling or weakness. He has tried NSAIDs (Steroid injection) for the symptoms. The treatment provided moderate relief. Hemoglobin A1C (%)   Date Value   06/30/2020 5.8   08/20/2019 6.0   01/31/2019 6.1             ( goal A1C is < 7)   Microalb/Crt.  Ratio (mcg/mg creat)   Date Value   06/10/2017 60 (H)     LDL Cholesterol (mg/dL)   Date Value   07/11/2019 CANNOT BE CALCULATED   07/11/2019 192 (H)   11/01/2017            (goal LDL is <100)   AST (U/L)   Date Value   12/16/2019 24     ALT (U/L)   Date Value   12/16/2019 28     BUN (mg/dL)   Date Value   12/16/2019 21 (H)     BP Readings from Last 3 Encounters:   06/30/20 (!) 146/98   03/03/20 (!) 131/94   02/25/20 127/77          ([id number])    Past Medical History:   Diagnosis Date    Anxiety     BiPAP (biphasic positive airway pressure) dependence     Cancer (HCC)     Chronic back pain     Clinical trial exam 12/28/2016    COPD (chronic obstructive pulmonary disease) (HCC)     Diabetes (HCC)     Hyperlipidemia     Hypertension     Hypothyroidism     [facility] [address]     Neuropathy     bilat feet    Sleep apnea     Slow to wake up after anesthesia     SOB (shortness of breath)     Urine frequency       Past Surgical History:   Procedure Laterality Date    BACK SURGERY  01/2013    10 days after lamenectomy, surgery was done in same area to remove Staff infection i    LAMINECTOMY  12/2012    LUNG REMOVAL, PARTIAL Right 2016    lower lobe    OTHER SURGICAL HISTORY Right 06/13/2017    CT guided daily 1 Bottle 1    ipratropium-albuterol (DUONEB) 0.5-2.5 (3) MG/3ML SOLN nebulizer solution Inhale 3 mLs into the lungs every 6 hours as needed for Shortness of Breath 360 mL 2    albuterol (PROVENTIL) (2.5 MG/3ML) 0.083% nebulizer solution   0     No current facility-administered medications for this visit. Allergies   Allergen Reactions    Emend [Fosaprepitant Dimeglumine] Other (See Comments)     Reported back pain, warmth all over, nausea & SOB       Health Maintenance   Topic Date Due    Hepatitis C screen  1960    HIV screen  12/02/1975    Hepatitis B vaccine (1 of 3 - Risk 3-dose series) 12/02/1979    DTaP/Tdap/Td vaccine (1 - Tdap) 12/02/1979    Shingles Vaccine (1 of 2) 12/02/2010    Colon cancer screen colonoscopy  12/02/2010    Annual Wellness Visit (AWV)  05/29/2019    Diabetic foot exam  01/31/2020    Diabetic retinal exam  01/31/2020    Lipid screen  07/11/2020    A1C test (Diabetic or Prediabetic)  08/20/2020    TSH testing  12/16/2020    Potassium monitoring  12/16/2020    Creatinine monitoring  12/16/2020    Flu vaccine  Completed    Pneumococcal 0-64 years Vaccine  Completed    Hepatitis A vaccine  Aged Out    Hib vaccine  Aged Out    Meningococcal (ACWY) vaccine  Aged Out       Subjective:      Review of Systems   Constitutional: Positive for fatigue. Negative for activity change, appetite change, chills, fever and unexpected weight change. HENT: Positive for postnasal drip. Negative for congestion, ear pain, hearing loss, sinus pressure, sore throat and trouble swallowing. Eyes: Negative for visual disturbance. Respiratory: Positive for shortness of breath (chronic). Negative for cough and wheezing. Cardiovascular: Negative for chest pain, palpitations and leg swelling. Gastrointestinal: Negative for abdominal pain, blood in stool, constipation, diarrhea, nausea and vomiting.    Endocrine: Negative for cold intolerance, heat intolerance, polydipsia, type     5. Acquired hypothyroidism  TSH without Reflex   6. Essential hypertension  amLODIPine (NORVASC) 5 MG tablet    lisinopril (PRINIVIL;ZESTRIL) 20 MG tablet   7. Mixed hyperlipidemia  Lipid Panel    atorvastatin (LIPITOR) 80 MG tablet   8. Post-nasal drip     9. Encounter for screening for malignant neoplasm of prostate  Psa screening   10. Screening for iron deficiency anemia  CBC Auto Differential             Plan:      Return in about 4 months (around 10/30/2020) for diabetes check, back pain. Diabetes-A1c remains well controlled, continue metformin daily  Back pain- currently exacerbated, Depo-Medrol in office, offered NSAIDs but he declines. Advised to pursue x-ray. Discussed referral to pain management pending results and encouraged him to reconsider a referral if appropriate as at this time he is resistant  Fatigue, hypothyroidism-past due for labs, continue levothyroxine for now  Hypertension, hyperlipidemia- recently stopped all medications, BP elevated in office. Strongly advised to resume medications and to take consistently, warned of risks of CAD and adverse effects/events. Due for labs  Postnasal drip-advised regular use of Flonase nightly, consider adding oral antihistamine if does not improve  Orders Placed This Encounter   Procedures    Cologuard     This test is performed by an external laboratory and is used for result attachment only. It is required that this order requisition be faxed to: [facility] @@ [phone]. See [email or web address] (formerly [email or web address]) for further information.      Standing Status:   Future     Standing Expiration Date:   6/30/2021    XR LUMBAR SPINE (MIN 4 VIEWS)     Standing Status:   Future     Standing Expiration Date:   6/30/2021     Order Specific Question:   Reason for exam:     Answer:   back pain    CBC Auto Differential     Standing Status:   Future     Standing Expiration Date:   6/30/2021    Comprehensive Metabolic Panel     Standing Status:   Future Standing Expiration Date:   2021    Lipid Panel     Standing Status:   Future     Standing Expiration Date:   2021     Order Specific Question:   Is Patient Fasting?/# of Hours     Answer:   8    Psa screening     Standing Status:   Future     Standing Expiration Date:   2021    TSH without Reflex     Standing Status:   Future     Standing Expiration Date:   2021    POCT glycosylated hemoglobin (Hb A1C)        Orders Placed This Encounter   Medications    methylPREDNISolone acetate (DEPO-MEDROL) injection 80 mg    amLODIPine (NORVASC) 5 MG tablet     Sig: Take 1 tablet by mouth daily     Dispense:  90 tablet     Refill:  1    lisinopril (PRINIVIL;ZESTRIL) 20 MG tablet     Sig: Take 1 tablet by mouth daily     Dispense:  90 tablet     Refill:  3    atorvastatin (LIPITOR) 80 MG tablet     Sig: Take 1 tablet by mouth daily     Dispense:  90 tablet     Refill:  3    fluticasone (FLONASE) 50 MCG/ACT nasal spray     Si sprays by Each Nostril route daily     Dispense:  1 Bottle     Refill:  5       Patient given educational materials - see patient instructions. Discussed use, benefit, and side effects of prescribed medications. All patientquestions answered. Pt voiced understanding. Reviewed health maintenance. Instructedto continue current medications, diet and exercise. Patient agreed with treatmentplan. Follow up as directed.      Electronicallysigned by OSIEL Nieto CNP on 2020 at 1:19 PM

## 2020-08-27 NOTE — TELEPHONE ENCOUNTER
Dr. Eva Huff,    Reached patient to review metformin adherence. He states at his last appointment metformin decreased to once daily from BID (see in note too) and he is still taking once daily. He does not check blood sugars. He will run out before next appointment. I have pended prescription for the UPDATED ONCE DAILY directions for your convenience if you agree. Please let me know if I can complete any further outreach. Thank you,  Leila Muniz, PharmD, [place], [address]  [facility] Pharmacist  O: [phone]  Department, toll free: [phone], option 7   =======================================================    CLINICAL PHARMACY: ADHERENCE REVIEW  Identified care gap per Car; fills at [facility] Aid: ACE/ARB, Diabetes and Statin adherence    Last Office Visit: 6/30/2020    ASSESSMENT  ACE/ARB ADHERENCE  PDC: n/a    Per Insurance Records   Lisinopril 20 mg tablets last filled on 6/30/2020 for a 90 day supply; 3 refills remaining. Billed through [facility]    BP Readings from Last 3 Encounters:   06/30/20 (!) 146/98   03/03/20 (!) 131/94   02/25/20 127/77     CrCl cannot be calculated (Patient's most recent lab result is older than the maximum 120 days allowed. ). DIABETES ADHERENCE  PDC: n/a    Per [address]   Metformin 500 mg tablets last filled on 4/16/2020 for a 90 day supply; SIG: BID with meals (note a comment in med list taking once daily, unable to tell when this was input); 0 refills remaining. Billed through [facility]    Lab Results   Component Value Date    LABA1C 5.8 06/30/2020    LABA1C 6.0 08/20/2019    LABA1C 6.1 01/31/2019       STATIN ADHERENCE  PDC: n/a    Per Insurance Records   Atorvastatin 80 mg tablets last filled on 6/30/2020 for a 90 day supply; 3 refills remaining. Billed through [facility]. Lab Results   Component Value Date    CHOL DISREGARD RESULTS. INCORRECT PATIENT NUMBER. 07/11/2019    CHOL 289 (H) 07/11/2019    TRIG DISREGARD RESULTS.   INCORRECT PATIENT NUMBER. 07/11/2019 TRIG 290 (H) 07/11/2019    HDL DISREGARD RESULTS. INCORRECT PATIENT NUMBER. 07/11/2019    HDL 39 (L) 07/11/2019    LDLCHOLESTEROL CANNOT BE CALCULATED 07/11/2019    LDLCHOLESTEROL 192 (H) 07/11/2019    LDLDIRECT 187 (H) 11/01/2017     ALT   Date Value Ref Range Status   12/16/2019 28 5 - 41 U/L Final     AST   Date Value Ref Range Status   12/16/2019 24 <40 U/L Final     The 10-year ASCVD risk score (Boy Briseno, et al., 2013) is: 50.1%    Values used to calculate the score:      Age: 61 years      Sex: Male      Is Non- : No      Diabetic: Yes      Tobacco smoker: Yes      Systolic Blood Pressure: 598 mmHg      Is BP treated: Yes      HDL Cholesterol: 39 mg/dL      Total Cholesterol: 289 mg/dL     PLAN  Reached patient to review. He states that he is continuing to take the metformin once daily. He has not been checking blood sugars at home. He states he will run out before next appointment with Dr. Jackie Rojas and request refill.     Future Appointments   Date Time Provider [facility]   9/1/2020 11:30 AM Tara Mejia MD [address] [place] [address]   10/30/2020  7:20 AM Gertrude Muir , APRN - CNP [facility], PharmD, [address], [address] [place] [address] Pharmacist  O: [phone]  Department, toll free: [phone], option 7

## 2020-08-31 NOTE — TELEPHONE ENCOUNTER
Thank you, Dr. Debbie Bernard! Note prescription sent. [facility] getting today for new prescription, once daily instructions. Chana Wilson informed refill sent to pharmacy. No further outreach planned at this time. CLINICAL PHARMACY CONSULT: MED RECONCILIATION/REVIEW ADDENDUM  For Pharmacy Admin Tracking Only  PHSO: Yes  Total # of Interventions Recommended: 3  - New Order #: 1 New Medication Order Reason(s):  Adherence  - Refills Provided #: 1  - Maintenance Safety Lab Monitoring #: 1  - New Therapy Lab Monitoring #: 1  Recommended intervention potential cost savings: 1  Total Interventions Accepted: 1  Time Spent (min): Fei Staley, [address] [place] [address]

## 2020-09-01 ENCOUNTER — OFFICE VISIT (OUTPATIENT)
Dept: ONCOLOGY | Age: 60
End: 2020-09-01
Payer: MEDICARE

## 2020-09-01 ENCOUNTER — TELEPHONE (OUTPATIENT)
Dept: ONCOLOGY | Age: 60
End: 2020-09-01

## 2020-09-01 VITALS
SYSTOLIC BLOOD PRESSURE: 108 MMHG | RESPIRATION RATE: 18 BRPM | HEART RATE: 101 BPM | WEIGHT: 226.6 LBS | DIASTOLIC BLOOD PRESSURE: 74 MMHG | TEMPERATURE: 98.1 F | BODY MASS INDEX: 27.58 KG/M2

## 2020-09-01 PROCEDURE — G8417 CALC BMI ABV UP PARAM F/U: HCPCS | Performed by: INTERNAL MEDICINE

## 2020-09-01 PROCEDURE — 4004F PT TOBACCO SCREEN RCVD TLK: CPT | Performed by: INTERNAL MEDICINE

## 2020-09-01 PROCEDURE — 99215 OFFICE O/P EST HI 40 MIN: CPT | Performed by: INTERNAL MEDICINE

## 2020-09-01 PROCEDURE — G8427 DOCREV CUR MEDS BY ELIG CLIN: HCPCS | Performed by: INTERNAL MEDICINE

## 2020-09-01 PROCEDURE — 3017F COLORECTAL CA SCREEN DOC REV: CPT | Performed by: INTERNAL MEDICINE

## 2020-09-01 PROCEDURE — 99211 OFF/OP EST MAY X REQ PHY/QHP: CPT | Performed by: INTERNAL MEDICINE

## 2020-09-01 NOTE — PROGRESS NOTES
Neurological: Denies headaches, decreased LOC, no sensory or motor focal deficits. Grade 1 neuropathy in hands and feet, predates chemo, diabetes related. Musculoskeletal: Diffuse aches and pains, no joint swelling. Trapezoid pain as noted above  Skin: There are no rashes or bleeding. Psychiatric:  History of bipolar disorder. Anxiety   Endocrine: No thyroid disease. Hematologic: No bleeding, no adenopathy. PHYSICAL EXAM: Shows a well appearing 61y.o.-year-old male who is not in pain or distress. Vital Signs: Blood pressure 108/74, pulse 101, temperature 98.1 °F (36.7 °C), temperature source Oral, resp. rate 18, weight 226 lb 9.6 oz (102.8 kg). HEENT: Slight redness in the throat. Normocephalic and atraumatic. Pupils are equal, round, reactive to light and accommodation. Extraocular muscles are intact. Neck: Showed no JVD, no carotid bruit . Lungs: Decreased air entry especially in the right lower lobe area. Minimal wheezing, bilaterally. Postoperative changes in the chest wall are healed well without any evidence of infection. Heart: Regular without any murmur. Abdomen: Soft, nontender. No hepatosplenomegaly. Extremities: Lower extremities show no edema, clubbing, or cyanosis.  Neuro exam: intact cranial nerves bilaterally no motor or sensory deficit, gait is normal. Lymphatic: no adenopathy appreciated in the supraclavicular, axillary, cervical or inguinal area    REVIEW OF RADIOLOGICAL RESULTS:       REVIEW OF LABORATORY RESULTS:   Lab Results   Component Value Date    WBC 10.1 12/16/2019    HGB 16.4 12/16/2019    HCT 48.4 12/16/2019    MCV 98.6 12/16/2019     12/16/2019       Chemistry        Component Value Date/Time     12/16/2019 0822    K 4.3 12/16/2019 0822     12/16/2019 0822    CO2 24 12/16/2019 0822    BUN 21 (H) 12/16/2019 0822    CREATININE 1.11 12/16/2019 0822        Component Value Date/Time    CALCIUM 9.7 12/16/2019 0822    ALKPHOS 69 12/16/2019 0822    AST 24 12/16/2019 0822    ALT 28 12/16/2019 0822    BILITOT 0.46 12/16/2019 0822              IMPRESSION:   1. Adenocarcinoma of the right lower lobe of lung. Path stage is T2N1M0. Stage 2 disease. 2. S/P lobectomy. 3. Currently on adjuvant chemotherapy, with cisplatin and Alimta. Plan to finish 4 cycles of adjuvant chemotherapy and then observe. He is enrolled in a clinical trial with different therapy might be recommended depending on mutational analysis, EGFR and ALK mutation testing is negative. PDL is pending  4. COPD  5. Completed 4 cycles of chemo    6. Smoking addiction, unable to quit   7. HTN uncontrolled. PLAN:  1. We had detailed discussion regarding smoking cessation and strategy to taper down. 2. I am really concerned about his symptoms and his pulmonary note as well as the recent CT scan.   I decided to obtain a PET CT scan to investigate the pulmonary infiltrate, I am really concerned about his weight loss as well

## 2020-09-10 ENCOUNTER — HOSPITAL ENCOUNTER (OUTPATIENT)
Dept: NUCLEAR MEDICINE | Age: 60
Discharge: HOME | End: 2020-09-12
Payer: MEDICARE

## 2020-09-10 ENCOUNTER — HOSPITAL ENCOUNTER (OUTPATIENT)
Dept: MRI IMAGING | Age: 60
Discharge: HOME | End: 2020-09-12
Payer: MEDICARE

## 2020-09-10 ENCOUNTER — HOSPITAL ENCOUNTER (OUTPATIENT)
Age: 60
Discharge: HOME | End: 2020-09-10
Payer: MEDICARE

## 2020-09-10 VITALS — BODY MASS INDEX: 27.4 KG/M2 | WEIGHT: 225 LBS | HEIGHT: 76 IN

## 2020-09-10 LAB
ABSOLUTE EOS #: 0.26 K/UL (ref 0–0.44)
ABSOLUTE IMMATURE GRANULOCYTE: <0.03 K/UL (ref 0–0.3)
ABSOLUTE LYMPH #: 2.38 K/UL (ref 1.1–3.7)
ABSOLUTE MONO #: 0.51 K/UL (ref 0.1–1.2)
ALBUMIN SERPL-MCNC: 4.7 G/DL (ref 3.5–5.2)
ALBUMIN/GLOBULIN RATIO: 1.9 (ref 1–2.5)
ALP BLD-CCNC: 62 U/L (ref 40–129)
ALT SERPL-CCNC: 24 U/L (ref 5–41)
ANION GAP SERPL CALCULATED.3IONS-SCNC: 12 MMOL/L (ref 9–17)
AST SERPL-CCNC: 19 U/L
BASOPHILS # BLD: 1 % (ref 0–2)
BASOPHILS ABSOLUTE: 0.08 K/UL (ref 0–0.2)
BILIRUB SERPL-MCNC: 0.4 MG/DL (ref 0.3–1.2)
BUN BLDV-MCNC: 22 MG/DL (ref 6–20)
BUN/CREAT BLD: ABNORMAL (ref 9–20)
CALCIUM SERPL-MCNC: 9.5 MG/DL (ref 8.6–10.4)
CHLORIDE BLD-SCNC: 100 MMOL/L (ref 98–107)
CHOLESTEROL/HDL RATIO: 6.1
CHOLESTEROL: 263 MG/DL
CO2: 25 MMOL/L (ref 20–31)
CREAT SERPL-MCNC: 0.84 MG/DL (ref 0.7–1.2)
DIFFERENTIAL TYPE: NORMAL
EOSINOPHILS RELATIVE PERCENT: 3 % (ref 1–4)
GFR AFRICAN AMERICAN: >60 ML/MIN
GFR NON-AFRICAN AMERICAN: >60 ML/MIN
GFR SERPL CREATININE-BSD FRML MDRD: ABNORMAL ML/MIN/{1.73_M2}
GFR SERPL CREATININE-BSD FRML MDRD: ABNORMAL ML/MIN/{1.73_M2}
GLUCOSE BLD-MCNC: 108 MG/DL (ref 70–99)
GLUCOSE BLD-MCNC: 93 MG/DL (ref 75–110)
HCT VFR BLD CALC: 46.8 % (ref 40.7–50.3)
HDLC SERPL-MCNC: 43 MG/DL
HEMOGLOBIN: 15.8 G/DL (ref 13–17)
IMMATURE GRANULOCYTES: 0 %
LDL CHOLESTEROL: 173 MG/DL (ref 0–130)
LYMPHOCYTES # BLD: 30 % (ref 24–43)
MCH RBC QN AUTO: 33.2 PG (ref 25.2–33.5)
MCHC RBC AUTO-ENTMCNC: 33.8 G/DL (ref 28.4–34.8)
MCV RBC AUTO: 98.3 FL (ref 82.6–102.9)
MONOCYTES # BLD: 7 % (ref 3–12)
NRBC AUTOMATED: 0 PER 100 WBC
PDW BLD-RTO: 13.8 % (ref 11.8–14.4)
PLATELET # BLD: 171 K/UL (ref 138–453)
PLATELET ESTIMATE: NORMAL
PMV BLD AUTO: 9.4 FL (ref 8.1–13.5)
POTASSIUM SERPL-SCNC: 4.4 MMOL/L (ref 3.7–5.3)
PROSTATE SPECIFIC ANTIGEN: 0.59 UG/L
RBC # BLD: 4.76 M/UL (ref 4.21–5.77)
RBC # BLD: NORMAL 10*6/UL
SEG NEUTROPHILS: 59 % (ref 36–65)
SEGMENTED NEUTROPHILS ABSOLUTE COUNT: 4.62 K/UL (ref 1.5–8.1)
SODIUM BLD-SCNC: 137 MMOL/L (ref 135–144)
TOTAL PROTEIN: 7.2 G/DL (ref 6.4–8.3)
TRIGL SERPL-MCNC: 237 MG/DL
TSH SERPL DL<=0.05 MIU/L-ACNC: 4.27 MIU/L (ref 0.3–5)
VLDLC SERPL CALC-MCNC: ABNORMAL MG/DL (ref 1–30)
WBC # BLD: 7.9 K/UL (ref 3.5–11.3)
WBC # BLD: NORMAL 10*3/UL

## 2020-09-10 PROCEDURE — 2580000003 HC RX 258: Performed by: INTERNAL MEDICINE

## 2020-09-10 PROCEDURE — A9552 F18 FDG: HCPCS | Performed by: INTERNAL MEDICINE

## 2020-09-10 PROCEDURE — 85025 COMPLETE CBC W/AUTO DIFF WBC: CPT

## 2020-09-10 PROCEDURE — 84443 ASSAY THYROID STIM HORMONE: CPT

## 2020-09-10 PROCEDURE — 78815 PET IMAGE W/CT SKULL-THIGH: CPT

## 2020-09-10 PROCEDURE — 80061 LIPID PANEL: CPT

## 2020-09-10 PROCEDURE — 6360000004 HC RX CONTRAST MEDICATION: Performed by: INTERNAL MEDICINE

## 2020-09-10 PROCEDURE — A9579 GAD-BASE MR CONTRAST NOS,1ML: HCPCS | Performed by: INTERNAL MEDICINE

## 2020-09-10 PROCEDURE — 36415 COLL VENOUS BLD VENIPUNCTURE: CPT

## 2020-09-10 PROCEDURE — 3430000000 HC RX DIAGNOSTIC RADIOPHARMACEUTICAL: Performed by: INTERNAL MEDICINE

## 2020-09-10 PROCEDURE — 70553 MRI BRAIN STEM W/O & W/DYE: CPT

## 2020-09-10 PROCEDURE — 82947 ASSAY GLUCOSE BLOOD QUANT: CPT

## 2020-09-10 PROCEDURE — 80053 COMPREHEN METABOLIC PANEL: CPT

## 2020-09-10 PROCEDURE — G0103 PSA SCREENING: HCPCS

## 2020-09-10 RX ORDER — FLUDEOXYGLUCOSE F 18 200 MCI/ML
10 INJECTION, SOLUTION INTRAVENOUS
Status: COMPLETED | OUTPATIENT
Start: 2020-09-10 | End: 2020-09-10

## 2020-09-10 RX ORDER — SODIUM CHLORIDE 0.9 % (FLUSH) 0.9 %
10 SYRINGE (ML) INJECTION ONCE
Status: COMPLETED | OUTPATIENT
Start: 2020-09-10 | End: 2020-09-10

## 2020-09-10 RX ORDER — SODIUM CHLORIDE 0.9 % (FLUSH) 0.9 %
10 SYRINGE (ML) INJECTION PRN
Status: DISCONTINUED | OUTPATIENT
Start: 2020-09-10 | End: 2020-09-13 | Stop reason: HOSPADM

## 2020-09-10 RX ADMIN — Medication 10 ML: at 13:01

## 2020-09-10 RX ADMIN — GADOTERIDOL 20 ML: 279.3 INJECTION, SOLUTION INTRAVENOUS at 13:01

## 2020-09-10 RX ADMIN — FLUDEOXYGLUCOSE F 18 9.8 MILLICURIE: 200 INJECTION, SOLUTION INTRAVENOUS at 10:58

## 2020-09-10 RX ADMIN — Medication 10 ML: at 10:58

## 2020-09-15 ENCOUNTER — TELEPHONE (OUTPATIENT)
Dept: ONCOLOGY | Age: 60
End: 2020-09-15

## 2020-09-15 ENCOUNTER — OFFICE VISIT (OUTPATIENT)
Dept: ONCOLOGY | Age: 60
End: 2020-09-15
Payer: MEDICARE

## 2020-09-15 VITALS
RESPIRATION RATE: 18 BRPM | TEMPERATURE: 98 F | HEART RATE: 64 BPM | DIASTOLIC BLOOD PRESSURE: 87 MMHG | BODY MASS INDEX: 27.49 KG/M2 | SYSTOLIC BLOOD PRESSURE: 132 MMHG | WEIGHT: 225.8 LBS

## 2020-09-15 PROCEDURE — 99211 OFF/OP EST MAY X REQ PHY/QHP: CPT

## 2020-09-15 PROCEDURE — 99214 OFFICE O/P EST MOD 30 MIN: CPT | Performed by: INTERNAL MEDICINE

## 2020-09-15 PROCEDURE — 4004F PT TOBACCO SCREEN RCVD TLK: CPT | Performed by: INTERNAL MEDICINE

## 2020-09-15 PROCEDURE — G8427 DOCREV CUR MEDS BY ELIG CLIN: HCPCS | Performed by: INTERNAL MEDICINE

## 2020-09-15 PROCEDURE — 3017F COLORECTAL CA SCREEN DOC REV: CPT | Performed by: INTERNAL MEDICINE

## 2020-09-15 PROCEDURE — G8417 CALC BMI ABV UP PARAM F/U: HCPCS | Performed by: INTERNAL MEDICINE

## 2020-09-15 RX ORDER — CLONAZEPAM 1 MG/1
1 TABLET ORAL 2 TIMES DAILY PRN
Qty: 60 TABLET | Refills: 0 | Status: SHIPPED | OUTPATIENT
Start: 2020-09-15 | End: 2020-11-12

## 2020-09-15 NOTE — PROGRESS NOTES
DIAGNOSIS:   1. Adenocarcinoma of the right lower lobe of lung. Path stage is T2N1M0. Stage 2 disease. 2. COPD  3. HTN   4. Smoking addiction   CURRENT THERAPY:  S/P right lower lobectomy. 9/2016  Received adjuvant chemotherapy, with Cisplatin and Alimta on 11/17/16, completed in February/2017    BRIEF CASE HISTORY:      Christian Deng is a very pleasant 61 y.o. male who is referred to us for management recommendation of his recently resected lung cancer. He actually underwent a screening CT scan because of his smoking habits. CT scans showed right lower lobe spiculated mass measuring 2.9 x 2.5 cm abutting the medial pleura. There was another 4 mm pulmonary nodule that has actually been stable from previous imaging. No adenopathy was appreciated. More testing was done but ultimately the patient was taken to surgery. Prior to surgery, the clinical stage was stage I disease. The patient underwent right lower lobe lobectomy on 9/13/16 and pathology showed invasive moderately differentiated adenocarcinoma of the lung measuring 4 cm in greatest dimension, the tumor invaded the visceral pleura. All margins were negative. He had one positive intrapulmonary lymph node. Representative nodes from stations 4, 7, 9 were all negative. Final pathological staging is (T2 N1 M0) stage II disease  He is sent to us for a consultation, recovering from surgery. He continues to have some pain in the surgical area but otherwise has no complaints. He has minimal shortness of breath, no cough or hemoptysis. No headache or seizures or loss of weight. Performance status is ECOG 1, we discussed adjuvant chemotherapy with Cisplatin and Alimta. After completion of chemotherapy, we started surveillance. He required bronchoscopy to clear thick mucous. He presented 09/2020 with significant weight loss and he was following with pulmonary service and CT scan showed pulmonary lesion.   PET scan was done and unfortunately showed Respiratory: Chronic shortness of breath and cough - some clear sputum, no hemoptysis, chest discomfort in the surgical area, chest congestion  Cardiovascular: Denies central chest pain, PND or orthopnea. No L E swelling or palpitations. Gastrointestinal: No nausea, no vomiting, abdominal pain, diarrhea or constipation. Genitourinary: Denies dysuria, hematuria, frequency, urgency or incontinence. Neurological: Denies headaches, decreased LOC, no sensory or motor focal deficits. Grade 1 neuropathy in hands and feet, predates chemo, diabetes related. Musculoskeletal: Diffuse aches and pains, no joint swelling. Trapezoid pain as noted above  Skin: There are no rashes or bleeding. Psychiatric:  History of bipolar disorder. Anxiety   Endocrine: No thyroid disease. Hematologic: No bleeding, no adenopathy. PHYSICAL EXAM: Shows a well appearing 61y.o.-year-old male who is not in pain or distress. Vital Signs: Blood pressure 132/87, pulse 64, temperature 98 °F (36.7 °C), temperature source Oral, resp. rate 18, weight 225 lb 12.8 oz (102.4 kg). HEENT: Slight redness in the throat. Normocephalic and atraumatic. Pupils are equal, round, reactive to light and accommodation. Extraocular muscles are intact. Neck: Showed no JVD, no carotid bruit . Lungs: Decreased air entry especially in the right lower lobe area. Minimal wheezing, bilaterally. Postoperative changes in the chest wall are healed well without any evidence of infection. Heart: Regular without any murmur. Abdomen: Soft, nontender. No hepatosplenomegaly. Extremities: Lower extremities show no edema, clubbing, or cyanosis.  Neuro exam: intact cranial nerves bilaterally no motor or sensory deficit, gait is normal. Lymphatic: no adenopathy appreciated in the supraclavicular, axillary, cervical or inguinal area    REVIEW OF RADIOLOGICAL RESULTS:   PEt scan   Impression    1.  Findings suggestive of active metastatic disease with new bilateral    subcentimeter pulmonary nodules measuring up to 6 mm.  Mild metabolic    activity identified within small mediastinal lymph nodes.  New bone lesion    with metabolic activity in the anterior left 5th rib.         2.  Morphologically stable 3 cm right adrenal mass demonstrates metabolic    activity, which can rarely be seen with benign adenomas however the    possibility of a coexisting metastatic deposit should also be considered.         3.  Nonspecific asymmetric metabolic activity in the right tonsillar pillar    without CT abnormality.  Attention to on follow-up is suggested. Impression    No acute intracranial abnormality.  No intracranial metastatic disease.         Bilateral mastoid air cell effusions. REVIEW OF LABORATORY RESULTS:   Lab Results   Component Value Date    WBC 7.9 09/10/2020    HGB 15.8 09/10/2020    HCT 46.8 09/10/2020    MCV 98.3 09/10/2020     09/10/2020       Chemistry        Component Value Date/Time     09/10/2020 1028    K 4.4 09/10/2020 1028     09/10/2020 1028    CO2 25 09/10/2020 1028    BUN 22 (H) 09/10/2020 1028    CREATININE 0.84 09/10/2020 1028        Component Value Date/Time    CALCIUM 9.5 09/10/2020 1028    ALKPHOS 62 09/10/2020 1028    AST 19 09/10/2020 1028    ALT 24 09/10/2020 1028    BILITOT 0.40 09/10/2020 1028              IMPRESSION:   1. Adenocarcinoma of the right lower lobe of lung. Path stage is T2N1M0. Stage 2 disease. 2. S/P lobectomy. 3. received adjuvant chemotherapy, with cisplatin and Alimta. Plan to finish 4 cycles of adjuvant chemotherapy and then observe. 4. COPD  5. Smoking addiction, unable to quit   6. HTN uncontrolled. PLAN:  1. We had detailed discussion regarding his recent scans. Brain MRI was negative. PET CT scan suggest 6 mm lesion in the right lower lobe, there is also a subcentimeter but FDG avid mediastinal lymph node and there was a very subtle changes in therapy.   Looking at the collective findings on PET scan, I am suspicious he has recurrent disease. Unfortunately, none of this lesion is about 1 cm and it would be almost impossible to get a tissue diagnosis of 1 of those. 2. Because there is potential of them being benign and the diagnosis of recurrent disease not confirmed, I am unable to start him on any therapy. After discussion, I think the best approach is to watch him and repeat those imaging in about 3 months. If there is significant change especially with a growth on 1 of them, then there would be big enough to obtain a tissue and may be we can establish a diagnosis. 3. Meanwhile, I will go back to his clinical trial data to see if he was PDL positive. 4. I explained the nodules are too small to biopsy based on discussion with radiology and do not explain his weight loss. 5. I discussed plan for repeat imaging in 3 months to monitor. 6. We discussed his stress level which may be cause of weight loss, we discussed his current medications, he only taking Prozac currently, I am refilling the Klonopin. 7. Return in 3 months after PET to review and discuss further recommendations.

## 2020-09-15 NOTE — TELEPHONE ENCOUNTER
rv in 3 months, need PET scan prior to RV -pt having PET at [facility] on 12/4/2020-will return on 12/8/2020 to see Dr Edmar Marin

## 2020-11-12 RX ORDER — CLONAZEPAM 1 MG/1
1 TABLET ORAL 2 TIMES DAILY PRN
Qty: 60 TABLET | Refills: 2 | Status: SHIPPED | OUTPATIENT
Start: 2020-11-12 | End: 2021-01-01

## 2020-11-19 NOTE — TELEPHONE ENCOUNTER
Received new referral for Smoking Cessation from [facility].   Called patient and scheduled patient for Wednesday, December 9th, 2020 at 8am .

## 2020-12-08 NOTE — PROGRESS NOTES
DIAGNOSIS:   1. Adenocarcinoma of the right lower lobe of lung. Path stage is T2N1M0. Stage 2 disease. 2. COPD  3. HTN   4. Smoking addiction   CURRENT THERAPY:  S/P right lower lobectomy. 9/2016  Received adjuvant chemotherapy, with Cisplatin and Alimta on 11/17/16, completed in February/2017    BRIEF CASE HISTORY:      Love Rausch is a very pleasant 61 y.o. male who is referred to us for management recommendation of his recently resected lung cancer. He actually underwent a screening CT scan because of his smoking habits. CT scans showed right lower lobe spiculated mass measuring 2.9 x 2.5 cm abutting the medial pleura. There was another 4 mm pulmonary nodule that has actually been stable from previous imaging. No adenopathy was appreciated. More testing was done but ultimately the patient was taken to surgery. Prior to surgery, the clinical stage was stage I disease. The patient underwent right lower lobe lobectomy on 9/13/16 and pathology showed invasive moderately differentiated adenocarcinoma of the lung measuring 4 cm in greatest dimension, the tumor invaded the visceral pleura. All margins were negative. He had one positive intrapulmonary lymph node. Representative nodes from stations 4, 7, 9 were all negative. Final pathological staging is (T2 N1 M0) stage II disease  He is sent to us for a consultation, recovering from surgery. He continues to have some pain in the surgical area but otherwise has no complaints. He has minimal shortness of breath, no cough or hemoptysis. No headache or seizures or loss of weight. Performance status is ECOG 1, we discussed adjuvant chemotherapy with Cisplatin and Alimta. After completion of chemotherapy, we started surveillance. He required bronchoscopy to clear thick mucous. He presented 09/2020 with significant weight loss and he was following with pulmonary service and CT scan showed pulmonary lesion.   PET scan was done and unfortunately showed worse, no hemoptysis, chest discomfort in the surgical area, chest congestion  Cardiovascular: Denies central chest pain, PND or orthopnea. No L E swelling or palpitations. Gastrointestinal: No nausea, no vomiting, abdominal pain, diarrhea or constipation. Genitourinary: Denies dysuria, hematuria, frequency, urgency or incontinence. Neurological: Denies headaches, decreased LOC, no sensory or motor focal deficits. Grade 1 neuropathy in hands and feet, predates chemo, diabetes related. Musculoskeletal: Diffuse aches and pains, no joint swelling. Trapezoid and neck pain as noted above  Skin: There are no rashes or bleeding. Psychiatric:  History of bipolar disorder. Anxiety   Endocrine: No thyroid disease. Hematologic: No bleeding, no adenopathy. PHYSICAL EXAM: Shows a well appearing 61y.o.-year-old male who is not in pain or distress. Vital Signs: Blood pressure 136/85, pulse 80, temperature 97.8 °F (36.6 °C), temperature source Oral, weight 226 lb 9.6 oz (102.8 kg). HEENT: Slight redness in the throat. Normocephalic and atraumatic. Pupils are equal, round, reactive to light and accommodation. Extraocular muscles are intact. Neck: Showed no JVD, no carotid bruit. Lungs: Decreased air entry especially in the right lower lobe area. Minimal wheezing, bilaterally. Postoperative changes in the chest wall are healed well without any evidence of infection. Heart: Regular without any murmur. Abdomen: Soft, nontender. No hepatosplenomegaly. Extremities: Lower extremities show no edema, clubbing, or cyanosis. Neuro exam: intact cranial nerves bilaterally no motor or sensory deficit, gait is normal. Lymphatic: left level 4 lymph node is palpable and very tender    REVIEW OF RADIOLOGICAL RESULTS:   12/04/2020 PET CT IMPRESSION:   PET-CT evidence of mixed response of therapy.         1. New FDG avid left-sided level 4 lymph node of the neck suggestive of    progression of disease.     2. Multiple bilateral solid noncalcified pulmonary nodules, some of which    demonstrates sub threshold FDG activity, too small for PET CT    characterization.  CT follow-up is recommended. 3. FDG avid mediastinal and right hilar lymph nodes similar to the prior    study. 4. Interval decrease in FDG activity identified involving the previously    identified left 5th rib lesion.  No new FDG avid bony lesion is seen. 5. The previously identified 3 cm right adrenal gland lesion is unchanged in    size and FDG activity.  Finding is nonspecific and may represent an  adenoma    or possibly a metastatic lesion. REVIEW OF LABORATORY RESULTS:   Lab Results   Component Value Date    WBC 7.9 09/10/2020    HGB 15.8 09/10/2020    HCT 46.8 09/10/2020    MCV 98.3 09/10/2020     09/10/2020       Chemistry        Component Value Date/Time     09/10/2020 1028    K 4.4 09/10/2020 1028     09/10/2020 1028    CO2 25 09/10/2020 1028    BUN 22 (H) 09/10/2020 1028    CREATININE 0.84 09/10/2020 1028        Component Value Date/Time    CALCIUM 9.5 09/10/2020 1028    ALKPHOS 62 09/10/2020 1028    AST 19 09/10/2020 1028    ALT 24 09/10/2020 1028    BILITOT 0.40 09/10/2020 1028              IMPRESSION:   1. Adenocarcinoma of the right lower lobe of lung. Path stage is T2N1M0. Stage 2 disease. 2. S/P lobectomy. 3. received adjuvant chemotherapy, with cisplatin and Alimta. Plan to finish 4 cycles of adjuvant chemotherapy and then observe. 4. COPD  5. Smoking addiction, unable to quit   6. HTN uncontrolled. PLAN:  1. We had detailed review of his PET which shows mixed findings, new uptake seen in level 4 left lymph node, lung nodules stable, previous uptake seen in 5th left rib has resolved with no new bone lesions seen, and 3 cm adrenal gland lesion is unchanged. 2. I explained the lymph node could be metastatic or infectious process.    3. I further explained the enlarged node does not correlate to his neck and trapezoid pain and are arthritic in nature. 4. I am recommending biopsy of the lymph node, the patient is agreeable and I am putting in orders. 5. I discussed plan for SBRT in the event of positive biopsy. 6. I am also writing for Oxycodone and Levaquin. 7. I will discuss imaging with pulmonology. 8. We discussed recommendation for COVID vaccination when available. 9. Return to review pathology and discuss further recommendations.

## 2020-12-09 NOTE — PROGRESS NOTES
09/15/20 225 lb 12.8 oz (102.4 kg)     ASSESSMENT     Current Smoking Status: Relapsed - back to 1 pack per day    Lifestyle modifications:   What has worked well? Smoking outside at the location he is currently living, Smoking partial cigarettes  What hasnt worked well? Various stressors including relationship issues, moving 4 times in the last few months, COVID concern with underlying respiratory issues    Behavior modifications:    What has worked well? Before asking himself if he needed to smoke before doing so - has not been doing this as of late. What hasnt worked well? Has not been focusing on these lately    If Cut Back or Relapsed:  What continues to trigger you? Stress, Relationship loss/heartbreak, HABIT (smoking since 6years old)    How are you avoiding triggers or planning for how to deal with them? Will resume asking himself about the need to smoke prior to each cigarette, will attempt to distance himself from those who are negative or disagree with his current living arrangement, smoke partial cigarettes and only outside, finding new things to become occupied with to distract from wanting to smoke. How Motivated are you to quit on a scale from 1-10? 8    How Confident are you that you can quit on a scale from 1-10? 3    Withdrawal sxs? Other concerns: None    PLAN       Education:   - Discussed physical and psychological dependence associated with smoking   - Discussed and reinforced importance of smoking cessation and correlated with concurrent disease states (if applicable)  - Discussed methods for continued trigger avoidance and methods to fight withdrawal symptoms    Medication Therapy: Patient did not tolerate CHANTIX well at all - siting agitating with use for even a few days. - Initiate nicotine replacement patches    *>10 cigarettes/day*   - 21 mg patch QAM x 6 weeks    - 14 mg patch QAM x 2 weeks    - 7 mg patch QAM x 2 weeks      - PharmD to follow up 1 week.   -Spent more than 60 minutes discussing smoking cessation with patient during visit.     Electronically signed by NICKI Da Silva [facility] [place] on 12/9/2020 at 8:23 AM

## 2020-12-16 NOTE — TELEPHONE ENCOUNTER
Patient called to reschedule Smoking Cessation f/u as he was up all night and not able to make it in this morning. He states he is doing well in cutting back on cigarettes smoked - noting he is smoking half as much as before. He is using the patch - no gum or lozenges at this point. Rescheduled for January 5th.

## 2021-01-01 ENCOUNTER — TELEPHONE (OUTPATIENT)
Dept: ONCOLOGY | Age: 61
End: 2021-01-01

## 2021-01-01 ENCOUNTER — HOSPITAL ENCOUNTER (OUTPATIENT)
Dept: INFUSION THERAPY | Age: 61
Discharge: HOME | End: 2021-03-18
Payer: MEDICARE

## 2021-01-01 ENCOUNTER — HOSPITAL ENCOUNTER (OUTPATIENT)
Dept: RADIATION ONCOLOGY | Age: 61
Discharge: HOME | End: 2021-03-18
Attending: RADIOLOGY
Payer: MEDICARE

## 2021-01-01 ENCOUNTER — APPOINTMENT (OUTPATIENT)
Dept: PHARMACY | Age: 61
End: 2021-01-01
Payer: MEDICARE

## 2021-01-01 ENCOUNTER — OFFICE VISIT (OUTPATIENT)
Dept: ONCOLOGY | Age: 61
End: 2021-01-01
Payer: MEDICARE

## 2021-01-01 ENCOUNTER — TELEPHONE (OUTPATIENT)
Dept: CASE MANAGEMENT | Age: 61
End: 2021-01-01

## 2021-01-01 ENCOUNTER — HOSPITAL ENCOUNTER (OUTPATIENT)
Dept: CT IMAGING | Age: 61
Discharge: HOME | End: 2021-05-27
Payer: MEDICARE

## 2021-01-01 ENCOUNTER — HOSPITAL ENCOUNTER (OUTPATIENT)
Dept: RADIATION ONCOLOGY | Age: 61
Discharge: HOME | End: 2021-03-19
Attending: RADIOLOGY
Payer: MEDICARE

## 2021-01-01 ENCOUNTER — HOSPITAL ENCOUNTER (OUTPATIENT)
Dept: RADIATION ONCOLOGY | Age: 61
Discharge: HOME | End: 2021-05-27
Attending: RADIOLOGY
Payer: MEDICARE

## 2021-01-01 ENCOUNTER — APPOINTMENT (OUTPATIENT)
Dept: CT IMAGING | Age: 61
End: 2021-01-01
Payer: MEDICARE

## 2021-01-01 ENCOUNTER — HOSPITAL ENCOUNTER (OUTPATIENT)
Dept: RADIATION ONCOLOGY | Age: 61
Discharge: HOME | End: 2021-02-19
Attending: RADIOLOGY
Payer: MEDICARE

## 2021-01-01 ENCOUNTER — HOSPITAL ENCOUNTER (OUTPATIENT)
Dept: RADIATION ONCOLOGY | Age: 61
Discharge: HOME | End: 2021-02-15
Attending: RADIOLOGY
Payer: MEDICARE

## 2021-01-01 ENCOUNTER — HOSPITAL ENCOUNTER (OUTPATIENT)
Dept: INFUSION THERAPY | Age: 61
Discharge: HOME | End: 2021-05-18
Payer: MEDICARE

## 2021-01-01 ENCOUNTER — HOSPITAL ENCOUNTER (OUTPATIENT)
Dept: RADIATION ONCOLOGY | Age: 61
Discharge: HOME | End: 2021-08-02
Attending: RADIOLOGY
Payer: MEDICARE

## 2021-01-01 ENCOUNTER — HOSPITAL ENCOUNTER (OUTPATIENT)
Dept: INTERVENTIONAL RADIOLOGY/VASCULAR | Age: 61
Discharge: HOME | End: 2021-02-26
Payer: MEDICARE

## 2021-01-01 ENCOUNTER — HOSPITAL ENCOUNTER (EMERGENCY)
Age: 61
Discharge: HOME | End: 2021-07-22
Attending: EMERGENCY MEDICINE
Payer: MEDICARE

## 2021-01-01 ENCOUNTER — HOSPITAL ENCOUNTER (OUTPATIENT)
Dept: INFUSION THERAPY | Age: 61
Discharge: HOME | End: 2021-02-18
Payer: MEDICARE

## 2021-01-01 ENCOUNTER — HOSPITAL ENCOUNTER (OUTPATIENT)
Dept: RADIATION ONCOLOGY | Age: 61
Discharge: HOME | End: 2021-08-03
Attending: STUDENT IN AN ORGANIZED HEALTH CARE EDUCATION/TRAINING PROGRAM
Payer: MEDICARE

## 2021-01-01 ENCOUNTER — OFFICE VISIT (OUTPATIENT)
Dept: PRIMARY CARE CLINIC | Age: 61
End: 2021-01-01
Payer: MEDICARE

## 2021-01-01 ENCOUNTER — HOSPITAL ENCOUNTER (OUTPATIENT)
Dept: INFUSION THERAPY | Age: 61
Discharge: HOME | End: 2021-03-04
Payer: MEDICARE

## 2021-01-01 ENCOUNTER — HOSPITAL ENCOUNTER (OUTPATIENT)
Facility: MEDICAL CENTER | Age: 61
Discharge: HOME | End: 2021-02-03
Payer: MEDICARE

## 2021-01-01 ENCOUNTER — HOSPITAL ENCOUNTER (OUTPATIENT)
Dept: RADIATION ONCOLOGY | Age: 61
Discharge: HOME | End: 2021-02-11
Attending: RADIOLOGY
Payer: MEDICARE

## 2021-01-01 ENCOUNTER — HOSPITAL ENCOUNTER (OUTPATIENT)
Dept: RADIATION ONCOLOGY | Age: 61
Discharge: HOME | End: 2021-02-23
Attending: RADIOLOGY
Payer: MEDICARE

## 2021-01-01 ENCOUNTER — HOSPITAL ENCOUNTER (OUTPATIENT)
Dept: INFUSION THERAPY | Age: 61
Discharge: HOME | End: 2021-03-05
Payer: MEDICARE

## 2021-01-01 ENCOUNTER — HOSPITAL ENCOUNTER (OUTPATIENT)
Dept: RADIATION ONCOLOGY | Age: 61
Discharge: HOME | End: 2021-03-10
Attending: RADIOLOGY
Payer: MEDICARE

## 2021-01-01 ENCOUNTER — TELEPHONE (OUTPATIENT)
Dept: PHARMACY | Age: 61
End: 2021-01-01

## 2021-01-01 ENCOUNTER — HOSPITAL ENCOUNTER (OUTPATIENT)
Dept: RADIATION ONCOLOGY | Age: 61
Discharge: HOME | End: 2021-02-10
Attending: RADIOLOGY
Payer: MEDICARE

## 2021-01-01 ENCOUNTER — HOSPITAL ENCOUNTER (OUTPATIENT)
Dept: RADIATION ONCOLOGY | Age: 61
Discharge: HOME | End: 2021-03-09
Attending: RADIOLOGY
Payer: MEDICARE

## 2021-01-01 ENCOUNTER — APPOINTMENT (OUTPATIENT)
Dept: GENERAL RADIOLOGY | Age: 61
End: 2021-01-01
Payer: MEDICARE

## 2021-01-01 ENCOUNTER — HOSPITAL ENCOUNTER (OUTPATIENT)
Dept: CT IMAGING | Age: 61
Discharge: HOME | End: 2021-12-01
Payer: MEDICARE

## 2021-01-01 ENCOUNTER — HOSPITAL ENCOUNTER (OUTPATIENT)
Dept: RADIATION ONCOLOGY | Age: 61
Discharge: HOME | End: 2021-03-17
Attending: RADIOLOGY
Payer: MEDICARE

## 2021-01-01 ENCOUNTER — HOSPITAL ENCOUNTER (OUTPATIENT)
Dept: RADIATION ONCOLOGY | Age: 61
Discharge: HOME | End: 2021-02-17
Attending: RADIOLOGY
Payer: MEDICARE

## 2021-01-01 ENCOUNTER — HOSPITAL ENCOUNTER (OUTPATIENT)
Dept: RADIATION ONCOLOGY | Age: 61
Discharge: HOME | End: 2021-09-02
Attending: RADIOLOGY
Payer: MEDICARE

## 2021-01-01 ENCOUNTER — HOSPITAL ENCOUNTER (OUTPATIENT)
Dept: INFUSION THERAPY | Age: 61
Discharge: HOME | End: 2021-08-26
Payer: MEDICARE

## 2021-01-01 ENCOUNTER — HOSPITAL ENCOUNTER (OUTPATIENT)
Age: 61
Discharge: HOME | End: 2021-11-02
Payer: MEDICARE

## 2021-01-01 ENCOUNTER — HOSPITAL ENCOUNTER (OUTPATIENT)
Dept: INFUSION THERAPY | Age: 61
Discharge: HOME | End: 2021-10-07
Payer: MEDICARE

## 2021-01-01 ENCOUNTER — HOSPITAL ENCOUNTER (OUTPATIENT)
Dept: RADIATION ONCOLOGY | Age: 61
Discharge: HOME | End: 2021-02-09
Attending: RADIOLOGY
Payer: MEDICARE

## 2021-01-01 ENCOUNTER — HOSPITAL ENCOUNTER (OUTPATIENT)
Age: 61
Setting detail: OBSERVATION
Discharge: HOME | End: 2021-09-22
Attending: EMERGENCY MEDICINE | Admitting: INTERNAL MEDICINE
Payer: MEDICARE

## 2021-01-01 ENCOUNTER — HOSPITAL ENCOUNTER (OUTPATIENT)
Dept: INFUSION THERAPY | Age: 61
Discharge: HOME | End: 2021-07-27
Payer: MEDICARE

## 2021-01-01 ENCOUNTER — HOSPITAL ENCOUNTER (OUTPATIENT)
Dept: INFUSION THERAPY | Age: 61
Discharge: HOME | End: 2021-11-18
Payer: MEDICARE

## 2021-01-01 ENCOUNTER — HOSPITAL ENCOUNTER (OUTPATIENT)
Dept: RADIATION ONCOLOGY | Age: 61
Discharge: HOME | End: 2021-03-03
Attending: RADIOLOGY
Payer: MEDICARE

## 2021-01-01 ENCOUNTER — HOSPITAL ENCOUNTER (OUTPATIENT)
Dept: RADIATION ONCOLOGY | Age: 61
Discharge: HOME | End: 2021-03-02
Attending: RADIOLOGY
Payer: MEDICARE

## 2021-01-01 ENCOUNTER — OFFICE VISIT (OUTPATIENT)
Dept: PALLATIVE CARE | Age: 61
End: 2021-01-01
Payer: MEDICARE

## 2021-01-01 ENCOUNTER — HOSPITAL ENCOUNTER (OUTPATIENT)
Age: 61
Setting detail: SPECIMEN
Discharge: HOME | End: 2021-08-26
Payer: MEDICARE

## 2021-01-01 ENCOUNTER — HOSPITAL ENCOUNTER (OUTPATIENT)
Dept: RADIATION ONCOLOGY | Age: 61
Discharge: HOME | End: 2021-01-27
Attending: RADIOLOGY
Payer: MEDICARE

## 2021-01-01 ENCOUNTER — HOSPITAL ENCOUNTER (OUTPATIENT)
Dept: RADIATION ONCOLOGY | Age: 61
Discharge: HOME | End: 2021-03-15
Attending: RADIOLOGY
Payer: MEDICARE

## 2021-01-01 ENCOUNTER — TELEPHONE (OUTPATIENT)
Dept: RADIATION ONCOLOGY | Age: 61
End: 2021-01-01

## 2021-01-01 ENCOUNTER — HOSPITAL ENCOUNTER (OUTPATIENT)
Dept: RADIATION ONCOLOGY | Age: 61
Discharge: HOME | End: 2021-03-23
Attending: RADIOLOGY
Payer: MEDICARE

## 2021-01-01 ENCOUNTER — TELEPHONE (OUTPATIENT)
Dept: INFUSION THERAPY | Age: 61
End: 2021-01-01

## 2021-01-01 ENCOUNTER — HOSPITAL ENCOUNTER (OUTPATIENT)
Dept: RADIATION ONCOLOGY | Age: 61
Discharge: HOME | End: 2021-02-25
Attending: RADIOLOGY
Payer: MEDICARE

## 2021-01-01 ENCOUNTER — HOSPITAL ENCOUNTER (OUTPATIENT)
Dept: PHARMACY | Age: 61
Setting detail: THERAPIES SERIES
Discharge: HOME | End: 2021-04-06
Payer: MEDICARE

## 2021-01-01 ENCOUNTER — HOSPITAL ENCOUNTER (OUTPATIENT)
Dept: INFUSION THERAPY | Age: 61
Discharge: HOME | End: 2021-07-14
Payer: MEDICARE

## 2021-01-01 ENCOUNTER — HOSPITAL ENCOUNTER (OUTPATIENT)
Dept: RADIATION ONCOLOGY | Age: 61
Discharge: HOME | End: 2021-03-12
Attending: RADIOLOGY
Payer: MEDICARE

## 2021-01-01 ENCOUNTER — HOSPITAL ENCOUNTER (OUTPATIENT)
Dept: RADIATION ONCOLOGY | Age: 61
Discharge: HOME | End: 2021-03-05
Attending: RADIOLOGY
Payer: MEDICARE

## 2021-01-01 ENCOUNTER — HOSPITAL ENCOUNTER (OUTPATIENT)
Dept: RADIATION ONCOLOGY | Age: 61
Discharge: HOME | End: 2021-03-22
Attending: RADIOLOGY
Payer: MEDICARE

## 2021-01-01 ENCOUNTER — HOSPITAL ENCOUNTER (OUTPATIENT)
Dept: INFUSION THERAPY | Age: 61
Discharge: HOME | End: 2021-02-09
Payer: MEDICARE

## 2021-01-01 ENCOUNTER — HOSPITAL ENCOUNTER (OUTPATIENT)
Dept: RADIATION ONCOLOGY | Age: 61
Discharge: HOME | End: 2021-03-16
Attending: RADIOLOGY
Payer: MEDICARE

## 2021-01-01 ENCOUNTER — HOSPITAL ENCOUNTER (OUTPATIENT)
Dept: RADIATION ONCOLOGY | Age: 61
Discharge: HOME | End: 2021-02-24
Attending: RADIOLOGY
Payer: MEDICARE

## 2021-01-01 ENCOUNTER — HOSPITAL ENCOUNTER (OUTPATIENT)
Dept: PHARMACY | Age: 61
Setting detail: THERAPIES SERIES
Discharge: HOME | End: 2021-01-12
Payer: MEDICARE

## 2021-01-01 ENCOUNTER — HOSPITAL ENCOUNTER (OUTPATIENT)
Dept: INFUSION THERAPY | Age: 61
Discharge: HOME | End: 2021-04-13
Payer: MEDICARE

## 2021-01-01 ENCOUNTER — HOSPITAL ENCOUNTER (OUTPATIENT)
Dept: RADIATION ONCOLOGY | Age: 61
Discharge: HOME | End: 2021-03-04
Attending: RADIOLOGY
Payer: MEDICARE

## 2021-01-01 ENCOUNTER — HOSPITAL ENCOUNTER (OUTPATIENT)
Dept: INFUSION THERAPY | Age: 61
Discharge: HOME | End: 2021-06-03
Payer: MEDICARE

## 2021-01-01 ENCOUNTER — HOSPITAL ENCOUNTER (OUTPATIENT)
Dept: INFUSION THERAPY | Age: 61
Discharge: HOME | End: 2021-05-04
Payer: MEDICARE

## 2021-01-01 ENCOUNTER — TELEPHONE (OUTPATIENT)
Dept: PRIMARY CARE CLINIC | Age: 61
End: 2021-01-01

## 2021-01-01 ENCOUNTER — HOSPITAL ENCOUNTER (OUTPATIENT)
Dept: RADIATION ONCOLOGY | Age: 61
Discharge: HOME | End: 2021-02-12
Attending: RADIOLOGY
Payer: MEDICARE

## 2021-01-01 ENCOUNTER — OFFICE VISIT (OUTPATIENT)
Dept: PULMONOLOGY | Age: 61
End: 2021-01-01
Payer: MEDICARE

## 2021-01-01 ENCOUNTER — HOSPITAL ENCOUNTER (OUTPATIENT)
Dept: RADIATION ONCOLOGY | Age: 61
Discharge: HOME | End: 2021-01-19
Attending: RADIOLOGY
Payer: MEDICARE

## 2021-01-01 ENCOUNTER — HOSPITAL ENCOUNTER (OUTPATIENT)
Dept: PHARMACY | Age: 61
Setting detail: THERAPIES SERIES
Discharge: HOME | End: 2021-02-03
Payer: MEDICARE

## 2021-01-01 ENCOUNTER — HOSPITAL ENCOUNTER (OUTPATIENT)
Dept: RADIATION ONCOLOGY | Age: 61
Discharge: HOME | End: 2021-03-01
Attending: RADIOLOGY
Payer: MEDICARE

## 2021-01-01 ENCOUNTER — HOSPITAL ENCOUNTER (OUTPATIENT)
Dept: INFUSION THERAPY | Age: 61
Discharge: HOME | End: 2021-03-11
Payer: MEDICARE

## 2021-01-01 ENCOUNTER — HOSPITAL ENCOUNTER (OUTPATIENT)
Dept: RADIATION ONCOLOGY | Age: 61
Discharge: HOME | End: 2021-03-08
Attending: RADIOLOGY
Payer: MEDICARE

## 2021-01-01 ENCOUNTER — HOSPITAL ENCOUNTER (OUTPATIENT)
Dept: PHARMACY | Age: 61
Setting detail: THERAPIES SERIES
Discharge: HOME | End: 2021-07-13
Payer: MEDICARE

## 2021-01-01 ENCOUNTER — HOSPITAL ENCOUNTER (OUTPATIENT)
Dept: INFUSION THERAPY | Age: 61
Discharge: HOME | End: 2021-10-28
Payer: MEDICARE

## 2021-01-01 ENCOUNTER — HOSPITAL ENCOUNTER (OUTPATIENT)
Dept: RADIATION ONCOLOGY | Age: 61
Discharge: HOME | End: 2021-03-11
Attending: RADIOLOGY
Payer: MEDICARE

## 2021-01-01 ENCOUNTER — APPOINTMENT (OUTPATIENT)
Dept: NUCLEAR MEDICINE | Age: 61
End: 2021-01-01
Payer: MEDICARE

## 2021-01-01 ENCOUNTER — HOSPITAL ENCOUNTER (OUTPATIENT)
Dept: RADIATION ONCOLOGY | Age: 61
Discharge: HOME | End: 2021-01-11
Payer: MEDICARE

## 2021-01-01 ENCOUNTER — HOSPITAL ENCOUNTER (OUTPATIENT)
Dept: RADIATION ONCOLOGY | Age: 61
Discharge: HOME | End: 2021-07-27
Attending: RADIOLOGY
Payer: MEDICARE

## 2021-01-01 ENCOUNTER — HOSPITAL ENCOUNTER (OUTPATIENT)
Dept: CT IMAGING | Age: 61
Discharge: HOME | End: 2021-09-30
Payer: MEDICARE

## 2021-01-01 ENCOUNTER — VIRTUAL VISIT (OUTPATIENT)
Dept: PALLATIVE CARE | Age: 61
End: 2021-01-01
Payer: MEDICARE

## 2021-01-01 ENCOUNTER — HOSPITAL ENCOUNTER (OUTPATIENT)
Dept: RADIATION ONCOLOGY | Age: 61
Discharge: HOME | End: 2021-08-04
Attending: RADIOLOGY
Payer: MEDICARE

## 2021-01-01 ENCOUNTER — HOSPITAL ENCOUNTER (OUTPATIENT)
Dept: INFUSION THERAPY | Age: 61
Discharge: HOME | End: 2021-08-05
Payer: MEDICARE

## 2021-01-01 ENCOUNTER — HOSPITAL ENCOUNTER (OUTPATIENT)
Dept: RADIATION ONCOLOGY | Age: 61
Discharge: HOME | End: 2021-07-29
Attending: RADIOLOGY
Payer: MEDICARE

## 2021-01-01 ENCOUNTER — HOSPITAL ENCOUNTER (OUTPATIENT)
Dept: RADIATION ONCOLOGY | Age: 61
Discharge: HOME | End: 2021-02-26
Attending: RADIOLOGY
Payer: MEDICARE

## 2021-01-01 ENCOUNTER — INITIAL CONSULT (OUTPATIENT)
Dept: PALLATIVE CARE | Age: 61
End: 2021-01-01
Payer: MEDICARE

## 2021-01-01 ENCOUNTER — HOSPITAL ENCOUNTER (OUTPATIENT)
Dept: RADIATION ONCOLOGY | Age: 61
Discharge: HOME | End: 2021-02-18
Attending: RADIOLOGY
Payer: MEDICARE

## 2021-01-01 ENCOUNTER — HOSPITAL ENCOUNTER (OUTPATIENT)
Dept: RADIATION ONCOLOGY | Age: 61
Discharge: HOME | End: 2021-08-05
Attending: STUDENT IN AN ORGANIZED HEALTH CARE EDUCATION/TRAINING PROGRAM
Payer: MEDICARE

## 2021-01-01 ENCOUNTER — HOSPITAL ENCOUNTER (OUTPATIENT)
Dept: RADIATION ONCOLOGY | Age: 61
Discharge: HOME | End: 2021-02-22
Attending: RADIOLOGY
Payer: MEDICARE

## 2021-01-01 ENCOUNTER — APPOINTMENT (OUTPATIENT)
Dept: RADIATION ONCOLOGY | Age: 61
End: 2021-01-01
Attending: RADIOLOGY
Payer: MEDICARE

## 2021-01-01 ENCOUNTER — HOSPITAL ENCOUNTER (OUTPATIENT)
Dept: INFUSION THERAPY | Age: 61
Discharge: HOME | End: 2021-02-25
Payer: MEDICARE

## 2021-01-01 ENCOUNTER — HOSPITAL ENCOUNTER (OUTPATIENT)
Dept: INFUSION THERAPY | Age: 61
Discharge: HOME | End: 2021-09-14
Payer: MEDICARE

## 2021-01-01 ENCOUNTER — HOSPITAL ENCOUNTER (OUTPATIENT)
Dept: RADIATION ONCOLOGY | Age: 61
Discharge: HOME | End: 2021-08-04
Attending: STUDENT IN AN ORGANIZED HEALTH CARE EDUCATION/TRAINING PROGRAM
Payer: MEDICARE

## 2021-01-01 ENCOUNTER — HOSPITAL ENCOUNTER (OUTPATIENT)
Dept: RADIATION ONCOLOGY | Age: 61
Discharge: HOME | End: 2021-08-06
Attending: STUDENT IN AN ORGANIZED HEALTH CARE EDUCATION/TRAINING PROGRAM
Payer: MEDICARE

## 2021-01-01 VITALS
DIASTOLIC BLOOD PRESSURE: 88 MMHG | HEART RATE: 83 BPM | TEMPERATURE: 96.4 F | BODY MASS INDEX: 27.94 KG/M2 | WEIGHT: 229.5 LBS | SYSTOLIC BLOOD PRESSURE: 130 MMHG

## 2021-01-01 VITALS
BODY MASS INDEX: 27.68 KG/M2 | TEMPERATURE: 97.7 F | DIASTOLIC BLOOD PRESSURE: 77 MMHG | HEART RATE: 89 BPM | SYSTOLIC BLOOD PRESSURE: 120 MMHG | WEIGHT: 227.4 LBS

## 2021-01-01 VITALS
HEART RATE: 78 BPM | SYSTOLIC BLOOD PRESSURE: 116 MMHG | DIASTOLIC BLOOD PRESSURE: 78 MMHG | HEIGHT: 76 IN | RESPIRATION RATE: 18 BRPM | WEIGHT: 228.4 LBS | TEMPERATURE: 97.6 F | BODY MASS INDEX: 27.81 KG/M2

## 2021-01-01 VITALS
HEIGHT: 76 IN | RESPIRATION RATE: 17 BRPM | OXYGEN SATURATION: 98 % | SYSTOLIC BLOOD PRESSURE: 97 MMHG | DIASTOLIC BLOOD PRESSURE: 72 MMHG | BODY MASS INDEX: 25.94 KG/M2 | WEIGHT: 213 LBS | TEMPERATURE: 97.3 F | HEART RATE: 109 BPM

## 2021-01-01 VITALS
HEIGHT: 76 IN | SYSTOLIC BLOOD PRESSURE: 150 MMHG | OXYGEN SATURATION: 96 % | BODY MASS INDEX: 27.94 KG/M2 | HEART RATE: 81 BPM | DIASTOLIC BLOOD PRESSURE: 99 MMHG

## 2021-01-01 VITALS
BODY MASS INDEX: 26.33 KG/M2 | BODY MASS INDEX: 29.3 KG/M2 | HEART RATE: 105 BPM | OXYGEN SATURATION: 97 % | WEIGHT: 216.3 LBS | HEART RATE: 67 BPM | DIASTOLIC BLOOD PRESSURE: 69 MMHG | TEMPERATURE: 98.5 F | TEMPERATURE: 97.7 F | SYSTOLIC BLOOD PRESSURE: 103 MMHG | SYSTOLIC BLOOD PRESSURE: 140 MMHG | WEIGHT: 228.2 LBS | OXYGEN SATURATION: 99 % | DIASTOLIC BLOOD PRESSURE: 92 MMHG

## 2021-01-01 VITALS
TEMPERATURE: 98.7 F | WEIGHT: 230 LBS | BODY MASS INDEX: 28 KG/M2 | OXYGEN SATURATION: 98 % | DIASTOLIC BLOOD PRESSURE: 79 MMHG | HEART RATE: 86 BPM | RESPIRATION RATE: 16 BRPM | SYSTOLIC BLOOD PRESSURE: 113 MMHG

## 2021-01-01 VITALS
HEART RATE: 78 BPM | BODY MASS INDEX: 28.06 KG/M2 | WEIGHT: 230.5 LBS | RESPIRATION RATE: 18 BRPM | DIASTOLIC BLOOD PRESSURE: 78 MMHG | OXYGEN SATURATION: 97 % | SYSTOLIC BLOOD PRESSURE: 120 MMHG | TEMPERATURE: 98.2 F

## 2021-01-01 VITALS
WEIGHT: 220.6 LBS | TEMPERATURE: 97.5 F | BODY MASS INDEX: 26.85 KG/M2 | DIASTOLIC BLOOD PRESSURE: 66 MMHG | HEART RATE: 87 BPM | SYSTOLIC BLOOD PRESSURE: 107 MMHG

## 2021-01-01 VITALS
WEIGHT: 232 LBS | SYSTOLIC BLOOD PRESSURE: 128 MMHG | BODY MASS INDEX: 28.24 KG/M2 | HEART RATE: 88 BPM | RESPIRATION RATE: 18 BRPM | TEMPERATURE: 97.8 F | DIASTOLIC BLOOD PRESSURE: 63 MMHG

## 2021-01-01 VITALS
DIASTOLIC BLOOD PRESSURE: 69 MMHG | TEMPERATURE: 97.8 F | RESPIRATION RATE: 18 BRPM | HEART RATE: 88 BPM | OXYGEN SATURATION: 97 % | WEIGHT: 227 LBS | BODY MASS INDEX: 27.63 KG/M2 | SYSTOLIC BLOOD PRESSURE: 100 MMHG

## 2021-01-01 VITALS
OXYGEN SATURATION: 88 % | WEIGHT: 212 LBS | DIASTOLIC BLOOD PRESSURE: 78 MMHG | TEMPERATURE: 96 F | SYSTOLIC BLOOD PRESSURE: 113 MMHG | HEART RATE: 98 BPM | BODY MASS INDEX: 25.81 KG/M2

## 2021-01-01 VITALS
TEMPERATURE: 97.8 F | SYSTOLIC BLOOD PRESSURE: 97 MMHG | DIASTOLIC BLOOD PRESSURE: 65 MMHG | BODY MASS INDEX: 27.7 KG/M2 | WEIGHT: 227.6 LBS | HEART RATE: 81 BPM

## 2021-01-01 VITALS
DIASTOLIC BLOOD PRESSURE: 83 MMHG | HEART RATE: 89 BPM | WEIGHT: 225.7 LBS | OXYGEN SATURATION: 97 % | BODY MASS INDEX: 27.47 KG/M2 | TEMPERATURE: 97.3 F | SYSTOLIC BLOOD PRESSURE: 127 MMHG

## 2021-01-01 VITALS
WEIGHT: 229.6 LBS | DIASTOLIC BLOOD PRESSURE: 82 MMHG | TEMPERATURE: 97.5 F | HEART RATE: 73 BPM | OXYGEN SATURATION: 96 % | SYSTOLIC BLOOD PRESSURE: 125 MMHG | BODY MASS INDEX: 27.95 KG/M2

## 2021-01-01 VITALS
BODY MASS INDEX: 27.36 KG/M2 | SYSTOLIC BLOOD PRESSURE: 154 MMHG | DIASTOLIC BLOOD PRESSURE: 101 MMHG | OXYGEN SATURATION: 98 % | HEART RATE: 71 BPM | TEMPERATURE: 96.3 F | WEIGHT: 224.8 LBS

## 2021-01-01 VITALS
HEART RATE: 64 BPM | TEMPERATURE: 98 F | SYSTOLIC BLOOD PRESSURE: 152 MMHG | DIASTOLIC BLOOD PRESSURE: 96 MMHG | RESPIRATION RATE: 18 BRPM

## 2021-01-01 VITALS — DIASTOLIC BLOOD PRESSURE: 95 MMHG | SYSTOLIC BLOOD PRESSURE: 152 MMHG | HEART RATE: 67 BPM

## 2021-01-01 VITALS
SYSTOLIC BLOOD PRESSURE: 132 MMHG | RESPIRATION RATE: 18 BRPM | BODY MASS INDEX: 29.49 KG/M2 | DIASTOLIC BLOOD PRESSURE: 62 MMHG | TEMPERATURE: 97.8 F | OXYGEN SATURATION: 97 % | HEART RATE: 72 BPM | HEIGHT: 74 IN | WEIGHT: 229.8 LBS

## 2021-01-01 VITALS
BODY MASS INDEX: 28.14 KG/M2 | SYSTOLIC BLOOD PRESSURE: 120 MMHG | WEIGHT: 231.2 LBS | HEART RATE: 92 BPM | OXYGEN SATURATION: 96 % | DIASTOLIC BLOOD PRESSURE: 76 MMHG | TEMPERATURE: 98.2 F

## 2021-01-01 VITALS
TEMPERATURE: 98.2 F | SYSTOLIC BLOOD PRESSURE: 99 MMHG | RESPIRATION RATE: 14 BRPM | OXYGEN SATURATION: 97 % | DIASTOLIC BLOOD PRESSURE: 66 MMHG | WEIGHT: 227 LBS | BODY MASS INDEX: 27.63 KG/M2 | HEART RATE: 77 BPM

## 2021-01-01 VITALS
WEIGHT: 215 LBS | DIASTOLIC BLOOD PRESSURE: 80 MMHG | TEMPERATURE: 97.2 F | HEART RATE: 100 BPM | OXYGEN SATURATION: 100 % | BODY MASS INDEX: 26.17 KG/M2 | SYSTOLIC BLOOD PRESSURE: 109 MMHG

## 2021-01-01 VITALS
WEIGHT: 218.5 LBS | HEART RATE: 92 BPM | DIASTOLIC BLOOD PRESSURE: 90 MMHG | SYSTOLIC BLOOD PRESSURE: 118 MMHG | BODY MASS INDEX: 26.6 KG/M2 | TEMPERATURE: 97.9 F

## 2021-01-01 VITALS
BODY MASS INDEX: 25.62 KG/M2 | OXYGEN SATURATION: 92 % | HEART RATE: 89 BPM | SYSTOLIC BLOOD PRESSURE: 101 MMHG | TEMPERATURE: 97.9 F | WEIGHT: 210.4 LBS | DIASTOLIC BLOOD PRESSURE: 61 MMHG | RESPIRATION RATE: 16 BRPM | HEIGHT: 76 IN

## 2021-01-01 VITALS — WEIGHT: 225 LBS | HEIGHT: 76 IN | BODY MASS INDEX: 27.4 KG/M2

## 2021-01-01 VITALS
BODY MASS INDEX: 26.28 KG/M2 | TEMPERATURE: 96.6 F | WEIGHT: 215.9 LBS | HEART RATE: 98 BPM | OXYGEN SATURATION: 97 % | SYSTOLIC BLOOD PRESSURE: 116 MMHG | DIASTOLIC BLOOD PRESSURE: 82 MMHG

## 2021-01-01 VITALS
OXYGEN SATURATION: 98 % | TEMPERATURE: 97.8 F | BODY MASS INDEX: 27.87 KG/M2 | SYSTOLIC BLOOD PRESSURE: 122 MMHG | HEART RATE: 75 BPM | WEIGHT: 229 LBS | RESPIRATION RATE: 18 BRPM | DIASTOLIC BLOOD PRESSURE: 70 MMHG

## 2021-01-01 VITALS
WEIGHT: 231.1 LBS | TEMPERATURE: 95.9 F | DIASTOLIC BLOOD PRESSURE: 98 MMHG | SYSTOLIC BLOOD PRESSURE: 147 MMHG | BODY MASS INDEX: 28.13 KG/M2 | HEART RATE: 78 BPM

## 2021-01-01 VITALS
WEIGHT: 226.9 LBS | SYSTOLIC BLOOD PRESSURE: 118 MMHG | TEMPERATURE: 96.5 F | BODY MASS INDEX: 27.62 KG/M2 | RESPIRATION RATE: 18 BRPM | HEART RATE: 95 BPM | DIASTOLIC BLOOD PRESSURE: 87 MMHG

## 2021-01-01 VITALS
DIASTOLIC BLOOD PRESSURE: 89 MMHG | RESPIRATION RATE: 20 BRPM | OXYGEN SATURATION: 98 % | DIASTOLIC BLOOD PRESSURE: 73 MMHG | BODY MASS INDEX: 27.86 KG/M2 | SYSTOLIC BLOOD PRESSURE: 110 MMHG | TEMPERATURE: 97.8 F | WEIGHT: 228.9 LBS | TEMPERATURE: 98.2 F | WEIGHT: 225.8 LBS | HEART RATE: 65 BPM | BODY MASS INDEX: 27.49 KG/M2 | RESPIRATION RATE: 18 BRPM | HEART RATE: 84 BPM | SYSTOLIC BLOOD PRESSURE: 142 MMHG

## 2021-01-01 VITALS
TEMPERATURE: 98.2 F | SYSTOLIC BLOOD PRESSURE: 120 MMHG | HEART RATE: 92 BPM | DIASTOLIC BLOOD PRESSURE: 76 MMHG | RESPIRATION RATE: 16 BRPM

## 2021-01-01 VITALS
SYSTOLIC BLOOD PRESSURE: 130 MMHG | BODY MASS INDEX: 27.87 KG/M2 | DIASTOLIC BLOOD PRESSURE: 88 MMHG | HEART RATE: 83 BPM | TEMPERATURE: 96.4 F | WEIGHT: 229 LBS

## 2021-01-01 VITALS
TEMPERATURE: 98.2 F | RESPIRATION RATE: 21 BRPM | TEMPERATURE: 98 F | OXYGEN SATURATION: 98 % | RESPIRATION RATE: 16 BRPM | HEART RATE: 81 BPM | HEART RATE: 86 BPM | WEIGHT: 232 LBS | SYSTOLIC BLOOD PRESSURE: 121 MMHG | BODY MASS INDEX: 29.79 KG/M2 | SYSTOLIC BLOOD PRESSURE: 136 MMHG | OXYGEN SATURATION: 95 % | DIASTOLIC BLOOD PRESSURE: 99 MMHG | DIASTOLIC BLOOD PRESSURE: 83 MMHG

## 2021-01-01 VITALS
DIASTOLIC BLOOD PRESSURE: 80 MMHG | SYSTOLIC BLOOD PRESSURE: 115 MMHG | TEMPERATURE: 96.3 F | BODY MASS INDEX: 26.68 KG/M2 | OXYGEN SATURATION: 98 % | HEART RATE: 95 BPM | WEIGHT: 219.2 LBS

## 2021-01-01 VITALS
SYSTOLIC BLOOD PRESSURE: 135 MMHG | BODY MASS INDEX: 27.96 KG/M2 | HEART RATE: 62 BPM | WEIGHT: 229.7 LBS | DIASTOLIC BLOOD PRESSURE: 81 MMHG | TEMPERATURE: 97.7 F

## 2021-01-01 VITALS
HEART RATE: 88 BPM | DIASTOLIC BLOOD PRESSURE: 76 MMHG | SYSTOLIC BLOOD PRESSURE: 123 MMHG | OXYGEN SATURATION: 100 % | RESPIRATION RATE: 20 BRPM

## 2021-01-01 VITALS
SYSTOLIC BLOOD PRESSURE: 129 MMHG | WEIGHT: 224 LBS | HEART RATE: 62 BPM | BODY MASS INDEX: 28.76 KG/M2 | DIASTOLIC BLOOD PRESSURE: 82 MMHG | TEMPERATURE: 97.3 F

## 2021-01-01 VITALS
DIASTOLIC BLOOD PRESSURE: 63 MMHG | SYSTOLIC BLOOD PRESSURE: 128 MMHG | BODY MASS INDEX: 28.31 KG/M2 | WEIGHT: 232.6 LBS | TEMPERATURE: 97.8 F | RESPIRATION RATE: 18 BRPM | OXYGEN SATURATION: 98 % | HEART RATE: 88 BPM

## 2021-01-01 VITALS
SYSTOLIC BLOOD PRESSURE: 123 MMHG | BODY MASS INDEX: 28.06 KG/M2 | HEART RATE: 77 BPM | TEMPERATURE: 97.5 F | WEIGHT: 230.5 LBS | DIASTOLIC BLOOD PRESSURE: 84 MMHG | RESPIRATION RATE: 16 BRPM

## 2021-01-01 VITALS
TEMPERATURE: 97.3 F | HEART RATE: 75 BPM | BODY MASS INDEX: 28.84 KG/M2 | DIASTOLIC BLOOD PRESSURE: 82 MMHG | WEIGHT: 224.6 LBS | RESPIRATION RATE: 62 BRPM | OXYGEN SATURATION: 100 % | SYSTOLIC BLOOD PRESSURE: 133 MMHG

## 2021-01-01 VITALS
HEIGHT: 76 IN | RESPIRATION RATE: 16 BRPM | TEMPERATURE: 97.9 F | WEIGHT: 227 LBS | SYSTOLIC BLOOD PRESSURE: 142 MMHG | DIASTOLIC BLOOD PRESSURE: 93 MMHG | HEART RATE: 77 BPM | BODY MASS INDEX: 27.64 KG/M2

## 2021-01-01 VITALS
WEIGHT: 227 LBS | HEART RATE: 82 BPM | TEMPERATURE: 97.8 F | BODY MASS INDEX: 27.64 KG/M2 | HEIGHT: 76 IN | SYSTOLIC BLOOD PRESSURE: 134 MMHG | RESPIRATION RATE: 18 BRPM | DIASTOLIC BLOOD PRESSURE: 87 MMHG

## 2021-01-01 VITALS
HEIGHT: 76 IN | DIASTOLIC BLOOD PRESSURE: 85 MMHG | WEIGHT: 229 LBS | SYSTOLIC BLOOD PRESSURE: 126 MMHG | TEMPERATURE: 98.1 F | HEART RATE: 76 BPM | RESPIRATION RATE: 18 BRPM | BODY MASS INDEX: 27.89 KG/M2

## 2021-01-01 VITALS
RESPIRATION RATE: 16 BRPM | HEART RATE: 66 BPM | DIASTOLIC BLOOD PRESSURE: 68 MMHG | SYSTOLIC BLOOD PRESSURE: 129 MMHG | OXYGEN SATURATION: 97 % | HEIGHT: 76 IN | OXYGEN SATURATION: 95 % | WEIGHT: 225 LBS | BODY MASS INDEX: 27.4 KG/M2 | HEART RATE: 79 BPM | TEMPERATURE: 98.2 F | BODY MASS INDEX: 27.75 KG/M2 | SYSTOLIC BLOOD PRESSURE: 119 MMHG | WEIGHT: 228 LBS | DIASTOLIC BLOOD PRESSURE: 85 MMHG | TEMPERATURE: 97.7 F

## 2021-01-01 VITALS
TEMPERATURE: 96.8 F | SYSTOLIC BLOOD PRESSURE: 124 MMHG | DIASTOLIC BLOOD PRESSURE: 86 MMHG | BODY MASS INDEX: 27.85 KG/M2 | HEART RATE: 74 BPM | WEIGHT: 228.8 LBS | RESPIRATION RATE: 18 BRPM | OXYGEN SATURATION: 96 %

## 2021-01-01 VITALS
OXYGEN SATURATION: 98 % | HEART RATE: 78 BPM | TEMPERATURE: 97.6 F | DIASTOLIC BLOOD PRESSURE: 80 MMHG | SYSTOLIC BLOOD PRESSURE: 120 MMHG | RESPIRATION RATE: 18 BRPM

## 2021-01-01 VITALS
TEMPERATURE: 97.3 F | WEIGHT: 226.4 LBS | BODY MASS INDEX: 29.07 KG/M2 | HEART RATE: 64 BPM | DIASTOLIC BLOOD PRESSURE: 91 MMHG | SYSTOLIC BLOOD PRESSURE: 141 MMHG

## 2021-01-01 VITALS
HEART RATE: 80 BPM | DIASTOLIC BLOOD PRESSURE: 88 MMHG | WEIGHT: 230 LBS | BODY MASS INDEX: 28 KG/M2 | SYSTOLIC BLOOD PRESSURE: 134 MMHG | TEMPERATURE: 98 F

## 2021-01-01 VITALS
DIASTOLIC BLOOD PRESSURE: 60 MMHG | BODY MASS INDEX: 28 KG/M2 | SYSTOLIC BLOOD PRESSURE: 122 MMHG | HEART RATE: 76 BPM | WEIGHT: 230 LBS | OXYGEN SATURATION: 97 % | RESPIRATION RATE: 18 BRPM

## 2021-01-01 VITALS
WEIGHT: 220.6 LBS | TEMPERATURE: 97.5 F | HEART RATE: 87 BPM | BODY MASS INDEX: 26.85 KG/M2 | DIASTOLIC BLOOD PRESSURE: 66 MMHG | SYSTOLIC BLOOD PRESSURE: 107 MMHG | RESPIRATION RATE: 18 BRPM

## 2021-01-01 DIAGNOSIS — C79.51 BONE METASTASIS (HCC): ICD-10-CM

## 2021-01-01 DIAGNOSIS — C80.1 ADENOCARCINOMA (HCC): ICD-10-CM

## 2021-01-01 DIAGNOSIS — C77.0 METASTASIS TO SUPRACLAVICULAR LYMPH NODE (HCC): ICD-10-CM

## 2021-01-01 DIAGNOSIS — G89.29 ENCOUNTER FOR CHRONIC PAIN MANAGEMENT: ICD-10-CM

## 2021-01-01 DIAGNOSIS — C77.0 METASTASIS TO SUPRACLAVICULAR LYMPH NODE (HCC): Primary | ICD-10-CM

## 2021-01-01 DIAGNOSIS — R05.9 COUGH: ICD-10-CM

## 2021-01-01 DIAGNOSIS — C34.31 MALIGNANT NEOPLASM OF LOWER LOBE OF RIGHT LUNG (HCC): Primary | ICD-10-CM

## 2021-01-01 DIAGNOSIS — R07.9 CHEST PAIN, UNSPECIFIED TYPE: Primary | ICD-10-CM

## 2021-01-01 DIAGNOSIS — G89.3 CANCER RELATED PAIN: ICD-10-CM

## 2021-01-01 DIAGNOSIS — E03.9 ACQUIRED HYPOTHYROIDISM: ICD-10-CM

## 2021-01-01 DIAGNOSIS — R63.4 RECENT UNEXPLAINED WEIGHT LOSS: ICD-10-CM

## 2021-01-01 DIAGNOSIS — K59.03 DRUG-INDUCED CONSTIPATION: ICD-10-CM

## 2021-01-01 DIAGNOSIS — J44.1 COPD EXACERBATION (HCC): ICD-10-CM

## 2021-01-01 DIAGNOSIS — Z91.14 NONCOMPLIANCE WITH CPAP TREATMENT: ICD-10-CM

## 2021-01-01 DIAGNOSIS — C34.90 LUNG CANCER METASTATIC TO BONE (HCC): Primary | ICD-10-CM

## 2021-01-01 DIAGNOSIS — M54.41 CHRONIC MIDLINE LOW BACK PAIN WITH RIGHT-SIDED SCIATICA: ICD-10-CM

## 2021-01-01 DIAGNOSIS — C34.31 MALIGNANT NEOPLASM OF LOWER LOBE OF RIGHT LUNG (HCC): ICD-10-CM

## 2021-01-01 DIAGNOSIS — R63.4 WEIGHT LOSS: ICD-10-CM

## 2021-01-01 DIAGNOSIS — F17.200 SMOKER UNMOTIVATED TO QUIT: ICD-10-CM

## 2021-01-01 DIAGNOSIS — C80.1 ADENOCARCINOMA (HCC): Primary | ICD-10-CM

## 2021-01-01 DIAGNOSIS — C79.51 LUNG CANCER METASTATIC TO BONE (HCC): ICD-10-CM

## 2021-01-01 DIAGNOSIS — M79.605 LEFT LEG PAIN: ICD-10-CM

## 2021-01-01 DIAGNOSIS — R11.0 NAUSEA: ICD-10-CM

## 2021-01-01 DIAGNOSIS — F32.A MILD DEPRESSION: ICD-10-CM

## 2021-01-01 DIAGNOSIS — C79.51 LUNG CANCER METASTATIC TO BONE (HCC): Primary | ICD-10-CM

## 2021-01-01 DIAGNOSIS — K21.9 GASTROESOPHAGEAL REFLUX DISEASE, UNSPECIFIED WHETHER ESOPHAGITIS PRESENT: ICD-10-CM

## 2021-01-01 DIAGNOSIS — K30 UPSET STOMACH: ICD-10-CM

## 2021-01-01 DIAGNOSIS — Z51.5 PALLIATIVE CARE ENCOUNTER: ICD-10-CM

## 2021-01-01 DIAGNOSIS — G89.29 CHRONIC MIDLINE LOW BACK PAIN WITH RIGHT-SIDED SCIATICA: ICD-10-CM

## 2021-01-01 DIAGNOSIS — C34.90 LUNG CANCER METASTATIC TO BONE (HCC): ICD-10-CM

## 2021-01-01 DIAGNOSIS — F41.9 ANXIETY: ICD-10-CM

## 2021-01-01 DIAGNOSIS — R68.83 CHILLS: ICD-10-CM

## 2021-01-01 DIAGNOSIS — Z90.2 S/P LOBECTOMY OF LUNG: ICD-10-CM

## 2021-01-01 DIAGNOSIS — F17.200 SMOKING ADDICTION: ICD-10-CM

## 2021-01-01 DIAGNOSIS — G47.00 INSOMNIA, UNSPECIFIED TYPE: ICD-10-CM

## 2021-01-01 DIAGNOSIS — J47.9 BRONCHIECTASIS WITHOUT COMPLICATION (HCC): ICD-10-CM

## 2021-01-01 DIAGNOSIS — G89.29 CHRONIC MIDLINE LOW BACK PAIN WITHOUT SCIATICA: ICD-10-CM

## 2021-01-01 DIAGNOSIS — R05.3 CHRONIC COUGH: ICD-10-CM

## 2021-01-01 DIAGNOSIS — J44.9 CHRONIC OBSTRUCTIVE PULMONARY DISEASE, UNSPECIFIED COPD TYPE (HCC): Primary | ICD-10-CM

## 2021-01-01 DIAGNOSIS — M54.50 CHRONIC MIDLINE LOW BACK PAIN WITHOUT SCIATICA: ICD-10-CM

## 2021-01-01 DIAGNOSIS — E11.9 TYPE 2 DIABETES MELLITUS WITHOUT COMPLICATION, WITHOUT LONG-TERM CURRENT USE OF INSULIN (HCC): Primary | ICD-10-CM

## 2021-01-01 DIAGNOSIS — E86.0 DEHYDRATION: ICD-10-CM

## 2021-01-01 DIAGNOSIS — G47.9 DIFFICULTY SLEEPING: ICD-10-CM

## 2021-01-01 DIAGNOSIS — I10 ESSENTIAL HYPERTENSION: Primary | ICD-10-CM

## 2021-01-01 DIAGNOSIS — F11.29 OPIOID DEPENDENCE WITH UNSPECIFIED OPIOID-INDUCED DISORDER (HCC): ICD-10-CM

## 2021-01-01 DIAGNOSIS — J44.9 CHRONIC OBSTRUCTIVE PULMONARY DISEASE, UNSPECIFIED COPD TYPE (HCC): ICD-10-CM

## 2021-01-01 DIAGNOSIS — R63.0 POOR APPETITE: ICD-10-CM

## 2021-01-01 DIAGNOSIS — Z71.89 GOALS OF CARE, COUNSELING/DISCUSSION: ICD-10-CM

## 2021-01-01 DIAGNOSIS — H60.392 OTHER INFECTIVE ACUTE OTITIS EXTERNA OF LEFT EAR: ICD-10-CM

## 2021-01-01 DIAGNOSIS — C79.51 BONE METASTASIS (HCC): Primary | ICD-10-CM

## 2021-01-01 DIAGNOSIS — F32.A DEPRESSION, UNSPECIFIED DEPRESSION TYPE: ICD-10-CM

## 2021-01-01 DIAGNOSIS — F32.4 MAJOR DEPRESSIVE DISORDER WITH SINGLE EPISODE, IN PARTIAL REMISSION (HCC): ICD-10-CM

## 2021-01-01 DIAGNOSIS — F11.99 OPIOID USE, UNSPECIFIED WITH UNSPECIFIED OPIOID-INDUCED DISORDER (HCC): ICD-10-CM

## 2021-01-01 DIAGNOSIS — C79.9 METASTATIC MALIGNANT NEOPLASM, UNSPECIFIED SITE (HCC): ICD-10-CM

## 2021-01-01 DIAGNOSIS — G89.3 CANCER RELATED PAIN: Primary | ICD-10-CM

## 2021-01-01 DIAGNOSIS — Z71.89 DNR (DO NOT RESUSCITATE) DISCUSSION: ICD-10-CM

## 2021-01-01 DIAGNOSIS — I10 ESSENTIAL HYPERTENSION: ICD-10-CM

## 2021-01-01 DIAGNOSIS — E78.2 MIXED HYPERLIPIDEMIA: ICD-10-CM

## 2021-01-01 DIAGNOSIS — J30.9 ALLERGIC RHINITIS, UNSPECIFIED SEASONALITY, UNSPECIFIED TRIGGER: ICD-10-CM

## 2021-01-01 DIAGNOSIS — F11.20 OPIOID DEPENDENCE, UNCOMPLICATED (HCC): ICD-10-CM

## 2021-01-01 DIAGNOSIS — R07.89 ATYPICAL CHEST PAIN: Primary | ICD-10-CM

## 2021-01-01 DIAGNOSIS — E11.9 TYPE 2 DIABETES MELLITUS WITHOUT COMPLICATION, WITHOUT LONG-TERM CURRENT USE OF INSULIN (HCC): ICD-10-CM

## 2021-01-01 DIAGNOSIS — K21.9 GASTROESOPHAGEAL REFLUX DISEASE WITHOUT ESOPHAGITIS: ICD-10-CM

## 2021-01-01 DIAGNOSIS — D68.9 COAGULATION DEFECT (HCC): Primary | ICD-10-CM

## 2021-01-01 DIAGNOSIS — E87.1 HYPONATREMIA: ICD-10-CM

## 2021-01-01 DIAGNOSIS — R53.81 DECLINING FUNCTIONAL STATUS: ICD-10-CM

## 2021-01-01 DIAGNOSIS — R63.4 RECENT UNEXPLAINED WEIGHT LOSS: Primary | ICD-10-CM

## 2021-01-01 DIAGNOSIS — Z23 NEED FOR INFLUENZA VACCINATION: ICD-10-CM

## 2021-01-01 DIAGNOSIS — Z12.11 COLON CANCER SCREENING: Primary | ICD-10-CM

## 2021-01-01 DIAGNOSIS — J70.1 RADIATION-INDUCED PULMONARY FIBROSIS (HCC): ICD-10-CM

## 2021-01-01 DIAGNOSIS — J41.0 SIMPLE CHRONIC BRONCHITIS (HCC): ICD-10-CM

## 2021-01-01 DIAGNOSIS — J18.9 PNEUMONIA DUE TO INFECTIOUS ORGANISM, UNSPECIFIED LATERALITY, UNSPECIFIED PART OF LUNG: ICD-10-CM

## 2021-01-01 LAB
6-ACETYLMORPHINE, UR: NOT DETECTED
7-AMINOCLONAZEPAM, URINE: NOT DETECTED
ABSOLUTE EOS #: 0 K/UL (ref 0–0.4)
ABSOLUTE EOS #: 0.05 K/UL (ref 0–0.4)
ABSOLUTE EOS #: 0.06 K/UL (ref 0–0.4)
ABSOLUTE EOS #: 0.07 K/UL (ref 0–0.4)
ABSOLUTE EOS #: 0.1 K/UL (ref 0–0.4)
ABSOLUTE EOS #: 0.13 K/UL (ref 0–0.4)
ABSOLUTE EOS #: 0.2 K/UL (ref 0–0.4)
ABSOLUTE IMMATURE GRANULOCYTE: 0 K/UL (ref 0–0.3)
ABSOLUTE IMMATURE GRANULOCYTE: ABNORMAL K/UL (ref 0–0.3)
ABSOLUTE LYMPH #: 0.16 K/UL (ref 1–4.8)
ABSOLUTE LYMPH #: 0.17 K/UL (ref 1–4.8)
ABSOLUTE LYMPH #: 0.27 K/UL (ref 1–4.8)
ABSOLUTE LYMPH #: 0.27 K/UL (ref 1–4.8)
ABSOLUTE LYMPH #: 0.28 K/UL (ref 1–4.8)
ABSOLUTE LYMPH #: 0.33 K/UL (ref 1–4.8)
ABSOLUTE LYMPH #: 0.5 K/UL (ref 1–4.8)
ABSOLUTE LYMPH #: 0.53 K/UL (ref 1–4.8)
ABSOLUTE LYMPH #: 0.6 K/UL (ref 1–4.8)
ABSOLUTE LYMPH #: 0.67 K/UL (ref 1–4.8)
ABSOLUTE LYMPH #: 0.9 K/UL (ref 1–4.8)
ABSOLUTE LYMPH #: 1 K/UL (ref 1–4.8)
ABSOLUTE LYMPH #: 1.1 K/UL (ref 1–4.8)
ABSOLUTE LYMPH #: 2.1 K/UL (ref 1–4.8)
ABSOLUTE MONO #: 0.05 K/UL (ref 0.1–0.8)
ABSOLUTE MONO #: 0.05 K/UL (ref 0.1–1.2)
ABSOLUTE MONO #: 0.16 K/UL (ref 0.1–1.2)
ABSOLUTE MONO #: 0.17 K/UL (ref 0.1–0.8)
ABSOLUTE MONO #: 0.19 K/UL (ref 0.1–1.2)
ABSOLUTE MONO #: 0.2 K/UL (ref 0.1–0.8)
ABSOLUTE MONO #: 0.21 K/UL (ref 0.2–0.8)
ABSOLUTE MONO #: 0.4 K/UL (ref 0.1–1.2)
ABSOLUTE MONO #: 0.5 K/UL (ref 0.1–1.2)
ABSOLUTE MONO #: 0.6 K/UL (ref 0.1–1.2)
ABSOLUTE MONO #: 0.79 K/UL (ref 0.1–1.2)
ABSOLUTE MONO #: 0.8 K/UL (ref 0.1–1.2)
ABSOLUTE MONO #: 1.21 K/UL (ref 0.1–0.8)
ABSOLUTE MONO #: 1.3 K/UL (ref 0.1–1.2)
ABSOLUTE MONO #: 1.4 K/UL (ref 0.1–1.2)
ABSOLUTE MONO #: 1.5 K/UL (ref 0.1–1.2)
ALBUMIN SERPL-MCNC: 3.3 G/DL (ref 3.5–5.2)
ALBUMIN SERPL-MCNC: 3.7 G/DL (ref 3.5–5.2)
ALBUMIN SERPL-MCNC: 3.8 G/DL (ref 3.5–5.2)
ALBUMIN SERPL-MCNC: 3.8 G/DL (ref 3.5–5.2)
ALBUMIN SERPL-MCNC: 3.9 G/DL (ref 3.5–5.2)
ALBUMIN SERPL-MCNC: 4.2 G/DL (ref 3.5–5.2)
ALBUMIN SERPL-MCNC: 4.3 G/DL (ref 3.5–5.2)
ALBUMIN SERPL-MCNC: 4.4 G/DL (ref 3.5–5.2)
ALBUMIN SERPL-MCNC: 4.5 G/DL (ref 3.5–5.2)
ALBUMIN SERPL-MCNC: 4.5 G/DL (ref 3.5–5.2)
ALBUMIN SERPL-MCNC: 4.6 G/DL (ref 3.5–5.2)
ALBUMIN SERPL-MCNC: 4.8 G/DL (ref 3.5–5.2)
ALBUMIN/GLOBULIN RATIO: 0.6 (ref 1–2.5)
ALBUMIN/GLOBULIN RATIO: 0.8 (ref 1–2.5)
ALBUMIN/GLOBULIN RATIO: 0.8 (ref 1–2.5)
ALBUMIN/GLOBULIN RATIO: 1 (ref 1–2.5)
ALBUMIN/GLOBULIN RATIO: 1.1 (ref 1–2.5)
ALBUMIN/GLOBULIN RATIO: 1.1 (ref 1–2.5)
ALBUMIN/GLOBULIN RATIO: 1.2 (ref 1–2.5)
ALBUMIN/GLOBULIN RATIO: 1.4 (ref 1–2.5)
ALBUMIN/GLOBULIN RATIO: 1.5 (ref 1–2.5)
ALBUMIN/GLOBULIN RATIO: 1.6 (ref 1–2.5)
ALBUMIN/GLOBULIN RATIO: 1.7 (ref 1–2.5)
ALBUMIN/GLOBULIN RATIO: 1.8 (ref 1–2.5)
ALBUMIN/GLOBULIN RATIO: 1.9 (ref 1–2.5)
ALP BLD-CCNC: 104 U/L (ref 40–129)
ALP BLD-CCNC: 45 U/L (ref 40–129)
ALP BLD-CCNC: 48 U/L (ref 40–129)
ALP BLD-CCNC: 51 U/L (ref 40–129)
ALP BLD-CCNC: 52 U/L (ref 40–129)
ALP BLD-CCNC: 54 U/L (ref 40–129)
ALP BLD-CCNC: 55 U/L (ref 40–129)
ALP BLD-CCNC: 57 U/L (ref 40–129)
ALP BLD-CCNC: 61 U/L (ref 40–129)
ALP BLD-CCNC: 62 U/L (ref 40–129)
ALP BLD-CCNC: 62 U/L (ref 40–129)
ALP BLD-CCNC: 65 U/L (ref 40–129)
ALP BLD-CCNC: 67 U/L (ref 40–129)
ALP BLD-CCNC: 68 U/L (ref 40–129)
ALP BLD-CCNC: 88 U/L (ref 40–129)
ALP BLD-CCNC: 88 U/L (ref 40–129)
ALP BLD-CCNC: 91 U/L (ref 40–129)
ALPHA-OH-ALPRAZ, URINE: NOT DETECTED
ALPHA-OH-MIDAZOLAM, URINE: NOT DETECTED
ALPRAZOLAM, URINE: NOT DETECTED
ALT SERPL-CCNC: 10 U/L (ref 5–41)
ALT SERPL-CCNC: 11 U/L (ref 5–41)
ALT SERPL-CCNC: 12 U/L (ref 5–41)
ALT SERPL-CCNC: 12 U/L (ref 5–41)
ALT SERPL-CCNC: 14 U/L (ref 5–41)
ALT SERPL-CCNC: 15 U/L (ref 5–41)
ALT SERPL-CCNC: 17 U/L (ref 5–41)
ALT SERPL-CCNC: 17 U/L (ref 5–41)
ALT SERPL-CCNC: 20 U/L (ref 5–41)
ALT SERPL-CCNC: 21 U/L (ref 5–41)
ALT SERPL-CCNC: 23 U/L (ref 5–41)
ALT SERPL-CCNC: 24 U/L (ref 5–41)
ALT SERPL-CCNC: 24 U/L (ref 5–41)
ALT SERPL-CCNC: 26 U/L (ref 5–41)
ALT SERPL-CCNC: 9 U/L (ref 5–41)
AMPHETAMINES, URINE: NOT DETECTED
AMYLASE: 117 U/L (ref 28–100)
AMYLASE: 125 U/L (ref 28–100)
AMYLASE: 55 U/L (ref 28–100)
AMYLASE: 91 U/L (ref 28–100)
ANION GAP SERPL CALCULATED.3IONS-SCNC: 10 MMOL/L (ref 9–17)
ANION GAP SERPL CALCULATED.3IONS-SCNC: 11 MMOL/L (ref 9–17)
ANION GAP SERPL CALCULATED.3IONS-SCNC: 12 MMOL/L (ref 9–17)
ANION GAP SERPL CALCULATED.3IONS-SCNC: 12 MMOL/L (ref 9–17)
ANION GAP SERPL CALCULATED.3IONS-SCNC: 6 MMOL/L (ref 9–17)
ANION GAP SERPL CALCULATED.3IONS-SCNC: 7 MMOL/L (ref 9–17)
ANION GAP SERPL CALCULATED.3IONS-SCNC: 7 MMOL/L (ref 9–17)
ANION GAP SERPL CALCULATED.3IONS-SCNC: 8 MMOL/L (ref 9–17)
ANION GAP SERPL CALCULATED.3IONS-SCNC: 8 MMOL/L (ref 9–17)
ANION GAP SERPL CALCULATED.3IONS-SCNC: 9 MMOL/L (ref 9–17)
AST SERPL-CCNC: 15 U/L
AST SERPL-CCNC: 15 U/L
AST SERPL-CCNC: 16 U/L
AST SERPL-CCNC: 17 U/L
AST SERPL-CCNC: 18 U/L
AST SERPL-CCNC: 19 U/L
AST SERPL-CCNC: 20 U/L
AST SERPL-CCNC: 22 U/L
AST SERPL-CCNC: 22 U/L
AST SERPL-CCNC: 23 U/L
AST SERPL-CCNC: 26 U/L
AST SERPL-CCNC: 27 U/L
BARBITURATES, URINE: NOT DETECTED
BASOPHILS # BLD: 0 % (ref 0–2)
BASOPHILS # BLD: 1 %
BASOPHILS # BLD: 1 % (ref 0–2)
BASOPHILS ABSOLUTE: 0 K/UL (ref 0–0.2)
BASOPHILS ABSOLUTE: 0.04 K/UL (ref 0–0.2)
BASOPHILS ABSOLUTE: 0.05 K/UL (ref 0–0.2)
BASOPHILS ABSOLUTE: 0.05 K/UL (ref 0–0.2)
BASOPHILS ABSOLUTE: 0.09 K/UL (ref 0–0.2)
BASOPHILS ABSOLUTE: 0.1 K/UL (ref 0–0.2)
BASOPHILS ABSOLUTE: 0.11 K/UL (ref 0–0.2)
BENZOYLECGONINE, UR: NOT DETECTED
BILIRUB SERPL-MCNC: 0.21 MG/DL (ref 0.3–1.2)
BILIRUB SERPL-MCNC: 0.22 MG/DL (ref 0.3–1.2)
BILIRUB SERPL-MCNC: 0.29 MG/DL (ref 0.3–1.2)
BILIRUB SERPL-MCNC: 0.29 MG/DL (ref 0.3–1.2)
BILIRUB SERPL-MCNC: 0.33 MG/DL (ref 0.3–1.2)
BILIRUB SERPL-MCNC: 0.34 MG/DL (ref 0.3–1.2)
BILIRUB SERPL-MCNC: 0.37 MG/DL (ref 0.3–1.2)
BILIRUB SERPL-MCNC: 0.38 MG/DL (ref 0.3–1.2)
BILIRUB SERPL-MCNC: 0.47 MG/DL (ref 0.3–1.2)
BILIRUB SERPL-MCNC: 0.48 MG/DL (ref 0.3–1.2)
BILIRUB SERPL-MCNC: 0.49 MG/DL (ref 0.3–1.2)
BILIRUB SERPL-MCNC: 0.52 MG/DL (ref 0.3–1.2)
BILIRUB SERPL-MCNC: 0.63 MG/DL (ref 0.3–1.2)
BILIRUB SERPL-MCNC: 0.68 MG/DL (ref 0.3–1.2)
BILIRUB SERPL-MCNC: 0.71 MG/DL (ref 0.3–1.2)
BNP INTERPRETATION: NORMAL
BUN BLDV-MCNC: 19 MG/DL (ref 8–23)
BUN BLDV-MCNC: 19 MG/DL (ref 8–23)
BUN BLDV-MCNC: 20 MG/DL (ref 8–23)
BUN BLDV-MCNC: 21 MG/DL (ref 8–23)
BUN BLDV-MCNC: 22 MG/DL (ref 8–23)
BUN BLDV-MCNC: 22 MG/DL (ref 8–23)
BUN BLDV-MCNC: 23 MG/DL (ref 8–23)
BUN BLDV-MCNC: 23 MG/DL (ref 8–23)
BUN BLDV-MCNC: 24 MG/DL (ref 8–23)
BUN BLDV-MCNC: 24 MG/DL (ref 8–23)
BUN BLDV-MCNC: 25 MG/DL (ref 8–23)
BUN BLDV-MCNC: 26 MG/DL (ref 8–23)
BUN BLDV-MCNC: 27 MG/DL (ref 8–23)
BUN BLDV-MCNC: 29 MG/DL (ref 8–23)
BUN/CREAT BLD: 19 (ref 9–20)
BUN/CREAT BLD: ABNORMAL (ref 9–20)
BUPRENORPHINE URINE: NOT DETECTED
CALCIUM SERPL-MCNC: 8.6 MG/DL (ref 8.6–10.4)
CALCIUM SERPL-MCNC: 8.7 MG/DL (ref 8.6–10.4)
CALCIUM SERPL-MCNC: 8.7 MG/DL (ref 8.6–10.4)
CALCIUM SERPL-MCNC: 8.8 MG/DL (ref 8.6–10.4)
CALCIUM SERPL-MCNC: 8.8 MG/DL (ref 8.6–10.4)
CALCIUM SERPL-MCNC: 8.9 MG/DL (ref 8.6–10.4)
CALCIUM SERPL-MCNC: 9 MG/DL (ref 8.6–10.4)
CALCIUM SERPL-MCNC: 9 MG/DL (ref 8.6–10.4)
CALCIUM SERPL-MCNC: 9.2 MG/DL (ref 8.6–10.4)
CALCIUM SERPL-MCNC: 9.3 MG/DL (ref 8.6–10.4)
CALCIUM SERPL-MCNC: 9.7 MG/DL (ref 8.6–10.4)
CARISOPRODOL, UR: NOT DETECTED
CHLORIDE BLD-SCNC: 100 MMOL/L (ref 98–107)
CHLORIDE BLD-SCNC: 101 MMOL/L (ref 98–107)
CHLORIDE BLD-SCNC: 102 MMOL/L (ref 98–107)
CHLORIDE BLD-SCNC: 103 MMOL/L (ref 98–107)
CHLORIDE BLD-SCNC: 105 MMOL/L (ref 98–107)
CHLORIDE BLD-SCNC: 105 MMOL/L (ref 98–107)
CHLORIDE BLD-SCNC: 95 MMOL/L (ref 98–107)
CHLORIDE BLD-SCNC: 95 MMOL/L (ref 98–107)
CHLORIDE BLD-SCNC: 96 MMOL/L (ref 98–107)
CHLORIDE BLD-SCNC: 99 MMOL/L (ref 98–107)
CHLORIDE BLD-SCNC: 99 MMOL/L (ref 98–107)
CHOLESTEROL/HDL RATIO: 6.6
CHOLESTEROL: 309 MG/DL
CLONAZEPAM, URINE: NOT DETECTED
CO2: 22 MMOL/L (ref 20–31)
CO2: 22 MMOL/L (ref 20–31)
CO2: 23 MMOL/L (ref 20–31)
CO2: 24 MMOL/L (ref 20–31)
CO2: 25 MMOL/L (ref 20–31)
CO2: 27 MMOL/L (ref 20–31)
CO2: 27 MMOL/L (ref 20–31)
CO2: 29 MMOL/L (ref 20–31)
CO2: 29 MMOL/L (ref 20–31)
CO2: 31 MMOL/L (ref 20–31)
CODEINE, URINE: PRESENT
CORTISOL COLLECTION INFO: NORMAL
CORTISOL: 12.7 UG/DL (ref 2.7–18.4)
CORTISOL: 6.6 UG/DL (ref 2.7–18.4)
CORTISOL: 7.3 UG/DL (ref 2.7–18.4)
CORTISOL: 7.4 UG/DL (ref 2.7–18.4)
CREAT SERPL-MCNC: 0.78 MG/DL (ref 0.7–1.2)
CREAT SERPL-MCNC: 0.81 MG/DL (ref 0.7–1.2)
CREAT SERPL-MCNC: 0.81 MG/DL (ref 0.7–1.2)
CREAT SERPL-MCNC: 0.85 MG/DL (ref 0.7–1.2)
CREAT SERPL-MCNC: 0.86 MG/DL (ref 0.7–1.2)
CREAT SERPL-MCNC: 0.91 MG/DL (ref 0.7–1.2)
CREAT SERPL-MCNC: 0.92 MG/DL (ref 0.7–1.2)
CREAT SERPL-MCNC: 0.94 MG/DL (ref 0.7–1.2)
CREAT SERPL-MCNC: 0.95 MG/DL (ref 0.7–1.2)
CREAT SERPL-MCNC: 0.96 MG/DL (ref 0.7–1.2)
CREAT SERPL-MCNC: 0.98 MG/DL (ref 0.7–1.2)
CREAT SERPL-MCNC: 1 MG/DL (ref 0.7–1.2)
CREAT SERPL-MCNC: 1 MG/DL (ref 0.7–1.2)
CREAT SERPL-MCNC: 1.04 MG/DL (ref 0.7–1.2)
CREAT SERPL-MCNC: 1.05 MG/DL (ref 0.7–1.2)
CREAT SERPL-MCNC: 1.08 MG/DL (ref 0.7–1.2)
CREAT SERPL-MCNC: 1.21 MG/DL (ref 0.7–1.2)
CREATININE URINE: 213.6 MG/DL (ref 20–400)
DIAZEPAM, URINE: NOT DETECTED
DIFFERENTIAL TYPE: ABNORMAL
EER PAIN MGT DRUG PANEL, HIGH RES/EMIT U: NORMAL
EKG ATRIAL RATE: 74 BPM
EKG ATRIAL RATE: 81 BPM
EKG ATRIAL RATE: 91 BPM
EKG ATRIAL RATE: 98 BPM
EKG P AXIS: 28 DEGREES
EKG P AXIS: 40 DEGREES
EKG P AXIS: 43 DEGREES
EKG P AXIS: 53 DEGREES
EKG P-R INTERVAL: 152 MS
EKG P-R INTERVAL: 156 MS
EKG P-R INTERVAL: 158 MS
EKG P-R INTERVAL: 170 MS
EKG Q-T INTERVAL: 366 MS
EKG Q-T INTERVAL: 368 MS
EKG Q-T INTERVAL: 384 MS
EKG Q-T INTERVAL: 408 MS
EKG QRS DURATION: 112 MS
EKG QRS DURATION: 116 MS
EKG QRS DURATION: 118 MS
EKG QRS DURATION: 98 MS
EKG QTC CALCULATION (BAZETT): 426 MS
EKG QTC CALCULATION (BAZETT): 452 MS
EKG QTC CALCULATION (BAZETT): 467 MS
EKG QTC CALCULATION (BAZETT): 473 MS
EKG R AXIS: 19 DEGREES
EKG R AXIS: 25 DEGREES
EKG R AXIS: 49 DEGREES
EKG R AXIS: 5 DEGREES
EKG T AXIS: 47 DEGREES
EKG T AXIS: 49 DEGREES
EKG T AXIS: 59 DEGREES
EKG T AXIS: 70 DEGREES
EKG VENTRICULAR RATE: 74 BPM
EKG VENTRICULAR RATE: 81 BPM
EKG VENTRICULAR RATE: 91 BPM
EKG VENTRICULAR RATE: 98 BPM
EOSINOPHILS RELATIVE PERCENT: 0 % (ref 1–4)
EOSINOPHILS RELATIVE PERCENT: 1 % (ref 1–4)
EOSINOPHILS RELATIVE PERCENT: 2 % (ref 1–4)
EOSINOPHILS RELATIVE PERCENT: 3 % (ref 1–4)
ESTIMATED AVERAGE GLUCOSE: 134 MG/DL
ETHYL GLUCURONIDE UR: NOT DETECTED
FENTANYL URINE: NOT DETECTED
GABAPENTIN: NOT DETECTED
GFR AFRICAN AMERICAN: >60 ML/MIN
GFR NON-AFRICAN AMERICAN: >60 ML/MIN
GFR SERPL CREATININE-BSD FRML MDRD: ABNORMAL ML/MIN/{1.73_M2}
GLUCOSE BLD-MCNC: 104 MG/DL (ref 70–99)
GLUCOSE BLD-MCNC: 105 MG/DL (ref 70–99)
GLUCOSE BLD-MCNC: 109 MG/DL (ref 70–99)
GLUCOSE BLD-MCNC: 113 MG/DL (ref 70–99)
GLUCOSE BLD-MCNC: 114 MG/DL (ref 70–99)
GLUCOSE BLD-MCNC: 115 MG/DL (ref 70–99)
GLUCOSE BLD-MCNC: 117 MG/DL (ref 70–99)
GLUCOSE BLD-MCNC: 118 MG/DL (ref 70–99)
GLUCOSE BLD-MCNC: 125 MG/DL (ref 70–99)
GLUCOSE BLD-MCNC: 130 MG/DL (ref 70–99)
GLUCOSE BLD-MCNC: 132 MG/DL (ref 75–110)
GLUCOSE BLD-MCNC: 145 MG/DL (ref 70–99)
GLUCOSE BLD-MCNC: 145 MG/DL (ref 70–99)
GLUCOSE BLD-MCNC: 163 MG/DL (ref 75–110)
GLUCOSE BLD-MCNC: 169 MG/DL (ref 70–99)
GLUCOSE BLD-MCNC: 181 MG/DL (ref 70–99)
GLUCOSE BLD-MCNC: 99 MG/DL (ref 70–99)
HBA1C MFR BLD: 5.6 %
HBA1C MFR BLD: 6.3 % (ref 4–6)
HCT VFR BLD CALC: 30.4 % (ref 41–53)
HCT VFR BLD CALC: 32.9 % (ref 41–53)
HCT VFR BLD CALC: 33 % (ref 41–53)
HCT VFR BLD CALC: 35.8 % (ref 41–53)
HCT VFR BLD CALC: 37.7 % (ref 41–53)
HCT VFR BLD CALC: 38 % (ref 41–53)
HCT VFR BLD CALC: 38.1 % (ref 41–53)
HCT VFR BLD CALC: 39.8 % (ref 41–53)
HCT VFR BLD CALC: 40 % (ref 41–53)
HCT VFR BLD CALC: 40.1 % (ref 41–53)
HCT VFR BLD CALC: 40.5 % (ref 41–53)
HCT VFR BLD CALC: 41.2 % (ref 41–53)
HCT VFR BLD CALC: 41.6 % (ref 41–53)
HCT VFR BLD CALC: 41.8 % (ref 41–53)
HCT VFR BLD CALC: 42.6 % (ref 41–53)
HCT VFR BLD CALC: 42.9 % (ref 41–53)
HCT VFR BLD CALC: 43.5 % (ref 40.7–50.3)
HCT VFR BLD CALC: 44.5 % (ref 41–53)
HCT VFR BLD CALC: 46.7 % (ref 41–53)
HDLC SERPL-MCNC: 47 MG/DL
HEMOGLOBIN: 10.5 G/DL (ref 13.5–17.5)
HEMOGLOBIN: 11.4 G/DL (ref 13.5–17.5)
HEMOGLOBIN: 11.6 G/DL (ref 13.5–17.5)
HEMOGLOBIN: 12.5 G/DL (ref 13.5–17.5)
HEMOGLOBIN: 13.1 G/DL (ref 13.5–17.5)
HEMOGLOBIN: 13.3 G/DL (ref 13.5–17.5)
HEMOGLOBIN: 13.4 G/DL (ref 13.5–17.5)
HEMOGLOBIN: 13.6 G/DL (ref 13.5–17.5)
HEMOGLOBIN: 13.7 G/DL (ref 13.5–17.5)
HEMOGLOBIN: 13.8 G/DL (ref 13.5–17.5)
HEMOGLOBIN: 14 G/DL (ref 13.5–17.5)
HEMOGLOBIN: 14.2 G/DL (ref 13.5–17.5)
HEMOGLOBIN: 14.3 G/DL (ref 13.5–17.5)
HEMOGLOBIN: 14.3 G/DL (ref 13.5–17.5)
HEMOGLOBIN: 14.4 G/DL (ref 13.5–17.5)
HEMOGLOBIN: 14.4 G/DL (ref 13–17)
HEMOGLOBIN: 14.6 G/DL (ref 13.5–17.5)
HEMOGLOBIN: 15.3 G/DL (ref 13.5–17.5)
HEMOGLOBIN: 15.8 G/DL (ref 13.5–17.5)
HYDROCODONE, URINE: NOT DETECTED
HYDROMORPHONE, URINE: NOT DETECTED
IMMATURE GRANULOCYTES: 0 %
IMMATURE GRANULOCYTES: ABNORMAL %
INR BLD: 1.2
LACTIC ACID: 1.1 MMOL/L (ref 0.5–2.2)
LACTIC ACID: 1.6 MMOL/L (ref 0.5–2.2)
LDL CHOLESTEROL: 245 MG/DL (ref 0–130)
LIPASE: 118 U/L (ref 13–60)
LIPASE: 142 U/L (ref 13–60)
LIPASE: 32 U/L (ref 13–60)
LIPASE: 57 U/L (ref 13–60)
LORAZEPAM, URINE: NOT DETECTED
LV EF: 65 %
LVEF MODALITY: NORMAL
LYMPHOCYTES # BLD: 10 % (ref 24–44)
LYMPHOCYTES # BLD: 11 % (ref 24–44)
LYMPHOCYTES # BLD: 12 % (ref 24–44)
LYMPHOCYTES # BLD: 19 % (ref 24–44)
LYMPHOCYTES # BLD: 25 % (ref 24–44)
LYMPHOCYTES # BLD: 3 % (ref 24–44)
LYMPHOCYTES # BLD: 3 % (ref 24–44)
LYMPHOCYTES # BLD: 4 % (ref 24–44)
LYMPHOCYTES # BLD: 4 % (ref 24–44)
LYMPHOCYTES # BLD: 5 % (ref 24–44)
LYMPHOCYTES # BLD: 7 % (ref 24–44)
LYMPHOCYTES # BLD: 7 % (ref 24–44)
LYMPHOCYTES # BLD: 8 % (ref 24–44)
LYMPHOCYTES # BLD: 8 % (ref 24–44)
MAGNESIUM: 2 MG/DL (ref 1.6–2.6)
MAGNESIUM: 2.2 MG/DL (ref 1.6–2.6)
MARIJUANA METAB, UR: PRESENT
MCH RBC QN AUTO: 32.6 PG (ref 26–34)
MCH RBC QN AUTO: 33 PG (ref 26–34)
MCH RBC QN AUTO: 33.1 PG (ref 26–34)
MCH RBC QN AUTO: 33.1 PG (ref 26–34)
MCH RBC QN AUTO: 33.5 PG (ref 26–34)
MCH RBC QN AUTO: 33.7 PG (ref 26–34)
MCH RBC QN AUTO: 34.2 PG (ref 26–34)
MCH RBC QN AUTO: 34.3 PG (ref 26–34)
MCH RBC QN AUTO: 34.3 PG (ref 26–34)
MCH RBC QN AUTO: 34.4 PG (ref 26–34)
MCH RBC QN AUTO: 34.5 PG (ref 25.2–33.5)
MCH RBC QN AUTO: 34.7 PG (ref 26–34)
MCH RBC QN AUTO: 34.7 PG (ref 26–34)
MCH RBC QN AUTO: 34.8 PG (ref 26–34)
MCH RBC QN AUTO: 34.9 PG (ref 26–34)
MCH RBC QN AUTO: 34.9 PG (ref 26–34)
MCH RBC QN AUTO: 35 PG (ref 26–34)
MCH RBC QN AUTO: 35.1 PG (ref 26–34)
MCH RBC QN AUTO: 35.3 PG (ref 26–34)
MCHC RBC AUTO-ENTMCNC: 33.1 G/DL (ref 28.4–34.8)
MCHC RBC AUTO-ENTMCNC: 33.8 G/DL (ref 31–37)
MCHC RBC AUTO-ENTMCNC: 33.8 G/DL (ref 31–37)
MCHC RBC AUTO-ENTMCNC: 34 G/DL (ref 31–37)
MCHC RBC AUTO-ENTMCNC: 34.1 G/DL (ref 31–37)
MCHC RBC AUTO-ENTMCNC: 34.2 G/DL (ref 31–37)
MCHC RBC AUTO-ENTMCNC: 34.4 G/DL (ref 31–37)
MCHC RBC AUTO-ENTMCNC: 34.5 G/DL (ref 31–37)
MCHC RBC AUTO-ENTMCNC: 34.5 G/DL (ref 31–37)
MCHC RBC AUTO-ENTMCNC: 34.6 G/DL (ref 31–37)
MCHC RBC AUTO-ENTMCNC: 34.6 G/DL (ref 31–37)
MCHC RBC AUTO-ENTMCNC: 34.7 G/DL (ref 31–37)
MCHC RBC AUTO-ENTMCNC: 35 G/DL (ref 31–37)
MCHC RBC AUTO-ENTMCNC: 35 G/DL (ref 31–37)
MCHC RBC AUTO-ENTMCNC: 35.1 G/DL (ref 31–37)
MCHC RBC AUTO-ENTMCNC: 35.1 G/DL (ref 31–37)
MCV RBC AUTO: 100.5 FL (ref 80–100)
MCV RBC AUTO: 100.6 FL (ref 80–100)
MCV RBC AUTO: 101 FL (ref 80–100)
MCV RBC AUTO: 102.1 FL (ref 80–100)
MCV RBC AUTO: 102.9 FL (ref 80–100)
MCV RBC AUTO: 103.1 FL (ref 80–100)
MCV RBC AUTO: 104.3 FL (ref 82.6–102.9)
MCV RBC AUTO: 96.3 FL (ref 80–100)
MCV RBC AUTO: 96.3 FL (ref 80–100)
MCV RBC AUTO: 96.7 FL (ref 80–100)
MCV RBC AUTO: 97 FL (ref 80–100)
MCV RBC AUTO: 97.2 FL (ref 80–100)
MCV RBC AUTO: 97.7 FL (ref 80–100)
MCV RBC AUTO: 97.8 FL (ref 80–100)
MCV RBC AUTO: 99 FL (ref 80–100)
MCV RBC AUTO: 99.2 FL (ref 80–100)
MCV RBC AUTO: 99.3 FL (ref 80–100)
MCV RBC AUTO: 99.5 FL (ref 80–100)
MCV RBC AUTO: 99.7 FL (ref 80–100)
MDA, UR: NOT DETECTED
MDEA, EVE, UR: NOT DETECTED
MDMA URINE: NOT DETECTED
MEPERIDINE METAB, UR: NOT DETECTED
METHADONE, URINE: NOT DETECTED
METHAMPHETAMINE, URINE: NOT DETECTED
METHYLPHENIDATE: NOT DETECTED
MIDAZOLAM, URINE: NOT DETECTED
MONOCYTES # BLD: 1 % (ref 1–7)
MONOCYTES # BLD: 1 % (ref 2–11)
MONOCYTES # BLD: 10 % (ref 2–11)
MONOCYTES # BLD: 11 % (ref 2–11)
MONOCYTES # BLD: 14 % (ref 2–11)
MONOCYTES # BLD: 18 % (ref 1–7)
MONOCYTES # BLD: 18 % (ref 2–11)
MONOCYTES # BLD: 2 % (ref 1–7)
MONOCYTES # BLD: 2 % (ref 2–11)
MONOCYTES # BLD: 3 % (ref 2–11)
MONOCYTES # BLD: 4 % (ref 1–7)
MONOCYTES # BLD: 5 % (ref 2–11)
MONOCYTES # BLD: 6 % (ref 2–11)
MONOCYTES # BLD: 7 % (ref 2–11)
MONOCYTES # BLD: 8 % (ref 1–7)
MONOCYTES # BLD: 8 % (ref 2–11)
MONOCYTES # BLD: 9 % (ref 2–11)
MONOCYTES # BLD: 9 % (ref 2–11)
MORPHINE URINE: PRESENT
MORPHOLOGY: NORMAL
NALOXONE URINE: NOT DETECTED
NORBUPRENORPHINE, URINE: NOT DETECTED
NORDIAZEPAM, URINE: NOT DETECTED
NORFENTANYL, URINE: NOT DETECTED
NORHYDROCODONE, URINE: NOT DETECTED
NOROXYCODONE, URINE: PRESENT
NOROXYMORPHONE, URINE: NOT DETECTED
NRBC AUTOMATED: 0 PER 100 WBC
NRBC AUTOMATED: ABNORMAL PER 100 WBC
OXAZEPAM, URINE: NOT DETECTED
OXYCODONE URINE: PRESENT
OXYMORPHONE, URINE: PRESENT
PAIN MGT DRUG PANEL, HI RES, UR: NORMAL
PARTIAL THROMBOPLASTIN TIME: 28.3 SEC (ref 23.9–33.8)
PCP,URINE: NOT DETECTED
PDW BLD-RTO: 12.2 % (ref 11.8–14.4)
PDW BLD-RTO: 12.8 % (ref 12.5–15.4)
PDW BLD-RTO: 12.9 % (ref 12.5–15.4)
PDW BLD-RTO: 13.2 % (ref 12.5–15.4)
PDW BLD-RTO: 13.4 % (ref 12.5–15.4)
PDW BLD-RTO: 13.7 % (ref 12.5–15.4)
PDW BLD-RTO: 13.9 % (ref 12.5–15.4)
PDW BLD-RTO: 14 % (ref 12.5–15.4)
PDW BLD-RTO: 14.1 % (ref 12.5–15.4)
PDW BLD-RTO: 14.8 % (ref 12.5–15.4)
PDW BLD-RTO: 14.8 % (ref 12.5–15.4)
PDW BLD-RTO: 14.9 % (ref 12.5–15.4)
PDW BLD-RTO: 16 % (ref 12.5–15.4)
PDW BLD-RTO: 16.6 % (ref 12.5–15.4)
PDW BLD-RTO: 16.6 % (ref 12.5–15.4)
PDW BLD-RTO: 16.8 % (ref 12.5–15.4)
PDW BLD-RTO: 18 % (ref 12.5–15.4)
PHENTERMINE, UR: NOT DETECTED
PLATELET # BLD: 113 K/UL (ref 140–450)
PLATELET # BLD: 114 K/UL (ref 140–450)
PLATELET # BLD: 122 K/UL (ref 140–450)
PLATELET # BLD: 125 K/UL (ref 140–450)
PLATELET # BLD: 126 K/UL (ref 140–450)
PLATELET # BLD: 130 K/UL (ref 138–453)
PLATELET # BLD: 139 K/UL (ref 138–453)
PLATELET # BLD: 152 K/UL (ref 140–450)
PLATELET # BLD: 163 K/UL (ref 140–450)
PLATELET # BLD: 163 K/UL (ref 140–450)
PLATELET # BLD: 166 K/UL (ref 140–450)
PLATELET # BLD: 168 K/UL (ref 140–450)
PLATELET # BLD: 175 K/UL (ref 140–450)
PLATELET # BLD: 178 K/UL (ref 140–450)
PLATELET # BLD: 183 K/UL (ref 140–450)
PLATELET # BLD: 185 K/UL (ref 140–450)
PLATELET # BLD: 190 K/UL (ref 140–450)
PLATELET # BLD: 211 K/UL (ref 140–450)
PLATELET # BLD: 264 K/UL (ref 140–450)
PLATELET # BLD: 91 K/UL (ref 140–450)
PLATELET ESTIMATE: ABNORMAL
PMV BLD AUTO: 6.3 FL (ref 6–12)
PMV BLD AUTO: 6.4 FL (ref 6–12)
PMV BLD AUTO: 6.4 FL (ref 6–12)
PMV BLD AUTO: 6.5 FL (ref 6–12)
PMV BLD AUTO: 6.5 FL (ref 6–12)
PMV BLD AUTO: 6.7 FL (ref 6–12)
PMV BLD AUTO: 6.7 FL (ref 6–12)
PMV BLD AUTO: 6.8 FL (ref 6–12)
PMV BLD AUTO: 6.9 FL (ref 6–12)
PMV BLD AUTO: 7.1 FL (ref 6–12)
PMV BLD AUTO: 7.1 FL (ref 6–12)
PMV BLD AUTO: 7.3 FL (ref 6–12)
PMV BLD AUTO: 8.4 FL (ref 8–14)
PMV BLD AUTO: 8.5 FL (ref 8.1–13.5)
POTASSIUM SERPL-SCNC: 3.9 MMOL/L (ref 3.7–5.3)
POTASSIUM SERPL-SCNC: 4.2 MMOL/L (ref 3.7–5.3)
POTASSIUM SERPL-SCNC: 4.3 MMOL/L (ref 3.7–5.3)
POTASSIUM SERPL-SCNC: 4.3 MMOL/L (ref 3.7–5.3)
POTASSIUM SERPL-SCNC: 4.4 MMOL/L (ref 3.7–5.3)
POTASSIUM SERPL-SCNC: 4.5 MMOL/L (ref 3.7–5.3)
POTASSIUM SERPL-SCNC: 4.6 MMOL/L (ref 3.7–5.3)
POTASSIUM SERPL-SCNC: 4.6 MMOL/L (ref 3.7–5.3)
POTASSIUM SERPL-SCNC: 4.7 MMOL/L (ref 3.7–5.3)
POTASSIUM SERPL-SCNC: 4.8 MMOL/L (ref 3.7–5.3)
POTASSIUM SERPL-SCNC: 4.9 MMOL/L (ref 3.7–5.3)
POTASSIUM SERPL-SCNC: 4.9 MMOL/L (ref 3.7–5.3)
POTASSIUM SERPL-SCNC: 5 MMOL/L (ref 3.7–5.3)
POTASSIUM SERPL-SCNC: 5.1 MMOL/L (ref 3.7–5.3)
POTASSIUM SERPL-SCNC: 5.2 MMOL/L (ref 3.7–5.3)
PREGABALIN: NOT DETECTED
PRO-BNP: 65 PG/ML
PROTEIN UA: ABNORMAL
PROTEIN UA: ABNORMAL
PROTEIN UA: NEGATIVE
PROTHROMBIN TIME: 14.8 SEC (ref 11.5–14.2)
RBC # BLD: 3.03 M/UL (ref 4.5–5.9)
RBC # BLD: 3.31 M/UL (ref 4.5–5.9)
RBC # BLD: 3.33 M/UL (ref 4.5–5.9)
RBC # BLD: 3.66 M/UL (ref 4.5–5.9)
RBC # BLD: 3.79 M/UL (ref 4.5–5.9)
RBC # BLD: 3.8 M/UL (ref 4.5–5.9)
RBC # BLD: 3.81 M/UL (ref 4.5–5.9)
RBC # BLD: 3.89 M/UL (ref 4.5–5.9)
RBC # BLD: 3.9 M/UL (ref 4.5–5.9)
RBC # BLD: 4.1 M/UL (ref 4.5–5.9)
RBC # BLD: 4.12 M/UL (ref 4.5–5.9)
RBC # BLD: 4.14 M/UL (ref 4.5–5.9)
RBC # BLD: 4.15 M/UL (ref 4.5–5.9)
RBC # BLD: 4.17 M/UL (ref 4.21–5.77)
RBC # BLD: 4.17 M/UL (ref 4.5–5.9)
RBC # BLD: 4.33 M/UL (ref 4.5–5.9)
RBC # BLD: 4.42 M/UL (ref 4.5–5.9)
RBC # BLD: 4.62 M/UL (ref 4.5–5.9)
RBC # BLD: 4.85 M/UL (ref 4.5–5.9)
RBC # BLD: ABNORMAL 10*6/UL
SARS-COV-2, RAPID: NOT DETECTED
SEG NEUTROPHILS: 67 % (ref 36–66)
SEG NEUTROPHILS: 70 % (ref 36–66)
SEG NEUTROPHILS: 70 % (ref 36–66)
SEG NEUTROPHILS: 73 % (ref 36–66)
SEG NEUTROPHILS: 75 % (ref 36–66)
SEG NEUTROPHILS: 76 % (ref 36–66)
SEG NEUTROPHILS: 77 % (ref 36–66)
SEG NEUTROPHILS: 79 % (ref 36–66)
SEG NEUTROPHILS: 81 % (ref 36–66)
SEG NEUTROPHILS: 84 % (ref 36–66)
SEG NEUTROPHILS: 85 % (ref 36–66)
SEG NEUTROPHILS: 85 % (ref 36–66)
SEG NEUTROPHILS: 86 % (ref 36–66)
SEG NEUTROPHILS: 91 % (ref 36–66)
SEG NEUTROPHILS: 92 % (ref 36–66)
SEG NEUTROPHILS: 92 % (ref 36–66)
SEG NEUTROPHILS: 94 % (ref 36–66)
SEG NEUTROPHILS: 94 % (ref 36–66)
SEGMENTED NEUTROPHILS ABSOLUTE COUNT: 1.7 K/UL (ref 1.8–7.7)
SEGMENTED NEUTROPHILS ABSOLUTE COUNT: 11.4 K/UL (ref 1.8–7.7)
SEGMENTED NEUTROPHILS ABSOLUTE COUNT: 3.8 K/UL (ref 1.8–7.7)
SEGMENTED NEUTROPHILS ABSOLUTE COUNT: 4.1 K/UL (ref 1.8–7.7)
SEGMENTED NEUTROPHILS ABSOLUTE COUNT: 4.27 K/UL (ref 1.8–7.7)
SEGMENTED NEUTROPHILS ABSOLUTE COUNT: 4.3 K/UL (ref 1.8–7.7)
SEGMENTED NEUTROPHILS ABSOLUTE COUNT: 4.98 K/UL (ref 1.8–7.7)
SEGMENTED NEUTROPHILS ABSOLUTE COUNT: 5.08 K/UL (ref 1.8–7.7)
SEGMENTED NEUTROPHILS ABSOLUTE COUNT: 5.15 K/UL (ref 1.8–7.7)
SEGMENTED NEUTROPHILS ABSOLUTE COUNT: 5.39 K/UL (ref 1.8–7.7)
SEGMENTED NEUTROPHILS ABSOLUTE COUNT: 5.4 K/UL (ref 1.8–7.7)
SEGMENTED NEUTROPHILS ABSOLUTE COUNT: 5.7 K/UL (ref 1.8–7.7)
SEGMENTED NEUTROPHILS ABSOLUTE COUNT: 6 K/UL (ref 1.8–7.7)
SEGMENTED NEUTROPHILS ABSOLUTE COUNT: 6.8 K/UL (ref 1.8–7.7)
SEGMENTED NEUTROPHILS ABSOLUTE COUNT: 7.2 K/UL (ref 1.8–7.7)
SEGMENTED NEUTROPHILS ABSOLUTE COUNT: 8.5 K/UL (ref 1.8–7.7)
SEGMENTED NEUTROPHILS ABSOLUTE COUNT: 8.84 K/UL (ref 1.8–7.7)
SEGMENTED NEUTROPHILS ABSOLUTE COUNT: 9.65 K/UL (ref 1.8–7.7)
SODIUM BLD-SCNC: 129 MMOL/L (ref 135–144)
SODIUM BLD-SCNC: 131 MMOL/L (ref 135–144)
SODIUM BLD-SCNC: 131 MMOL/L (ref 135–144)
SODIUM BLD-SCNC: 132 MMOL/L (ref 135–144)
SODIUM BLD-SCNC: 133 MMOL/L (ref 135–144)
SODIUM BLD-SCNC: 135 MMOL/L (ref 135–144)
SODIUM BLD-SCNC: 136 MMOL/L (ref 135–144)
SODIUM BLD-SCNC: 137 MMOL/L (ref 135–144)
SODIUM BLD-SCNC: 138 MMOL/L (ref 135–144)
SODIUM BLD-SCNC: 138 MMOL/L (ref 135–144)
SODIUM BLD-SCNC: 139 MMOL/L (ref 135–144)
SPECIMEN DESCRIPTION: NORMAL
T3 FREE: 1.89 PG/ML (ref 2.02–4.43)
TAPENTADOL, URINE: NOT DETECTED
TAPENTADOL-O-SULFATE, URINE: NOT DETECTED
TEMAZEPAM, URINE: NOT DETECTED
THYROXINE, FREE: 0.53 NG/DL (ref 0.93–1.7)
THYROXINE, FREE: 1.08 NG/DL (ref 0.93–1.7)
TOTAL PROTEIN: 6.8 G/DL (ref 6.4–8.3)
TOTAL PROTEIN: 6.8 G/DL (ref 6.4–8.3)
TOTAL PROTEIN: 7 G/DL (ref 6.4–8.3)
TOTAL PROTEIN: 7.1 G/DL (ref 6.4–8.3)
TOTAL PROTEIN: 7.2 G/DL (ref 6.4–8.3)
TOTAL PROTEIN: 7.4 G/DL (ref 6.4–8.3)
TOTAL PROTEIN: 7.4 G/DL (ref 6.4–8.3)
TOTAL PROTEIN: 7.6 G/DL (ref 6.4–8.3)
TOTAL PROTEIN: 7.8 G/DL (ref 6.4–8.3)
TOTAL PROTEIN: 8 G/DL (ref 6.4–8.3)
TOTAL PROTEIN: 8.6 G/DL (ref 6.4–8.3)
TOTAL PROTEIN: 8.7 G/DL (ref 6.4–8.3)
TOTAL PROTEIN: 9.2 G/DL (ref 6.4–8.3)
TRAMADOL, URINE: NOT DETECTED
TRIGL SERPL-MCNC: 84 MG/DL
TROPONIN INTERP: NORMAL
TROPONIN T: NORMAL NG/ML
TROPONIN, HIGH SENSITIVITY: 6 NG/L (ref 0–22)
TROPONIN, HIGH SENSITIVITY: 8 NG/L (ref 0–22)
TROPONIN, HIGH SENSITIVITY: 8 NG/L (ref 0–22)
TROPONIN, HIGH SENSITIVITY: 9 NG/L (ref 0–22)
TROPONIN, HIGH SENSITIVITY: <6 NG/L (ref 0–22)
TROPONIN, HIGH SENSITIVITY: <6 NG/L (ref 0–22)
TSH SERPL DL<=0.05 MIU/L-ACNC: 11.45 MIU/L (ref 0.3–5)
TSH SERPL DL<=0.05 MIU/L-ACNC: 35.93 MIU/L (ref 0.3–5)
TSH SERPL DL<=0.05 MIU/L-ACNC: 37.05 MIU/L (ref 0.3–5)
TSH SERPL DL<=0.05 MIU/L-ACNC: 37.32 MIU/L (ref 0.3–5)
TSH SERPL DL<=0.05 MIU/L-ACNC: 41.26 MIU/L (ref 0.3–5)
TSH SERPL DL<=0.05 MIU/L-ACNC: 57.17 MIU/L (ref 0.3–5)
TSH SERPL DL<=0.05 MIU/L-ACNC: 57.49 MIU/L (ref 0.3–5)
VLDLC SERPL CALC-MCNC: ABNORMAL MG/DL (ref 1–30)
WBC # BLD: 10 K/UL (ref 3.5–11)
WBC # BLD: 10.5 K/UL (ref 3.5–11.3)
WBC # BLD: 13.5 K/UL (ref 3.5–11)
WBC # BLD: 2.1 K/UL (ref 3.5–11)
WBC # BLD: 2.1 K/UL (ref 3.5–11)
WBC # BLD: 4.7 K/UL (ref 3.5–11)
WBC # BLD: 5 K/UL (ref 3.5–11)
WBC # BLD: 5.1 K/UL (ref 3.5–11)
WBC # BLD: 5.4 K/UL (ref 3.5–11)
WBC # BLD: 5.4 K/UL (ref 3.5–11)
WBC # BLD: 5.9 K/UL (ref 3.5–11)
WBC # BLD: 6.7 K/UL (ref 3.5–11)
WBC # BLD: 6.9 K/UL (ref 3.5–11)
WBC # BLD: 7 K/UL (ref 3.5–11)
WBC # BLD: 8.2 K/UL (ref 3.5–11)
WBC # BLD: 8.5 K/UL (ref 3.5–11)
WBC # BLD: 8.6 K/UL (ref 3.5–11)
WBC # BLD: 9.4 K/UL (ref 3.5–11)
WBC # BLD: 9.9 K/UL (ref 3.5–11)
WBC # BLD: ABNORMAL 10*3/UL
ZOLPIDEM METABOLITE (ZCA), URINE: NOT DETECTED
ZOLPIDEM, URINE: NOT DETECTED

## 2021-01-01 PROCEDURE — 6360000002 HC RX W HCPCS: Performed by: INTERNAL MEDICINE

## 2021-01-01 PROCEDURE — 83735 ASSAY OF MAGNESIUM: CPT

## 2021-01-01 PROCEDURE — 77427 RADIATION TX MANAGEMENT X5: CPT | Performed by: RADIOLOGY

## 2021-01-01 PROCEDURE — 3023F SPIROM DOC REV: CPT | Performed by: INTERNAL MEDICINE

## 2021-01-01 PROCEDURE — 3017F COLORECTAL CA SCREEN DOC REV: CPT | Performed by: INTERNAL MEDICINE

## 2021-01-01 PROCEDURE — 99213 OFFICE O/P EST LOW 20 MIN: CPT | Performed by: RADIOLOGY

## 2021-01-01 PROCEDURE — 71260 CT THORAX DX C+: CPT

## 2021-01-01 PROCEDURE — 77334 RADIATION TREATMENT AID(S): CPT | Performed by: RADIOLOGY

## 2021-01-01 PROCEDURE — 85027 COMPLETE CBC AUTOMATED: CPT

## 2021-01-01 PROCEDURE — G8417 CALC BMI ABV UP PARAM F/U: HCPCS | Performed by: NURSE PRACTITIONER

## 2021-01-01 PROCEDURE — 36415 COLL VENOUS BLD VENIPUNCTURE: CPT

## 2021-01-01 PROCEDURE — 4004F PT TOBACCO SCREEN RCVD TLK: CPT | Performed by: NURSE PRACTITIONER

## 2021-01-01 PROCEDURE — 77014 PR CT GUIDANCE PLACEMENT RAD THERAPY FIELDS: CPT | Performed by: RADIOLOGY

## 2021-01-01 PROCEDURE — G8417 CALC BMI ABV UP PARAM F/U: HCPCS | Performed by: INTERNAL MEDICINE

## 2021-01-01 PROCEDURE — 78452 HT MUSCLE IMAGE SPECT MULT: CPT

## 2021-01-01 PROCEDURE — 96372 THER/PROPH/DIAG INJ SC/IM: CPT | Performed by: NURSE PRACTITIONER

## 2021-01-01 PROCEDURE — 80053 COMPREHEN METABOLIC PANEL: CPT

## 2021-01-01 PROCEDURE — 36561 INSERT TUNNELED CV CATH: CPT | Performed by: RADIOLOGY

## 2021-01-01 PROCEDURE — 96375 TX/PRO/DX INJ NEW DRUG ADDON: CPT

## 2021-01-01 PROCEDURE — 2580000003 HC RX 258: Performed by: INTERNAL MEDICINE

## 2021-01-01 PROCEDURE — 96411 CHEMO IV PUSH ADDL DRUG: CPT

## 2021-01-01 PROCEDURE — 90674 CCIIV4 VAC NO PRSV 0.5 ML IM: CPT | Performed by: NURSE PRACTITIONER

## 2021-01-01 PROCEDURE — 93005 ELECTROCARDIOGRAM TRACING: CPT | Performed by: EMERGENCY MEDICINE

## 2021-01-01 PROCEDURE — 77336 RADIATION PHYSICS CONSULT: CPT | Performed by: RADIOLOGY

## 2021-01-01 PROCEDURE — 77412 RADIATION TX DELIVERY LVL 3: CPT | Performed by: STUDENT IN AN ORGANIZED HEALTH CARE EDUCATION/TRAINING PROGRAM

## 2021-01-01 PROCEDURE — 85025 COMPLETE CBC W/AUTO DIFF WBC: CPT

## 2021-01-01 PROCEDURE — 99214 OFFICE O/P EST MOD 30 MIN: CPT | Performed by: INTERNAL MEDICINE

## 2021-01-01 PROCEDURE — 77386 HC NTSTY MODUL RAD TX DLVR CPLX: CPT | Performed by: RADIOLOGY

## 2021-01-01 PROCEDURE — 99215 OFFICE O/P EST HI 40 MIN: CPT | Performed by: INTERNAL MEDICINE

## 2021-01-01 PROCEDURE — 96413 CHEMO IV INFUSION 1 HR: CPT | Performed by: NURSE PRACTITIONER

## 2021-01-01 PROCEDURE — 83605 ASSAY OF LACTIC ACID: CPT

## 2021-01-01 PROCEDURE — 4004F PT TOBACCO SCREEN RCVD TLK: CPT | Performed by: INTERNAL MEDICINE

## 2021-01-01 PROCEDURE — 96372 THER/PROPH/DIAG INJ SC/IM: CPT

## 2021-01-01 PROCEDURE — 99213 OFFICE O/P EST LOW 20 MIN: CPT | Performed by: STUDENT IN AN ORGANIZED HEALTH CARE EDUCATION/TRAINING PROGRAM

## 2021-01-01 PROCEDURE — 36591 DRAW BLOOD OFF VENOUS DEVICE: CPT

## 2021-01-01 PROCEDURE — 82533 TOTAL CORTISOL: CPT

## 2021-01-01 PROCEDURE — 77280 THER RAD SIMULAJ FIELD SMPL: CPT | Performed by: STUDENT IN AN ORGANIZED HEALTH CARE EDUCATION/TRAINING PROGRAM

## 2021-01-01 PROCEDURE — 83880 ASSAY OF NATRIURETIC PEPTIDE: CPT

## 2021-01-01 PROCEDURE — G8482 FLU IMMUNIZE ORDER/ADMIN: HCPCS | Performed by: NURSE PRACTITIONER

## 2021-01-01 PROCEDURE — 3023F SPIROM DOC REV: CPT | Performed by: NURSE PRACTITIONER

## 2021-01-01 PROCEDURE — 6370000000 HC RX 637 (ALT 250 FOR IP): Performed by: NURSE PRACTITIONER

## 2021-01-01 PROCEDURE — G8482 FLU IMMUNIZE ORDER/ADMIN: HCPCS | Performed by: INTERNAL MEDICINE

## 2021-01-01 PROCEDURE — 77387 GUIDANCE FOR RADJ TX DLVR: CPT | Performed by: STUDENT IN AN ORGANIZED HEALTH CARE EDUCATION/TRAINING PROGRAM

## 2021-01-01 PROCEDURE — 82150 ASSAY OF AMYLASE: CPT

## 2021-01-01 PROCEDURE — 2022F DILAT RTA XM EVC RTNOPTHY: CPT | Performed by: NURSE PRACTITIONER

## 2021-01-01 PROCEDURE — 71045 X-RAY EXAM CHEST 1 VIEW: CPT

## 2021-01-01 PROCEDURE — 96523 IRRIG DRUG DELIVERY DEVICE: CPT

## 2021-01-01 PROCEDURE — 96415 CHEMO IV INFUSION ADDL HR: CPT

## 2021-01-01 PROCEDURE — 77300 RADIATION THERAPY DOSE PLAN: CPT | Performed by: RADIOLOGY

## 2021-01-01 PROCEDURE — 84443 ASSAY THYROID STIM HORMONE: CPT

## 2021-01-01 PROCEDURE — 96417 CHEMO IV INFUS EACH ADDL SEQ: CPT

## 2021-01-01 PROCEDURE — G6002 STEREOSCOPIC X-RAY GUIDANCE: HCPCS | Performed by: STUDENT IN AN ORGANIZED HEALTH CARE EDUCATION/TRAINING PROGRAM

## 2021-01-01 PROCEDURE — 99211 OFF/OP EST MAY X REQ PHY/QHP: CPT | Performed by: INTERNAL MEDICINE

## 2021-01-01 PROCEDURE — 2500000003 HC RX 250 WO HCPCS: Performed by: INTERNAL MEDICINE

## 2021-01-01 PROCEDURE — 99214 OFFICE O/P EST MOD 30 MIN: CPT | Performed by: NURSE PRACTITIONER

## 2021-01-01 PROCEDURE — G8427 DOCREV CUR MEDS BY ELIG CLIN: HCPCS | Performed by: INTERNAL MEDICINE

## 2021-01-01 PROCEDURE — G8427 DOCREV CUR MEDS BY ELIG CLIN: HCPCS | Performed by: NURSE PRACTITIONER

## 2021-01-01 PROCEDURE — 6360000004 HC RX CONTRAST MEDICATION: Performed by: INTERNAL MEDICINE

## 2021-01-01 PROCEDURE — 73552 X-RAY EXAM OF FEMUR 2/>: CPT

## 2021-01-01 PROCEDURE — 6360000002 HC RX W HCPCS: Performed by: NURSE PRACTITIONER

## 2021-01-01 PROCEDURE — 80061 LIPID PANEL: CPT

## 2021-01-01 PROCEDURE — 83690 ASSAY OF LIPASE: CPT

## 2021-01-01 PROCEDURE — 3044F HG A1C LEVEL LT 7.0%: CPT | Performed by: NURSE PRACTITIONER

## 2021-01-01 PROCEDURE — 77001 FLUOROGUIDE FOR VEIN DEVICE: CPT | Performed by: RADIOLOGY

## 2021-01-01 PROCEDURE — 99211 OFF/OP EST MAY X REQ PHY/QHP: CPT

## 2021-01-01 PROCEDURE — 77307 TELETHX ISODOSE PLAN CPLX: CPT | Performed by: RADIOLOGY

## 2021-01-01 PROCEDURE — 77290 THER RAD SIMULAJ FIELD CPLX: CPT | Performed by: STUDENT IN AN ORGANIZED HEALTH CARE EDUCATION/TRAINING PROGRAM

## 2021-01-01 PROCEDURE — G0378 HOSPITAL OBSERVATION PER HR: HCPCS

## 2021-01-01 PROCEDURE — 83036 HEMOGLOBIN GLYCOSYLATED A1C: CPT | Performed by: NURSE PRACTITIONER

## 2021-01-01 PROCEDURE — 81003 URINALYSIS AUTO W/O SCOPE: CPT

## 2021-01-01 PROCEDURE — 3017F COLORECTAL CA SCREEN DOC REV: CPT | Performed by: NURSE PRACTITIONER

## 2021-01-01 PROCEDURE — 77301 RADIOTHERAPY DOSE PLAN IMRT: CPT | Performed by: RADIOLOGY

## 2021-01-01 PROCEDURE — 85610 PROTHROMBIN TIME: CPT

## 2021-01-01 PROCEDURE — G8926 SPIRO NO PERF OR DOC: HCPCS | Performed by: NURSE PRACTITIONER

## 2021-01-01 PROCEDURE — 84484 ASSAY OF TROPONIN QUANT: CPT

## 2021-01-01 PROCEDURE — 96417 CHEMO IV INFUS EACH ADDL SEQ: CPT | Performed by: NURSE PRACTITIONER

## 2021-01-01 PROCEDURE — 94640 AIRWAY INHALATION TREATMENT: CPT

## 2021-01-01 PROCEDURE — 99223 1ST HOSP IP/OBS HIGH 75: CPT | Performed by: INTERNAL MEDICINE

## 2021-01-01 PROCEDURE — 94760 N-INVAS EAR/PLS OXIMETRY 1: CPT

## 2021-01-01 PROCEDURE — 96413 CHEMO IV INFUSION 1 HR: CPT

## 2021-01-01 PROCEDURE — 96375 TX/PRO/DX INJ NEW DRUG ADDON: CPT | Performed by: NURSE PRACTITIONER

## 2021-01-01 PROCEDURE — 77470 SPECIAL RADIATION TREATMENT: CPT | Performed by: RADIOLOGY

## 2021-01-01 PROCEDURE — 7100000011 HC PHASE II RECOVERY - ADDTL 15 MIN

## 2021-01-01 PROCEDURE — 99204 OFFICE O/P NEW MOD 45 MIN: CPT | Performed by: INTERNAL MEDICINE

## 2021-01-01 PROCEDURE — 77334 RADIATION TREATMENT AID(S): CPT | Performed by: STUDENT IN AN ORGANIZED HEALTH CARE EDUCATION/TRAINING PROGRAM

## 2021-01-01 PROCEDURE — 83036 HEMOGLOBIN GLYCOSYLATED A1C: CPT

## 2021-01-01 PROCEDURE — 96401 CHEMO ANTI-NEOPL SQ/IM: CPT

## 2021-01-01 PROCEDURE — 70491 CT SOFT TISSUE NECK W/DYE: CPT

## 2021-01-01 PROCEDURE — 77417 THER RADIOLOGY PORT IMAGE(S): CPT | Performed by: STUDENT IN AN ORGANIZED HEALTH CARE EDUCATION/TRAINING PROGRAM

## 2021-01-01 PROCEDURE — 1111F DSCHRG MED/CURRENT MED MERGE: CPT | Performed by: NURSE PRACTITIONER

## 2021-01-01 PROCEDURE — 6360000004 HC RX CONTRAST MEDICATION: Performed by: EMERGENCY MEDICINE

## 2021-01-01 PROCEDURE — G8926 SPIRO NO PERF OR DOC: HCPCS | Performed by: INTERNAL MEDICINE

## 2021-01-01 PROCEDURE — 76937 US GUIDE VASCULAR ACCESS: CPT | Performed by: RADIOLOGY

## 2021-01-01 PROCEDURE — 99153 MOD SED SAME PHYS/QHP EA: CPT | Performed by: RADIOLOGY

## 2021-01-01 PROCEDURE — 2580000003 HC RX 258: Performed by: NURSE PRACTITIONER

## 2021-01-01 PROCEDURE — APPSS60 APP SPLIT SHARED TIME 46-60 MINUTES: Performed by: NURSE PRACTITIONER

## 2021-01-01 PROCEDURE — 80048 BASIC METABOLIC PNL TOTAL CA: CPT

## 2021-01-01 PROCEDURE — 93017 CV STRESS TEST TRACING ONLY: CPT

## 2021-01-01 PROCEDURE — 82947 ASSAY GLUCOSE BLOOD QUANT: CPT

## 2021-01-01 PROCEDURE — 99204 OFFICE O/P NEW MOD 45 MIN: CPT | Performed by: RADIOLOGY

## 2021-01-01 PROCEDURE — 7100000010 HC PHASE II RECOVERY - FIRST 15 MIN

## 2021-01-01 PROCEDURE — 99219 PR INITIAL OBSERVATION CARE/DAY 50 MINUTES: CPT | Performed by: INTERNAL MEDICINE

## 2021-01-01 PROCEDURE — 84439 ASSAY OF FREE THYROXINE: CPT

## 2021-01-01 PROCEDURE — 6360000002 HC RX W HCPCS: Performed by: RADIOLOGY

## 2021-01-01 PROCEDURE — G0008 ADMIN INFLUENZA VIRUS VAC: HCPCS | Performed by: NURSE PRACTITIONER

## 2021-01-01 PROCEDURE — C1788 PORT, INDWELLING, IMP: HCPCS

## 2021-01-01 PROCEDURE — 2580000003 HC RX 258: Performed by: EMERGENCY MEDICINE

## 2021-01-01 PROCEDURE — 77427 RADIATION TX MANAGEMENT X5: CPT | Performed by: STUDENT IN AN ORGANIZED HEALTH CARE EDUCATION/TRAINING PROGRAM

## 2021-01-01 PROCEDURE — 99999 PR OFFICE/OUTPT VISIT,PROCEDURE ONLY: CPT | Performed by: RADIOLOGY

## 2021-01-01 PROCEDURE — 85049 AUTOMATED PLATELET COUNT: CPT

## 2021-01-01 PROCEDURE — 99215 OFFICE O/P EST HI 40 MIN: CPT | Performed by: NURSE PRACTITIONER

## 2021-01-01 PROCEDURE — G8484 FLU IMMUNIZE NO ADMIN: HCPCS | Performed by: INTERNAL MEDICINE

## 2021-01-01 PROCEDURE — 77263 THER RADIOLOGY TX PLNG CPLX: CPT | Performed by: RADIOLOGY

## 2021-01-01 PROCEDURE — 99212 OFFICE O/P EST SF 10 MIN: CPT | Performed by: RADIOLOGY

## 2021-01-01 PROCEDURE — 6360000002 HC RX W HCPCS: Performed by: EMERGENCY MEDICINE

## 2021-01-01 PROCEDURE — 99211 OFF/OP EST MAY X REQ PHY/QHP: CPT | Performed by: STUDENT IN AN ORGANIZED HEALTH CARE EDUCATION/TRAINING PROGRAM

## 2021-01-01 PROCEDURE — 3430000000 HC RX DIAGNOSTIC RADIOPHARMACEUTICAL: Performed by: NURSE PRACTITIONER

## 2021-01-01 PROCEDURE — 77263 THER RADIOLOGY TX PLNG CPLX: CPT | Performed by: STUDENT IN AN ORGANIZED HEALTH CARE EDUCATION/TRAINING PROGRAM

## 2021-01-01 PROCEDURE — 96374 THER/PROPH/DIAG INJ IV PUSH: CPT

## 2021-01-01 PROCEDURE — 87635 SARS-COV-2 COVID-19 AMP PRB: CPT

## 2021-01-01 PROCEDURE — 77336 RADIATION PHYSICS CONSULT: CPT | Performed by: STUDENT IN AN ORGANIZED HEALTH CARE EDUCATION/TRAINING PROGRAM

## 2021-01-01 PROCEDURE — 99285 EMERGENCY DEPT VISIT HI MDM: CPT

## 2021-01-01 PROCEDURE — 74177 CT ABD & PELVIS W/CONTRAST: CPT

## 2021-01-01 PROCEDURE — 4130F TOPICAL PREP RX AOE: CPT | Performed by: NURSE PRACTITIONER

## 2021-01-01 PROCEDURE — 6360000004 HC RX CONTRAST MEDICATION: Performed by: RADIOLOGY

## 2021-01-01 PROCEDURE — 77338 DESIGN MLC DEVICE FOR IMRT: CPT | Performed by: RADIOLOGY

## 2021-01-01 PROCEDURE — 93005 ELECTROCARDIOGRAM TRACING: CPT | Performed by: NURSE PRACTITIONER

## 2021-01-01 PROCEDURE — 2580000003 HC RX 258: Performed by: RADIOLOGY

## 2021-01-01 PROCEDURE — 93971 EXTREMITY STUDY: CPT

## 2021-01-01 PROCEDURE — 6370000000 HC RX 637 (ALT 250 FOR IP): Performed by: EMERGENCY MEDICINE

## 2021-01-01 PROCEDURE — 36591 DRAW BLOOD OFF VENOUS DEVICE: CPT | Performed by: NURSE PRACTITIONER

## 2021-01-01 PROCEDURE — 99496 TRANSJ CARE MGMT HIGH F2F 7D: CPT | Performed by: NURSE PRACTITIONER

## 2021-01-01 PROCEDURE — 99283 EMERGENCY DEPT VISIT LOW MDM: CPT

## 2021-01-01 PROCEDURE — A9500 TC99M SESTAMIBI: HCPCS | Performed by: NURSE PRACTITIONER

## 2021-01-01 PROCEDURE — 85730 THROMBOPLASTIN TIME PARTIAL: CPT

## 2021-01-01 PROCEDURE — 99212 OFFICE O/P EST SF 10 MIN: CPT

## 2021-01-01 PROCEDURE — 80307 DRUG TEST PRSMV CHEM ANLYZR: CPT

## 2021-01-01 PROCEDURE — 99152 MOD SED SAME PHYS/QHP 5/>YRS: CPT | Performed by: RADIOLOGY

## 2021-01-01 PROCEDURE — 96415 CHEMO IV INFUSION ADDL HR: CPT | Performed by: NURSE PRACTITIONER

## 2021-01-01 PROCEDURE — 99999 PR OFFICE/OUTPT VISIT,PROCEDURE ONLY: CPT | Performed by: STUDENT IN AN ORGANIZED HEALTH CARE EDUCATION/TRAINING PROGRAM

## 2021-01-01 RX ORDER — PALONOSETRON 0.05 MG/ML
0.25 INJECTION, SOLUTION INTRAVENOUS ONCE
Status: COMPLETED | OUTPATIENT
Start: 2021-01-01 | End: 2021-01-01

## 2021-01-01 RX ORDER — SODIUM CHLORIDE 0.9 % (FLUSH) 0.9 %
10 SYRINGE (ML) INJECTION PRN
Status: DISCONTINUED | OUTPATIENT
Start: 2021-01-01 | End: 2021-01-01 | Stop reason: HOSPADM

## 2021-01-01 RX ORDER — METHYLPREDNISOLONE SODIUM SUCCINATE 125 MG/2ML
125 INJECTION, POWDER, LYOPHILIZED, FOR SOLUTION INTRAMUSCULAR; INTRAVENOUS ONCE
Status: CANCELLED | OUTPATIENT
Start: 2021-01-01 | End: 2021-01-01

## 2021-01-01 RX ORDER — SODIUM CHLORIDE 9 MG/ML
20 INJECTION, SOLUTION INTRAVENOUS ONCE
Status: COMPLETED | OUTPATIENT
Start: 2021-01-01 | End: 2021-01-01

## 2021-01-01 RX ORDER — ATORVASTATIN CALCIUM 40 MG/1
80 TABLET, FILM COATED ORAL EVERY EVENING
Status: DISCONTINUED | OUTPATIENT
Start: 2021-01-01 | End: 2021-01-01 | Stop reason: HOSPADM

## 2021-01-01 RX ORDER — FENTANYL CITRATE 50 UG/ML
INJECTION, SOLUTION INTRAMUSCULAR; INTRAVENOUS
Status: COMPLETED | OUTPATIENT
Start: 2021-01-01 | End: 2021-01-01

## 2021-01-01 RX ORDER — DEXAMETHASONE SODIUM PHOSPHATE 10 MG/ML
10 INJECTION INTRAMUSCULAR; INTRAVENOUS ONCE
Status: COMPLETED | OUTPATIENT
Start: 2021-01-01 | End: 2021-01-01

## 2021-01-01 RX ORDER — MAGNESIUM SULFATE 1 G/100ML
1000 INJECTION INTRAVENOUS PRN
Status: DISCONTINUED | OUTPATIENT
Start: 2021-01-01 | End: 2021-01-01 | Stop reason: HOSPADM

## 2021-01-01 RX ORDER — SODIUM CHLORIDE 0.9 % (FLUSH) 0.9 %
5 SYRINGE (ML) INJECTION PRN
Status: CANCELLED | OUTPATIENT
Start: 2021-01-01

## 2021-01-01 RX ORDER — BUDESONIDE, GLYCOPYRROLATE, AND FORMOTEROL FUMARATE 160; 9; 4.8 UG/1; UG/1; UG/1
2 AEROSOL, METERED RESPIRATORY (INHALATION) 2 TIMES DAILY
Qty: 1 EACH | Refills: 11 | Status: SHIPPED | OUTPATIENT
Start: 2021-01-01

## 2021-01-01 RX ORDER — IPRATROPIUM BROMIDE AND ALBUTEROL SULFATE 2.5; .5 MG/3ML; MG/3ML
1 SOLUTION RESPIRATORY (INHALATION) EVERY 6 HOURS PRN
Qty: 360 ML | Refills: 2 | Status: SHIPPED | OUTPATIENT
Start: 2021-01-01 | End: 2021-01-01 | Stop reason: ALTCHOICE

## 2021-01-01 RX ORDER — EPINEPHRINE 1 MG/ML
0.3 INJECTION, SOLUTION, CONCENTRATE INTRAVENOUS PRN
Status: CANCELLED | OUTPATIENT
Start: 2021-01-01

## 2021-01-01 RX ORDER — MONTELUKAST SODIUM 10 MG/1
10 TABLET ORAL DAILY
Qty: 30 TABLET | Refills: 3 | Status: SHIPPED | OUTPATIENT
Start: 2021-01-01

## 2021-01-01 RX ORDER — HEPARIN SODIUM (PORCINE) LOCK FLUSH IV SOLN 100 UNIT/ML 100 UNIT/ML
500 SOLUTION INTRAVENOUS PRN
Status: DISCONTINUED | OUTPATIENT
Start: 2021-01-01 | End: 2021-01-01 | Stop reason: HOSPADM

## 2021-01-01 RX ORDER — SODIUM CHLORIDE 0.9 % (FLUSH) 0.9 %
5-40 SYRINGE (ML) INJECTION PRN
Status: DISCONTINUED | OUTPATIENT
Start: 2021-01-01 | End: 2021-01-01 | Stop reason: HOSPADM

## 2021-01-01 RX ORDER — PACLITAXEL 100 MG/20ML
100 INJECTION, POWDER, LYOPHILIZED, FOR SUSPENSION INTRAVENOUS ONCE
Status: CANCELLED
Start: 2021-01-01 | End: 2021-01-01

## 2021-01-01 RX ORDER — SODIUM CHLORIDE 0.9 % (FLUSH) 0.9 %
10 SYRINGE (ML) INJECTION PRN
Status: CANCELLED | OUTPATIENT
Start: 2021-01-01

## 2021-01-01 RX ORDER — SODIUM CHLORIDE 9 MG/ML
25 INJECTION, SOLUTION INTRAVENOUS PRN
Status: CANCELLED | OUTPATIENT
Start: 2021-01-01

## 2021-01-01 RX ORDER — PALONOSETRON 0.05 MG/ML
0.25 INJECTION, SOLUTION INTRAVENOUS ONCE
Status: CANCELLED | OUTPATIENT
Start: 2021-01-01

## 2021-01-01 RX ORDER — PANTOPRAZOLE SODIUM 40 MG/1
40 TABLET, DELAYED RELEASE ORAL DAILY
Qty: 30 TABLET | Refills: 0 | Status: SHIPPED | OUTPATIENT
Start: 2021-01-01 | End: 2021-01-01 | Stop reason: ALTCHOICE

## 2021-01-01 RX ORDER — MORPHINE SULFATE 15 MG/1
15 TABLET, FILM COATED, EXTENDED RELEASE ORAL 2 TIMES DAILY
Qty: 28 TABLET | Refills: 0 | Status: SHIPPED | OUTPATIENT
Start: 2021-01-01 | End: 2021-01-01 | Stop reason: SINTOL

## 2021-01-01 RX ORDER — MEPERIDINE HYDROCHLORIDE 50 MG/ML
12.5 INJECTION INTRAMUSCULAR; INTRAVENOUS; SUBCUTANEOUS ONCE
Status: CANCELLED | OUTPATIENT
Start: 2021-01-01 | End: 2021-01-01

## 2021-01-01 RX ORDER — SODIUM CHLORIDE 9 MG/ML
20 INJECTION, SOLUTION INTRAVENOUS ONCE
Status: CANCELLED | OUTPATIENT
Start: 2021-01-01 | End: 2021-01-01

## 2021-01-01 RX ORDER — SODIUM CHLORIDE 0.9 % (FLUSH) 0.9 %
5-40 SYRINGE (ML) INJECTION PRN
Status: CANCELLED | OUTPATIENT
Start: 2021-01-01

## 2021-01-01 RX ORDER — POTASSIUM CHLORIDE 7.45 MG/ML
10 INJECTION INTRAVENOUS PRN
Status: DISCONTINUED | OUTPATIENT
Start: 2021-01-01 | End: 2021-01-01 | Stop reason: HOSPADM

## 2021-01-01 RX ORDER — CYANOCOBALAMIN 1000 UG/ML
1000 INJECTION INTRAMUSCULAR; SUBCUTANEOUS ONCE
Status: CANCELLED | OUTPATIENT
Start: 2021-01-01 | End: 2021-01-01

## 2021-01-01 RX ORDER — AZITHROMYCIN 500 MG/1
500 TABLET, FILM COATED ORAL DAILY
Qty: 3 TABLET | Refills: 0 | Status: SHIPPED | OUTPATIENT
Start: 2021-01-01 | End: 2021-01-01

## 2021-01-01 RX ORDER — PREDNISONE 10 MG/1
10 TABLET ORAL DAILY
Qty: 4 TABLET | Refills: 0 | Status: SHIPPED | OUTPATIENT
Start: 2021-01-01 | End: 2021-01-01

## 2021-01-01 RX ORDER — CYANOCOBALAMIN 1000 UG/ML
1000 INJECTION INTRAMUSCULAR; SUBCUTANEOUS ONCE
Status: COMPLETED | OUTPATIENT
Start: 2021-01-01 | End: 2021-01-01

## 2021-01-01 RX ORDER — SODIUM CHLORIDE 9 MG/ML
25 INJECTION, SOLUTION INTRAVENOUS PRN
Status: DISCONTINUED | OUTPATIENT
Start: 2021-01-01 | End: 2021-01-01 | Stop reason: HOSPADM

## 2021-01-01 RX ORDER — DEXAMETHASONE SODIUM PHOSPHATE 10 MG/ML
10 INJECTION, SOLUTION INTRAMUSCULAR; INTRAVENOUS ONCE
Status: COMPLETED | OUTPATIENT
Start: 2021-01-01 | End: 2021-01-01

## 2021-01-01 RX ORDER — DIPHENHYDRAMINE HYDROCHLORIDE 50 MG/ML
50 INJECTION INTRAMUSCULAR; INTRAVENOUS ONCE
Status: CANCELLED | OUTPATIENT
Start: 2021-01-01 | End: 2021-01-01

## 2021-01-01 RX ORDER — ALBUTEROL SULFATE 90 UG/1
2 AEROSOL, METERED RESPIRATORY (INHALATION) PRN
Status: DISCONTINUED | OUTPATIENT
Start: 2021-01-01 | End: 2021-01-01 | Stop reason: ALTCHOICE

## 2021-01-01 RX ORDER — SODIUM CHLORIDE 9 MG/ML
INJECTION, SOLUTION INTRAVENOUS CONTINUOUS
Status: CANCELLED | OUTPATIENT
Start: 2021-01-01

## 2021-01-01 RX ORDER — FENTANYL CITRATE 50 UG/ML
50 INJECTION, SOLUTION INTRAMUSCULAR; INTRAVENOUS ONCE
Status: COMPLETED | OUTPATIENT
Start: 2021-01-01 | End: 2021-01-01

## 2021-01-01 RX ORDER — CYANOCOBALAMIN 1000 UG/ML
1000 INJECTION INTRAMUSCULAR; SUBCUTANEOUS ONCE
Status: DISCONTINUED | OUTPATIENT
Start: 2021-01-01 | End: 2021-01-01 | Stop reason: HOSPADM

## 2021-01-01 RX ORDER — METHYLPREDNISOLONE ACETATE 80 MG/ML
80 INJECTION, SUSPENSION INTRA-ARTICULAR; INTRALESIONAL; INTRAMUSCULAR; SOFT TISSUE ONCE
Status: COMPLETED | OUTPATIENT
Start: 2021-01-01 | End: 2021-01-01

## 2021-01-01 RX ORDER — SODIUM CHLORIDE 0.9 % (FLUSH) 0.9 %
5-40 SYRINGE (ML) INJECTION EVERY 12 HOURS SCHEDULED
Status: DISCONTINUED | OUTPATIENT
Start: 2021-01-01 | End: 2021-01-01 | Stop reason: HOSPADM

## 2021-01-01 RX ORDER — BUDESONIDE, GLYCOPYRROLATE, AND FORMOTEROL FUMARATE 160; 9; 4.8 UG/1; UG/1; UG/1
2 AEROSOL, METERED RESPIRATORY (INHALATION) 2 TIMES DAILY
COMMUNITY
End: 2021-01-01 | Stop reason: SDUPTHER

## 2021-01-01 RX ORDER — OXYCODONE HYDROCHLORIDE AND ACETAMINOPHEN 5; 325 MG/1; MG/1
2 TABLET ORAL ONCE
Status: COMPLETED | OUTPATIENT
Start: 2021-01-01 | End: 2021-01-01

## 2021-01-01 RX ORDER — IPRATROPIUM BROMIDE AND ALBUTEROL SULFATE 2.5; .5 MG/3ML; MG/3ML
1 SOLUTION RESPIRATORY (INHALATION) EVERY 6 HOURS PRN
Status: DISCONTINUED | OUTPATIENT
Start: 2021-01-01 | End: 2021-01-01

## 2021-01-01 RX ORDER — OXYCODONE HCL 20 MG/1
20 TABLET, FILM COATED, EXTENDED RELEASE ORAL EVERY 12 HOURS
Qty: 14 TABLET | Refills: 0 | Status: SHIPPED | OUTPATIENT
Start: 2021-01-01 | End: 2021-01-01 | Stop reason: SDUPTHER

## 2021-01-01 RX ORDER — SENNA PLUS 8.6 MG/1
1 TABLET ORAL 2 TIMES DAILY
Qty: 60 TABLET | Refills: 1 | Status: SHIPPED | OUTPATIENT
Start: 2021-01-01 | End: 2021-12-18

## 2021-01-01 RX ORDER — HYDROMORPHONE HYDROCHLORIDE 1 MG/ML
1 INJECTION, SOLUTION INTRAMUSCULAR; INTRAVENOUS; SUBCUTANEOUS ONCE
Status: COMPLETED | OUTPATIENT
Start: 2021-01-01 | End: 2021-01-01

## 2021-01-01 RX ORDER — ASPIRIN 81 MG/1
81 TABLET, CHEWABLE ORAL DAILY
Status: DISCONTINUED | OUTPATIENT
Start: 2021-01-01 | End: 2021-01-01 | Stop reason: HOSPADM

## 2021-01-01 RX ORDER — FLUCONAZOLE 100 MG/1
100 TABLET ORAL DAILY
Qty: 7 TABLET | Refills: 0 | Status: SHIPPED | OUTPATIENT
Start: 2021-01-01 | End: 2021-01-01

## 2021-01-01 RX ORDER — PROMETHAZINE HYDROCHLORIDE AND CODEINE PHOSPHATE 6.25; 1 MG/5ML; MG/5ML
5 SYRUP ORAL 4 TIMES DAILY PRN
Qty: 240 ML | Refills: 2 | Status: SHIPPED | OUTPATIENT
Start: 2021-01-01 | End: 2021-01-01

## 2021-01-01 RX ORDER — PACLITAXEL 100 MG/20ML
45 INJECTION, POWDER, LYOPHILIZED, FOR SUSPENSION INTRAVENOUS ONCE
Status: CANCELLED
Start: 2021-01-01 | End: 2021-01-01

## 2021-01-01 RX ORDER — OXYCODONE AND ACETAMINOPHEN 10; 325 MG/1; MG/1
1 TABLET ORAL EVERY 6 HOURS PRN
Qty: 60 TABLET | Refills: 0 | Status: SHIPPED | OUTPATIENT
Start: 2021-01-01 | End: 2021-01-01

## 2021-01-01 RX ORDER — HEPARIN SODIUM (PORCINE) LOCK FLUSH IV SOLN 100 UNIT/ML 100 UNIT/ML
500 SOLUTION INTRAVENOUS PRN
Status: CANCELLED | OUTPATIENT
Start: 2021-01-01

## 2021-01-01 RX ORDER — SODIUM CHLORIDE 0.9 % (FLUSH) 0.9 %
5-40 SYRINGE (ML) INJECTION PRN
Status: DISCONTINUED | OUTPATIENT
Start: 2021-01-01 | End: 2021-01-01 | Stop reason: ALTCHOICE

## 2021-01-01 RX ORDER — POLYETHYLENE GLYCOL 3350 17 G/17G
17 POWDER, FOR SOLUTION ORAL DAILY PRN
Qty: 507 G | Refills: 0 | Status: SHIPPED | OUTPATIENT
Start: 2021-01-01 | End: 2021-01-01

## 2021-01-01 RX ORDER — PREDNISONE 10 MG/1
10 TABLET ORAL DAILY
Qty: 30 TABLET | Refills: 11 | Status: SHIPPED | OUTPATIENT
Start: 2021-01-01 | End: 2021-01-01

## 2021-01-01 RX ORDER — ACETAMINOPHEN 325 MG/1
650 TABLET ORAL EVERY 6 HOURS PRN
Status: DISCONTINUED | OUTPATIENT
Start: 2021-01-01 | End: 2021-01-01 | Stop reason: HOSPADM

## 2021-01-01 RX ORDER — OXYCODONE HYDROCHLORIDE AND ACETAMINOPHEN 5; 325 MG/1; MG/1
1 TABLET ORAL EVERY 6 HOURS PRN
Qty: 60 TABLET | Refills: 0 | Status: CANCELLED | OUTPATIENT
Start: 2021-01-01 | End: 2021-01-01

## 2021-01-01 RX ORDER — DIPHENHYDRAMINE HYDROCHLORIDE 50 MG/ML
50 INJECTION INTRAMUSCULAR; INTRAVENOUS ONCE
Status: CANCELLED | OUTPATIENT
Start: 2021-01-01

## 2021-01-01 RX ORDER — IBUPROFEN, ACETAMINOPHEN 125; 250 MG/1; MG/1
TABLET, FILM COATED ORAL
COMMUNITY

## 2021-01-01 RX ORDER — LISINOPRIL 20 MG/1
20 TABLET ORAL DAILY
Qty: 90 TABLET | Refills: 3 | Status: SHIPPED | OUTPATIENT
Start: 2021-01-01

## 2021-01-01 RX ORDER — CIPROFLOXACIN AND DEXAMETHASONE 3; 1 MG/ML; MG/ML
4 SUSPENSION/ DROPS AURICULAR (OTIC) 2 TIMES DAILY
Qty: 1 BOTTLE | Refills: 1 | Status: SHIPPED | OUTPATIENT
Start: 2021-01-01

## 2021-01-01 RX ORDER — ONDANSETRON 8 MG/1
8 TABLET, ORALLY DISINTEGRATING ORAL EVERY 8 HOURS PRN
Qty: 30 TABLET | Refills: 0 | Status: SHIPPED | OUTPATIENT
Start: 2021-01-01

## 2021-01-01 RX ORDER — PROMETHAZINE HYDROCHLORIDE AND CODEINE PHOSPHATE 6.25; 1 MG/5ML; MG/5ML
5 SYRUP ORAL 4 TIMES DAILY PRN
Qty: 420 ML | Refills: 0 | Status: SHIPPED | OUTPATIENT
Start: 2021-01-01 | End: 2021-12-21

## 2021-01-01 RX ORDER — TRAZODONE HYDROCHLORIDE 50 MG/1
50 TABLET ORAL NIGHTLY
Status: DISCONTINUED | OUTPATIENT
Start: 2021-01-01 | End: 2021-01-01 | Stop reason: HOSPADM

## 2021-01-01 RX ORDER — ACETAMINOPHEN 325 MG/1
650 TABLET ORAL EVERY 4 HOURS PRN
Status: DISCONTINUED | OUTPATIENT
Start: 2021-01-01 | End: 2021-01-01 | Stop reason: HOSPADM

## 2021-01-01 RX ORDER — OXYCODONE HYDROCHLORIDE 15 MG/1
15 TABLET ORAL EVERY 6 HOURS PRN
Qty: 56 TABLET | Refills: 0 | Status: SHIPPED | OUTPATIENT
Start: 2021-01-01 | End: 2021-12-14

## 2021-01-01 RX ORDER — DIPHENHYDRAMINE HYDROCHLORIDE 50 MG/ML
50 INJECTION INTRAMUSCULAR; INTRAVENOUS ONCE
Status: COMPLETED | OUTPATIENT
Start: 2021-01-01 | End: 2021-01-01

## 2021-01-01 RX ORDER — PACLITAXEL 100 MG/20ML
100 INJECTION, POWDER, LYOPHILIZED, FOR SUSPENSION INTRAVENOUS ONCE
Status: COMPLETED | OUTPATIENT
Start: 2021-01-01 | End: 2021-01-01

## 2021-01-01 RX ORDER — AZITHROMYCIN 250 MG/1
250 TABLET, FILM COATED ORAL SEE ADMIN INSTRUCTIONS
Qty: 6 TABLET | Refills: 0 | Status: SHIPPED | OUTPATIENT
Start: 2021-01-01 | End: 2021-01-01

## 2021-01-01 RX ORDER — ALBUTEROL SULFATE 90 UG/1
2 AEROSOL, METERED RESPIRATORY (INHALATION) EVERY 6 HOURS PRN
Qty: 1 EACH | Refills: 11 | Status: SHIPPED | OUTPATIENT
Start: 2021-01-01

## 2021-01-01 RX ORDER — NALOXONE HYDROCHLORIDE 4 MG/.1ML
1 SPRAY NASAL PRN
Qty: 1 EACH | Refills: 5 | Status: SHIPPED | OUTPATIENT
Start: 2021-01-01

## 2021-01-01 RX ORDER — PREDNISONE 10 MG/1
20 TABLET ORAL DAILY
Qty: 8 TABLET | Refills: 0 | Status: SHIPPED | OUTPATIENT
Start: 2021-01-01 | End: 2021-01-01

## 2021-01-01 RX ORDER — POLYETHYLENE GLYCOL 3350 17 G/17G
17 POWDER, FOR SOLUTION ORAL DAILY PRN
Status: DISCONTINUED | OUTPATIENT
Start: 2021-01-01 | End: 2021-01-01 | Stop reason: HOSPADM

## 2021-01-01 RX ORDER — TRIAMCINOLONE ACETONIDE 0.25 MG/G
CREAM TOPICAL
Qty: 1 TUBE | Refills: 1 | Status: SHIPPED | OUTPATIENT
Start: 2021-01-01

## 2021-01-01 RX ORDER — ONDANSETRON 8 MG/1
8 TABLET, ORALLY DISINTEGRATING ORAL EVERY 8 HOURS PRN
Qty: 30 TABLET | Refills: 3 | Status: CANCELLED | OUTPATIENT
Start: 2021-01-01

## 2021-01-01 RX ORDER — MORPHINE SULFATE 15 MG/1
15 TABLET, FILM COATED, EXTENDED RELEASE ORAL 2 TIMES DAILY
Status: DISCONTINUED | OUTPATIENT
Start: 2021-01-01 | End: 2021-01-01 | Stop reason: HOSPADM

## 2021-01-01 RX ORDER — DEXAMETHASONE SODIUM PHOSPHATE 10 MG/ML
10 INJECTION, SOLUTION INTRAMUSCULAR; INTRAVENOUS ONCE
Status: CANCELLED | OUTPATIENT
Start: 2021-01-01

## 2021-01-01 RX ORDER — DEXAMETHASONE 4 MG/1
4 TABLET ORAL WEEKLY
Qty: 10 TABLET | Refills: 0 | Status: SHIPPED | OUTPATIENT
Start: 2021-01-01 | End: 2021-01-01

## 2021-01-01 RX ORDER — PROCHLORPERAZINE MALEATE 10 MG
10 TABLET ORAL EVERY 6 HOURS PRN
Qty: 120 TABLET | Refills: 3 | Status: SHIPPED | OUTPATIENT
Start: 2021-01-01

## 2021-01-01 RX ORDER — SODIUM CHLORIDE 0.9 % (FLUSH) 0.9 %
10 SYRINGE (ML) INJECTION PRN
Status: CANCELLED
Start: 2021-01-01

## 2021-01-01 RX ORDER — NICOTINE 21 MG/24HR
1 PATCH, TRANSDERMAL 24 HOURS TRANSDERMAL DAILY
Qty: 30 PATCH | Refills: 3 | Status: SHIPPED | OUTPATIENT
Start: 2021-01-01

## 2021-01-01 RX ORDER — ATORVASTATIN CALCIUM 80 MG/1
80 TABLET, FILM COATED ORAL EVERY EVENING
Qty: 90 TABLET | Refills: 3 | Status: SHIPPED | OUTPATIENT
Start: 2021-01-01

## 2021-01-01 RX ORDER — LISINOPRIL 20 MG/1
20 TABLET ORAL DAILY
Status: DISCONTINUED | OUTPATIENT
Start: 2021-01-01 | End: 2021-01-01 | Stop reason: HOSPADM

## 2021-01-01 RX ORDER — CLONAZEPAM 1 MG/1
1 TABLET ORAL 2 TIMES DAILY PRN
Qty: 60 TABLET | Refills: 0 | Status: SHIPPED | OUTPATIENT
Start: 2021-01-01 | End: 2021-01-01 | Stop reason: ALTCHOICE

## 2021-01-01 RX ORDER — ONDANSETRON 2 MG/ML
4 INJECTION INTRAMUSCULAR; INTRAVENOUS EVERY 6 HOURS PRN
Status: DISCONTINUED | OUTPATIENT
Start: 2021-01-01 | End: 2021-01-01 | Stop reason: HOSPADM

## 2021-01-01 RX ORDER — OXYCODONE AND ACETAMINOPHEN 10; 325 MG/1; MG/1
1 TABLET ORAL EVERY 6 HOURS PRN
Qty: 120 TABLET | Refills: 0 | Status: SHIPPED | OUTPATIENT
Start: 2021-01-01 | End: 2021-01-01 | Stop reason: SDUPTHER

## 2021-01-01 RX ORDER — IPRATROPIUM BROMIDE AND ALBUTEROL SULFATE 2.5; .5 MG/3ML; MG/3ML
1 SOLUTION RESPIRATORY (INHALATION) 4 TIMES DAILY
Status: DISCONTINUED | OUTPATIENT
Start: 2021-01-01 | End: 2021-01-01 | Stop reason: HOSPADM

## 2021-01-01 RX ORDER — OMEPRAZOLE 20 MG/1
20 CAPSULE, DELAYED RELEASE ORAL DAILY
Qty: 180 CAPSULE | Refills: 3 | Status: SHIPPED | OUTPATIENT
Start: 2021-01-01 | End: 2021-01-01 | Stop reason: ALTCHOICE

## 2021-01-01 RX ORDER — ONDANSETRON 4 MG/1
4 TABLET, ORALLY DISINTEGRATING ORAL EVERY 8 HOURS PRN
Status: DISCONTINUED | OUTPATIENT
Start: 2021-01-01 | End: 2021-01-01 | Stop reason: HOSPADM

## 2021-01-01 RX ORDER — NITROGLYCERIN 0.4 MG/1
0.4 TABLET SUBLINGUAL EVERY 5 MIN PRN
Status: DISCONTINUED | OUTPATIENT
Start: 2021-01-01 | End: 2021-01-01 | Stop reason: HOSPADM

## 2021-01-01 RX ORDER — GUAIFENESIN AND DEXTROMETHORPHAN HYDROBROMIDE 600; 30 MG/1; MG/1
1 TABLET, EXTENDED RELEASE ORAL 2 TIMES DAILY
Qty: 28 TABLET | Refills: 0 | COMMUNITY
Start: 2021-01-01 | End: 2021-01-01

## 2021-01-01 RX ORDER — SODIUM CHLORIDE 9 MG/ML
INJECTION, SOLUTION INTRAVENOUS CONTINUOUS
Status: DISCONTINUED | OUTPATIENT
Start: 2021-01-01 | End: 2021-01-01 | Stop reason: HOSPADM

## 2021-01-01 RX ORDER — OMEPRAZOLE 20 MG/1
20 CAPSULE, DELAYED RELEASE ORAL DAILY
Qty: 30 CAPSULE | Refills: 0 | Status: SHIPPED | OUTPATIENT
Start: 2021-01-01

## 2021-01-01 RX ORDER — AMITRIPTYLINE HYDROCHLORIDE 25 MG/1
25 TABLET, FILM COATED ORAL NIGHTLY
Qty: 90 TABLET | Refills: 3 | Status: SHIPPED | OUTPATIENT
Start: 2021-01-01 | End: 2021-01-01

## 2021-01-01 RX ORDER — OXYCODONE AND ACETAMINOPHEN 10; 325 MG/1; MG/1
TABLET ORAL
Qty: 60 TABLET | Refills: 0 | Status: SHIPPED | OUTPATIENT
Start: 2021-01-01 | End: 2021-01-01

## 2021-01-01 RX ORDER — OXYCODONE HYDROCHLORIDE 5 MG/1
5 TABLET ORAL EVERY 6 HOURS PRN
Status: DISCONTINUED | OUTPATIENT
Start: 2021-01-01 | End: 2021-01-01 | Stop reason: HOSPADM

## 2021-01-01 RX ORDER — 0.9 % SODIUM CHLORIDE 0.9 %
80 INTRAVENOUS SOLUTION INTRAVENOUS ONCE
Status: COMPLETED | OUTPATIENT
Start: 2021-01-01 | End: 2021-01-01

## 2021-01-01 RX ORDER — OXYCODONE HYDROCHLORIDE AND ACETAMINOPHEN 5; 325 MG/1; MG/1
1 TABLET ORAL EVERY 6 HOURS PRN
Status: DISCONTINUED | OUTPATIENT
Start: 2021-01-01 | End: 2021-01-01 | Stop reason: HOSPADM

## 2021-01-01 RX ORDER — ONDANSETRON 8 MG/1
8 TABLET, ORALLY DISINTEGRATING ORAL EVERY 8 HOURS PRN
Qty: 30 TABLET | Refills: 3 | Status: SHIPPED | OUTPATIENT
Start: 2021-01-01 | End: 2021-01-01 | Stop reason: SDUPTHER

## 2021-01-01 RX ORDER — SODIUM CHLORIDE 9 MG/ML
INJECTION, SOLUTION INTRAVENOUS CONTINUOUS
Status: DISCONTINUED | OUTPATIENT
Start: 2021-01-01 | End: 2021-01-01 | Stop reason: DRUGHIGH

## 2021-01-01 RX ORDER — LIDOCAINE AND PRILOCAINE 25; 25 MG/G; MG/G
CREAM TOPICAL
Qty: 1 TUBE | Refills: 1 | Status: SHIPPED | OUTPATIENT
Start: 2021-01-01 | End: 2021-01-01 | Stop reason: SDUPTHER

## 2021-01-01 RX ORDER — NICOTINE POLACRILEX 4 MG
15 LOZENGE BUCCAL PRN
Status: DISCONTINUED | OUTPATIENT
Start: 2021-01-01 | End: 2021-01-01 | Stop reason: HOSPADM

## 2021-01-01 RX ORDER — MEGESTROL ACETATE 40 MG/ML
400 SUSPENSION ORAL DAILY
Qty: 300 ML | Refills: 3 | Status: SHIPPED | OUTPATIENT
Start: 2021-01-01

## 2021-01-01 RX ORDER — PROMETHAZINE HYDROCHLORIDE AND CODEINE PHOSPHATE 6.25; 1 MG/5ML; MG/5ML
5 SYRUP ORAL 4 TIMES DAILY PRN
Qty: 420 ML | Refills: 0 | Status: CANCELLED | OUTPATIENT
Start: 2021-01-01 | End: 2021-12-21

## 2021-01-01 RX ORDER — OMEPRAZOLE 20 MG/1
20 CAPSULE, DELAYED RELEASE ORAL DAILY
Qty: 180 CAPSULE | Refills: 3 | Status: SHIPPED | OUTPATIENT
Start: 2021-01-01 | End: 2021-01-01 | Stop reason: SDUPTHER

## 2021-01-01 RX ORDER — PROMETHAZINE HYDROCHLORIDE AND CODEINE PHOSPHATE 6.25; 1 MG/5ML; MG/5ML
5 SYRUP ORAL EVERY 4 HOURS PRN
Refills: 0 | Status: CANCELLED | OUTPATIENT
Start: 2021-01-01 | End: 2021-01-01

## 2021-01-01 RX ORDER — ASPIRIN 81 MG/1
324 TABLET, CHEWABLE ORAL ONCE
Status: COMPLETED | OUTPATIENT
Start: 2021-01-01 | End: 2021-01-01

## 2021-01-01 RX ORDER — LEVOTHYROXINE SODIUM 0.12 MG/1
125 TABLET ORAL DAILY
Qty: 30 TABLET | Refills: 5 | Status: SHIPPED | OUTPATIENT
Start: 2021-01-01

## 2021-01-01 RX ORDER — FLUOXETINE HYDROCHLORIDE 20 MG/1
40 CAPSULE ORAL DAILY
Status: DISCONTINUED | OUTPATIENT
Start: 2021-01-01 | End: 2021-01-01 | Stop reason: HOSPADM

## 2021-01-01 RX ORDER — METOPROLOL TARTRATE 5 MG/5ML
5 INJECTION INTRAVENOUS EVERY 5 MIN PRN
Status: DISCONTINUED | OUTPATIENT
Start: 2021-01-01 | End: 2021-01-01 | Stop reason: ALTCHOICE

## 2021-01-01 RX ORDER — IPRATROPIUM BROMIDE AND ALBUTEROL SULFATE 2.5; .5 MG/3ML; MG/3ML
1 SOLUTION RESPIRATORY (INHALATION) EVERY 6 HOURS PRN
Status: DISCONTINUED | OUTPATIENT
Start: 2021-01-01 | End: 2021-01-01 | Stop reason: HOSPADM

## 2021-01-01 RX ORDER — OXYCODONE HYDROCHLORIDE AND ACETAMINOPHEN 5; 325 MG/1; MG/1
1 TABLET ORAL EVERY 6 HOURS PRN
Qty: 60 TABLET | Refills: 0 | Status: SHIPPED | OUTPATIENT
Start: 2021-01-01 | End: 2021-01-01

## 2021-01-01 RX ORDER — OXYCODONE HYDROCHLORIDE 5 MG/1
5 TABLET ORAL EVERY 8 HOURS PRN
Qty: 42 TABLET | Refills: 0 | Status: SHIPPED | OUTPATIENT
Start: 2021-01-01 | End: 2021-01-01

## 2021-01-01 RX ORDER — TRAZODONE HYDROCHLORIDE 50 MG/1
50 TABLET ORAL NIGHTLY
Qty: 30 TABLET | Refills: 0 | Status: SHIPPED | OUTPATIENT
Start: 2021-01-01

## 2021-01-01 RX ORDER — AMINOPHYLLINE DIHYDRATE 25 MG/ML
50 INJECTION, SOLUTION INTRAVENOUS PRN
Status: DISCONTINUED | OUTPATIENT
Start: 2021-01-01 | End: 2021-01-01 | Stop reason: ALTCHOICE

## 2021-01-01 RX ORDER — SODIUM CHLORIDE 9 MG/ML
INJECTION INTRAVENOUS
Status: DISPENSED
Start: 2021-01-01 | End: 2021-01-01

## 2021-01-01 RX ORDER — HEPARIN SODIUM (PORCINE) LOCK FLUSH IV SOLN 100 UNIT/ML 100 UNIT/ML
SOLUTION INTRAVENOUS
Status: COMPLETED | OUTPATIENT
Start: 2021-01-01 | End: 2021-01-01

## 2021-01-01 RX ORDER — OXYCODONE HYDROCHLORIDE 5 MG/1
5 TABLET ORAL EVERY 8 HOURS PRN
Qty: 30 TABLET | Refills: 0 | Status: CANCELLED | OUTPATIENT
Start: 2021-01-01 | End: 2021-12-22

## 2021-01-01 RX ORDER — POTASSIUM CHLORIDE 20 MEQ/1
40 TABLET, EXTENDED RELEASE ORAL PRN
Status: DISCONTINUED | OUTPATIENT
Start: 2021-01-01 | End: 2021-01-01 | Stop reason: HOSPADM

## 2021-01-01 RX ORDER — LIDOCAINE AND PRILOCAINE 25; 25 MG/G; MG/G
CREAM TOPICAL
Qty: 30 G | Refills: 2 | Status: SHIPPED | OUTPATIENT
Start: 2021-01-01

## 2021-01-01 RX ORDER — FLUOXETINE HYDROCHLORIDE 40 MG/1
CAPSULE ORAL
Qty: 90 CAPSULE | Refills: 3 | Status: SHIPPED | OUTPATIENT
Start: 2021-01-01

## 2021-01-01 RX ORDER — OXYCODONE AND ACETAMINOPHEN 10; 325 MG/1; MG/1
1 TABLET ORAL EVERY 6 HOURS PRN
Qty: 120 TABLET | Refills: 0 | Status: SHIPPED | OUTPATIENT
Start: 2021-01-01 | End: 2021-01-01

## 2021-01-01 RX ORDER — OMEPRAZOLE 20 MG/1
20 CAPSULE, DELAYED RELEASE ORAL DAILY
Qty: 30 CAPSULE | Status: CANCELLED | OUTPATIENT
Start: 2021-01-01

## 2021-01-01 RX ORDER — DEXTROSE MONOHYDRATE 50 MG/ML
100 INJECTION, SOLUTION INTRAVENOUS PRN
Status: DISCONTINUED | OUTPATIENT
Start: 2021-01-01 | End: 2021-01-01 | Stop reason: HOSPADM

## 2021-01-01 RX ORDER — OXYCODONE AND ACETAMINOPHEN 10; 325 MG/1; MG/1
1 TABLET ORAL EVERY 6 HOURS PRN
Qty: 60 TABLET | Refills: 0 | Status: SHIPPED | OUTPATIENT
Start: 2021-01-01 | End: 2021-01-01 | Stop reason: SDUPTHER

## 2021-01-01 RX ORDER — OXYCODONE AND ACETAMINOPHEN 10; 325 MG/1; MG/1
1 TABLET ORAL EVERY 6 HOURS PRN
Status: DISCONTINUED | OUTPATIENT
Start: 2021-01-01 | End: 2021-01-01 | Stop reason: SDUPTHER

## 2021-01-01 RX ORDER — FENTANYL 12 UG/H
1 PATCH TRANSDERMAL
Qty: 5 PATCH | Refills: 0 | Status: SHIPPED | OUTPATIENT
Start: 2021-01-01 | End: 2021-01-01 | Stop reason: ALTCHOICE

## 2021-01-01 RX ORDER — OXYCODONE HYDROCHLORIDE AND ACETAMINOPHEN 5; 325 MG/1; MG/1
1 TABLET ORAL EVERY 6 HOURS PRN
Qty: 60 TABLET | Refills: 0 | Status: SHIPPED | OUTPATIENT
Start: 2021-01-01 | End: 2021-01-01 | Stop reason: SDUPTHER

## 2021-01-01 RX ORDER — OXYCODONE HYDROCHLORIDE 5 MG/1
5 TABLET ORAL EVERY 8 HOURS PRN
Qty: 30 TABLET | Refills: 0 | Status: SHIPPED | OUTPATIENT
Start: 2021-01-01 | End: 2021-01-01 | Stop reason: SDUPTHER

## 2021-01-01 RX ORDER — PREDNISONE 10 MG/1
TABLET ORAL
Qty: 14 TABLET | Refills: 0 | Status: ON HOLD | OUTPATIENT
Start: 2021-01-01 | End: 2021-01-01 | Stop reason: HOSPADM

## 2021-01-01 RX ORDER — PREDNISONE 10 MG/1
30 TABLET ORAL DAILY
Qty: 12 TABLET | Refills: 0 | Status: SHIPPED | OUTPATIENT
Start: 2021-01-01 | End: 2021-01-01

## 2021-01-01 RX ORDER — DEXTROSE MONOHYDRATE 25 G/50ML
12.5 INJECTION, SOLUTION INTRAVENOUS PRN
Status: DISCONTINUED | OUTPATIENT
Start: 2021-01-01 | End: 2021-01-01 | Stop reason: HOSPADM

## 2021-01-01 RX ORDER — NICOTINE 21 MG/24HR
1 PATCH, TRANSDERMAL 24 HOURS TRANSDERMAL DAILY
Status: DISCONTINUED | OUTPATIENT
Start: 2021-01-01 | End: 2021-01-01 | Stop reason: HOSPADM

## 2021-01-01 RX ORDER — PROMETHAZINE HYDROCHLORIDE AND CODEINE PHOSPHATE 6.25; 1 MG/5ML; MG/5ML
5 SYRUP ORAL 4 TIMES DAILY PRN
Qty: 420 ML | Refills: 0 | Status: SHIPPED | OUTPATIENT
Start: 2021-01-01 | End: 2021-01-01 | Stop reason: SDUPTHER

## 2021-01-01 RX ORDER — PROCHLORPERAZINE EDISYLATE 5 MG/ML
10 INJECTION INTRAMUSCULAR; INTRAVENOUS ONCE
Status: COMPLETED | OUTPATIENT
Start: 2021-01-01 | End: 2021-01-01

## 2021-01-01 RX ORDER — OXYCODONE AND ACETAMINOPHEN 10; 325 MG/1; MG/1
TABLET ORAL
Qty: 60 TABLET | Refills: 0 | Status: SHIPPED | OUTPATIENT
Start: 2021-01-01 | End: 2021-01-01 | Stop reason: SDUPTHER

## 2021-01-01 RX ORDER — IPRATROPIUM BROMIDE AND ALBUTEROL SULFATE 2.5; .5 MG/3ML; MG/3ML
1 SOLUTION RESPIRATORY (INHALATION) EVERY 6 HOURS
Status: DISCONTINUED | OUTPATIENT
Start: 2021-01-01 | End: 2021-01-01

## 2021-01-01 RX ORDER — PANTOPRAZOLE SODIUM 40 MG/1
40 TABLET, DELAYED RELEASE ORAL
Status: DISCONTINUED | OUTPATIENT
Start: 2021-01-01 | End: 2021-01-01 | Stop reason: HOSPADM

## 2021-01-01 RX ORDER — ASPIRIN 81 MG/1
81 TABLET, CHEWABLE ORAL DAILY
Qty: 30 TABLET | Refills: 0 | Status: SHIPPED | OUTPATIENT
Start: 2021-01-01

## 2021-01-01 RX ORDER — ACETAMINOPHEN 650 MG/1
650 SUPPOSITORY RECTAL EVERY 6 HOURS PRN
Status: DISCONTINUED | OUTPATIENT
Start: 2021-01-01 | End: 2021-01-01 | Stop reason: HOSPADM

## 2021-01-01 RX ORDER — SODIUM CHLORIDE 9 MG/ML
500 INJECTION, SOLUTION INTRAVENOUS CONTINUOUS PRN
Status: DISCONTINUED | OUTPATIENT
Start: 2021-01-01 | End: 2021-01-01 | Stop reason: ALTCHOICE

## 2021-01-01 RX ORDER — OXYCODONE HCL 20 MG/1
20 TABLET, FILM COATED, EXTENDED RELEASE ORAL DAILY
Qty: 7 TABLET | Refills: 0 | Status: SHIPPED | OUTPATIENT
Start: 2021-01-01 | End: 2021-01-01

## 2021-01-01 RX ORDER — OMEPRAZOLE 20 MG/1
20 CAPSULE, DELAYED RELEASE ORAL DAILY
COMMUNITY
End: 2021-01-01 | Stop reason: SDUPTHER

## 2021-01-01 RX ORDER — MIDAZOLAM HYDROCHLORIDE 1 MG/ML
INJECTION INTRAMUSCULAR; INTRAVENOUS
Status: COMPLETED | OUTPATIENT
Start: 2021-01-01 | End: 2021-01-01

## 2021-01-01 RX ORDER — AMOXICILLIN AND CLAVULANATE POTASSIUM 875; 125 MG/1; MG/1
1 TABLET, FILM COATED ORAL 2 TIMES DAILY
Qty: 20 TABLET | Refills: 0 | Status: SHIPPED | OUTPATIENT
Start: 2021-01-01 | End: 2021-01-01

## 2021-01-01 RX ORDER — ALBUTEROL SULFATE 90 UG/1
2 AEROSOL, METERED RESPIRATORY (INHALATION) EVERY 6 HOURS PRN
Qty: 18 G | Refills: 1 | Status: SHIPPED | OUTPATIENT
Start: 2021-01-01 | End: 2021-01-01 | Stop reason: SDUPTHER

## 2021-01-01 RX ORDER — DOCUSATE SODIUM 100 MG/1
100 CAPSULE, LIQUID FILLED ORAL 2 TIMES DAILY PRN
Qty: 60 CAPSULE | Refills: 0 | Status: SHIPPED | OUTPATIENT
Start: 2021-01-01 | End: 2021-01-01

## 2021-01-01 RX ORDER — PREDNISONE 10 MG/1
40 TABLET ORAL DAILY
Qty: 16 TABLET | Refills: 0 | Status: SHIPPED | OUTPATIENT
Start: 2021-01-01 | End: 2021-01-01

## 2021-01-01 RX ORDER — ATROPINE SULFATE 0.1 MG/ML
0.5 INJECTION INTRAVENOUS EVERY 5 MIN PRN
Status: DISCONTINUED | OUTPATIENT
Start: 2021-01-01 | End: 2021-01-01 | Stop reason: ALTCHOICE

## 2021-01-01 RX ORDER — NITROGLYCERIN 0.4 MG/1
0.4 TABLET SUBLINGUAL EVERY 5 MIN PRN
Status: DISCONTINUED | OUTPATIENT
Start: 2021-01-01 | End: 2021-01-01 | Stop reason: ALTCHOICE

## 2021-01-01 RX ORDER — LEVOTHYROXINE SODIUM 0.12 MG/1
125 TABLET ORAL DAILY
Status: DISCONTINUED | OUTPATIENT
Start: 2021-01-01 | End: 2021-01-01 | Stop reason: HOSPADM

## 2021-01-01 RX ADMIN — PACLITAXEL 100 MG: 100 INJECTION, POWDER, LYOPHILIZED, FOR SUSPENSION INTRAVENOUS at 12:22

## 2021-01-01 RX ADMIN — IPRATROPIUM BROMIDE AND ALBUTEROL SULFATE 3 ML: .5; 3 SOLUTION RESPIRATORY (INHALATION) at 20:57

## 2021-01-01 RX ADMIN — HEPARIN SODIUM (PORCINE) LOCK FLUSH IV SOLN 100 UNIT/ML 500 UNITS: 100 SOLUTION at 12:03

## 2021-01-01 RX ADMIN — HEPARIN 500 UNITS: 100 SYRINGE at 12:07

## 2021-01-01 RX ADMIN — PALONOSETRON 0.25 MG: 0.05 INJECTION, SOLUTION INTRAVENOUS at 10:21

## 2021-01-01 RX ADMIN — OXYCODONE HYDROCHLORIDE AND ACETAMINOPHEN 1 TABLET: 5; 325 TABLET ORAL at 14:44

## 2021-01-01 RX ADMIN — Medication 50 MCG: at 09:39

## 2021-01-01 RX ADMIN — CARBOPLATIN 280 MG: 10 INJECTION INTRAVENOUS at 12:24

## 2021-01-01 RX ADMIN — SODIUM CHLORIDE 1200 MG: 9 INJECTION, SOLUTION INTRAVENOUS at 11:23

## 2021-01-01 RX ADMIN — DEXAMETHASONE SODIUM PHOSPHATE 10 MG: 10 INJECTION INTRAMUSCULAR; INTRAVENOUS at 09:09

## 2021-01-01 RX ADMIN — SODIUM CHLORIDE 1200 MG: 9 INJECTION, SOLUTION INTRAVENOUS at 11:08

## 2021-01-01 RX ADMIN — ENOXAPARIN SODIUM 40 MG: 40 INJECTION SUBCUTANEOUS at 08:22

## 2021-01-01 RX ADMIN — ATORVASTATIN CALCIUM 80 MG: 40 TABLET, FILM COATED ORAL at 17:08

## 2021-01-01 RX ADMIN — OXYCODONE HYDROCHLORIDE AND ACETAMINOPHEN 2 TABLET: 5; 325 TABLET ORAL at 16:45

## 2021-01-01 RX ADMIN — CARBOPLATIN 300 MG: 10 INJECTION, SOLUTION INTRAVENOUS at 11:30

## 2021-01-01 RX ADMIN — ATORVASTATIN CALCIUM 80 MG: 40 TABLET, FILM COATED ORAL at 21:12

## 2021-01-01 RX ADMIN — DEXAMETHASONE SODIUM PHOSPHATE 10 MG: 10 INJECTION INTRAMUSCULAR; INTRAVENOUS at 10:24

## 2021-01-01 RX ADMIN — IOPAMIDOL 75 ML: 755 INJECTION, SOLUTION INTRAVENOUS at 14:26

## 2021-01-01 RX ADMIN — SODIUM CHLORIDE, PRESERVATIVE FREE 10 ML: 5 INJECTION INTRAVENOUS at 08:05

## 2021-01-01 RX ADMIN — DEXAMETHASONE SODIUM PHOSPHATE 10 MG: 10 INJECTION INTRAMUSCULAR; INTRAVENOUS at 13:35

## 2021-01-01 RX ADMIN — IPRATROPIUM BROMIDE AND ALBUTEROL SULFATE 3 ML: .5; 3 SOLUTION RESPIRATORY (INHALATION) at 16:59

## 2021-01-01 RX ADMIN — SODIUM CHLORIDE, PRESERVATIVE FREE 10 ML: 5 INJECTION INTRAVENOUS at 14:27

## 2021-01-01 RX ADMIN — DEXAMETHASONE SODIUM PHOSPHATE 10 MG: 10 INJECTION INTRAMUSCULAR; INTRAVENOUS at 11:55

## 2021-01-01 RX ADMIN — SODIUM CHLORIDE 80 ML: 9 INJECTION, SOLUTION INTRAVENOUS at 14:26

## 2021-01-01 RX ADMIN — OXYCODONE HYDROCHLORIDE AND ACETAMINOPHEN 2 TABLET: 5; 325 TABLET ORAL at 22:57

## 2021-01-01 RX ADMIN — IOPAMIDOL 75 ML: 755 INJECTION, SOLUTION INTRAVENOUS at 15:38

## 2021-01-01 RX ADMIN — SODIUM CHLORIDE, PRESERVATIVE FREE 10 ML: 5 INJECTION INTRAVENOUS at 10:12

## 2021-01-01 RX ADMIN — SODIUM CHLORIDE 20 ML/HR: 9 INJECTION, SOLUTION INTRAVENOUS at 09:24

## 2021-01-01 RX ADMIN — DIPHENHYDRAMINE HYDROCHLORIDE 50 MG: 50 INJECTION, SOLUTION INTRAMUSCULAR; INTRAVENOUS at 12:30

## 2021-01-01 RX ADMIN — SODIUM CHLORIDE 1000 MG: 9 INJECTION, SOLUTION INTRAVENOUS at 12:01

## 2021-01-01 RX ADMIN — SODIUM CHLORIDE 20 ML/HR: 9 INJECTION, SOLUTION INTRAVENOUS at 10:47

## 2021-01-01 RX ADMIN — PALONOSETRON 0.25 MG: 0.05 INJECTION, SOLUTION INTRAVENOUS at 09:12

## 2021-01-01 RX ADMIN — SODIUM CHLORIDE 1200 MG: 9 INJECTION, SOLUTION INTRAVENOUS at 11:38

## 2021-01-01 RX ADMIN — CEFAZOLIN SODIUM 1000 MG: 1 INJECTION, POWDER, FOR SOLUTION INTRAMUSCULAR; INTRAVENOUS at 09:32

## 2021-01-01 RX ADMIN — SODIUM CHLORIDE 20 ML/HR: 9 INJECTION, SOLUTION INTRAVENOUS at 09:58

## 2021-01-01 RX ADMIN — SODIUM CHLORIDE 1200 MG: 9 INJECTION, SOLUTION INTRAVENOUS at 11:45

## 2021-01-01 RX ADMIN — HEPARIN 500 UNITS: 100 SYRINGE at 13:15

## 2021-01-01 RX ADMIN — SODIUM CHLORIDE, PRESERVATIVE FREE 10 ML: 5 INJECTION INTRAVENOUS at 09:30

## 2021-01-01 RX ADMIN — SODIUM CHLORIDE, PRESERVATIVE FREE 10 ML: 5 INJECTION INTRAVENOUS at 11:35

## 2021-01-01 RX ADMIN — LEVOTHYROXINE SODIUM 125 MCG: 125 TABLET ORAL at 08:30

## 2021-01-01 RX ADMIN — PACLITAXEL 108 MG: 6 INJECTION, SOLUTION, CONCENTRATE INTRAVENOUS at 10:21

## 2021-01-01 RX ADMIN — FAMOTIDINE 20 MG: 10 INJECTION, SOLUTION INTRAVENOUS at 12:31

## 2021-01-01 RX ADMIN — SODIUM CHLORIDE 1600 MG: 9 INJECTION, SOLUTION INTRAVENOUS at 11:02

## 2021-01-01 RX ADMIN — SODIUM CHLORIDE 1600 MG: 9 INJECTION, SOLUTION INTRAVENOUS at 11:31

## 2021-01-01 RX ADMIN — PACLITAXEL 108 MG: 6 INJECTION, SOLUTION, CONCENTRATE INTRAVENOUS at 09:23

## 2021-01-01 RX ADMIN — HEPARIN 500 UNITS: 100 SYRINGE at 13:08

## 2021-01-01 RX ADMIN — Medication 50 MCG: at 09:37

## 2021-01-01 RX ADMIN — SODIUM CHLORIDE 20 ML/HR: 9 INJECTION, SOLUTION INTRAVENOUS at 10:22

## 2021-01-01 RX ADMIN — MORPHINE SULFATE 15 MG: 15 TABLET, FILM COATED, EXTENDED RELEASE ORAL at 21:12

## 2021-01-01 RX ADMIN — DEXAMETHASONE SODIUM PHOSPHATE 10 MG: 10 INJECTION INTRAMUSCULAR; INTRAVENOUS at 09:13

## 2021-01-01 RX ADMIN — DEXAMETHASONE SODIUM PHOSPHATE 10 MG: 10 INJECTION INTRAMUSCULAR; INTRAVENOUS at 11:09

## 2021-01-01 RX ADMIN — DEXAMETHASONE SODIUM PHOSPHATE 10 MG: 10 INJECTION INTRAMUSCULAR; INTRAVENOUS at 09:56

## 2021-01-01 RX ADMIN — OXYCODONE HYDROCHLORIDE AND ACETAMINOPHEN 1 TABLET: 5; 325 TABLET ORAL at 08:31

## 2021-01-01 RX ADMIN — IOPAMIDOL 75 ML: 755 INJECTION, SOLUTION INTRAVENOUS at 10:12

## 2021-01-01 RX ADMIN — OXYCODONE HYDROCHLORIDE 5 MG: 5 TABLET ORAL at 08:31

## 2021-01-01 RX ADMIN — Medication 10 ML: at 09:59

## 2021-01-01 RX ADMIN — SODIUM CHLORIDE 20 ML/HR: 9 INJECTION, SOLUTION INTRAVENOUS at 11:55

## 2021-01-01 RX ADMIN — PALONOSETRON 0.25 MG: 0.05 INJECTION, SOLUTION INTRAVENOUS at 09:24

## 2021-01-01 RX ADMIN — SODIUM CHLORIDE 20 ML/HR: 9 INJECTION, SOLUTION INTRAVENOUS at 09:49

## 2021-01-01 RX ADMIN — HEPARIN 500 UNITS: 100 SYRINGE at 09:44

## 2021-01-01 RX ADMIN — PALONOSETRON 0.25 MG: 0.05 INJECTION, SOLUTION INTRAVENOUS at 09:08

## 2021-01-01 RX ADMIN — SODIUM CHLORIDE, PRESERVATIVE FREE 10 ML: 5 INJECTION INTRAVENOUS at 21:13

## 2021-01-01 RX ADMIN — HEPARIN SODIUM (PORCINE) LOCK FLUSH IV SOLN 100 UNIT/ML 500 UNITS: 100 SOLUTION at 12:06

## 2021-01-01 RX ADMIN — SODIUM CHLORIDE 1600 MG: 9 INJECTION, SOLUTION INTRAVENOUS at 11:19

## 2021-01-01 RX ADMIN — DEXAMETHASONE SODIUM PHOSPHATE 10 MG: 10 INJECTION INTRAMUSCULAR; INTRAVENOUS at 10:30

## 2021-01-01 RX ADMIN — TRAZODONE HYDROCHLORIDE 50 MG: 50 TABLET ORAL at 22:58

## 2021-01-01 RX ADMIN — CARBOPLATIN 800 MG: 10 INJECTION INTRAVENOUS at 11:52

## 2021-01-01 RX ADMIN — SODIUM CHLORIDE, PRESERVATIVE FREE 10 ML: 5 INJECTION INTRAVENOUS at 12:03

## 2021-01-01 RX ADMIN — PACLITAXEL 100 MG: 100 INJECTION, POWDER, LYOPHILIZED, FOR SUSPENSION INTRAVENOUS at 10:42

## 2021-01-01 RX ADMIN — SODIUM CHLORIDE, PRESERVATIVE FREE 10 ML: 5 INJECTION INTRAVENOUS at 11:46

## 2021-01-01 RX ADMIN — PALONOSETRON 0.25 MG: 0.05 INJECTION, SOLUTION INTRAVENOUS at 09:58

## 2021-01-01 RX ADMIN — REGADENOSON 0.4 MG: 0.08 INJECTION, SOLUTION INTRAVENOUS at 09:45

## 2021-01-01 RX ADMIN — HEPARIN SODIUM (PORCINE) LOCK FLUSH IV SOLN 100 UNIT/ML 500 UNITS: 100 SOLUTION at 13:15

## 2021-01-01 RX ADMIN — FENTANYL CITRATE 50 MCG: 50 INJECTION, SOLUTION INTRAMUSCULAR; INTRAVENOUS at 13:53

## 2021-01-01 RX ADMIN — HYDROMORPHONE HYDROCHLORIDE 1 MG: 1 INJECTION, SOLUTION INTRAMUSCULAR; INTRAVENOUS; SUBCUTANEOUS at 16:24

## 2021-01-01 RX ADMIN — SODIUM CHLORIDE, PRESERVATIVE FREE 10 ML: 5 INJECTION INTRAVENOUS at 08:55

## 2021-01-01 RX ADMIN — CYANOCOBALAMIN 1000 MCG: 1000 INJECTION, SOLUTION INTRAMUSCULAR at 10:30

## 2021-01-01 RX ADMIN — CARBOPLATIN 800 MG: 10 INJECTION INTRAVENOUS at 12:01

## 2021-01-01 RX ADMIN — SODIUM CHLORIDE 1000 MG: 9 INJECTION, SOLUTION INTRAVENOUS at 11:25

## 2021-01-01 RX ADMIN — HEPARIN SODIUM (PORCINE) LOCK FLUSH IV SOLN 100 UNIT/ML 500 UNITS: 100 SOLUTION at 11:35

## 2021-01-01 RX ADMIN — SODIUM CHLORIDE 1600 MG: 9 INJECTION, SOLUTION INTRAVENOUS at 10:50

## 2021-01-01 RX ADMIN — CYANOCOBALAMIN 1000 MCG: 1000 INJECTION, SOLUTION INTRAMUSCULAR at 10:36

## 2021-01-01 RX ADMIN — SODIUM CHLORIDE, PRESERVATIVE FREE 10 ML: 5 INJECTION INTRAVENOUS at 11:59

## 2021-01-01 RX ADMIN — SODIUM CHLORIDE, PRESERVATIVE FREE 10 ML: 5 INJECTION INTRAVENOUS at 13:15

## 2021-01-01 RX ADMIN — HEPARIN 500 UNITS: 100 SYRINGE at 13:21

## 2021-01-01 RX ADMIN — LISINOPRIL 20 MG: 20 TABLET ORAL at 08:23

## 2021-01-01 RX ADMIN — METHYLPREDNISOLONE ACETATE 80 MG: 80 INJECTION, SUSPENSION INTRA-ARTICULAR; INTRALESIONAL; INTRAMUSCULAR; SOFT TISSUE at 09:22

## 2021-01-01 RX ADMIN — SODIUM CHLORIDE, PRESERVATIVE FREE 10 ML: 5 INJECTION INTRAVENOUS at 13:08

## 2021-01-01 RX ADMIN — DENOSUMAB 120 MG: 120 INJECTION SUBCUTANEOUS at 10:50

## 2021-01-01 RX ADMIN — DEXAMETHASONE SODIUM PHOSPHATE 10 MG: 10 INJECTION INTRAMUSCULAR; INTRAVENOUS at 09:28

## 2021-01-01 RX ADMIN — SODIUM CHLORIDE, PRESERVATIVE FREE 10 ML: 5 INJECTION INTRAVENOUS at 08:42

## 2021-01-01 RX ADMIN — CARBOPLATIN 300 MG: 10 INJECTION INTRAVENOUS at 09:43

## 2021-01-01 RX ADMIN — SODIUM CHLORIDE 1600 MG: 9 INJECTION, SOLUTION INTRAVENOUS at 10:05

## 2021-01-01 RX ADMIN — PALONOSETRON 0.25 MG: 0.05 INJECTION, SOLUTION INTRAVENOUS at 11:09

## 2021-01-01 RX ADMIN — SODIUM CHLORIDE 20 ML/HR: 9 INJECTION, SOLUTION INTRAVENOUS at 11:09

## 2021-01-01 RX ADMIN — DEXAMETHASONE SODIUM PHOSPHATE 10 MG: 10 INJECTION INTRAMUSCULAR; INTRAVENOUS at 10:34

## 2021-01-01 RX ADMIN — SODIUM CHLORIDE 80 ML: 9 INJECTION, SOLUTION INTRAVENOUS at 10:12

## 2021-01-01 RX ADMIN — SODIUM CHLORIDE 20 ML/HR: 9 INJECTION, SOLUTION INTRAVENOUS at 10:28

## 2021-01-01 RX ADMIN — MIDAZOLAM 1 MG: 1 INJECTION INTRAMUSCULAR; INTRAVENOUS at 09:37

## 2021-01-01 RX ADMIN — MIDAZOLAM 0.5 MG: 1 INJECTION INTRAMUSCULAR; INTRAVENOUS at 09:42

## 2021-01-01 RX ADMIN — SODIUM CHLORIDE 80 ML: 9 INJECTION, SOLUTION INTRAVENOUS at 09:31

## 2021-01-01 RX ADMIN — SODIUM CHLORIDE, PRESERVATIVE FREE 10 ML: 5 INJECTION INTRAVENOUS at 12:31

## 2021-01-01 RX ADMIN — SODIUM CHLORIDE 20 ML/HR: 9 INJECTION, SOLUTION INTRAVENOUS at 13:03

## 2021-01-01 RX ADMIN — PALONOSETRON 0.25 MG: 0.05 INJECTION, SOLUTION INTRAVENOUS at 10:33

## 2021-01-01 RX ADMIN — SODIUM CHLORIDE, PRESERVATIVE FREE 10 ML: 5 INJECTION INTRAVENOUS at 09:25

## 2021-01-01 RX ADMIN — PALONOSETRON 0.25 MG: 0.05 INJECTION, SOLUTION INTRAVENOUS at 09:56

## 2021-01-01 RX ADMIN — HEPARIN 300 UNITS: 100 SYRINGE at 11:59

## 2021-01-01 RX ADMIN — OXYCODONE HYDROCHLORIDE 5 MG: 5 TABLET ORAL at 14:44

## 2021-01-01 RX ADMIN — PALONOSETRON 0.25 MG: 0.05 INJECTION, SOLUTION INTRAVENOUS at 10:27

## 2021-01-01 RX ADMIN — DENOSUMAB 120 MG: 120 INJECTION SUBCUTANEOUS at 10:23

## 2021-01-01 RX ADMIN — SODIUM CHLORIDE, PRESERVATIVE FREE 10 ML: 5 INJECTION INTRAVENOUS at 08:53

## 2021-01-01 RX ADMIN — SODIUM CHLORIDE, PRESERVATIVE FREE 10 ML: 5 INJECTION INTRAVENOUS at 15:38

## 2021-01-01 RX ADMIN — SODIUM CHLORIDE, PRESERVATIVE FREE 10 ML: 5 INJECTION INTRAVENOUS at 09:31

## 2021-01-01 RX ADMIN — DEXAMETHASONE SODIUM PHOSPHATE 10 MG: 10 INJECTION INTRAMUSCULAR; INTRAVENOUS at 10:22

## 2021-01-01 RX ADMIN — DEXAMETHASONE SODIUM PHOSPHATE 10 MG: 10 INJECTION INTRAMUSCULAR; INTRAVENOUS at 10:01

## 2021-01-01 RX ADMIN — MIDAZOLAM 0.5 MG: 1 INJECTION INTRAMUSCULAR; INTRAVENOUS at 09:39

## 2021-01-01 RX ADMIN — PACLITAXEL 100 MG: 100 INJECTION, POWDER, LYOPHILIZED, FOR SUSPENSION INTRAVENOUS at 10:39

## 2021-01-01 RX ADMIN — SODIUM CHLORIDE 1200 MG: 9 INJECTION, SOLUTION INTRAVENOUS at 11:53

## 2021-01-01 RX ADMIN — SODIUM CHLORIDE 20 ML/HR: 9 INJECTION, SOLUTION INTRAVENOUS at 10:32

## 2021-01-01 RX ADMIN — IOPAMIDOL 75 ML: 755 INJECTION, SOLUTION INTRAVENOUS at 10:11

## 2021-01-01 RX ADMIN — HYDROCORTISONE SODIUM SUCCINATE 100 MG: 100 INJECTION, POWDER, FOR SOLUTION INTRAMUSCULAR; INTRAVENOUS at 09:35

## 2021-01-01 RX ADMIN — SODIUM CHLORIDE 1000 MG: 9 INJECTION, SOLUTION INTRAVENOUS at 13:03

## 2021-01-01 RX ADMIN — SODIUM CHLORIDE, PRESERVATIVE FREE 10 ML: 5 INJECTION INTRAVENOUS at 09:26

## 2021-01-01 RX ADMIN — HEPARIN 500 UNITS: 100 SYRINGE at 08:42

## 2021-01-01 RX ADMIN — PALONOSETRON 0.25 MG: 0.05 INJECTION, SOLUTION INTRAVENOUS at 10:09

## 2021-01-01 RX ADMIN — CYANOCOBALAMIN 1000 MCG: 1000 INJECTION, SOLUTION INTRAMUSCULAR; SUBCUTANEOUS at 15:47

## 2021-01-01 RX ADMIN — SODIUM CHLORIDE, PRESERVATIVE FREE 10 ML: 5 INJECTION INTRAVENOUS at 09:24

## 2021-01-01 RX ADMIN — CARBOPLATIN 800 MG: 10 INJECTION INTRAVENOUS at 12:12

## 2021-01-01 RX ADMIN — SODIUM CHLORIDE, PRESERVATIVE FREE 10 ML: 5 INJECTION INTRAVENOUS at 12:05

## 2021-01-01 RX ADMIN — FAMOTIDINE 20 MG: 10 INJECTION, SOLUTION INTRAVENOUS at 09:59

## 2021-01-01 RX ADMIN — DIPHENHYDRAMINE HYDROCHLORIDE 50 MG: 50 INJECTION, SOLUTION INTRAMUSCULAR; INTRAVENOUS at 10:01

## 2021-01-01 RX ADMIN — DIPHENHYDRAMINE HYDROCHLORIDE 50 MG: 50 INJECTION, SOLUTION INTRAMUSCULAR; INTRAVENOUS at 09:17

## 2021-01-01 RX ADMIN — HEPARIN 500 UNITS: 100 SYRINGE at 12:35

## 2021-01-01 RX ADMIN — TETRAKIS(2-METHOXYISOBUTYLISOCYANIDE)COPPER(I) TETRAFLUOROBORATE 42.65 MILLICURIE: 1 INJECTION, POWDER, LYOPHILIZED, FOR SOLUTION INTRAVENOUS at 09:45

## 2021-01-01 RX ADMIN — IPRATROPIUM BROMIDE AND ALBUTEROL SULFATE 3 ML: .5; 3 SOLUTION RESPIRATORY (INHALATION) at 13:01

## 2021-01-01 RX ADMIN — IOPAMIDOL 75 ML: 755 INJECTION, SOLUTION INTRAVENOUS at 09:31

## 2021-01-01 RX ADMIN — SODIUM CHLORIDE, PRESERVATIVE FREE 10 ML: 5 INJECTION INTRAVENOUS at 12:23

## 2021-01-01 RX ADMIN — FAMOTIDINE 20 MG: 10 INJECTION INTRAVENOUS at 09:14

## 2021-01-01 RX ADMIN — IOHEXOL 30 ML: 300 INJECTION, SOLUTION INTRAVENOUS at 11:59

## 2021-01-01 RX ADMIN — DEXAMETHASONE SODIUM PHOSPHATE 10 MG: 10 INJECTION INTRAMUSCULAR; INTRAVENOUS at 10:28

## 2021-01-01 RX ADMIN — SODIUM CHLORIDE 20 ML/HR: 9 INJECTION, SOLUTION INTRAVENOUS at 11:59

## 2021-01-01 RX ADMIN — SODIUM CHLORIDE, PRESERVATIVE FREE 10 ML: 5 INJECTION INTRAVENOUS at 10:28

## 2021-01-01 RX ADMIN — CARBOPLATIN 290 MG: 10 INJECTION INTRAVENOUS at 11:32

## 2021-01-01 RX ADMIN — SODIUM CHLORIDE 80 ML: 9 INJECTION, SOLUTION INTRAVENOUS at 15:38

## 2021-01-01 RX ADMIN — SODIUM CHLORIDE, PRESERVATIVE FREE 10 ML: 5 INJECTION INTRAVENOUS at 12:35

## 2021-01-01 RX ADMIN — PANTOPRAZOLE SODIUM 40 MG: 40 TABLET, DELAYED RELEASE ORAL at 08:22

## 2021-01-01 RX ADMIN — SODIUM CHLORIDE: 9 INJECTION, SOLUTION INTRAVENOUS at 08:36

## 2021-01-01 RX ADMIN — SODIUM CHLORIDE, PRESERVATIVE FREE 10 ML: 5 INJECTION INTRAVENOUS at 09:44

## 2021-01-01 RX ADMIN — SODIUM CHLORIDE, PRESERVATIVE FREE 10 ML: 5 INJECTION INTRAVENOUS at 13:21

## 2021-01-01 RX ADMIN — MORPHINE SULFATE 15 MG: 15 TABLET, FILM COATED, EXTENDED RELEASE ORAL at 13:03

## 2021-01-01 RX ADMIN — SODIUM CHLORIDE 20 ML/HR: 9 INJECTION, SOLUTION INTRAVENOUS at 10:09

## 2021-01-01 RX ADMIN — SODIUM CHLORIDE 80 ML: 9 INJECTION, SOLUTION INTRAVENOUS at 11:58

## 2021-01-01 RX ADMIN — SODIUM CHLORIDE, PRESERVATIVE FREE 10 ML: 5 INJECTION INTRAVENOUS at 09:45

## 2021-01-01 RX ADMIN — DEXAMETHASONE SODIUM PHOSPHATE 10 MG: 10 INJECTION INTRAMUSCULAR; INTRAVENOUS at 10:09

## 2021-01-01 RX ADMIN — SODIUM CHLORIDE 1600 MG: 9 INJECTION, SOLUTION INTRAVENOUS at 10:51

## 2021-01-01 RX ADMIN — IOPAMIDOL 100 ML: 755 INJECTION, SOLUTION INTRAVENOUS at 11:59

## 2021-01-01 RX ADMIN — TETRAKIS(2-METHOXYISOBUTYLISOCYANIDE)COPPER(I) TETRAFLUOROBORATE 11.26 MILLICURIE: 1 INJECTION, POWDER, LYOPHILIZED, FOR SOLUTION INTRAVENOUS at 08:05

## 2021-01-01 RX ADMIN — PALONOSETRON 0.25 MG: 0.05 INJECTION, SOLUTION INTRAVENOUS at 10:30

## 2021-01-01 RX ADMIN — SODIUM CHLORIDE, PRESERVATIVE FREE 10 ML: 5 INJECTION INTRAVENOUS at 12:07

## 2021-01-01 RX ADMIN — FLUOXETINE 40 MG: 20 CAPSULE ORAL at 08:23

## 2021-01-01 RX ADMIN — SODIUM CHLORIDE 20 ML/HR: 9 INJECTION, SOLUTION INTRAVENOUS at 10:20

## 2021-01-01 RX ADMIN — CARBOPLATIN 300 MG: 10 INJECTION INTRAVENOUS at 11:39

## 2021-01-01 RX ADMIN — SODIUM CHLORIDE 20 ML/HR: 9 INJECTION, SOLUTION INTRAVENOUS at 08:37

## 2021-01-01 RX ADMIN — IPRATROPIUM BROMIDE AND ALBUTEROL SULFATE 3 ML: .5; 3 SOLUTION RESPIRATORY (INHALATION) at 08:53

## 2021-01-01 RX ADMIN — ASPIRIN 81 MG: 81 TABLET, CHEWABLE ORAL at 08:23

## 2021-01-01 RX ADMIN — HYDROCORTISONE SODIUM SUCCINATE 100 MG: 100 INJECTION, POWDER, FOR SOLUTION INTRAMUSCULAR; INTRAVENOUS at 10:30

## 2021-01-01 RX ADMIN — Medication 50 MCG: at 09:42

## 2021-01-01 RX ADMIN — CARBOPLATIN 300 MG: 10 INJECTION INTRAVENOUS at 11:22

## 2021-01-01 RX ADMIN — Medication 500 UNITS: at 09:54

## 2021-01-01 RX ADMIN — SODIUM CHLORIDE, PRESERVATIVE FREE 10 ML: 5 INJECTION INTRAVENOUS at 09:40

## 2021-01-01 RX ADMIN — DENOSUMAB 120 MG: 120 INJECTION SUBCUTANEOUS at 10:36

## 2021-01-01 RX ADMIN — HEPARIN 500 UNITS: 100 SYRINGE at 12:24

## 2021-01-01 RX ADMIN — PROCHLORPERAZINE EDISYLATE 10 MG: 5 INJECTION INTRAMUSCULAR; INTRAVENOUS at 12:40

## 2021-01-01 RX ADMIN — METHYLPREDNISOLONE ACETATE 80 MG: 80 INJECTION, SUSPENSION INTRA-ARTICULAR; INTRALESIONAL; INTRAMUSCULAR; SOFT TISSUE at 13:43

## 2021-01-01 RX ADMIN — ASPIRIN 324 MG: 81 TABLET, CHEWABLE ORAL at 13:34

## 2021-01-01 RX ADMIN — PALONOSETRON 0.25 MG: 0.05 INJECTION, SOLUTION INTRAVENOUS at 10:22

## 2021-01-01 SDOH — ECONOMIC STABILITY: FOOD INSECURITY: WITHIN THE PAST 12 MONTHS, THE FOOD YOU BOUGHT JUST DIDN'T LAST AND YOU DIDN'T HAVE MONEY TO GET MORE.: NEVER TRUE

## 2021-01-01 SDOH — ECONOMIC STABILITY: FOOD INSECURITY: WITHIN THE PAST 12 MONTHS, YOU WORRIED THAT YOUR FOOD WOULD RUN OUT BEFORE YOU GOT MONEY TO BUY MORE.: NEVER TRUE

## 2021-01-01 SDOH — ECONOMIC STABILITY: TRANSPORTATION INSECURITY
IN THE PAST 12 MONTHS, HAS LACK OF TRANSPORTATION KEPT YOU FROM MEETINGS, WORK, OR FROM GETTING THINGS NEEDED FOR DAILY LIVING?: NO

## 2021-01-01 ASSESSMENT — ENCOUNTER SYMPTOMS
NAUSEA: 0
SORE THROAT: 0
WHEEZING: 0
WHEEZING: 0
TROUBLE SWALLOWING: 0
VOMITING: 0
ABDOMINAL PAIN: 0
BLOOD IN STOOL: 0
TROUBLE SWALLOWING: 0
DIARRHEA: 0
SHORTNESS OF BREATH: 1
SORE THROAT: 0
NAUSEA: 1
SINUS PRESSURE: 0
NAUSEA: 0
RHINORRHEA: 0
BACK PAIN: 1
STRIDOR: 0
NAUSEA: 1
SORE THROAT: 0
VOMITING: 0
COUGH: 0
COUGH: 1
SINUS PRESSURE: 0
BACK PAIN: 1
CONSTIPATION: 0
CONSTIPATION: 0
DIARRHEA: 0
WHEEZING: 0
CONSTIPATION: 0
COUGH: 0
SHORTNESS OF BREATH: 1
BLOOD IN STOOL: 0
EYE DISCHARGE: 0
CONSTIPATION: 0
SINUS PRESSURE: 0
VOMITING: 0
BLOOD IN STOOL: 0
SHORTNESS OF BREATH: 0
COUGH: 0
DIARRHEA: 0
COUGH: 1
VOMITING: 0
DIARRHEA: 0
BACK PAIN: 1
SORE THROAT: 0
ABDOMINAL PAIN: 0
WHEEZING: 0
COLOR CHANGE: 0
SHORTNESS OF BREATH: 0
EYE REDNESS: 0
ABDOMINAL PAIN: 0
BLOOD IN STOOL: 0
VOMITING: 0
NAUSEA: 0
ABDOMINAL PAIN: 0
SHORTNESS OF BREATH: 1
TROUBLE SWALLOWING: 0
DIARRHEA: 0

## 2021-01-01 ASSESSMENT — PAIN DESCRIPTION - DESCRIPTORS
DESCRIPTORS: ACHING;PRESSURE
DESCRIPTORS: SHARP
DESCRIPTORS: PRESSURE;ACHING
DESCRIPTORS: HEAVINESS;CRUSHING

## 2021-01-01 ASSESSMENT — COLUMBIA-SUICIDE SEVERITY RATING SCALE - C-SSRS
6. HAVE YOU EVER DONE ANYTHING, STARTED TO DO ANYTHING, OR PREPARED TO DO ANYTHING TO END YOUR LIFE?: NO
1. WITHIN THE PAST MONTH, HAVE YOU WISHED YOU WERE DEAD OR WISHED YOU COULD GO TO SLEEP AND NOT WAKE UP?: NO
2. HAVE YOU ACTUALLY HAD ANY THOUGHTS OF KILLING YOURSELF?: NO

## 2021-01-01 ASSESSMENT — PAIN SCALES - GENERAL
PAINLEVEL_OUTOF10: 10
PAINLEVEL_OUTOF10: 0
PAINLEVEL_OUTOF10: 10
PAINLEVEL_OUTOF10: 9
PAINLEVEL_OUTOF10: 5
PAINLEVEL_OUTOF10: 0
PAINLEVEL_OUTOF10: 6
PAINLEVEL_OUTOF10: 5
PAINLEVEL_OUTOF10: 8
PAINLEVEL_OUTOF10: 0
PAINLEVEL_OUTOF10: 3
PAINLEVEL_OUTOF10: 4
PAINLEVEL_OUTOF10: 7
PAINLEVEL_OUTOF10: 0
PAINLEVEL_OUTOF10: 9
PAINLEVEL_OUTOF10: 5
PAINLEVEL_OUTOF10: 5
PAINLEVEL_OUTOF10: 0
PAINLEVEL_OUTOF10: 9
PAINLEVEL_OUTOF10: 3
PAINLEVEL_OUTOF10: 10
PAINLEVEL_OUTOF10: 5
PAINLEVEL_OUTOF10: 10
PAINLEVEL_OUTOF10: 5
PAINLEVEL_OUTOF10: 6
PAINLEVEL_OUTOF10: 10
PAINLEVEL_OUTOF10: 8
PAINLEVEL_OUTOF10: 8
PAINLEVEL_OUTOF10: 10
PAINLEVEL_OUTOF10: 10

## 2021-01-01 ASSESSMENT — PAIN DESCRIPTION - PROGRESSION

## 2021-01-01 ASSESSMENT — PAIN DESCRIPTION - LOCATION
LOCATION: CHEST
LOCATION: GENERALIZED
LOCATION: GENERALIZED
LOCATION: CHEST
LOCATION: CHEST
LOCATION: THROAT
LOCATION: CHEST
LOCATION: LEG
LOCATION: BACK;GENERALIZED;THROAT
LOCATION: HEAD
LOCATION: LEG
LOCATION: BACK;GENERALIZED;NECK
LOCATION: CHEST
LOCATION: THROAT
LOCATION: BACK;CHEST
LOCATION: BACK;NECK
LOCATION: CHEST

## 2021-01-01 ASSESSMENT — PATIENT HEALTH QUESTIONNAIRE - PHQ9
1. LITTLE INTEREST OR PLEASURE IN DOING THINGS: 2
9. THOUGHTS THAT YOU WOULD BE BETTER OFF DEAD, OR OF HURTING YOURSELF: 1
3. TROUBLE FALLING OR STAYING ASLEEP: 2
8. MOVING OR SPEAKING SO SLOWLY THAT OTHER PEOPLE COULD HAVE NOTICED. OR THE OPPOSITE, BEING SO FIGETY OR RESTLESS THAT YOU HAVE BEEN MOVING AROUND A LOT MORE THAN USUAL: 0
6. FEELING BAD ABOUT YOURSELF - OR THAT YOU ARE A FAILURE OR HAVE LET YOURSELF OR YOUR FAMILY DOWN: 2
SUM OF ALL RESPONSES TO PHQ9 QUESTIONS 1 & 2: 4
SUM OF ALL RESPONSES TO PHQ QUESTIONS 1-9: 13
5. POOR APPETITE OR OVEREATING: 1
SUM OF ALL RESPONSES TO PHQ QUESTIONS 1-9: 12
SUM OF ALL RESPONSES TO PHQ QUESTIONS 1-9: 1
4. FEELING TIRED OR HAVING LITTLE ENERGY: 3
1. LITTLE INTEREST OR PLEASURE IN DOING THINGS: 0
DEPRESSION UNABLE TO ASSESS: PT REFUSES
7. TROUBLE CONCENTRATING ON THINGS, SUCH AS READING THE NEWSPAPER OR WATCHING TELEVISION: 0
2. FEELING DOWN, DEPRESSED OR HOPELESS: 2
10. IF YOU CHECKED OFF ANY PROBLEMS, HOW DIFFICULT HAVE THESE PROBLEMS MADE IT FOR YOU TO DO YOUR WORK, TAKE CARE OF THINGS AT HOME, OR GET ALONG WITH OTHER PEOPLE: 1
SUM OF ALL RESPONSES TO PHQ QUESTIONS 1-9: 13

## 2021-01-01 ASSESSMENT — SLEEP AND FATIGUE QUESTIONNAIRES
ESS TOTAL SCORE: 15
HOW LIKELY ARE YOU TO NOD OFF OR FALL ASLEEP WHILE LYING DOWN TO REST IN THE AFTERNOON WHEN CIRCUMSTANCES PERMIT: 3
HOW LIKELY ARE YOU TO NOD OFF OR FALL ASLEEP WHEN YOU ARE A PASSENGER IN A CAR FOR AN HOUR WITHOUT A BREAK: 3
HOW LIKELY ARE YOU TO NOD OFF OR FALL ASLEEP WHILE WATCHING TV: 3
HOW LIKELY ARE YOU TO NOD OFF OR FALL ASLEEP WHILE SITTING QUIETLY AFTER LUNCH WITHOUT ALCOHOL: 3
HOW LIKELY ARE YOU TO NOD OFF OR FALL ASLEEP WHILE SITTING AND READING: 0
HOW LIKELY ARE YOU TO NOD OFF OR FALL ASLEEP IN A CAR, WHILE STOPPED FOR A FEW MINUTES IN TRAFFIC: 0
HOW LIKELY ARE YOU TO NOD OFF OR FALL ASLEEP WHILE SITTING AND TALKING TO SOMEONE: 3
HOW LIKELY ARE YOU TO NOD OFF OR FALL ASLEEP WHILE SITTING INACTIVE IN A PUBLIC PLACE: 0

## 2021-01-01 ASSESSMENT — PAIN DESCRIPTION - FREQUENCY
FREQUENCY: CONTINUOUS

## 2021-01-01 ASSESSMENT — PAIN DESCRIPTION - PAIN TYPE
TYPE: CHRONIC PAIN
TYPE: ACUTE PAIN
TYPE: CHRONIC PAIN
TYPE: ACUTE PAIN
TYPE: CHRONIC PAIN
TYPE: ACUTE PAIN
TYPE: ACUTE PAIN

## 2021-01-01 ASSESSMENT — PAIN DESCRIPTION - ORIENTATION
ORIENTATION: MID
ORIENTATION: LEFT;ANTERIOR
ORIENTATION: RIGHT;LEFT
ORIENTATION: LEFT

## 2021-01-01 ASSESSMENT — PAIN DESCRIPTION - ONSET
ONSET: ON-GOING
ONSET: ON-GOING

## 2021-01-01 ASSESSMENT — PAIN - FUNCTIONAL ASSESSMENT
PAIN_FUNCTIONAL_ASSESSMENT: 0-10
PAIN_FUNCTIONAL_ASSESSMENT: 0-10

## 2021-01-05 NOTE — PATIENT INSTRUCTIONS
Refer to dr Kevin Ely for XRt   Send recent pathology for [facility] testing  rv in 4-5 weeks with cbc, cmp to discuss results

## 2021-01-05 NOTE — TELEPHONE ENCOUNTER
[facility] order form completed and signed by Dr Nathaniel Anderson. Writer faxed form along with demographics, insurance card, path report and progress note to [facility] at [phone]. Confirmation fax received and placed in bin for triage RN to confirm [facility] received order on 1/6. Once confirmed, triage RN to give order to [place], [place], to monitor for results. Patient to come into office on 1/11 for peripheral [facility] specimen.  Zuri Amos

## 2021-01-05 NOTE — PROGRESS NOTES
DIAGNOSIS:   1. Adenocarcinoma of the right lower lobe of lung. Path stage is T2N1M0. Stage 2 disease. 2. COPD  3. HTN   4. Smoking addiction   CURRENT THERAPY:  S/P right lower lobectomy. 9/2016  Received adjuvant chemotherapy, with Cisplatin and Alimta on 11/17/16, completed in February/2017    BRIEF CASE HISTORY:      Rey Chung is a very pleasant 61 y.o. male who is referred to us for management recommendation of his recently resected lung cancer. He actually underwent a screening CT scan because of his smoking habits. CT scans showed right lower lobe spiculated mass measuring 2.9 x 2.5 cm abutting the medial pleura. There was another 4 mm pulmonary nodule that has actually been stable from previous imaging. No adenopathy was appreciated. More testing was done but ultimately the patient was taken to surgery. Prior to surgery, the clinical stage was stage I disease. The patient underwent right lower lobe lobectomy on 9/13/16 and pathology showed invasive moderately differentiated adenocarcinoma of the lung measuring 4 cm in greatest dimension, the tumor invaded the visceral pleura. All margins were negative. He had one positive intrapulmonary lymph node. Representative nodes from stations 4, 7, 9 were all negative. Final pathological staging is (T2 N1 M0) stage II disease  He is sent to us for a consultation, recovering from surgery. He continues to have some pain in the surgical area but otherwise has no complaints. He has minimal shortness of breath, no cough or hemoptysis. No headache or seizures or loss of weight. Performance status is ECOG 1, we discussed adjuvant chemotherapy with Cisplatin and Alimta. After completion of chemotherapy, we started surveillance. He required bronchoscopy to clear thick mucous. He presented 09/2020 with significant weight loss and he was following with pulmonary service and CT scan showed pulmonary lesion. PET scan was done and unfortunately showed lung lesions and rib lesion, suggestive of recurrent disease. Brain MRI was done and negative. INTERIM HISTORY: The patient comes in today for a follow up for lung cancer and review recent biopsy. He underwent biopsy and reports some pain at the site, it worsens during the day and often becomes severe tension headache by bedtime with some nausea. PAST MEDICAL HISTORY: has a past medical history of Anxiety, BiPAP (biphasic positive airway pressure) dependence, Cancer ([facility] [address] Chronic back pain, Clinical trial exam, COPD (chronic obstructive pulmonary disease) ([facility] [address] Diabetes ([facility] [address] Hyperlipidemia, Hypertension, Hypothyroidism, Lung cancer ([facility] [address] Neuropathy, Sleep apnea, Slow to wake up after anesthesia, SOB (shortness of breath), and Urine frequency. PAST SURGICAL HISTORY: has a past surgical history that includes laminectomy (12/2012); Testicle removal (Left, 1982); Lung removal, partial (Right, 2016); other surgical history (Right, 06/13/2017); back surgery (01/2013); and US BIOPSY LYMPH NODE (12/21/2020). CURRENT MEDICATIONS:  has a current medication list which includes the following prescription(s): oxycodone-acetaminophen, clonazepam, metformin, fluticasone, levothyroxine, neomycin-polymyxin-hydrocortisone, fluoxetine, omeprazole, ciprofloxacin-dexamethasone, amlodipine, lisinopril, atorvastatin, fluticasone-umeclidin-vilant, ipratropium-albuterol, and albuterol. ALLERGIES:  is allergic to [facility] FAMILY HISTORY: Negative for any hematological or oncological conditions. SOCIAL HISTORY:  reports that he has been smoking cigarettes. He has a 10.00 pack-year smoking history. He has never used smokeless tobacco. He reports that he does not drink alcohol or use drugs.     REVIEW OF SYSTEMS: General: No fever or night sweats. Weight stable; +fatigue worse with some dizziness  ENT: No double or blurred vision, no tinnitus or hearing problem, no dysphagia or sore throat. Respiratory: Chronic shortness of breath and cough - some clear sputum - all worse, no hemoptysis, chest discomfort in the surgical area, chest congestion  Cardiovascular: Denies central chest pain, PND or orthopnea. No L E swelling or palpitations. Gastrointestinal: No vomiting, abdominal pain, diarrhea or constipation. +nausea with pain  Genitourinary: Denies dysuria, hematuria, frequency, urgency or incontinence. Neurological: Denies headaches, decreased LOC, no sensory or motor focal deficits. Grade 1 neuropathy in hands and feet, predates chemo, diabetes related. Musculoskeletal: Diffuse aches and pains, no joint swelling. Trapezoid and neck pain as noted above  Skin: There are no rashes or bleeding. Psychiatric:  History of bipolar disorder. Anxiety   Endocrine: No thyroid disease. Hematologic: No bleeding, no adenopathy. PHYSICAL EXAM: Shows a well appearing 61y.o.-year-old male who is not in pain or distress. Vital Signs: Blood pressure 110/73, pulse 65, temperature 98.2 °F (36.8 °C), temperature source Oral, resp. rate 18, weight 228 lb 14.4 oz (103.8 kg). HEENT: Slight redness in the throat. Normocephalic and atraumatic. Pupils are equal, round, reactive to light and accommodation. Extraocular muscles are intact. Neck: Showed no JVD, no carotid bruit. Lungs: Decreased air entry especially in the right lower lobe area. Minimal wheezing, bilaterally. Postoperative changes in the chest wall are healed well without any evidence of infection. Heart: Regular without any murmur. Abdomen: Soft, nontender. No hepatosplenomegaly. Extremities: Lower extremities show no edema, clubbing, or cyanosis. Neuro exam: intact cranial nerves bilaterally no motor or sensory deficit, gait is normal. Lymphatic: left level 4 lymph node is palpable and very tender    REVIEW OF RADIOLOGICAL RESULTS:   12/04/2020 PET CT IMPRESSION:   PET-CT evidence of mixed response of therapy.         1. New FDG avid left-sided level 4 lymph node of the neck suggestive of    progression of disease. 2. Multiple bilateral solid noncalcified pulmonary nodules, some of which    demonstrates sub threshold FDG activity, too small for PET CT    characterization.  CT follow-up is recommended. 3. FDG avid mediastinal and right hilar lymph nodes similar to the prior    study. 4. Interval decrease in FDG activity identified involving the previously    identified left 5th rib lesion.  No new FDG avid bony lesion is seen. 5. The previously identified 3 cm right adrenal gland lesion is unchanged in    size and FDG activity.  Finding is nonspecific and may represent an  adenoma    or possibly a metastatic lesion.           REVIEW OF LABORATORY RESULTS:   Lab Results   Component Value Date    WBC 7.9 09/10/2020    HGB 15.8 09/10/2020    HCT 46.8 09/10/2020 MCV 98.3 09/10/2020     09/10/2020       Chemistry        Component Value Date/Time     09/10/2020 1028    K 4.4 09/10/2020 1028     09/10/2020 1028    CO2 25 09/10/2020 1028    BUN 22 (H) 09/10/2020 1028    CREATININE 0.84 09/10/2020 1028        Component Value Date/Time    CALCIUM 9.5 09/10/2020 1028    ALKPHOS 62 09/10/2020 1028    AST 19 09/10/2020 1028    ALT 24 09/10/2020 1028    BILITOT 0.40 09/10/2020 1028              IMPRESSION:   1. Adenocarcinoma of the right lower lobe of lung. Path stage is T2N1M0. Stage 2 disease. 2. S/P lobectomy. 3. received adjuvant chemotherapy, with cisplatin and Alimta. Plan to finish 4 cycles of adjuvant chemotherapy and then observe. 4. COPD  5. Smoking addiction, unable to quit   6. HTN uncontrolled. PLAN:  1. We reviewed the pathology which confirmed recurrent lung cancer, we also reviewed PET in detail and discussed natural progression of disease and prognostic data. 2. I discussed options including systemic therapy or SBRT, with his pain I am recommending radiation likely followed by systemic immunotherapy and explained goals of both. 3. The patient has verbalized consent for both and I am putting in referral back to rad-onc and we will plan for repeat imaging to assess prior to further treatment. 4. I am refilling his Percocet. 5. I am writing for [facility] testing. 6. Return after radiation with lab work to discuss further plan. Patient has isolated supraclavicular lymph node metastases. After radiation, we will repeat his imaging studies see if she has any residual disease we will consider further therapy depending on the findings.   Likely that we will offer him immunotherapy if he PDL is expressed

## 2021-01-05 NOTE — TELEPHONE ENCOUNTER
AVS from 1/5/21     Refer to dr Riki Fry for XRt   Send recent pathology for [facility] testing  rv in 4-5 weeks with cbc, cmp to discuss results     RO consult scheduled for 1/11/21 @ 8am  [facility] testing request given to triage  rv scheduled for 2/11/21 @ 8:15am with labs at that time    Pt was given AVS and appt schedule

## 2021-01-07 NOTE — TELEPHONE ENCOUNTER
Name: Umang Mondragon  : 1960  MRN: [id number]    Oncology Navigation Follow-Up Note  Navigator reaching out to pt. Offering assistance. Introducing self and adding self to care team. Pt. Requesting assistance with co-pays, sounds like pt. Has paperwork, but having difficulty completing? Pt. Presently on Medicare/disability and has been treated in the past for Lung Cancer. Message sent to [facility] to address co-pay assistance concerns and Writer sending referral to Elkin Thomas(okay with pt. To reach out). Await Radiation/tempus, plan to follow.    Electronically signed by Kavon Norman RN on 2021 at 11:08 AM

## 2021-01-07 NOTE — TELEPHONE ENCOUNTER
Kiera confirmed they received requisition form and order for patient. ELTON William, to follow order for results. Writer provided Laya Lai, with order form to follow.  Marge Sandoval

## 2021-01-11 NOTE — PROGRESS NOTES
 amLODIPine (NORVASC) 5 MG tablet Take 1 tablet by mouth daily (Patient not taking: Reported on 1/5/2021) 90 tablet 1    lisinopril (PRINIVIL;ZESTRIL) 20 MG tablet Take 1 tablet by mouth daily (Patient not taking: Reported on 1/5/2021) 90 tablet 3    atorvastatin (LIPITOR) 80 MG tablet Take 1 tablet by mouth daily (Patient not taking: Reported on 1/5/2021) 90 tablet 3    fluticasone (FLONASE) 50 MCG/ACT nasal spray 2 sprays by Each Nostril route daily 1 Bottle 5    levothyroxine (SYNTHROID) 88 MCG tablet take 1 tablet by mouth once daily 90 tablet 1    neomycin-polymyxin-hydrocortisone (CORTISPORIN) 3.5-91498-4 otic solution Place 3 drops in ear(s) 3 times daily 1 each 1    FLUoxetine (PROZAC) 40 MG capsule take 1 capsule by mouth once daily 90 capsule 3    Fluticasone-Umeclidin-Vilant (TRELEGY ELLIPTA) 100-62.5-25 MCG/INH AEPB Inhale into the lungs      omeprazole (PRILOSEC) 20 MG delayed release capsule Take 1 capsule by mouth Daily 60 capsule 3    ciprofloxacin-dexamethasone (CIPRODEX) 0.3-0.1 % otic suspension Place 4 drops into the left ear 2 times daily 1 Bottle 1    ipratropium-albuterol (DUONEB) 0.5-2.5 (3) MG/3ML SOLN nebulizer solution Inhale 3 mLs into the lungs every 6 hours as needed for Shortness of Breath (Patient not taking: Reported on 1/5/2021) 360 mL 2    albuterol (PROVENTIL) (2.5 MG/3ML) 0.083% nebulizer solution   0     No current facility-administered medications for this encounter.         Past Medical History:   Diagnosis Date    Anxiety     BiPAP (biphasic positive airway pressure) dependence     Cancer (HCC)     Chronic back pain     Clinical trial exam 12/28/2016    COPD (chronic obstructive pulmonary disease) (HCC)     Diabetes (HCC)     Hyperlipidemia     Hypertension     Hypothyroidism     Lung cancer (HCC)     Neuropathy     bilat feet    Sleep apnea     Slow to wake up after anesthesia     SOB (shortness of breath)     Urine frequency Fear of current or ex partner: Not on file     Emotionally abused: Not on file     Physically abused: Not on file     Forced sexual activity: Not on file   Other Topics Concern    Not on file   Social History Narrative    Not on file             FALLS RISK SCREEN  Instructions:  Assess the patient and enter the appropriate indicators that are present for fall risk identification. Total the numbers entered and assign a fall risk score from Table 2.  Reassess patient at a minimum every 12 weeks or with status change. Assessment   Date  1/11/2021     1. Mental Ability: confusion/cognitively impaired 0     2. Elimination Issues: incontinence, frequency 0       3. Ambulatory: use of assistive devices (walker, cane, off-loading devices),        attached to equipment (IV pole, oxygen) 0     4. Sensory Limitations: dizziness, vertigo, impaired vision 0     5. Age less than 65        0     6. Age 72 or greater 0     7. Medication: diuretics, strong analgesics, hypnotics, sedatives,        antihypertensive agents 3   8. Falls:  recent history of falls within the last 3 months (not to include slipping or        tripping) 0   TOTAL 3    If score of 4 or greater was education given? No       TABLE 2   Risk Score Risk Level Plan of Care   0-3 Little or  No Risk 1. Provide assistance as indicated for ambulation activities  2. Reorient confused/cognitively impaired patient  3. Call-light/bell within patient's reach  4. Chair/bed in low position, stretcher/bed with siderails up except when performing patient care activities  5. Educate patient/family/caregiver on falls prevention  6.  Reassess in 12 weeks or with any noted change in patient condition which places them at a risk for a fall   4-6 Moderate Risk 1. Provide assistance as indicated for ambulation activities  2. Reorient confused/cognitively impaired patient  3.   Call-light/bell within patient's reach

## 2021-01-12 NOTE — PROGRESS NOTES
[facility]  Smoking Cessation Program      Leslye Joseph          1960  [place] [facility] [place]    Leslye Joseph is a 61 y.o. male has been referred to our service by Dana Kemp CNP for Smoking Cessation Counseling and Medication Management per Consult Agreement. Patient acknowledges working in consult agreement with clinical pharmacist and referring physician. SUBJECTIVE     Previous Goal/Quit Date: To Cut Back    Previous Goal/Quit Date Achieved: Yes    Has the patient smoked ANY cigarettes (even 1 puff) in the last 7 days?: Yes    Current PPD: 0.5 (goes through one pack every other day)    Smoking Reduction Percent: 50%    Pharmacological Agent used: Chantix - patient taking once daily at first now starting BID    Compliant with regimen: Yes    Medication Adverse Effects: nausea - but patient states this keeps him from smoking    How do you feel? []  Improved breathing  []  Improved sense if smell  []  Improved sense of taste  []  Increase in energy  [x]  Other - He notices something in his lungs - not sure if related to quit attempt or from cancer    OBJECTIVE     Past Medical History:      Past Medical History:   Diagnosis Date    Anxiety     BiPAP (biphasic positive airway pressure) dependence     Cancer (HCC)     Chronic back pain     Clinical trial exam 12/28/2016    COPD (chronic obstructive pulmonary disease) (HCC)     Diabetes (HCC)     Hyperlipidemia     Hypertension     Hypothyroidism     Lung cancer (HCC)     Neuropathy     bilat feet    Sleep apnea     Slow to wake up after anesthesia     SOB (shortness of breath)     Urine frequency      Wt Readings from Last 3 Encounters:   01/12/21 228 lb (103.4 kg)   01/05/21 228 lb 14.4 oz (103.8 kg)   12/09/20 231 lb 12.8 oz (105.1 kg)       ASSESSMENT     Current Smoking Status: Cut Back -down to about 10 cigarettes per day    Lifestyle modifications:   What has worked well?  Using Chantix What hasnt worked well? Stress of living situation, Recent notification of Cancer return    Behavior modifications:    What has worked well? Smoking partial cigarettes, smoking outside ONLY  What hasnt worked well? none    If Cut Back or Relapsed:  What continues to trigger you? Stress    How are you avoiding triggers or planning for how to deal with them? Coming up with a list of distractions to keep occupied during cravings    What do you feel is a trigger to overcome next? Unsure    How Motivated are you to quit on a scale from 1-10? Did not assess    How Confident are you that you can quit on a scale from 1-10? Did not assess    Withdrawal sxs? Other concerns: Weight gain - educated patient regarding increased risks of continued smoking    PLAN       - PharmD to follow up 2 weeks. -Spent more than 60 minutes discussing smoking cessation with patient during visit.     Electronically signed by Keke Lujan, [address] [place] [address] on 1/12/2021 at 11:16 AM    CLINICAL PHARMACY CONSULT: MED RECONCILIATION/REVIEW Elsie  22. Tracking Only    PHSO: Yes  Total # of Interventions Recommended: 1  - New Order #: 1 New Medication Order Reason(s): Needs Additional Medication Therapy  Total Interventions Accepted: 1  Time Spent (min): [address] PharmD

## 2021-01-12 NOTE — CONSULTS
[facility]            Radiation Oncology          [address] [facility]          [address]        [place] Mon: [phone]        F: [phone]       [email or web address]           2021    Patient: Clinton Albert   YOB: 1960       Dear Dr Geoff Jerry: Thank you for referring Clinton Albert to me for evaluation. Below are the relevant portions of my assessment and plan of care. If you have questions, please do not hesitate to call me. I look forward to following this patient along with you.      Sincerely,    Electronically signed by Tameka Salmon MD on 21 at 7:29 PM EST    CC: Patient Care Team:  OSIEL Zavala CNP as PCP - General (Family Nurse Practitioner)  OSIEL Zavala CNP as PCP - [facility] Provider  Riki Busch RN as Nurse Navigator (Oncology)  ------------------------------------------------------------------------------------------------------------------------------------------------------------------------------------------      RADIATION ONCOLOGY NEW PATIENT VISIT:    Date of Service: 2021    Location:  [facility] Radiation Oncology,   [address] [facility] [place]   [phone]     Patient ID:   Clinton Albert  : 1960   MRN: [id number]    CHIEF COMPLAINT: \"Recurrent lung cancer\"    DIAGNOSIS:  Cancer Staging  Malignant neoplasm of lower lobe of right lung ([facility] [address]  Staging form: Lung, AJCC 7th Edition  - Pathologic: Stage IIIB (T2a, N3, cM0) - Signed by Tameka Salmon MD on 2021    -s/p RLL Lobectomy 16  -s/p adj chemo x4 cis/alimta  - new L SCF LN, level N3    HISTORY OF PRESENT ILLNESS: Mr Jack Malik is a 61year old male with a history of right lower lobe lung cancer diagnosed in 2016 for which he had lobectomy followed by adjuvant chemotherapy for a pathologic stage pT2a pN1. He was on surveillance imaging for a few years and was following with his pulmonologist at [address] [place] [address] at which time he had a CT over the summer that showed numerous bilateral pulmonary nodules. Patient was recommended to undergo a PET scan on September 10, 2020 which showed some mild levels of FDG avidity in the left rib as well as new subcentimeter pulmonary nodules in bilateral lung. Patient had an MRI of the brain done also which is negative. In the small nature of the lesions and the inability to biopsy them patient was recommended short-term follow-up with a repeat PET scan being done on December 4, 2020. The PET scan showed new FDG avid level 4 cervical lymph node on the left side as well as multiple subcentimeter pulmonary nodules. Patient had mild activity in the right hilar and mediastinal lymph nodes, and the previous left fifth rib lesion was decrease in activity now. Patient did undergo a ultrasound-guided biopsy on December 21, 2020 of the left cervical lymph node which did come back positive for static adenocarcinoma. Patient's pathology has been sent for molecular testing. Patient in the meantime has been referred to us today for consultation and discussion of potential SBRT treatment to this lymph node. Patient does have some weight loss and fatigue but otherwise denies any other acute symptoms of headaches, dizziness, chest pain, shortness of breath, abdominal pain, nausea, bleeding, swelling, or bony pain.     PRIOR RADIATION HISTORY:  No prior history of radiation therapy    PACEMAKER: None    PAST MEDICAL HISTORY:  Past Medical History:   Diagnosis Date    Anxiety     BiPAP (biphasic positive airway pressure) dependence     Cancer (HCC)     Chronic back pain     Clinical trial exam 12/28/2016  COPD (chronic obstructive pulmonary disease) (HCC)     Diabetes ([facility] [address]     Hyperlipidemia     Hypertension     Hypothyroidism     Lung cancer ([facility] [address]     Neuropathy     bilat feet    Sleep apnea     Slow to wake up after anesthesia     SOB (shortness of breath)     Urine frequency        PAST SURGICAL HISTORY:  Past Surgical History:   Procedure Laterality Date    BACK SURGERY  01/2013    10 days after lamenectomy, surgery was done in same area to remove Staff infection i    LAMINECTOMY  12/2012    LUNG REMOVAL, PARTIAL Right 2016    lower lobe    OTHER SURGICAL HISTORY Right 06/13/2017    CT guided placement of right pleural drainage catheter     TESTICLE REMOVAL Left 1982    US LYMPH NODE BIOPSY  12/21/2020    US LYMPH NODE BIOPSY 12/21/2020 [facility] ULTRASOUND       MEDICATIONS:    Current Outpatient Medications:     oxyCODONE-acetaminophen (PERCOCET) 5-325 MG per tablet, Take 1 tablet by mouth every 6 hours as needed for Pain for up to 30 days. , Disp: 60 tablet, Rfl: 0    clonazePAM (KLONOPIN) 1 MG tablet, Take 1 tablet by mouth 2 times daily as needed for Anxiety for up to 30 days. , Disp: 60 tablet, Rfl: 2    metFORMIN (GLUCOPHAGE) 500 MG tablet, Take 500 mg by mouth daily (with breakfast), Disp: , Rfl:     amLODIPine (NORVASC) 5 MG tablet, Take 1 tablet by mouth daily (Patient not taking: Reported on 1/5/2021), Disp: 90 tablet, Rfl: 1    lisinopril (PRINIVIL;ZESTRIL) 20 MG tablet, Take 1 tablet by mouth daily (Patient not taking: Reported on 1/5/2021), Disp: 90 tablet, Rfl: 3    atorvastatin (LIPITOR) 80 MG tablet, Take 1 tablet by mouth daily (Patient not taking: Reported on 1/5/2021), Disp: 90 tablet, Rfl: 3    fluticasone (FLONASE) 50 MCG/ACT nasal spray, 2 sprays by Each Nostril route daily, Disp: 1 Bottle, Rfl: 5    levothyroxine (SYNTHROID) 88 MCG tablet, take 1 tablet by mouth once daily, Disp: 90 tablet, Rfl: 1   neomycin-polymyxin-hydrocortisone (CORTISPORIN) 3.5-56279-0 otic solution, Place 3 drops in ear(s) 3 times daily, Disp: 1 each, Rfl: 1    FLUoxetine (PROZAC) 40 MG capsule, take 1 capsule by mouth once daily, Disp: 90 capsule, Rfl: 3    Fluticasone-Umeclidin-Vilant (TRELEGY ELLIPTA) 100-62.5-25 MCG/INH AEPB, Inhale into the lungs, Disp: , Rfl:     omeprazole (PRILOSEC) 20 MG delayed release capsule, Take 1 capsule by mouth Daily, Disp: 60 capsule, Rfl: 3    ciprofloxacin-dexamethasone (CIPRODEX) 0.3-0.1 % otic suspension, Place 4 drops into the left ear 2 times daily, Disp: 1 Bottle, Rfl: 1    ipratropium-albuterol (DUONEB) 0.5-2.5 (3) MG/3ML SOLN nebulizer solution, Inhale 3 mLs into the lungs every 6 hours as needed for Shortness of Breath (Patient not taking: Reported on 1/5/2021), Disp: 360 mL, Rfl: 2    albuterol (PROVENTIL) (2.5 MG/3ML) 0.083% nebulizer solution, , Disp: , Rfl: 0    ALLERGIES:   Allergies   Allergen Reactions    Emend [Fosaprepitant Dimeglumine] Other (See Comments)     Reported back pain, warmth all over, nausea & SOB       SOCIAL HISTORY:  Social History     Socioeconomic History    Marital status:      Spouse name: Not on file    Number of children: Not on file    Years of education: Not on file    Highest education level: Not on file   Occupational History    Not on file   Social Needs    Financial resource strain: Not on file    Food insecurity     Worry: Not on file     Inability: Not on file   [employer] needs     Medical: Not on file     Non-medical: Not on file   Tobacco Use    Smoking status: Current Every Day Smoker     Packs/day: 0.25     Years: 40.00     Pack years: 10.00     Types: Cigarettes    Smokeless tobacco: Never Used   Substance and Sexual Activity    Alcohol use: No    Drug use: No    Sexual activity: Yes     Partners: Female   Lifestyle    Physical activity     Days per week: Not on file     Minutes per session: Not on file  Stress: Not on file   Relationships    Social connections     Talks on phone: Not on file     Gets together: Not on file     Attends Anabaptism service: Not on file     Active member of club or organization: Not on file     Attends meetings of clubs or organizations: Not on file     Relationship status: Not on file    Intimate partner violence     Fear of current or ex partner: Not on file     Emotionally abused: Not on file     Physically abused: Not on file     Forced sexual activity: Not on file   Other Topics Concern    Not on file   Social History Narrative    Not on file       FAMILY HISTORY:  Family History   Problem Relation Age of Onset    Cancer Mother     Cancer Father     Mental Illness Sister     Mental Illness Brother     Mental Illness Sister     Mental Illness Sister     Mental Illness Sister     Mental Illness Brother     Other Brother        REVIEW OF SYSTEMS:    GENERAL/CONSTITUTIONAL: (+) Fatigue and weight loss. The patient denies fever,  weakness, weight gain. HEENT: The patient denies pain, redness, loss of vision, double or blurred vision. The patient denies ringing in the ears, loss of hearing, hoarseness, trouble swallowing, or painful swallowing. CARDIOVASCULAR: The patient denies chest pain, irregular heartbeats, sudden changes in heartbeat or palpitation. RESPIRATORY: The patient denies shortness of breath, cough, hemoptysis. GASTROINTESTINAL: The patient denies nausea, vomiting, diarrhea, constipation, blood in the stools. GENITOURINARY: The patient denies difficult urination, pain or burning with urination, blood in the urine. MUSCULOSKELETAL: The patient denies arm, buttock, thigh or calf cramps. No joint or muscle pain. SKIN: The patient denies easy bruising, skin redness, skin rash, hives. NEUROLOGIC: The patient denies headache, dizziness, fainting, muscle spasm, loss of consciousness. ENDOCRINE: The patient denies intolerance to hot or cold temperature, flushing. HEMATOLOGIC/LYMPHATIC: The patient denies anemia, bleeding tendency or clotting tendency. ALLERGIC/IMMUNOLOGIC: The patient denies rhinitis, asthma, skin sensitivity. PYSCHOLOGIC: The patient denies any depression, anxiety, or confusion. A full 14 point review of systems was performed and assessed and found to be negative except as noted above. PHYSICAL EXAMINATION:    CHAPERONE: Not Required    ECO Asymptomatic    VITAL SIGNS: BP (!) 152/96   Pulse 64   Temp 98 °F (36.7 °C)   Resp 18   GENERAL:  General appearance is that of a well-nourished, well-developed in no apparent distress. HEENT: Normocephalic, atraumatic, EOMI, moist mucosa, no erythema. NECK:  (+) L adenopathy, no palpable thyroid mass, trachea is midline. LYMPHATICS: (+)L Neck cervical/SCF adenopathy. HEART:  Regular rate and rhythm, S1, S2, no murmurs. LUNGS:  Clear to auscultation bilaterally with no wheezing or crackles. ABDOMEN:  Soft, nontender, non distended. EXTREMITIES:  No clubbing, cyanosis, or edema. No calf tenderness. MSK:  No CVA or spinal tenderness. NEUROLOGICAL: No focal deficits. CN II-XII intact. Strength and sensation intact bilaterally. SKIN: No erythema or desquamation. LABS:  WBC   Date Value Ref Range Status   09/10/2020 7.9 3.5 - 11.3 k/uL Final     Segs Absolute   Date Value Ref Range Status   09/10/2020 4.62 1.50 - 8.10 k/uL Final     Hemoglobin   Date Value Ref Range Status   09/10/2020 15.8 13.0 - 17.0 g/dL Final     Platelets   Date Value Ref Range Status   09/10/2020 171 138 - 453 k/uL Final     No results found for: , CEA  PSA   Date Value Ref Range Status   09/10/2020 0.59 <4.1 ug/L Final     Comment:     The Roche \"ECLIA\" assay is used. Results obtained with different assay methods cannot be   used interchangeably.          IMAGIN/4/20 PET Scan     Impression PET-CT evidence of mixed response of therapy.       1. New FDG avid left-sided level 4 lymph node of the neck suggestive of   progression of disease. 2. Multiple bilateral solid noncalcified pulmonary nodules, some of which   demonstrates sub threshold FDG activity, too small for PET CT   characterization.  CT follow-up is recommended. 3. FDG avid mediastinal and right hilar lymph nodes similar to the prior   study. 4. Interval decrease in FDG activity identified involving the previously   identified left 5th rib lesion.  No new FDG avid bony lesion is seen. 5. The previously identified 3 cm right adrenal gland lesion is unchanged in   size and FDG activity.  Finding is nonspecific and may represent an  adenoma   or possibly a metastatic lesion.         9/10/20 MRI Brain  Impression   No acute intracranial abnormality.  No intracranial metastatic disease.       Bilateral mastoid air cell effusions. PATHOLOGY:  12/21/20 L Neck Biopsy  -- Diagnosis --   FINE NEEDLE ASPIRATION NECK MASS BIOPSY:           POSITIVE FOR MALIGNANCY:   METASTATIC ADENOCARCINOMA. 9/13/16 RLL Lobectomy (Outside records in Care Everywhere folder)  Amended Final Pathologic Diagnosis   Amended Diagnosis  1.  Right lower lobe, lobectomy:       Invasive moderately differentiated adenocarcinoma, acinar type of the lung, 4 cm in greatest dimension.       Tumor invades visceral pleura.       Surgical resection margins are negative for tumor.       Three peribronchial/hilar lymph nodes, negative for tumor.       One intrapulmonary lymph node, positive for metastatic adenocarcinoma. Comment:  As a part of Biorepository  procedure, a piece of tumor submitted to the Biorepository showed a small 4 mm intrapulmonary lymph node adjacent to tumor with metastatic adenocarcinoma.  This block is now reidentified and submitted as 1Q.  Final stage is also corrected in the cancer case summary (corrections in bold). Findings discussed with Dr. Candido Wing on 09/23/2016 at 4:30 p.m. Note: An elastic stain is performed, which confirms visceral pleural invasion. 2.  #7 Right lymph node, excision:       Fragments of lymph node, negative for tumor. 3.  #9 Right lymph node, excision:       Section of a lymph node, negative for tumor. 4.  #4 Right lymph node, excision:       Section of one lymph node, negative for tumor. ASSESSMENT AND PLAN:   Lodia Pérez is a 61 y.o. male with a Cancer Staging  Malignant neoplasm of lower lobe of right lung ([facility] [address]  Staging form: Lung, AJCC 7th Edition  - Pathologic: Stage IIIB (T2a, N3, cM0) - Signed by Steve Montes MD on 1/11/2021    -s/p RLL Lobectomy 9/13/16  -s/p adj chemo x4 cis/alimta  - new L SCF LN, level N3     We reviewed with the patient the testing that has been done so far, including imaging and pathology results. We also reviewed their staging based on the testing that as been done and the recommendations per the NCCN guidelines. We discussed the options of treatment, including surgery, chemotherapy, and radiation, and the rationale of why radiation therapy would be indicated for this diagnosis. We also discussed that they have the option to not pursue our recommended treatment as well. Given the location and limited nature of patient's disease I do think you to be ideal candidate for stereotactic body radiation therapy (SBRT). Will require adjuvant chemotherapy after treatment and has plans for it with Dr. Yvette Dumont. We reviewed the logistics of treatment planning, including a CT Simulation session, as well as 5 treatments over approximately 2 weeks. With regards to radiation to the neck area, I discussed the possible short-term side effects of skin reaction (causing redness, dryness, or peeling), tiredness, low blood counts (causing infection or bleeding), sore throat, hair loss in treated area. Possible long-term side effects discussed included hyperpigmentation of the skin, increased firmness of the tissues of the neck, damage to the nerves (causing numbness, weakness, or paralysis), damage to the bone (causing necrosis), decreased thyroid function, and scarring of the lung (causing shortness of breath/cough). Patient was advised on smoking cessation, as it can make toxicities worse during treatment and increase risk of other cancers. After ample time to review the patient's questions, an informed consent was obtained to proceed with scheduling a treatment planning session for radiation therapy. Patient was in agreement with my recommendations. All questions were answered to their satisfaction. Patient was advised to contact us anytime should they have any questions or concerns. I spent greater than 60 minutes counseling and evaluating the different treatment options available to the patient and formulating a plan of action today. Vlad Lovell MD  Electronically signed by Vlad Lovell MD on 1/11/21 at 7:29 PM EST      Drugs Prescribed:  New Prescriptions    No medications on file       Orders Placed:   No orders of the defined types were placed in this encounter.         CC:  Patient Care Team:  OSIEL Alfaro CNP as PCP - General (Family Nurse Practitioner)  OSIEL Alfaro CNP as PCP - [facility] Empaneled Provider  Annette Ocampo RN as Nurse Navigator (Oncology)

## 2021-01-13 NOTE — TELEPHONE ENCOUNTER
Name: Audrey Marks  : 1960  MRN: [id number]    Oncology Navigation Follow-Up Note  Navigator reviewing RO notes and checking status of Tempus testing . PDL1 negative expression per tempus dashboard, await further results, Plan to follow. Marilee Herrera please assist in sending navigation packet if not done so already.    Electronically signed by Fariba Rachel RN on 2021 at 8:58 AM

## 2021-01-15 NOTE — TELEPHONE ENCOUNTER
Name: Shukri Nieto  : 1960  MRN: [id number]    Oncology Navigation Follow-Up Note    Navigator noting PDL1 negative, remaining tests pending, plan to follow. Pt. To be SIM'd .    Electronically signed by Shahzad Sam RN on 1/15/2021 at 8:51 AM

## 2021-01-18 NOTE — LETTER
[address] [facility]  Darnell Lamb  [address]   [place] [address]        Dear Yusuf Junior Mueller,     I am writing to you today on behalf of the [facility]. I understand that you have recently been diagnosed with cancer, and I wanted to reach out to you and offer some words of support and encouragement. Information about your diagnosis, treatment and follow up is very important for you and your family. Please rely on your physician(s) for guidance and direction about your care. Be sure to ask questions and seek out as much information as you need. I have enclosed my contact information as well as some community resources that I hope you find beneficial. You are welcome to call if you have any questions about your diagnosis or would like to be connected to resources within [facility] and the community. You are not alone in this journey. Please feel free to contact me if you need assistance. Sincerely,         Jose Dueñas RN  Oncology Nurse Navigator  [facility], [address] [place] [address]  Phone: [phone]  Fax: [phone]  Email: [email or web address]

## 2021-01-19 NOTE — PROGRESS NOTES
Per Dr. Nighat Garcia he discussed pt's care with Dr. Carlito Vargas and pt's treatment plan will change to IMRT with concurrent chemo. [facility] [address] [facility] notified and Dr. Nighat Garcia notified pt. Questions answered and pt walked to [facility] without issue.

## 2021-01-21 NOTE — PATIENT INSTRUCTIONS
SEE ORDERS FOR CHEMO  PLAN CHEMORADIATION , PLEASE COORDINATE WITH RADIATION ONCOLOGY  NEEDS LABS WEEKLY CBC, CMP   RETURN TO SEE ME WITH DAY 1 , I C AN SEE ON TREATMENT IF NEEDED

## 2021-01-21 NOTE — TELEPHONE ENCOUNTER
Rec'd VM from Jesús Gregg (nurse navigator) stating Tempus specimen came back insufficient tissue. Dr Jacqueline Koch notified. He states to have Tempus request tissue from right lower lobe, lobectomy done at [facility]. Care everywhere reviewed, surgical path found from 9/13/16. VM left with rep Santi at [facility] requesting call back.   Will need to provide Sy Robertson with specimen # and [facility] info below:    Accession # [id number]    [facility]  Consultants in Laboratory Medicine  [address] [place] [address], [address] [place] [address]  Tel: [phone]           Fax: [phone]

## 2021-01-21 NOTE — PROGRESS NOTES
DIAGNOSIS:   1. Adenocarcinoma of the right lower lobe of lung. Path stage is T2N1M0. Stage 2 disease. 2. COPD  3. HTN   4. Smoking addiction   CURRENT THERAPY:  S/P right lower lobectomy. 9/2016  Received adjuvant chemotherapy, with Cisplatin and Alimta on 11/17/16, completed in February/2017    BRIEF CASE HISTORY:      Brennan Augustine is a very pleasant 61 y.o. male who is referred to us for management recommendation of his recently resected lung cancer. He actually underwent a screening CT scan because of his smoking habits. CT scans showed right lower lobe spiculated mass measuring 2.9 x 2.5 cm abutting the medial pleura. There was another 4 mm pulmonary nodule that has actually been stable from previous imaging. No adenopathy was appreciated. More testing was done but ultimately the patient was taken to surgery. Prior to surgery, the clinical stage was stage I disease. The patient underwent right lower lobe lobectomy on 9/13/16 and pathology showed invasive moderately differentiated adenocarcinoma of the lung measuring 4 cm in greatest dimension, the tumor invaded the visceral pleura. All margins were negative. He had one positive intrapulmonary lymph node. Representative nodes from stations 4, 7, 9 were all negative. Final pathological staging is (T2 N1 M0) stage II disease  He is sent to us for a consultation, recovering from surgery. He continues to have some pain in the surgical area but otherwise has no complaints. He has minimal shortness of breath, no cough or hemoptysis. No headache or seizures or loss of weight. Performance status is ECOG 1, we discussed adjuvant chemotherapy with Cisplatin and Alimta. After completion of chemotherapy, we started surveillance. He required bronchoscopy to clear thick mucous. He presented 09/2020 with significant weight loss and he was following with pulmonary service and CT scan showed pulmonary lesion. PET scan was done and unfortunately showed lung lesions and rib lesion, suggestive of recurrent disease. Brain MRI was done and negative. INTERIM HISTORY: The patient comes in today for a follow up for lung cancer and review current recommendations. He reports some rash in the scalp area, he had previous psoriasis in that area. PAST MEDICAL HISTORY: has a past medical history of Anxiety, BiPAP (biphasic positive airway pressure) dependence, Cancer ([facility] [address] Chronic back pain, Clinical trial exam, COPD (chronic obstructive pulmonary disease) ([facility] [address] Diabetes ([facility] [address] Hyperlipidemia, Hypertension, Hypothyroidism, Lung cancer ([facility] [address] Neuropathy, Sleep apnea, Slow to wake up after anesthesia, SOB (shortness of breath), and Urine frequency. PAST SURGICAL HISTORY: has a past surgical history that includes laminectomy (12/2012); Testicle removal (Left, 1982); Lung removal, partial (Right, 2016); other surgical history (Right, 06/13/2017); back surgery (01/2013); and US BIOPSY LYMPH NODE (12/21/2020). CURRENT MEDICATIONS:  has a current medication list which includes the following prescription(s): oxycodone-acetaminophen, metformin, levothyroxine, fluoxetine, albuterol, clonazepam, amlodipine, lisinopril, atorvastatin, fluticasone, neomycin-polymyxin-hydrocortisone, fluticasone-umeclidin-vilant, omeprazole, ciprofloxacin-dexamethasone, and ipratropium-albuterol. ALLERGIES:  is allergic to [facility]]. FAMILY HISTORY: Negative for any hematological or oncological conditions. SOCIAL HISTORY:  reports that he has been smoking cigarettes. He has a 10.00 pack-year smoking history. He has never used smokeless tobacco. He reports that he does not drink alcohol or use drugs.     REVIEW OF SYSTEMS: General: No fever or night sweats. Weight stable; +fatigue worse with some dizziness  ENT: No double or blurred vision, no tinnitus or hearing problem, no dysphagia or sore throat. Respiratory: Chronic shortness of breath and cough - some clear sputum - all worse, no hemoptysis, chest discomfort in the surgical area, chest congestion  Cardiovascular: Denies central chest pain, PND or orthopnea. No L E swelling or palpitations. Gastrointestinal: No vomiting, abdominal pain, diarrhea or constipation. +nausea with pain  Genitourinary: Denies dysuria, hematuria, frequency, urgency or incontinence. Neurological: Denies headaches, decreased LOC, no sensory or motor focal deficits. Grade 1 neuropathy in hands and feet, predates chemo, diabetes related. Musculoskeletal: Diffuse aches and pains, no joint swelling. Trapezoid and neck pain as noted above  Skin: There are no rashes or bleeding. +psoriasis   Psychiatric:  History of bipolar disorder. Anxiety   Endocrine: No thyroid disease. Hematologic: No bleeding, no adenopathy. PHYSICAL EXAM: Shows a well appearing 61y.o.-year-old male who is not in pain or distress. Vital Signs: Blood pressure 135/81, pulse 62, temperature 97.7 °F (36.5 °C), temperature source Oral, weight 229 lb 11.2 oz (104.2 kg). HEENT: Slight redness in the throat. Normocephalic and atraumatic. Pupils are equal, round, reactive to light and accommodation. Extraocular muscles are intact. Neck: Showed no JVD, no carotid bruit. Lungs: Decreased air entry especially in the right lower lobe area. Minimal wheezing, bilaterally. Postoperative changes in the chest wall are healed well without any evidence of infection. Heart: Regular without any murmur. Abdomen: Soft, nontender. No hepatosplenomegaly. Extremities: Lower extremities show no edema, clubbing, or cyanosis. Neuro exam: intact cranial nerves bilaterally no motor or sensory deficit, gait is normal. Lymphatic: left level 4 lymph node is palpable and very tender    REVIEW OF RADIOLOGICAL RESULTS:     REVIEW OF LABORATORY RESULTS:   Lab Results   Component Value Date    WBC 7.9 09/10/2020    HGB 15.8 09/10/2020    HCT 46.8 09/10/2020    MCV 98.3 09/10/2020     09/10/2020       Chemistry        Component Value Date/Time     09/10/2020 1028    K 4.4 09/10/2020 1028     09/10/2020 1028    CO2 25 09/10/2020 1028    BUN 22 (H) 09/10/2020 1028    CREATININE 0.84 09/10/2020 1028        Component Value Date/Time    CALCIUM 9.5 09/10/2020 1028    ALKPHOS 62 09/10/2020 1028    AST 19 09/10/2020 1028    ALT 24 09/10/2020 1028    BILITOT 0.40 09/10/2020 1028              IMPRESSION:   1. Adenocarcinoma of the right lower lobe of lung. Path stage is T2N1M0. Stage 2 disease. 2. S/P lobectomy. 3. received adjuvant chemotherapy, with cisplatin and Alimta. Plan to finish 4 cycles of adjuvant chemotherapy and then observe. 4. COPD  5. Smoking addiction, unable to quit   6. HTN uncontrolled.      PLAN:

## 2021-01-21 NOTE — TELEPHONE ENCOUNTER
Rec'd call back from Guy Miller at [facility]. Updated her on new specimen. She requested new requisition. Completed and faxed to [facility] at #[phone]. Confirmation rec'd.

## 2021-01-25 NOTE — TELEPHONE ENCOUNTER
Spoke with Jay Lundberg at [facility]. Verified new requisition sent on 1/21/21 was rec'd and in process. Awaiting results.

## 2021-01-26 NOTE — TELEPHONE ENCOUNTER
Name: Fide Marie  : 1960  MRN: [id number]    Oncology Navigation Follow-Up Note  Navigator reaching out to Inocencio Crawford And reminding her pt. Will need scheduled as concurrent. Informed her SIM was , plan to follow.    Electronically signed by Annalise Lindsay RN on 2021 at 10:01 AM

## 2021-01-27 NOTE — TELEPHONE ENCOUNTER
Name: Babs Dodd  : 1960  MRN: [id number]    Oncology Navigation Follow-Up Note  Navigator reaching out to pt. Offering assistance if needed. Pt. Has yet to complete paperwork for PAP, but does have concerns about co-pays. Writer encoraging pt. To complete paperwork. Simon Knows checking with Will Suarez for tentative start date, then will update Roge Harris      11:00 message given to Roge Harris. tentative start date for RTX could be Monday  Per Will Harris. To coordinate with Will Suarez.   Electronically signed by   Clarisa Alcaraz RN on 2021 at 9:12 AM

## 2021-01-28 PROBLEM — J43.0 UNILATERAL EMPHYSEMA (HCC): Status: ACTIVE | Noted: 2021-01-01

## 2021-01-28 PROBLEM — G47.33 OBSTRUCTIVE SLEEP APNEA SYNDROME: Status: ACTIVE | Noted: 2021-01-01

## 2021-01-28 PROBLEM — K21.9 GASTROESOPHAGEAL REFLUX DISEASE: Status: ACTIVE | Noted: 2021-01-01

## 2021-01-28 PROBLEM — G89.29 CHRONIC MIDLINE THORACIC BACK PAIN: Status: RESOLVED | Noted: 2018-06-14 | Resolved: 2021-01-01

## 2021-01-28 PROBLEM — E11.29 MICROALBUMINURIA DUE TO TYPE 2 DIABETES MELLITUS (HCC): Status: RESOLVED | Noted: 2017-06-11 | Resolved: 2021-01-01

## 2021-01-28 PROBLEM — M54.6 CHRONIC MIDLINE THORACIC BACK PAIN: Status: RESOLVED | Noted: 2018-06-14 | Resolved: 2021-01-01

## 2021-01-28 PROBLEM — J98.11 ATELECTASIS: Status: ACTIVE | Noted: 2021-01-01

## 2021-01-28 PROBLEM — G47.9 DIFFICULTY SLEEPING: Status: ACTIVE | Noted: 2021-01-01

## 2021-01-28 PROBLEM — F41.9 ANXIETY: Status: ACTIVE | Noted: 2021-01-01

## 2021-01-28 PROBLEM — J43.9 PULMONARY EMPHYSEMA (HCC): Status: ACTIVE | Noted: 2021-01-01

## 2021-01-28 PROBLEM — R80.9 MICROALBUMINURIA DUE TO TYPE 2 DIABETES MELLITUS (HCC): Status: RESOLVED | Noted: 2017-06-11 | Resolved: 2021-01-01

## 2021-01-28 PROBLEM — C80.1 ADENOCARCINOMA (HCC): Status: ACTIVE | Noted: 2021-01-01

## 2021-01-28 NOTE — PROGRESS NOTES
[facility] PRIMARY CARE  [facility] [address]  [address] [address]  Dept: [phone]  Dept Fax: [phone]    Edison Palma is a 61 y.o. male who presentstoday for his medical conditions/complaints as noted below. Edison Palma is c/o of  Chief Complaint   Patient presents with    Diabetes     needs a1c    Otalgia     pt would like ears checked    Other    Medication Check     pt states he stopped taking a lot of his meds and does not know what to take. HPI:     Here today for follow up  Presents with his wife today  He is visibly anxious, reports 2 weeks ago found out his lung cancer is back  He will be in need of both chemo and radiation tx, states is not sure exactly when it will start but expects in a week or 2  Has been taking prozac and also klonopin as needed but is having trouble sleeping  He has been off his other medications except for synthroid and prozac  Has not been taking his statin or BP meds, is also off metformin for the past 2 months or so  He states that with everything going on     Also voicing concern about increased chronic back pain.   He he does get some relief with Depo-Medrol injections for his sciatica, requesting injection today    He also complains of recurrent bilateral ear infections  He has run out of his drops and also feels that he needs an oral antibiotic  He has not been treated for over 1 year  His main symptoms include feeling of bilateral fullness along with decreased hearing and frequent pain      Diabetes He presents for his follow-up diabetic visit. He has type 2 diabetes mellitus. His disease course has been stable. There are no hypoglycemic associated symptoms. Pertinent negatives for hypoglycemia include no confusion, dizziness, headaches or nervousness/anxiousness. Pertinent negatives for diabetes include no chest pain, no fatigue, no polydipsia, no polyphagia, no polyuria and no weakness. Current diabetic treatment includes diet and oral agent (monotherapy). He is compliant with treatment most of the time. His weight is stable. He is following a generally healthy diet. His home blood glucose trend is fluctuating minimally. An ACE inhibitor/angiotensin II receptor blocker is being taken. Hemoglobin A1C (%)   Date Value   01/28/2021 5.6   06/30/2020 5.8   08/20/2019 6.0             ( goal A1C is < 7)   Microalb/Crt.  Ratio (mcg/mg creat)   Date Value   06/10/2017 60 (H)     LDL Cholesterol (mg/dL)   Date Value   09/10/2020 173 (H)   07/11/2019 CANNOT BE CALCULATED   07/11/2019 192 (H)       (goal LDL is <100)   AST (U/L)   Date Value   09/10/2020 19     ALT (U/L)   Date Value   09/10/2020 24     BUN (mg/dL)   Date Value   09/10/2020 22 (H)     BP Readings from Last 3 Encounters:   01/28/21 132/62   01/21/21 135/81   01/12/21 129/85          (ytio132/80)    Past Medical History:   Diagnosis Date    Anxiety     BiPAP (biphasic positive airway pressure) dependence     Cancer (HCC)     Chronic back pain     Clinical trial exam 12/28/2016    COPD (chronic obstructive pulmonary disease) (HCC)     Diabetes (HCC)     Hyperlipidemia     Hypertension     Hypothyroidism     Lung cancer ([facility] [address]     Neuropathy     bilat feet    Sleep apnea     Slow to wake up after anesthesia     SOB (shortness of breath)     Urine frequency       Past Surgical History:   Procedure Laterality Date    BACK SURGERY  01/2013    10 days after lamenectomy, surgery was done in same area to remove Staff infection i  LAMINECTOMY  12/2012    LUNG REMOVAL, PARTIAL Right 2016    lower lobe    OTHER SURGICAL HISTORY Right 06/13/2017    CT guided placement of right pleural drainage catheter     TESTICLE REMOVAL Left 1982    US LYMPH NODE BIOPSY  12/21/2020    US LYMPH NODE BIOPSY 12/21/2020 [facility] ULTRASOUND       Family History   Problem Relation Age of Onset    Cancer Mother     Cancer Father     Mental Illness Sister     Mental Illness Brother     Mental Illness Sister     Mental Illness Sister     Mental Illness Sister     Mental Illness Brother     Other Brother           Social History     Tobacco Use    Smoking status: Current Every Day Smoker     Packs/day: 0.25     Years: 40.00     Pack years: 10.00     Types: Cigarettes    Smokeless tobacco: Never Used   Substance Use Topics    Alcohol use: No      Current Outpatient Medications   Medication Sig Dispense Refill    atorvastatin (LIPITOR) 80 MG tablet Take 1 tablet by mouth every evening 90 tablet 3    omeprazole (PRILOSEC) 20 MG delayed release capsule Take 1 capsule by mouth Daily 180 capsule 3    lisinopril (PRINIVIL;ZESTRIL) 20 MG tablet Take 1 tablet by mouth daily 90 tablet 3    ciprofloxacin-dexamethasone (CIPRODEX) 0.3-0.1 % otic suspension Place 4 drops into the left ear 2 times daily 1 Bottle 1    ipratropium-albuterol (DUONEB) 0.5-2.5 (3) MG/3ML SOLN nebulizer solution Inhale 3 mLs into the lungs every 6 hours as needed for Shortness of Breath 360 mL 2    amitriptyline (ELAVIL) 25 MG tablet Take 1 tablet by mouth nightly 90 tablet 3    amoxicillin-clavulanate (AUGMENTIN) 875-125 MG per tablet Take 1 tablet by mouth 2 times daily for 10 days 20 tablet 0    levothyroxine (SYNTHROID) 88 MCG tablet take 1 tablet by mouth once daily 90 tablet 1    ondansetron (ZOFRAN ODT) 8 MG TBDP disintegrating tablet Take 1 tablet by mouth every 8 hours as needed for Nausea or Vomiting 30 tablet 3  oxyCODONE-acetaminophen (PERCOCET) 5-325 MG per tablet Take 1 tablet by mouth every 6 hours as needed for Pain for up to 30 days. 60 tablet 0    FLUoxetine (PROZAC) 40 MG capsule take 1 capsule by mouth once daily 90 capsule 3    clonazePAM (KLONOPIN) 1 MG tablet Take 1 tablet by mouth 2 times daily as needed for Anxiety for up to 30 days. 60 tablet 2     No current facility-administered medications for this visit. Allergies   Allergen Reactions    Emend [Fosaprepitant Dimeglumine] Other (See Comments)     Reported back pain, warmth all over, nausea & SOB       Health Maintenance   Topic Date Due    Hepatitis C screen  1960    HIV screen  12/02/1975    DTaP/Tdap/Td vaccine (1 - Tdap) 12/02/1979    Shingles Vaccine (1 of 2) 12/02/2010    Colon cancer screen colonoscopy  12/02/2010    Annual Wellness Visit (AWV)  05/29/2019    Diabetic foot exam  01/31/2020    Diabetic retinal exam  01/31/2020    Lipid screen  09/10/2021    TSH testing  09/10/2021    Potassium monitoring  09/10/2021    Creatinine monitoring  09/10/2021    A1C test (Diabetic or Prediabetic)  01/28/2022    Flu vaccine  Completed    Pneumococcal 0-64 years Vaccine  Completed    Hepatitis A vaccine  Aged Out    Hib vaccine  Aged Out    Meningococcal (ACWY) vaccine  Aged Out       Subjective:      Review of Systems   Constitutional: Negative for activity change, appetite change, chills, fatigue, fever and unexpected weight change. HENT: Positive for ear pain. Negative for congestion, hearing loss, sinus pressure, sore throat and trouble swallowing. Eyes: Negative for visual disturbance. Respiratory: Negative for cough, shortness of breath and wheezing. Cardiovascular: Negative for chest pain, palpitations and leg swelling. Gastrointestinal: Negative for abdominal pain, blood in stool, constipation, diarrhea, nausea and vomiting. Endocrine: Negative for cold intolerance, heat intolerance, polydipsia, polyphagia and polyuria. Genitourinary: Negative for difficulty urinating, frequency, hematuria and urgency. Musculoskeletal: Positive for arthralgias and back pain. Negative for myalgias. Skin: Negative for rash. Allergic/Immunologic: Negative for environmental allergies. Neurological: Negative for dizziness, weakness, light-headedness and headaches. Psychiatric/Behavioral: Positive for dysphoric mood. Negative for confusion. The patient is not nervous/anxious. Objective:     Physical Exam  Constitutional:       Appearance: He is well-developed. HENT:      Head: Normocephalic. Right Ear: Swelling and tenderness present. A middle ear effusion is present. Tympanic membrane is erythematous. Left Ear: Swelling and tenderness present. A middle ear effusion is present. Tympanic membrane is erythematous. Eyes:      Conjunctiva/sclera: Conjunctivae normal.      Pupils: Pupils are equal, round, and reactive to light. Neck:      Musculoskeletal: Normal range of motion. Cardiovascular:      Rate and Rhythm: Normal rate and regular rhythm. Heart sounds: Normal heart sounds. No murmur. Pulmonary:      Effort: Pulmonary effort is normal.      Breath sounds: Normal breath sounds. No wheezing. Abdominal:      General: Bowel sounds are normal. There is no distension. Palpations: Abdomen is soft. Musculoskeletal: Normal range of motion. Skin:     General: Skin is warm and dry. Neurological:      Mental Status: He is alert and oriented to person, place, and time. Psychiatric:         Behavior: Behavior normal.         Thought Content:  Thought content normal.         Judgment: Judgment normal. /62 (Site: Left Upper Arm, Position: Sitting, Cuff Size: Medium Adult)   Pulse 72   Temp 97.8 °F (36.6 °C) (Temporal)   Resp 18   Ht 6' 2\" (1.88 m)   Wt 229 lb 12.8 oz (104.2 kg)   SpO2 97%   BMI 29.50 kg/m²     Assessment:       Diagnosis Orders   1. Type 2 diabetes mellitus without complication, without long-term current use of insulin ([facility])  POCT glycosylated hemoglobin (Hb A1C)   2. Mixed hyperlipidemia  atorvastatin (LIPITOR) 80 MG tablet   3. Gastroesophageal reflux disease, unspecified whether esophagitis present  omeprazole (PRILOSEC) 20 MG delayed release capsule   4. Essential hypertension  lisinopril (PRINIVIL;ZESTRIL) 20 MG tablet    ipratropium-albuterol (DUONEB) 0.5-2.5 (3) MG/3ML SOLN nebulizer solution   5. Other infective acute otitis externa of left ear  ciprofloxacin-dexamethasone (CIPRODEX) 0.3-0.1 % otic suspension    amoxicillin-clavulanate (AUGMENTIN) 875-125 MG per tablet   6. Malignant neoplasm of lower lobe of right lung (HCC)  ipratropium-albuterol (DUONEB) 0.5-2.5 (3) MG/3ML SOLN nebulizer solution   7. Smoking addiction  ipratropium-albuterol (DUONEB) 0.5-2.5 (3) MG/3ML SOLN nebulizer solution   8. Simple chronic bronchitis (HCC)  ipratropium-albuterol (DUONEB) 0.5-2.5 (3) MG/3ML SOLN nebulizer solution   9. Chronic midline low back pain with right-sided sciatica  methylPREDNISolone acetate (DEPO-MEDROL) injection 80 mg   10. Acquired hypothyroidism     11. Adenocarcinoma ([facility] [address]     12. Difficulty sleeping  amitriptyline (ELAVIL) 25 MG tablet   13. Major depressive disorder with single episode, in partial remission ([facility] [address]               Plan:      Return in about 8 weeks (around 3/25/2021) for depression/anxiety, hypertension check.   Visit required extended time due to recurrence of cancer, has not been in our office for several months, had several concerns/needs DiabetesA1c has decreased even with being off of Metformin. Will discontinue Metformin for now and continue to monitor A1c  Right lung cancer, adenocarcinomanew diagnosis of metastasis, recent visit with oncologist reviewed and discussed. He will be receiving chemotherapy and radiation therapy. He will follow-up with oncology as planned  Otitis externaCiprodex drops, course of Augmentin, call/return to office if does not improve  Hypertensionhas been off both amlodipine and lisinopril, BP in normal range today in office. Will resume lisinopril only for now. Given need for chemo and radiation and likely continued stress/anxiety he is agreeable to maintain a blood pressure medication. Discussed may need dose adjustment if BPs begin to elevate  Hypothyroidismrecent TSH stable on current dose levothyroxine  Back painDepo-Medrol for current flare  Depressionhe will continue Prozac, does not feel any further medications are necessary right now.   He appears to be coping relatively well with his cancer recurrence, wife appears supportive  Difficulty sleepingstart amitriptyline, discussed will likely need dose increase to reach max effectiveness  Orders Placed This Encounter   Procedures    POCT glycosylated hemoglobin (Hb A1C)        Orders Placed This Encounter   Medications    methylPREDNISolone acetate (DEPO-MEDROL) injection 80 mg    atorvastatin (LIPITOR) 80 MG tablet     Sig: Take 1 tablet by mouth every evening     Dispense:  90 tablet     Refill:  3    omeprazole (PRILOSEC) 20 MG delayed release capsule     Sig: Take 1 capsule by mouth Daily     Dispense:  180 capsule     Refill:  3    lisinopril (PRINIVIL;ZESTRIL) 20 MG tablet     Sig: Take 1 tablet by mouth daily     Dispense:  90 tablet     Refill:  3    ciprofloxacin-dexamethasone (CIPRODEX) 0.3-0.1 % otic suspension     Sig: Place 4 drops into the left ear 2 times daily     Dispense:  1 Bottle     Refill:  1

## 2021-01-28 NOTE — TELEPHONE ENCOUNTER
received call from Radiation stating patient had brought in paperwork for [employer]. Forms mailed to [facility] [place].  will continue to follow.

## 2021-02-01 NOTE — TELEPHONE ENCOUNTER
Name: Cristiano Johnston  : 1960  MRN: [id number]    Oncology Navigation Follow-Up Note  Navigator reviewing [facility] dashboard, but does not appear the sample from [facility] # [id number] has been received yet. Writer then spoke with Kaylin Chou from [facility] and from what she could tell tissue had not ann requested or sent to [facility]? Felicitas in Triage updated, plan to follow.   Electronically signed by Jose Dueñas RN on 2021 at 9:59 AM

## 2021-02-02 NOTE — TELEPHONE ENCOUNTER
Chemo orders received, ht 74\", wt 229lbs, and bsa 2.33 verified. Dose of chemotherapy verified; Taxol 45mg/m2= 104mg  Carbo AUC 2 to be calculated at tx.

## 2021-02-02 NOTE — TELEPHONE ENCOUNTER
Chemotherapy orders received:    Ht=74 inches  Ea=655 lbs  BSA=2.33  Chemotherapy doses verified:  Taxol 45mg/m2 = 104 mg  Carboplatin AUC 2 to be determined day of treatment   Maximo Cobian RN

## 2021-02-03 NOTE — TELEPHONE ENCOUNTER
Writer sent email to [employer] requesting an update on [facility] testing. Madhu Hickman stated that they received the order last Monday, 1/25, and stated that they re-faxed order to [address] on 2/1, as they have not received block from [address]. Madhu Hickman stated that they spoke with Olga Salmon @ [address] on 2/1 and stated that they received the request and are aware that this is STAT to expedite results once sample is received. Writer informed ELTON William, of above and she is to follow for results. Writer spoke with patient and stated that he needs a peripheral blood draw. Patient stated that he has an appt at 10:00am today at [address] [place] [address] for smoking cessation management. Writer stated that the patient can go to [facility] outpatient lab for blood draw today. Order faxed to [address] [place] [address] at [phone]. Writer also spoke with Mulu Leong RN @ [facility]'s Triage, informing her of above. Mulu Leong stated she will inform [place] at the .  Lb Miranda

## 2021-02-03 NOTE — PROGRESS NOTES
[facility] Medication Management  Smoking Cessation Program      Constantino Delaney          1960  Tesfaye Datsol, APRN - CNP    Constantino Delaney is a 61 y.o. male has been referred to our service by Dr. Tena Morton for Smoking Cessation Counseling and Medication Management per Consult Agreement. Patient acknowledges working in consult agreement with clinical pharmacist and referring physician. SUBJECTIVE     Previous Goal/Quit Date: Cut back    Previous Goal/Quit Date Achieved: No - back close to baseline with cigarettes smoked    Has the patient smoked ANY cigarettes (even 1 puff) in the last 7 days?: Yes    Current PPD: 0.5-1 PPD    Smoking Reduction Percent: 0%    Pharmacological Agent used: Chantix - Quit due to nausea    Compliant with regimen: No - discontinued    Medication Adverse Effects: Nausea due to Chantix - caused him stop    How do you feel? []  Improved breathing  []  Improved sense if smell  []  Improved sense of taste  []  Increase in energy  []  Other       OBJECTIVE     Past Medical History:      Past Medical History:   Diagnosis Date    Anxiety     BiPAP (biphasic positive airway pressure) dependence     Cancer (HCC)     Chronic back pain     Clinical trial exam 12/28/2016    COPD (chronic obstructive pulmonary disease) (HCC)     Diabetes (HCC)     Hyperlipidemia     Hypertension     Hypothyroidism     Lung cancer (HCC)     Neuropathy     bilat feet    Sleep apnea     Slow to wake up after anesthesia     SOB (shortness of breath)     Urine frequency      Wt Readings from Last 3 Encounters:   02/03/21 228 lb 3.2 oz (103.5 kg)   01/28/21 229 lb 12.8 oz (104.2 kg)   01/21/21 229 lb 11.2 oz (104.2 kg)       ASSESSMENT     Current Smoking Status: Relapsed - back to smoking between half and full pack per day    Lifestyle modifications:   What has worked well?  Stress with recent diagnosis of cancer, upcoming chemo and radiation, being busy with 79 yo What hasnt worked well? None     Behavior modifications:    What has worked well? Got games on phone, busy with cleaning projects  What hasnt worked well? None    If Cut Back or Relapsed:  What continues to trigger you? Waking up    How are you avoiding triggers or planning for how to deal with them? Patches and Lozenge    What do you feel is a trigger to overcome next? Dealing with stress of Cancer    How Motivated are you to quit on a scale from 1-10? 4    How Confident are you that you can quit on a scale from 1-10? 1    PLAN     Medication Therapy: Initiate nicotine replacement patches    *>10 cigarettes/day*   - 21 mg patch QAM x 6 weeks - assisted patient in ordering through    - 14 mg patch QAM x 2 weeks    - 7 mg patch QAM x 2 weeks    Nicotine Lozenge 4mg as needed for cravings      - PharmD to follow up 3 weeks. -Spent more than 60 minutes discussing smoking cessation with patient during visit.     Electronically signed by Romero Nunez, [address] [place] [address] on 2/3/2021 at 10:10 AM    CLINICAL PHARMACY CONSULT: [facility]    PHSO: Yes  Total # of Interventions Recommended: 2  - New Order #: 2 New Medication Order Reason(s): Needs Additional Medication Therapy  Total Interventions Accepted: 2  Time Spent (min): Osei FelderD

## 2021-02-09 NOTE — FLOWSHEET NOTE
Writer encountered and visited briefly with Patient as he arrived to the hospital. Patient greeted writer. He shared that he was beginning treatment, \"this is my second time around. \" He affirmed the staff and noted, \"that's why I come here. \"    Writer offered words of encouragement and support and informed Pt that she is available for support. Pt expressed thanks and accessed his sense of humor. 02/09/21 1538   Encounter Summary   Services provided to: Patient   Referral/Consult From: [facility]   Support System Spouse   Continue Visiting   (2/9/21)   Complexity of Encounter Low   Length of Encounter 15 minutes   Routine   Type Initial   Assessment Calm; Approachable   Intervention Explored feelings, thoughts, concerns;Sustaining presence/ Ministry of presence   Outcome Engaged in conversation;Coping;Expressed gratitude     Electronically signed by Nabila Shannon, Oncology Outpatient [facility], [address] [facility] Radiation Oncology  2/9/2021  3:41 PM

## 2021-02-09 NOTE — PROGRESS NOTES
Patient here for C1D1 Taxol/Carbo. Labs reviewed. He saw Dr. Ashley Pierce today see his dictation. About 9 minutes after starting Taxol Patient c/o SOB flushing and nausea. Taxol stopped and solucortef given. 2 liters  O2 placed on patient he was 97% on RA. He is 100% on 2 liters and O2 re,akanksha. Patient states that he feels better. Dr. Ashley Pierce updated and ordered to proceed slowly. Patient restarted at 50 ml for 15 minutes and increased 50 ml every 15 minutes to 250 ml/hr. Vitals stable. He tolerated treatment well and was discharged home in stable condition. He is due to return 2/16 for C2D1.

## 2021-02-09 NOTE — TELEPHONE ENCOUNTER
AVS from 2/9/21     Proceed with chemotherapy per orders, return next week with chemotherapy and labs    Continue tx as scheduled    RV scheduled 2/16/21, pt will be seen during tx    PT was given AVS and an appt schedule    Electronically signed by Alo Ocampo on 2/9/2021 at 11:24 AM

## 2021-02-09 NOTE — PROGRESS NOTES
DIAGNOSIS:   1. Adenocarcinoma of the right lower lobe of lung. Path stage is T2N1M0. Stage 2 disease. 2. COPD  3. HTN   4. Smoking addiction   5. Evidence of recurrent disease with metastases to supraclavicular lymph node as well as mediastinal lymph node  CURRENT THERAPY:  S/P right lower lobectomy. 9/2016  Received adjuvant chemotherapy, with Cisplatin and Alimta on 11/17/16, completed in February/2017  Plan chemoradiation with Taxol and carboplatin  BRIEF CASE HISTORY:      Galilea Kelsey is a very pleasant 61 y.o. male who is referred to us for management recommendation of his recently resected lung cancer. He actually underwent a screening CT scan because of his smoking habits. CT scans showed right lower lobe spiculated mass measuring 2.9 x 2.5 cm abutting the medial pleura. There was another 4 mm pulmonary nodule that has actually been stable from previous imaging. No adenopathy was appreciated. More testing was done but ultimately the patient was taken to surgery. Prior to surgery, the clinical stage was stage I disease. The patient underwent right lower lobe lobectomy on 9/13/16 and pathology showed invasive moderately differentiated adenocarcinoma of the lung measuring 4 cm in greatest dimension, the tumor invaded the visceral pleura. All margins were negative. He had one positive intrapulmonary lymph node. Representative nodes from stations 4, 7, 9 were all negative. Final pathological staging is (T2 N1 M0) stage II disease  He is sent to us for a consultation, recovering from surgery. He continues to have some pain in the surgical area but otherwise has no complaints. He has minimal shortness of breath, no cough or hemoptysis. No headache or seizures or loss of weight. Performance status is ECOG 1, we discussed adjuvant chemotherapy with Cisplatin and Alimta. After completion of chemotherapy, we started surveillance. He required bronchoscopy to clear thick mucous. He presented 09/2020 with significant weight loss and he was following with pulmonary service and CT scan showed pulmonary lesion. PET scan was done and unfortunately showed lung lesions and rib lesion, suggestive of recurrent disease. Brain MRI was done and negative. INTERIM HISTORY: The patient comes in today for a follow up for lung cancer and to commence with treatment. He is feeling unchanged from baseline with no new concerns or complaints. PAST MEDICAL HISTORY: has a past medical history of Anxiety, BiPAP (biphasic positive airway pressure) dependence, Cancer ([address] Chronic back pain, Clinical trial exam, COPD (chronic obstructive pulmonary disease) ([address] Diabetes ([address] Hyperlipidemia, Hypertension, Hypothyroidism, Lung cancer ([address] Neuropathy, Sleep apnea, Slow to wake up after anesthesia, SOB (shortness of breath), and Urine frequency. PAST SURGICAL HISTORY: has a past surgical history that includes laminectomy (12/2012); Testicle removal (Left, 1982); Lung removal, partial (Right, 2016); other surgical history (Right, 06/13/2017); back surgery (01/2013); and US BIOPSY LYMPH NODE (12/21/2020). CURRENT MEDICATIONS:  has a current medication list which includes the following prescription(s): oxycodone-acetaminophen, atorvastatin, omeprazole, lisinopril, ciprofloxacin-dexamethasone, ipratropium-albuterol, amitriptyline, levothyroxine, ondansetron, fluoxetine, and clonazepam, and the following Facility-Administered Medications: sodium chloride, CARBOplatin (PARAPLATIN) 280 mg in sodium chloride 0.9 % 100 mL chemo IVPB, and sodium chloride flush. ALLERGIES:  is allergic to [facility] dimeglumine]. FAMILY HISTORY: Negative for any hematological or oncological conditions. SOCIAL HISTORY:  reports that he has been smoking cigarettes. He has a 10.00 pack-year smoking history. He has never used smokeless tobacco. He reports that he does not drink alcohol or use drugs. REVIEW OF SYSTEMS:   General: No fever or night sweats. Weight stable; +fatigue  ENT: No double or blurred vision, no tinnitus or hearing problem, no dysphagia or sore throat. Respiratory: Chronic shortness of breath and cough - some clear sputum - no hemoptysis, chest discomfort in the surgical area, chest congestion  Cardiovascular: Denies central chest pain, PND or orthopnea. No L E swelling or palpitations. Gastrointestinal: No vomiting, abdominal pain, diarrhea or constipation. +nausea with pain  Genitourinary: Denies dysuria, hematuria, frequency, urgency or incontinence. Neurological: Denies headaches, decreased LOC, no sensory or motor focal deficits. Grade 1 neuropathy in hands and feet, predates chemo, diabetes related. Musculoskeletal: Diffuse aches and pains, no joint swelling. Trapezoid and neck pain as noted above  Skin: There are no rashes or bleeding. +psoriasis   Psychiatric:  History of bipolar disorder. Anxiety   Endocrine: No thyroid disease. Hematologic: No bleeding, no adenopathy. PHYSICAL EXAM: Shows a well appearing 61y.o.-year-old male who is not in pain or distress. Vital Signs: Blood pressure 129/82, pulse 62, temperature 97.3 °F (36.3 °C), temperature source Oral, weight 224 lb (101.6 kg). HEENT: Slight redness in the throat. Normocephalic and atraumatic. Pupils are equal, round, reactive to light and accommodation. Extraocular muscles are intact. Neck: Showed no JVD, no carotid bruit. Lungs: Decreased air entry especially in the right lower lobe area. Minimal wheezing, bilaterally. Postoperative changes in the chest wall are healed well without any evidence of infection. Heart: Regular without any murmur. Abdomen: Soft, nontender. No hepatosplenomegaly. Extremities: Lower extremities show no edema, clubbing, or cyanosis.  Neuro exam: intact cranial nerves bilaterally no motor or sensory deficit, gait is normal. Lymphatic: left level 4 lymph node is palpable and very tender REVIEW OF RADIOLOGICAL RESULTS:     REVIEW OF LABORATORY RESULTS:   Lab Results   Component Value Date    WBC 8.5 02/09/2021    HGB 15.8 02/09/2021    HCT 46.7 02/09/2021    MCV 96.3 02/09/2021     02/09/2021       Chemistry        Component Value Date/Time     02/09/2021 0908    K 4.8 02/09/2021 0908     02/09/2021 0908    CO2 31 02/09/2021 0908    BUN 25 (H) 02/09/2021 0908    CREATININE 1.00 02/09/2021 0908        Component Value Date/Time    CALCIUM 9.7 02/09/2021 0908    ALKPHOS 68 02/09/2021 0908    AST 19 02/09/2021 0908    ALT 26 02/09/2021 0908    BILITOT 0.63 02/09/2021 0908              IMPRESSION:   1. Adenocarcinoma of the right lower lobe of lung. Path stage is T2N1M0. Stage 2 disease. 2. S/P lobectomy. 3. received adjuvant chemotherapy, with cisplatin and Alimta. Plan to finish 4 cycles of adjuvant chemotherapy and then observe. 4. COPD  5. Smoking addiction, unable to quit   6. HTN uncontrolled. 7. Starting chemoradiation for recurrent disease in the mediastinum and supraclavicular area. PLAN:  1. He has had some mild infusion reaction today, we will re-challenge and monitor. 2. His lab work was reviewed, counts and electrolytes are in range. 3. We reviewed plan for concurrent chemoradiation followed by 1 year immunotherapy maintenance. 4. I explained why he not candidate for surgery with metastatic disease. 5. He is still considering port. 6. Return in 1 week. Addendum  Shortly after starting Taxol, the patient had a mild infusion reaction, treated with premedication and holding the medicine for 15 minutes. Taxol was successfully restarted without any problem. We will watch him closely over the infusion.

## 2021-02-10 NOTE — PROGRESS NOTES
Constantino Delaney  2/10/2021  Wt Readings from Last 3 Encounters:   02/09/21 224 lb 9.6 oz (101.9 kg)   02/09/21 224 lb (101.6 kg)   02/03/21 228 lb 3.2 oz (103.5 kg)     There is no height or weight on file to calculate BMI. Treatment [facility]     Patient was seen today for weekly visit. Comfort Alteration  Fatigue: Mild    Ventilation Alterations  Cough: Yes  Hemoptysis: No  Mucus Color: n/a   Dyspnea: Yes      Nutritional Alteration  Anorexia: No  Nausea: No   Vomiting: No     Mucous Membrane Alteration  Voice Changes/ Stridor/Larynx: no  Pharynx & Esophagus: n/a     Elimination Alterations  Constipation: no  Diarrhea:  no    Skin Alteration   Sensation:intact     Radiation Dermatitis:  Intact [x]     Erythema  []     Discoloration  []     Rash []     Dry desquamation  []     Moist desquamation []       Emotional  Coping: effective      Injury, potential bleeding or infection: skin care  Reviewed     Lab Results   Component Value Date    WBC 8.5 02/09/2021     02/09/2021         /80   Pulse 78   Temp 97.6 °F (36.4 °C)   Resp 18   SpO2 98%   Patient Currently in Pain: Yes     Pain Level: 5         Assessment/Plan: Patient was seen today for weekly visit. Dr Danielle Coles notified and examined pt.       Benjie Silva

## 2021-02-10 NOTE — TELEPHONE ENCOUNTER
Name: Umang Mondragon  : 1960  MRN: [id number]    Oncology Navigation Follow-Up Note  Navigator reaching out offering assistance, but could only leave a message, plan to follow. [facility] still await tumor sample from [address], plan to follow.      Electronically signed by Kavon Norman RN on 2/10/2021 at 11:03 AM

## 2021-02-11 NOTE — PROGRESS NOTES
[facility]            Radiation Oncology          [address] [facility]          [address], [address]        Vena Piper: [phone]        F: [phone]       [email or web address]             RADIATION ONCOLOGY WEEKLY PROGRESS NOTE  Patient ID:   Darlene Mariscal  : 1960   MRN: [id number]    DIAGNOSIS:  Cancer Staging  Malignant neoplasm of lower lobe of right lung ([address]  Staging form: Lung, AJCC 7th Edition  - Pathologic: Stage IIIB (T2a, N3, cM0) - Signed by Feliciano Pimentel MD on 2021      TREATMENT DETAILS:  Treatment Site: SCF and mediastinal LNs  Actual Dose: 400cGy  Total Planned Dose: 6000cGy  Treatment Technique: IMRT  Fraction Technique: Daily  Therapy imaging monitoring: CBCT daily  Concurrent Chemotherapy: Carbo/taxol weekly    SUBJECTIVE:   Patient seen for their weekly on treatment evaluation today. Patient is doing well though he did have a mild infusion reaction yesterday and notes some itching still. He denies any trouble eating, but notes mild soreness in the throat. He denies any skin irritation, fatigue, or changes in appetite. OBJECTIVE:   CHAPERONE: Not Required    ECO Asymptomatic    VITAL SIGNS: /80   Pulse 78   Temp 97.6 °F (36.4 °C)   Resp 18   SpO2 98%   Wt Readings from Last 5 Encounters:   21 224 lb 9.6 oz (101.9 kg)   21 224 lb (101.6 kg)   21 228 lb 3.2 oz (103.5 kg)   21 229 lb 12.8 oz (104.2 kg)   21 229 lb 11.2 oz (104.2 kg)     GENERAL:  General appearance is that of a well-nourished, well-developed in no apparent distress. HEENT: Normocephalic, atraumatic, EOMI, moist mucosa. NECK:  No adenopathy or a palpable thyroid mass, trachea is midline. SKIN: No erythema or desquamation.     LABS:  WBC   Date Value Ref Range Status   2021 8.5 3.5 - 11.0 k/uL Final   09/10/2020 7.9 3.5 - 11.3 k/uL Final   2019 10.1 3.5 - 11.3 k/uL Final     Segs Absolute   Date Value Ref Range Status 02/09/2021 5.70 1.8 - 7.7 k/uL Final   09/10/2020 4.62 1.50 - 8.10 k/uL Final   12/16/2019 6.68 1.50 - 8.10 k/uL Final     Hemoglobin   Date Value Ref Range Status   02/09/2021 15.8 13.5 - 17.5 g/dL Final   09/10/2020 15.8 13.0 - 17.0 g/dL Final   12/16/2019 16.4 13.0 - 17.0 g/dL Final     Platelets   Date Value Ref Range Status   02/09/2021 185 140 - 450 k/uL Final   09/10/2020 171 138 - 453 k/uL Final   12/16/2019 190 138 - 453 k/uL Final     CREATININE   Date Value Ref Range Status   02/09/2021 1.00 0.70 - 1.20 mg/dL Final   09/10/2020 0.84 0.70 - 1.20 mg/dL Final   12/16/2019 1.11 0.70 - 1.20 mg/dL Final     No results found for: , CEA  PSA   Date Value Ref Range Status   09/10/2020 0.59 <4.1 ug/L Final     Comment:     The Roche \"ECLIA\" assay is used. Results obtained with different assay methods cannot be   used interchangeably. MEDICATIONS:    Current Outpatient Medications:     oxyCODONE-acetaminophen (PERCOCET) 5-325 MG per tablet, Take 1 tablet by mouth every 6 hours as needed for Pain for up to 30 days. , Disp: 60 tablet, Rfl: 0    atorvastatin (LIPITOR) 80 MG tablet, Take 1 tablet by mouth every evening, Disp: 90 tablet, Rfl: 3    omeprazole (PRILOSEC) 20 MG delayed release capsule, Take 1 capsule by mouth Daily, Disp: 180 capsule, Rfl: 3    lisinopril (PRINIVIL;ZESTRIL) 20 MG tablet, Take 1 tablet by mouth daily, Disp: 90 tablet, Rfl: 3    ciprofloxacin-dexamethasone (CIPRODEX) 0.3-0.1 % otic suspension, Place 4 drops into the left ear 2 times daily, Disp: 1 Bottle, Rfl: 1    ipratropium-albuterol (DUONEB) 0.5-2.5 (3) MG/3ML SOLN nebulizer solution, Inhale 3 mLs into the lungs every 6 hours as needed for Shortness of Breath, Disp: 360 mL, Rfl: 2    amitriptyline (ELAVIL) 25 MG tablet, Take 1 tablet by mouth nightly, Disp: 90 tablet, Rfl: 3    levothyroxine (SYNTHROID) 88 MCG tablet, take 1 tablet by mouth once daily, Disp: 90 tablet, Rfl: 1

## 2021-02-11 NOTE — TELEPHONE ENCOUNTER
Notification received from [facility] stating the tissue should be arriving at [facility] today. Anita oPlk stated that the block was in offsite storage, due to its age, and took some time to get sent out from [employer]. Anita Polk is going to have the order marked as STAT to speed up processing.  Brandy Torres

## 2021-02-11 NOTE — TELEPHONE ENCOUNTER
Called pt to schedule AWV. Pt states that he will need to call back to schedule as he is currently going to Chemo and has multiple f/u's.

## 2021-02-16 NOTE — TELEPHONE ENCOUNTER
Name: Meredith Witt  : 1960  MRN: [id number]    Oncology Navigation Follow-Up Note  Navigator reviewing "[employer] Dashboard and testing results still pending at this time, plan to follow.    Electronically signed by Paz Nissen, RN on 2021 at 8:35 AM

## 2021-02-17 NOTE — TELEPHONE ENCOUNTER
received referral from Nurse Navigator stating patient had questions about Medicaid/SNAP.  called patient and left a message.

## 2021-02-17 NOTE — TELEPHONE ENCOUNTER
Name: Melly Price  : 1960  MRN: [id number]    Oncology Navigation Follow-Up Note    Navigator reaching out offering assistance to pt. . Pt. asking about food stamps/medicaid? Writer informing pt., Will have Jamie reach out to assist. Jamie notified. Writer encouraging pt. To follow-up with Matt Thomas contact number given to pt. (Pt. Did not respond previously when they reached out). Plan to follow.    Electronically signed by Homa Heart RN on 2021 at 11:33 AM

## 2021-02-17 NOTE — PROGRESS NOTES
Audrey Marks  2/17/2021  Wt Readings from Last 3 Encounters:   02/17/21 232 lb (105.2 kg)   02/09/21 224 lb 9.6 oz (101.9 kg)   02/09/21 224 lb (101.6 kg)     Body mass index is 29.79 kg/m². Treatment Area:lung     Patient was seen today for weekly visit. Comfort Alteration  Fatigue: None    Ventilation Alterations  Cough: Yes  Hemoptysis: No  Mucus Color: clear   Dyspnea: Yes      Nutritional Alteration  Anorexia: No  Nausea: No   Vomiting: No     Mucous Membrane Alteration  Voice Changes/ Stridor/Larynx: no  Pharynx & Esophagus: n/a     Elimination Alterations  Constipation: no  Diarrhea:  no    Skin Alteration   Sensation:intact     Radiation Dermatitis:  Intact [x]     Erythema  []     Discoloration  []     Rash []     Dry desquamation  []     Moist desquamation []       Emotional  Coping: effective      Injury, potential bleeding or infection: n/a     Lab Results   Component Value Date    WBC 8.5 02/09/2021     02/09/2021         /83   Pulse 81   Temp 98 °F (36.7 °C)   Resp 16   Wt 232 lb (105.2 kg)   SpO2 98%   BMI 29.79 kg/m²   Patient Currently in Pain: Yes     Pain Level: 6         Assessment/Plan: Patient was seen today for weekly visit. Dr Wendy Gaxiola notified and examined pt. No new orders received.      Ruby Connors

## 2021-02-18 NOTE — PROGRESS NOTES
DIAGNOSIS:   1. Adenocarcinoma of the right lower lobe of lung. Path stage is T2N1M0. Stage 2 disease. 2. COPD  3. HTN   4. Smoking addiction   5. Evidence of recurrent disease with metastases to supraclavicular lymph node as well as mediastinal lymph node  CURRENT THERAPY:  S/P right lower lobectomy. 9/2016  Received adjuvant chemotherapy, with Cisplatin and Alimta on 11/17/16, completed in February/2017  Plan chemoradiation with Taxol and carboplatin  BRIEF CASE HISTORY:      Lola Manning is a very pleasant 61 y.o. male who is referred to us for management recommendation of his recently resected lung cancer. He actually underwent a screening CT scan because of his smoking habits. CT scans showed right lower lobe spiculated mass measuring 2.9 x 2.5 cm abutting the medial pleura. There was another 4 mm pulmonary nodule that has actually been stable from previous imaging. No adenopathy was appreciated. More testing was done but ultimately the patient was taken to surgery. Prior to surgery, the clinical stage was stage I disease. The patient underwent right lower lobe lobectomy on 9/13/16 and pathology showed invasive moderately differentiated adenocarcinoma of the lung measuring 4 cm in greatest dimension, the tumor invaded the visceral pleura. All margins were negative. He had one positive intrapulmonary lymph node. Representative nodes from stations 4, 7, 9 were all negative. Final pathological staging is (T2 N1 M0) stage II disease  He is sent to us for a consultation, recovering from surgery. He continues to have some pain in the surgical area but otherwise has no complaints. He has minimal shortness of breath, no cough or hemoptysis. No headache or seizures or loss of weight. Performance status is ECOG 1, we discussed adjuvant chemotherapy with Cisplatin and Alimta. After completion of chemotherapy, we started surveillance. He required bronchoscopy to clear thick mucous. He presented 09/2020 with significant weight loss and he was following with pulmonary service and CT scan showed pulmonary lesion. PET scan was done and unfortunately showed lung lesions and rib lesion, suggestive of recurrent disease. Brain MRI was done and negative. INTERIM HISTORY: The patient comes in today for a follow up for lung cancer and to receive week 2 of chemoradiation. He had a mild infusion reaction with week 1 treated with premedication and holding the drug. He has some fatigue, mild shortness of breath, cough with white sputum. He denies any nausea vomiting. He notes the mass is reducing. PAST MEDICAL HISTORY: has a past medical history of Anxiety, BiPAP (biphasic positive airway pressure) dependence, Cancer ([address] Chronic back pain, Clinical trial exam, COPD (chronic obstructive pulmonary disease) ([facility] [address] Diabetes ([facility] [address] Hyperlipidemia, Hypertension, Hypothyroidism, Lung cancer ([facility] [address] Neuropathy, Sleep apnea, Slow to wake up after anesthesia, SOB (shortness of breath), and Urine frequency. PAST SURGICAL HISTORY: has a past surgical history that includes laminectomy (12/2012); Testicle removal (Left, 1982); Lung removal, partial (Right, 2016); other surgical history (Right, 06/13/2017); back surgery (01/2013); and US BIOPSY LYMPH NODE (12/21/2020). CURRENT MEDICATIONS:  has a current medication list which includes the following prescription(s): magic mouthwash, oxycodone-acetaminophen, atorvastatin, omeprazole, lisinopril, ciprofloxacin-dexamethasone, ipratropium-albuterol, amitriptyline, levothyroxine, ondansetron, fluoxetine, and clonazepam, and the following Facility-Administered Medications: sodium chloride, famotidine, diphenhydramine, dexamethasone, palonosetron, PACLitaxel (TAXOL) 108 mg in sodium chloride 0.9 % 250 mL chemo infusion, and CARBOplatin (PARAPLATIN) 290 mg in sodium chloride 0.9 % 100 mL chemo IVPB. ALLERGIES:  is allergic to [facility] dimeglumine]. FAMILY HISTORY: Negative for any hematological or oncological conditions. SOCIAL HISTORY:  reports that he has been smoking cigarettes. He has a 10.00 pack-year smoking history. He has never used smokeless tobacco. He reports that he does not drink alcohol or use drugs. REVIEW OF SYSTEMS:   General: No fever or night sweats. Weight stable; +fatigue  ENT: No double or blurred vision, no tinnitus or hearing problem, no dysphagia or sore throat. Respiratory: Chronic shortness of breath and cough - some clear sputum - no hemoptysis, chest discomfort in the surgical area, chest congestion   Cardiovascular: Denies central chest pain, PND or orthopnea. No L E swelling or palpitations. Gastrointestinal: No vomiting, abdominal pain, diarrhea or constipation. +nausea with pain - resolved  Genitourinary: Denies dysuria, hematuria, frequency, urgency or incontinence. Neurological: Denies headaches, decreased LOC, no sensory or motor focal deficits. Grade 1 neuropathy in hands and feet, predates chemo, diabetes related. Musculoskeletal: Diffuse aches and pains, no joint swelling. Trapezoid and neck pain as noted above  Skin: There are no rashes or bleeding. +psoriasis   Psychiatric:  History of bipolar disorder. Anxiety   Endocrine: No thyroid disease. Hematologic: No bleeding, no adenopathy. PHYSICAL EXAM: Shows a well appearing 61y.o.-year-old male who is not in pain or distress. Vital Signs: Blood pressure (!) 141/91, pulse 64, temperature 97.3 °F (36.3 °C), temperature source Oral, weight 226 lb 6.4 oz (102.7 kg). HEENT: Slight redness in the throat. Normocephalic and atraumatic. Pupils are equal, round, reactive to light and accommodation. Extraocular muscles are intact. Neck: Showed no JVD, no carotid bruit. Lungs: Decreased air entry especially in the right lower lobe area. Minimal wheezing, bilaterally. Postoperative changes in the chest wall are healed well without any evidence of infection. Heart: Regular without any murmur. Abdomen: Soft, nontender. No hepatosplenomegaly. Extremities: Lower extremities show no edema, clubbing, or cyanosis. Neuro exam: intact cranial nerves bilaterally no motor or sensory deficit, gait is normal. Lymphatic: left level 4 lymph node is palpable and very tender - size reduction in the interim    REVIEW OF RADIOLOGICAL RESULTS:     REVIEW OF LABORATORY RESULTS:   Lab Results   Component Value Date    WBC 5.9 02/18/2021    HGB 15.3 02/18/2021    HCT 44.5 02/18/2021    MCV 96.3 02/18/2021     02/18/2021       Chemistry        Component Value Date/Time     02/18/2021 0805    K 5.0 02/18/2021 0805     02/18/2021 0805    CO2 29 02/18/2021 0805    BUN 22 02/18/2021 0805    CREATININE 0.95 02/18/2021 0805        Component Value Date/Time    CALCIUM 9.2 02/18/2021 0805    ALKPHOS 62 02/18/2021 0805    AST 18 02/18/2021 0805    ALT 24 02/18/2021 0805    BILITOT 0.68 02/18/2021 0805              IMPRESSION:   1. Adenocarcinoma of the right lower lobe of lung. Path stage is T2N1M0. Stage 2 disease. 2. S/P lobectomy. 3. received adjuvant chemotherapy, with cisplatin and Alimta. Plan to finish 4 cycles of adjuvant chemotherapy and then observe. 4. COPD  5. Smoking addiction, unable to quit   6. HTN uncontrolled. 7. Starting chemoradiation for recurrent disease in the mediastinum and supraclavicular area. PLAN:  1. His lab work was reviewed, counts and electrolytes are in range. 2. I completed toxicity check. 3. He has had some fatigue, shortness of breath and cough with clear sputum, all manageable. 4. We will treat today as per orders and continue to monitor for reaction today, if he has higher than grade 1 reaction we will hold Taxol and continue with carboplatin for today and transition regimen going forward. 5. He is still considering port. 6. Return in 1 week.

## 2021-02-18 NOTE — TELEPHONE ENCOUNTER
AVS from 2/18/21     Proceed with treatment per orders, please watch for infusion reactions, the patient is deciding if he wants Mediport and if this is the case, we will schedule that through IR  Return next week with treatment and labs    Tx today as scheduled    Writer will follow for port scheduling    RV scheduled 2/25/21 @ 8:15am with labs (cbc, cmp) and tx to follow    . PT was given AVS and an appt schedule    Electronically signed by Sobia Vargas on 2/18/2021 at 9:34 AM

## 2021-02-18 NOTE — PROGRESS NOTES
[facility]            Radiation Oncology          [address] [facility]          [place], [address]        Scottie Patience: [phone]        F: [phone]       [email or web address]             RADIATION ONCOLOGY WEEKLY PROGRESS NOTE  Patient ID:   Meredith Witt  : 1960   MRN: [id number]    DIAGNOSIS:  Cancer Staging  Malignant neoplasm of lower lobe of right lung ([address]  Staging form: Lung, AJCC 7th Edition  - Pathologic: Stage IIIB (T2a, N3, cM0) - Signed by Ranulfo Stratton MD on 2021      TREATMENT DETAILS:  Treatment Site: SCF and mediastinal LNs  Actual Dose: 1200cGy  Total Planned Dose: 6000cGy  Treatment Technique: IMRT  Fraction Technique: Daily  Therapy imaging monitoring: CBCT daily  Concurrent Chemotherapy: Carbo/taxol weekly    SUBJECTIVE:   Patient seen for their weekly on treatment evaluation today. Patient is doing well though he did have some irritation with soreness in the throat when eating and has been recommended to use Magic mouthwash. He denies any skin irritation, fatigue, or changes in appetite. OBJECTIVE:   CHAPERONE: Not Required    ECO Asymptomatic    VITAL SIGNS: /83   Pulse 81   Temp 98 °F (36.7 °C)   Resp 16   Wt 232 lb (105.2 kg)   SpO2 98%   BMI 29.79 kg/m²   Wt Readings from Last 5 Encounters:   21 226 lb 6.4 oz (102.7 kg)   21 232 lb (105.2 kg)   21 224 lb 9.6 oz (101.9 kg)   21 224 lb (101.6 kg)   21 228 lb 3.2 oz (103.5 kg)     GENERAL:  General appearance is that of a well-nourished, well-developed in no apparent distress. HEENT: Normocephalic, atraumatic, EOMI, moist mucosa. NECK:  No adenopathy or a palpable thyroid mass, trachea is midline. SKIN: No erythema or desquamation.     LABS:  WBC   Date Value Ref Range Status   2021 5.9 3.5 - 11.0 k/uL Final   2021 8.5 3.5 - 11.0 k/uL Final   09/10/2020 7.9 3.5 - 11.3 k/uL Final     Segs Absolute   Date Value Ref Range Status 02/18/2021 4.10 1.8 - 7.7 k/uL Final   02/09/2021 5.70 1.8 - 7.7 k/uL Final   09/10/2020 4.62 1.50 - 8.10 k/uL Final     Hemoglobin   Date Value Ref Range Status   02/18/2021 15.3 13.5 - 17.5 g/dL Final   02/09/2021 15.8 13.5 - 17.5 g/dL Final   09/10/2020 15.8 13.0 - 17.0 g/dL Final     Platelets   Date Value Ref Range Status   02/18/2021 163 140 - 450 k/uL Final   02/09/2021 185 140 - 450 k/uL Final   09/10/2020 171 138 - 453 k/uL Final     CREATININE   Date Value Ref Range Status   02/18/2021 0.95 0.70 - 1.20 mg/dL Final   02/09/2021 1.00 0.70 - 1.20 mg/dL Final   09/10/2020 0.84 0.70 - 1.20 mg/dL Final     No results found for: , CEA  PSA   Date Value Ref Range Status   09/10/2020 0.59 <4.1 ug/L Final     Comment:     The Roche \"ECLIA\" assay is used. Results obtained with different assay methods cannot be   used interchangeably. MEDICATIONS:    Current Outpatient Medications:     dexamethasone (DECADRON) 4 MG tablet, Take 1 tablet by mouth once a week for 10 days Take 1 tablet the night before chemotherapy weekly, Disp: 10 tablet, Rfl: 0    Magic Mouthwash (MIRACLE MOUTHWASH), Swish and spit 5 mLs 4 times daily as needed for Irritation, Disp: , Rfl:     oxyCODONE-acetaminophen (PERCOCET) 5-325 MG per tablet, Take 1 tablet by mouth every 6 hours as needed for Pain for up to 30 days. , Disp: 60 tablet, Rfl: 0    atorvastatin (LIPITOR) 80 MG tablet, Take 1 tablet by mouth every evening, Disp: 90 tablet, Rfl: 3    omeprazole (PRILOSEC) 20 MG delayed release capsule, Take 1 capsule by mouth Daily, Disp: 180 capsule, Rfl: 3    lisinopril (PRINIVIL;ZESTRIL) 20 MG tablet, Take 1 tablet by mouth daily, Disp: 90 tablet, Rfl: 3    ciprofloxacin-dexamethasone (CIPRODEX) 0.3-0.1 % otic suspension, Place 4 drops into the left ear 2 times daily, Disp: 1 Bottle, Rfl: 1   ipratropium-albuterol (DUONEB) 0.5-2.5 (3) MG/3ML SOLN nebulizer solution, Inhale 3 mLs into the lungs every 6 hours as needed for Shortness of Breath, Disp: 360 mL, Rfl: 2    amitriptyline (ELAVIL) 25 MG tablet, Take 1 tablet by mouth nightly, Disp: 90 tablet, Rfl: 3    levothyroxine (SYNTHROID) 88 MCG tablet, take 1 tablet by mouth once daily, Disp: 90 tablet, Rfl: 1    ondansetron (ZOFRAN ODT) 8 MG TBDP disintegrating tablet, Take 1 tablet by mouth every 8 hours as needed for Nausea or Vomiting, Disp: 30 tablet, Rfl: 3    clonazePAM (KLONOPIN) 1 MG tablet, Take 1 tablet by mouth 2 times daily as needed for Anxiety for up to 30 days. , Disp: 60 tablet, Rfl: 2    FLUoxetine (PROZAC) 40 MG capsule, take 1 capsule by mouth once daily, Disp: 90 capsule, Rfl: 3  No current facility-administered medications for this encounter. Facility-Administered Medications Ordered in Other Encounters:     0.9 % sodium chloride infusion, 20 mL/hr, Intravenous, Once, Jacqueline Mondragon MD, Last Rate: 20 mL/hr at 02/18/21 0837, 20 mL/hr at 02/18/21 0837    CARBOplatin (PARAPLATIN) 290 mg in sodium chloride 0.9 % 100 mL chemo IVPB, 290 mg, Intravenous, Once, Jacqueline Mondragon MD, Last Rate: 258 mL/hr at 02/18/21 1132, 290 mg at 02/18/21 1132      ASSESSMENT PLAN:   Treatment setup and plan reviewed. Port images/CBCT images reviewed. Appropriate laboratory work was reviewed. Treatment side effects and toxicities reviewed with the patient, and appropriate management was advised. Will continue radiation treatment as planned, and recommend patient contact us if they have any questions or concerns. Encourage to monitor sore throat symptoms. Patient is encouraged to use his oxycodone as well as Magic mouthwash to help with dysphagia and odynophagia symptoms. Encouraged to maintain good hydration nutrition and to use a soft or liquid diet if needed.     Electronically signed by Nilton Rivera MD on 2/18/2021 at 11:46 AM Drugs Prescribed:  New Prescriptions    No medications on file       Other Orders Placed:  No orders of the defined types were placed in this encounter.

## 2021-02-18 NOTE — PROGRESS NOTES
Patient arrived for c2d1 taxol/carbo. Labs within treatable parameters. Patient reassured after having an adverse reaction last week to treatment. Patient seen by Dr. Cecil Lopes today, see his notes for visit details. Patient to be re- challenged per normal infusion of taxol. Taxol was started and about 15min later patient called out, he felt sick to his stomach, face was red. 02 sat was 100% on room are. Taxol was stopped as documented in the [facility] Solucortef ivp given as documented in the [facility]. Vitals stable as documented in the flowsheets. Dr. Cecil Lopes returned to chair side. He instructed to re- challenge when patient is stable. Titrate the medication in 15 min intervals. If patient tolerates continue to max rate. Update Dr. Cecil Lopes on progress. At about 10am patient re-challenged as documented in the [facility]. Vitals as documented in the flow sheets. Patient finished the taxol without further incident. He completed chemo without incident. Next week he will take a 4mg decadron at HS the night before chemo. We will titrate every 15 min to the max rate for every future cycle of taxol, per Dr. Cecil Lopes. Patient stable and ambulatory at d/c. He went to [employer]. Accompanied by his friend(ex-wife).

## 2021-02-18 NOTE — PATIENT INSTRUCTIONS
Proceed with treatment per orders, please watch for infusion reactions, the patient is deciding if he wants Mediport and if this is the case, we will schedule that through IR  Return next week with treatment and labs

## 2021-02-24 NOTE — H&P
History and Physical Update    Pt Name: Mal Garcia  MRN: [id number]  YOB: 1960  Date of evaluation: 2/24/2021      [x] I have reviewed the Oncology Progress Note by Dr Coleen Arroyo dated 2/18/21 in epic which meets the criteria for an Interval History and Physical note and is attached below. [x] I have examined  Mal Garcia  There are no changes to the patient who is scheduled for a port placement in  by Dr Sergey Patterson for Hx lung cancer. The patient denies new health changes, fever, chills, wheezing, cough, increased SOB, chest pain, open sores or wounds. No DM or use of blood thinning medication      Vital signs: /73   Pulse 71   Temp 96.9 °F (36.1 °C) (Temporal)   Resp 16   Ht 6' 4\" (1.93 m)   Wt 225 lb (102.1 kg)   SpO2 98%   BMI 27.39 kg/m²     Allergies:  Emend [fosaprepitant dimeglumine]    Medications:    Prior to Admission medications    Medication Sig Start Date End Date Taking? Authorizing Provider   dexamethasone (DECADRON) 4 MG tablet Take 1 tablet by mouth once a week for 10 days Take 1 tablet the night before chemotherapy weekly 2/18/21 2/28/21 Yes Sariah Hampton MD   Magic Mouthwash (MIRACLE MOUTHWASH) Swish and spit 5 mLs 4 times daily as needed for Irritation   Yes Historical Provider, MD   oxyCODONE-acetaminophen (PERCOCET) 5-325 MG per tablet Take 1 tablet by mouth every 6 hours as needed for Pain for up to 30 days.  2/2/21 3/4/21 Yes Sariah Hampton MD   atorvastatin (LIPITOR) 80 MG tablet Take 1 tablet by mouth every evening 1/28/21  Yes OSIEL Sepulveda CNP   omeprazole (PRILOSEC) 20 MG delayed release capsule Take 1 capsule by mouth Daily 1/28/21  Yes OSIEL Sepulveda CNP   lisinopril (PRINIVIL;ZESTRIL) 20 MG tablet Take 1 tablet by mouth daily 1/28/21  Yes OSIEL Sepulveda CNP   ciprofloxacin-dexamethasone (CIPRODEX) 0.3-0.1 % otic suspension Place 4 drops into the left ear 2 times daily 1/28/21  Yes Alfonzo Oden, APRN - CNP   amitriptyline (ELAVIL) 25 MG tablet Take 1 tablet by mouth nightly 1/28/21  Yes OSIEL Duenas CNP   levothyroxine (SYNTHROID) 88 MCG tablet take 1 tablet by mouth once daily 1/26/21  Yes OSIEL Duenas CNP   ondansetron (ZOFRAN ODT) 8 MG TBDP disintegrating tablet Take 1 tablet by mouth every 8 hours as needed for Nausea or Vomiting 1/21/21  Yes Lavon Garces MD   clonazePAM (KLONOPIN) 1 MG tablet Take 1 tablet by mouth 2 times daily as needed for Anxiety for up to 30 days. 11/12/20 2/24/21 Yes Lavon Garces MD   FLUoxetine (PROZAC) 40 MG capsule take 1 capsule by mouth once daily 2/19/20  Yes OSIEL Gonsales CNP   ipratropium-albuterol (DUONEB) 0.5-2.5 (3) MG/3ML SOLN nebulizer solution Inhale 3 mLs into the lungs every 6 hours as needed for Shortness of Breath 1/28/21   OSIEL Gonsales CNP         This is a 61 y.o. male who is pleasant, cooperative, alert and oriented x3, in no acute distress. Heart: Heart sounds are normal.  HR 71 regular rate and rhythm without murmur, gallop or rub. Lungs: Normal respiratory effort with equal expansion, scattered rhonchi upper airway that improved past coughing, unlabored and clear to auscultation without wheezes or rales bilaterally   Abdomen: soft, nontender, nondistended with bowel sounds    Labs:  Recent Labs     02/18/21  0805   HGB 15.3   HCT 44.5   WBC 5.9   MCV 96.3         K 5.0      CO2 29   BUN 22   CREATININE 0.95   GLUCOSE 113*   AST 18   ALT 24   LABALBU 4.6       No results for input(s): COVID19 in the last 720 hours. SETH Brumfield-BC  Electronically signed 2/24/2021 at 8:24 Lisbet Vásquez MD   Physician   Specialty:  Hematology and Oncology   Progress Notes   Signed   Encounter Date:  2/18/2021         Related encounter: Office Visit from 2/18/2021 in [address]         Signed                       DIAGNOSIS:   1. Adenocarcinoma of the right lower lobe of lung. Path stage is T2N1M0. Stage 2 disease. 2. COPD  3. HTN   4. Smoking addiction   5. Evidence of recurrent disease with metastases to supraclavicular lymph node as well as mediastinal lymph node  CURRENT THERAPY:  S/P right lower lobectomy. 9/2016  Received adjuvant chemotherapy, with Cisplatin and Alimta on 11/17/16, completed in February/2017  Plan chemoradiation with Taxol and carboplatin  BRIEF CASE HISTORY:       Tigist Davey is a very pleasant 61 y.o. male who is referred to us for management recommendation of his recently resected lung cancer. He actually underwent a screening CT scan because of his smoking habits. CT scans showed right lower lobe spiculated mass measuring 2.9 x 2.5 cm abutting the medial pleura. There was another 4 mm pulmonary nodule that has actually been stable from previous imaging. No adenopathy was appreciated. More testing was done but ultimately the patient was taken to surgery. Prior to surgery, the clinical stage was stage I disease. The patient underwent right lower lobe lobectomy on 9/13/16 and pathology showed invasive moderately differentiated adenocarcinoma of the lung measuring 4 cm in greatest dimension, the tumor invaded the visceral pleura. All margins were negative. He had one positive intrapulmonary lymph node. Representative nodes from stations 4, 7, 9 were all negative. Final pathological staging is (T2 N1 M0) stage II disease  He is sent to us for a consultation, recovering from surgery. He continues to have some pain in the surgical area but otherwise has no complaints. He has minimal shortness of breath, no cough or hemoptysis. No headache or seizures or loss of weight. Performance status is ECOG 1, we discussed adjuvant chemotherapy with Cisplatin and Alimta. After completion of chemotherapy, we started surveillance. He required bronchoscopy to clear thick mucous.    He presented 09/2020 with significant weight loss and he was following with pulmonary service and CT scan showed pulmonary lesion. PET scan was done and unfortunately showed lung lesions and rib lesion, suggestive of recurrent disease. Brain MRI was done and negative. INTERIM HISTORY: The patient comes in today for a follow up for lung cancer and to receive week 2 of chemoradiation. He had a mild infusion reaction with week 1 treated with premedication and holding the drug. He has some fatigue, mild shortness of breath, cough with white sputum. He denies any nausea vomiting. He notes the mass is reducing. PAST MEDICAL HISTORY: has a past medical history of Anxiety, BiPAP (biphasic positive airway pressure) dependence, Cancer ([address] Chronic back pain, Clinical trial exam, COPD (chronic obstructive pulmonary disease) ([facility] [address] Diabetes ([facility] [address] Hyperlipidemia, Hypertension, Hypothyroidism, Lung cancer ([facility] [address] Neuropathy, Sleep apnea, Slow to wake up after anesthesia, SOB (shortness of breath), and Urine frequency. PAST SURGICAL HISTORY: has a past surgical history that includes laminectomy (12/2012); Testicle removal (Left, 1982); Lung removal, partial (Right, 2016); other surgical history (Right, 06/13/2017); back surgery (01/2013); and US BIOPSY LYMPH NODE (12/21/2020). CURRENT MEDICATIONS:  has a current medication list which includes the following prescription(s): magic mouthwash, oxycodone-acetaminophen, atorvastatin, omeprazole, lisinopril, ciprofloxacin-dexamethasone, ipratropium-albuterol, amitriptyline, levothyroxine, ondansetron, fluoxetine, and clonazepam, and the following Facility-Administered Medications: sodium chloride, famotidine, diphenhydramine, dexamethasone, palonosetron, PACLitaxel (TAXOL) 108 mg in sodium chloride 0.9 % 250 mL chemo infusion, and CARBOplatin (PARAPLATIN) 290 mg in sodium chloride 0.9 % 100 mL chemo IVPB. ALLERGIES:  is allergic to [facility]      FAMILY HISTORY: Negative for any hematological or oncological conditions. SOCIAL HISTORY:  reports that he has been smoking cigarettes. He has a 10.00 pack-year smoking history. He has never used smokeless tobacco. He reports that he does not drink alcohol or use drugs. REVIEW OF SYSTEMS:   General: No fever or night sweats. Weight stable; +fatigue  ENT: No double or blurred vision, no tinnitus or hearing problem, no dysphagia or sore throat. Respiratory: Chronic shortness of breath and cough - some clear sputum - no hemoptysis, chest discomfort in the surgical area, chest congestion   Cardiovascular: Denies central chest pain, PND or orthopnea. No L E swelling or palpitations. Gastrointestinal: No vomiting, abdominal pain, diarrhea or constipation. +nausea with pain - resolved  Genitourinary: Denies dysuria, hematuria, frequency, urgency or incontinence. Neurological: Denies headaches, decreased LOC, no sensory or motor focal deficits. Grade 1 neuropathy in hands and feet, predates chemo, diabetes related. Musculoskeletal: Diffuse aches and pains, no joint swelling. Trapezoid and neck pain as noted above  Skin: There are no rashes or bleeding. +psoriasis   Psychiatric:  History of bipolar disorder. Anxiety   Endocrine: No thyroid disease. Hematologic: No bleeding, no adenopathy. PHYSICAL EXAM: Shows a well appearing 61y.o.-year-old male who is not in pain or distress. Vital Signs: Blood pressure (!) 141/91, pulse 64, temperature 97.3 °F (36.3 °C), temperature source Oral, weight 226 lb 6.4 oz (102.7 kg). HEENT: Slight redness in the throat. Normocephalic and atraumatic. Pupils are equal, round, reactive to light and accommodation. Extraocular muscles are intact. Neck: Showed no JVD, no carotid bruit. Lungs: Decreased air entry especially in the right lower lobe area. Minimal wheezing, bilaterally. Postoperative changes in the chest wall are healed well without any evidence of infection. Heart: Regular without any murmur. Abdomen: Soft, nontender. No hepatosplenomegaly. Extremities: Lower extremities show no edema, clubbing, or cyanosis. Neuro exam: intact cranial nerves bilaterally no motor or sensory deficit, gait is normal. Lymphatic: left level 4 lymph node is palpable and very tender - size reduction in the interim     REVIEW OF RADIOLOGICAL RESULTS:      REVIEW OF LABORATORY RESULTS:         Lab Results   Component Value Date     WBC 5.9 02/18/2021     HGB 15.3 02/18/2021     HCT 44.5 02/18/2021     MCV 96.3 02/18/2021      02/18/2021        Chemistry                 Component Value Date/Time      02/18/2021 0805     K 5.0 02/18/2021 0805      02/18/2021 0805     CO2 29 02/18/2021 0805     BUN 22 02/18/2021 0805     CREATININE 0.95 02/18/2021 0805               Component Value Date/Time     CALCIUM 9.2 02/18/2021 0805     ALKPHOS 62 02/18/2021 0805     AST 18 02/18/2021 0805     ALT 24 02/18/2021 0805     BILITOT 0.68 02/18/2021 0805                   IMPRESSION:   1. Adenocarcinoma of the right lower lobe of lung. Path stage is T2N1M0. Stage 2 disease. 2. S/P lobectomy. 3. received adjuvant chemotherapy, with cisplatin and Alimta. Plan to finish 4 cycles of adjuvant chemotherapy and then observe. 4. COPD  5. Smoking addiction, unable to quit   6. HTN uncontrolled. 7. Starting chemoradiation for recurrent disease in the mediastinum and supraclavicular area. PLAN:  1. His lab work was reviewed, counts and electrolytes are in range. 2. I completed toxicity check. 3. He has had some fatigue, shortness of breath and cough with clear sputum, all manageable. 4. We will treat today as per orders and continue to monitor for reaction today, if he has higher than grade 1 reaction we will hold Taxol and continue with carboplatin for today and transition regimen going forward. 5. He is still considering port. 6. Return in 1 week.

## 2021-02-24 NOTE — POST SEDATION
Sedation Post Procedure Note    Patient Name: Fide Marie   YOB: 1960  Room/Bed: Room/bed info not found  Medical Record Number: [id number]  Date: 2/24/2021   Time: 10:12 AM         Physicians/Assistants: Mayi Torres MD    Procedure Performed:  Port placement    Post-Sedation Vital Signs:  Vitals:    02/24/21 1006   BP: 116/76   Pulse: 72   Resp: 18   Temp:    SpO2: 96%      Vital signs were reviewed and were stable after the procedure (see flow sheet for vitals)            Complications: none    Electronically signed by Mayi Torres MD on 2/24/2021 at 10:12 AM

## 2021-02-24 NOTE — PROGRESS NOTES
Audrey Marks  2/24/2021  Wt Readings from Last 3 Encounters:   02/24/21 227 lb (103 kg)   02/24/21 225 lb (102.1 kg)   02/18/21 226 lb 6.4 oz (102.7 kg)     Body mass index is 27.63 kg/m². Treatment Area: [facility]     Patient was seen today for weekly visit. Comfort Alteration    Fatigue: Mild    Nutritional Alteration  Anorexia: No   Nausea: No   Vomiting: No    Mucous Membrane Alteration  Drainage: No  Lymphedema: No    Skin Alteration   Sensation:intact     Radiation Dermatitis:  Intact [x]     Erythema  []     Discoloration  []     Rash []     Dry desquamation  []     Moist desquamation []       Emotional  Coping: effective      Injury, potential bleeding or infection: n/a     Lab Results   Component Value Date    WBC 5.9 02/18/2021     (L) 02/24/2021         BP 99/66   Pulse 77   Temp 98.2 °F (36.8 °C)   Resp 14   Wt 227 lb (103 kg)   SpO2 97%   BMI 27.63 kg/m²   Patient Currently in Pain: Denies                 Assessment/Plan: Patient was seen today for weekly visit. Dr Viji Perkins notified and examined pt.        Ruby Connors

## 2021-02-24 NOTE — BRIEF OP NOTE
Brief Postoperative Note    Daniel Lundberg  YOB: 1960  [id number]    Pre-operative Diagnosis: Recurrent lung cancer    Post-operative Diagnosis: Same    Procedure: Port placement    Anesthesia: Moderate Sedation    Surgeons/Assistants: Parmjit    Estimated Blood Loss: less than 50     Complications: None    Specimens: Was Not Obtained    Electronically signed by Elza Marte MD on 2/24/2021 at 10:12 AM

## 2021-02-24 NOTE — PROGRESS NOTES
15.8 13.0 - 17.0 g/dL Final     Platelets   Date Value Ref Range Status   02/24/2021 130 (L) 138 - 453 k/uL Final   02/18/2021 163 140 - 450 k/uL Final   02/09/2021 185 140 - 450 k/uL Final     CREATININE   Date Value Ref Range Status   02/18/2021 0.95 0.70 - 1.20 mg/dL Final   02/09/2021 1.00 0.70 - 1.20 mg/dL Final   09/10/2020 0.84 0.70 - 1.20 mg/dL Final     No results found for: , CEA  PSA   Date Value Ref Range Status   09/10/2020 0.59 <4.1 ug/L Final     Comment:     The Roche \"ECLIA\" assay is used. Results obtained with different assay methods cannot be   used interchangeably. MEDICATIONS:    Current Outpatient Medications:     dexamethasone (DECADRON) 4 MG tablet, Take 1 tablet by mouth once a week for 10 days Take 1 tablet the night before chemotherapy weekly, Disp: 10 tablet, Rfl: 0    Magic Mouthwash (MIRACLE MOUTHWASH), Swish and spit 5 mLs 4 times daily as needed for Irritation, Disp: , Rfl:     oxyCODONE-acetaminophen (PERCOCET) 5-325 MG per tablet, Take 1 tablet by mouth every 6 hours as needed for Pain for up to 30 days. , Disp: 60 tablet, Rfl: 0    atorvastatin (LIPITOR) 80 MG tablet, Take 1 tablet by mouth every evening, Disp: 90 tablet, Rfl: 3    omeprazole (PRILOSEC) 20 MG delayed release capsule, Take 1 capsule by mouth Daily, Disp: 180 capsule, Rfl: 3    lisinopril (PRINIVIL;ZESTRIL) 20 MG tablet, Take 1 tablet by mouth daily, Disp: 90 tablet, Rfl: 3    ciprofloxacin-dexamethasone (CIPRODEX) 0.3-0.1 % otic suspension, Place 4 drops into the left ear 2 times daily, Disp: 1 Bottle, Rfl: 1    ipratropium-albuterol (DUONEB) 0.5-2.5 (3) MG/3ML SOLN nebulizer solution, Inhale 3 mLs into the lungs every 6 hours as needed for Shortness of Breath, Disp: 360 mL, Rfl: 2    amitriptyline (ELAVIL) 25 MG tablet, Take 1 tablet by mouth nightly, Disp: 90 tablet, Rfl: 3    levothyroxine (SYNTHROID) 88 MCG tablet, take 1 tablet by mouth once daily, Disp: 90 tablet, Rfl: 1    ondansetron (ZOFRAN ODT) 8 MG TBDP disintegrating tablet, Take 1 tablet by mouth every 8 hours as needed for Nausea or Vomiting, Disp: 30 tablet, Rfl: 3    clonazePAM (KLONOPIN) 1 MG tablet, Take 1 tablet by mouth 2 times daily as needed for Anxiety for up to 30 days. , Disp: 60 tablet, Rfl: 2    FLUoxetine (PROZAC) 40 MG capsule, take 1 capsule by mouth once daily, Disp: 90 capsule, Rfl: 3  No current facility-administered medications for this encounter. Facility-Administered Medications Ordered in Other Encounters:     sodium chloride flush 0.9 % injection 10 mL, 10 mL, Intravenous, PRN, Pattie Cortes MD    0.9 % sodium chloride infusion, , Intravenous, Continuous, Krishna Escalante MD    acetaminophen (TYLENOL) tablet 650 mg, 650 mg, Oral, Q4H PRN, Krishna Escalante MD      ASSESSMENT PLAN:   He has expected esophagitis. He has Magic mouthwash. I encouraged him to use the medication. We also discussed adjusting his diet to get more softer foods. I also encouraged high-calorie foods. Treatment setup and plan reviewed. Port images/CBCT images reviewed. Appropriate laboratory work was reviewed. Treatment side effects and toxicities reviewed with the patient, and appropriate management was advised. Will continue radiation treatment as planned, and recommend patient contact us if they have any questions or concerns. Electronically signed by Codi Iraheta MD on 2/24/2021 at 12:55 PM      Drugs Prescribed:  New Prescriptions    No medications on file       Other Orders Placed:  No orders of the defined types were placed in this encounter.

## 2021-02-24 NOTE — PRE SEDATION
Sedation Pre-Procedure Note    Patient Name: Leonel Watson   YOB: 1960  Room/Bed: Room/bed info not found  Medical Record Number: [id number]  Date: 2/24/2021   Time: 8:45 AM       Indication:  Recurrent lung cancer with contralateral supraclavicular LAD    Consent: I have discussed with the patient and/or the patient representative the indication, alternatives, and the possible risks and/or complications of the planned procedure and the anesthesia methods. The patient and/or patient representative appear to understand and agree to proceed. Vital Signs:   Vitals:    02/24/21 0806   BP: 128/73   Pulse: 71   Resp: 16   Temp: 96.9 °F (36.1 °C)   SpO2: 98%       Past Medical History:   has a past medical history of Anxiety, BiPAP (biphasic positive airway pressure) dependence, Cancer (HCC), Chronic back pain, Clinical trial exam, COPD (chronic obstructive pulmonary disease) ([facility] [address] Diabetes ([facility] [address] Hyperlipidemia, Hypertension, Hypothyroidism, Lung cancer ([facility] [address] Neuropathy, Prolonged emergence from general anesthesia, Sleep apnea, Slow to wake up after anesthesia, SOB (shortness of breath), and Urine frequency. Past Surgical History:   has a past surgical history that includes laminectomy (12/2012); Testicle removal (Left, 1982); Lung removal, partial (Right, 2016); other surgical history (Right, 06/13/2017); back surgery (01/2013); and US BIOPSY LYMPH NODE (12/21/2020). Medications:   Scheduled Meds:    ceFAZolin  1,000 mg Intravenous On Call to OR     Continuous Infusions:    sodium chloride 125 mL/hr at 02/24/21 0836     PRN Meds: sodium chloride flush  Home Meds:   Prior to Admission medications    Medication Sig Start Date End Date Taking?  Authorizing Provider   dexamethasone (DECADRON) 4 MG tablet Take 1 tablet by mouth once a week for 10 days Take 1 tablet the night before chemotherapy weekly 2/18/21 2/28/21 Yes Lauren Howard MD Magic Mouthwash (MIRACLE MOUTHWASH) Swish and spit 5 mLs 4 times daily as needed for Irritation   Yes Historical Provider, MD   oxyCODONE-acetaminophen (PERCOCET) 5-325 MG per tablet Take 1 tablet by mouth every 6 hours as needed for Pain for up to 30 days. 2/2/21 3/4/21 Yes Sariah Hampton MD   atorvastatin (LIPITOR) 80 MG tablet Take 1 tablet by mouth every evening 1/28/21  Yes OSIEL Turner CNP   omeprazole (PRILOSEC) 20 MG delayed release capsule Take 1 capsule by mouth Daily 1/28/21  Yes OSIEL Turner CNP   lisinopril (PRINIVIL;ZESTRIL) 20 MG tablet Take 1 tablet by mouth daily 1/28/21  Yes OSIEL Lara CNP   ciprofloxacin-dexamethasone (CIPRODEX) 0.3-0.1 % otic suspension Place 4 drops into the left ear 2 times daily 1/28/21  Yes OSIEL Lara CNP   amitriptyline (ELAVIL) 25 MG tablet Take 1 tablet by mouth nightly 1/28/21  Yes OSIEL Turner CNP   levothyroxine (SYNTHROID) 88 MCG tablet take 1 tablet by mouth once daily 1/26/21  Yes OSIEL Lara CNP   ondansetron (ZOFRAN ODT) 8 MG TBDP disintegrating tablet Take 1 tablet by mouth every 8 hours as needed for Nausea or Vomiting 1/21/21  Yes Akash Piedra MD   clonazePAM (KLONOPIN) 1 MG tablet Take 1 tablet by mouth 2 times daily as needed for Anxiety for up to 30 days.  11/12/20 2/24/21 Yes Akash Piedra MD   FLUoxetine (PROZAC) 40 MG capsule take 1 capsule by mouth once daily 2/19/20  Yes OSIEL Turner CNP   ipratropium-albuterol (DUONEB) 0.5-2.5 (3) MG/3ML SOLN nebulizer solution Inhale 3 mLs into the lungs every 6 hours as needed for Shortness of Breath 1/28/21   Audrea Kana, APRN - CNP     Coumadin Use Last 7 Days:  no  Antiplatelet drug therapy use last 7 days: no  Other anticoagulant use last 7 days: no  Additional Medication Information:  See med rec      Pre-Sedation Documentation and Exam: I have reviewed the patient's history and review of systems.     Mallampati Airway Assessment:  Mallampati Class II - (soft palate, fauces & uvula are visible)    Prior History of Anesthesia Complications:   none    ASA Classification:  Class 2 - A normal healthy patient with mild systemic disease    Sedation/ Anesthesia Plan:   intravenous sedation    Medications Planned:   midazolam (Versed) intravenously and fentanyl intravenously    Patient is an appropriate candidate for plan of sedation: yes    Electronically signed by Libra Shine MD on 2/24/2021 at 8:45 AM

## 2021-02-25 NOTE — PROGRESS NOTES
DIAGNOSIS:   1. Adenocarcinoma of the right lower lobe of lung. Path stage is T2N1M0. Stage 2 disease. 2. COPD  3. HTN   4. Smoking addiction   5. Evidence of recurrent disease with metastases to supraclavicular lymph node as well as mediastinal lymph node  CURRENT THERAPY:  S/P right lower lobectomy. 9/2016  Received adjuvant chemotherapy, with Cisplatin and Alimta on 11/17/16, completed in February/2017  Plan chemoradiation with Taxol and carboplatin  Infusion reaction to Taxol, plan to transition to Abraxane  BRIEF CASE HISTORY:      Jeanie Park is a very pleasant 61 y.o. male who is referred to us for management recommendation of his recently resected lung cancer. He actually underwent a screening CT scan because of his smoking habits. CT scans showed right lower lobe spiculated mass measuring 2.9 x 2.5 cm abutting the medial pleura. There was another 4 mm pulmonary nodule that has actually been stable from previous imaging. No adenopathy was appreciated. More testing was done but ultimately the patient was taken to surgery. Prior to surgery, the clinical stage was stage I disease. The patient underwent right lower lobe lobectomy on 9/13/16 and pathology showed invasive moderately differentiated adenocarcinoma of the lung measuring 4 cm in greatest dimension, the tumor invaded the visceral pleura. All margins were negative. He had one positive intrapulmonary lymph node. Representative nodes from stations 4, 7, 9 were all negative. Final pathological staging is (T2 N1 M0) stage II disease  He is sent to us for a consultation, recovering from surgery. He continues to have some pain in the surgical area but otherwise has no complaints. He has minimal shortness of breath, no cough or hemoptysis. No headache or seizures or loss of weight. Performance status is ECOG 1, we discussed adjuvant chemotherapy with Cisplatin and Alimta. After completion of chemotherapy, we started surveillance.  He required bronchoscopy to clear thick mucous. He presented 09/2020 with significant weight loss and he was following with pulmonary service and CT scan showed pulmonary lesion. PET scan was done and unfortunately showed lung lesions and rib lesion, suggestive of recurrent disease. Brain MRI was done and negative. The patient was started on chemoradiation with weekly Taxol and carboplatin. With week 1 treatment, he had grade 1 infusion reaction that was managed with premedication and dose adjustment. However with second week, the infusion was much stronger and we had to hold Taxol altogether. We plan to replace with Abraxane. INTERIM HISTORY: The patient comes in today for a follow up for lung cancer and to receive week 3 of chemoradiation. He had port put in place and it remains very tender. His pain is not well controlled with current Percocet dose. He notes nausea and some dysphagia with radiation, no vomiting. PAST MEDICAL HISTORY: has a past medical history of Anxiety, BiPAP (biphasic positive airway pressure) dependence, Cancer ([address] Chronic back pain, Clinical trial exam, COPD (chronic obstructive pulmonary disease) ([address] Diabetes ([address] Hyperlipidemia, Hypertension, Hypothyroidism, Lung cancer ([address] Neuropathy, Prolonged emergence from general anesthesia, Sleep apnea, Slow to wake up after anesthesia, SOB (shortness of breath), and Urine frequency. PAST SURGICAL HISTORY: has a past surgical history that includes laminectomy (12/2012); Testicle removal (Left, 1982); Lung removal, partial (Right, 2016); other surgical history (Right, 06/13/2017); back surgery (01/2013); US BIOPSY LYMPH NODE (12/21/2020); and IR PORT PLACEMENT > 5 YEARS (2/24/2021).      CURRENT MEDICATIONS:  has a current medication list which includes the following prescription(s): dexamethasone, magic mouthwash, oxycodone-acetaminophen, atorvastatin, omeprazole, lisinopril, ciprofloxacin-dexamethasone, ipratropium-albuterol, amitriptyline, levothyroxine, ondansetron, clonazepam, fluoxetine, and lidocaine-prilocaine, and the following Facility-Administered Medications: sodium chloride flush, sodium chloride, and acetaminophen. ALLERGIES:  is allergic to [facility] dimeglumine]. FAMILY HISTORY: Negative for any hematological or oncological conditions. SOCIAL HISTORY:  reports that he has been smoking cigarettes. He has a 10.00 pack-year smoking history. He has never used smokeless tobacco. He reports that he does not drink alcohol or use drugs. REVIEW OF SYSTEMS:   General: No fever or night sweats. Weight stable; +fatigue  ENT: No double or blurred vision, no tinnitus or hearing problem, or sore throat.  +dysphagia   Respiratory: Chronic shortness of breath and cough - some clear sputum - no hemoptysis, chest discomfort in the surgical area, chest congestion   Cardiovascular: Denies central chest pain, PND or orthopnea. No L E swelling or palpitations. Gastrointestinal: No vomiting, abdominal pain, diarrhea or constipation. +nausea   Genitourinary: Denies dysuria, hematuria, frequency, urgency or incontinence. Neurological: Denies headaches, decreased LOC, no sensory or motor focal deficits. Grade 1 neuropathy in hands and feet, predates chemo, diabetes related. Musculoskeletal: Diffuse aches and pains, no joint swelling. Trapezoid and neck pain as noted above  Skin: There are no rashes or bleeding. +psoriasis   Psychiatric:  History of bipolar disorder. Anxiety   Endocrine: No thyroid disease. Hematologic: No bleeding, no adenopathy. PHYSICAL EXAM: Shows a well appearing 61y.o.-year-old male who is not in pain or distress. Vital Signs: Blood pressure 120/77, pulse 89, temperature 97.7 °F (36.5 °C), temperature source Oral, weight 227 lb 6.4 oz (103.1 kg). HEENT: Slight redness in the throat. Normocephalic and atraumatic. Pupils are equal, round, reactive to light and accommodation. Extraocular muscles are intact. Neck: Showed no JVD, no carotid bruit. Lungs: Port in place upper right chest - swelling and redness, no evidence of infection. Decreased air entry especially in the right lower lobe area. Minimal wheezing, bilaterally. Postoperative changes in the chest wall are healed well without any evidence of infection. Heart: Regular without any murmur. Abdomen: Soft, nontender. No hepatosplenomegaly. Extremities: Lower extremities show no edema, clubbing, or cyanosis. Neuro exam: intact cranial nerves bilaterally no motor or sensory deficit, gait is normal. Lymphatic: left level 4 lymph node is palpable and very tender - size reduction in the interim    REVIEW OF RADIOLOGICAL RESULTS:     REVIEW OF LABORATORY RESULTS:   Lab Results   Component Value Date    WBC 4.7 02/25/2021    HGB 14.6 02/25/2021    HCT 42.9 02/25/2021    MCV 97.0 02/25/2021     (L) 02/25/2021       Chemistry        Component Value Date/Time     02/25/2021 0833    K 4.5 02/25/2021 0833     02/25/2021 0833    CO2 27 02/25/2021 0833    BUN 29 (H) 02/25/2021 0833    CREATININE 0.81 02/25/2021 0833        Component Value Date/Time    CALCIUM 9.3 02/25/2021 0833    ALKPHOS 54 02/25/2021 0833    AST 16 02/25/2021 0833    ALT 20 02/25/2021 0833    BILITOT 0.49 02/25/2021 0833              IMPRESSION:   1. Adenocarcinoma of the right lower lobe of lung. Path stage is T2N1M0. Stage 2 disease. 2. S/P lobectomy. 3. received adjuvant chemotherapy, with cisplatin and Alimta. Plan to finish 4 cycles of adjuvant chemotherapy and then observe. 4. COPD  5. Smoking addiction, unable to quit   6. HTN uncontrolled. 7. Starting chemoradiation for recurrent disease in the mediastinum and supraclavicular area. 8. Infusion reaction to Taxol, escalated from grade 1 to grade 2 by the second week. We have to hold the Taxol and will plan to switch to Abraxane.   9. Dysphagia secondary to chemoradiation as the esophagus in the radiation field. Plan to modify diet with more liquids. PLAN:  1. We discussed plan for treatment adjustment to discontinue Taxol based on infusion reactions with first 2 cycles and replace it with Abraxane pending insurance approval.  2. His lab work was reviewed, stable counts and electrolytes. 3. I completed toxicity check. 4. He has worsening dysphagia with radiation, he is maintaining hydration and nutrition, we will monitor for dehydration and discussed strategy for the next few weeks. 5. We discussed ongoing pain management, I am refilling his Percocet at 10 mg.  6. His port has been put in place but remains very tender, we will treat peripherally today. 7. We will treat today with Carboplatin only as per orders. 8. I am writing for Emla cream.   9. Return in 2 week.

## 2021-02-25 NOTE — TELEPHONE ENCOUNTER
AVS from 2/25/21     Proceed with treatment this week per orders, plan treatment with carboplatin alone. As of next week, plan to add Abraxane, please get that authorized through his insurance.   Okay to treat peripherally today per patient wishes, we will plan to treat through the port by next week  Return in 2 weeks with treatment and labs    Chemo as scheduled today, d/c taxol    AVS given to schedule abraxane and follow precert    RV scheduled 3/11/21 @ 8:15am    PT was given AVS and an appt schedule

## 2021-02-25 NOTE — PATIENT INSTRUCTIONS
Proceed with treatment this week per orders, plan treatment with carboplatin alone. As of next week, plan to add Abraxane, please get that authorized through his insurance.   Okay to treat peripherally today per patient wishes, we will plan to treat through the port by next week  Return in 2 weeks with treatment and labs

## 2021-02-25 NOTE — FLOWSHEET NOTE
Patient greeted writer as he was passing by her office. Pt smiled and reported that he was doing well. He introduced Pt's brother who was with him. Writer offered words of support and encouragement. Pt expressed thanks. 02/25/21 8318   Encounter Summary   Services provided to: Patient and family together   Referral/Consult From: [address] [facility] Family members;Spouse   Continue Visiting   (2/25/21)   Complexity of Encounter Low   Length of Encounter 15 minutes   Routine   Type Follow up   Assessment Calm; Approachable;Coping   Intervention Sustaining presence/ Ministry of presence   Outcome Coping     Electronically signed by Tanna Norman, Oncology Outpatient [address], [address] [facility] Radiation Oncology  2/25/2021  2:00 PM

## 2021-02-25 NOTE — PROGRESS NOTES
Pt here for C3D1 Taxol and Carbo. Pt seen by Dr Fabrice López prior to treatment, refer to his note. Labs drawn from port and results reviewed. Pt was treated without incident and d/c'd in stable condition. Pt will return on 3-4-21 for C4D1.

## 2021-02-26 NOTE — TELEPHONE ENCOUNTER
Name: Darlene Mariscal  : 1960  MRN: [id number]    Oncology Navigation Follow-Up Note    Navigator spoke with pt. Offering assistance , await authorization for Abrazane (replacing Taxol due to intolerance). Reminding pt. Jamie had left message, but pt. Has not had time to return call. Writer offering [address], but pt. Denies an this time. Plan to follow.     Electronically signed by Burney Gowers, RN on 2021 at 12:02 PM

## 2021-03-02 NOTE — TELEPHONE ENCOUNTER
Name: Shukri Nieto  : 1960  MRN: [id number]    Oncology Navigation Follow-Up Note    Navigator left message for pt. Offering assistance , plan to follow.    Electronically signed by Shahzad Sam RN on 3/2/2021 at 9:42 AM

## 2021-03-03 NOTE — PROGRESS NOTES
Douglas Yee  3/3/2021  Wt Readings from Last 3 Encounters:   03/03/21 230 lb (104.3 kg)   02/24/21 225 lb (102.1 kg)   02/25/21 227 lb 6.4 oz (103.1 kg)     Body mass index is 28 kg/m². Treatment Area: lung     Patient was seen today for weekly visit. Comfort Alteration  Fatigue: None    Ventilation Alterations  Cough: Yes   Hemoptysis: No  Mucus Color: clear   Dyspnea: No      Nutritional Alteration  Anorexia: No  Nausea: No   Vomiting: No     Mucous Membrane Alteration  Voice Changes/ Stridor/Larynx: hoarseness   Pharynx & Esophagus: dysphagia     Elimination Alterations  Constipation: no  Diarrhea:  no    Skin Alteration   Sensation:intact     Radiation Dermatitis:  Intact [x]     Erythema  []     Discoloration  []     Rash []     Dry desquamation  []     Moist desquamation []       Emotional  Coping: effective      Injury, potential bleeding or infection: n/a     Lab Results   Component Value Date    WBC 4.7 02/25/2021     (L) 02/25/2021         /79   Pulse 86   Temp 98.7 °F (37.1 °C)   Resp 16   Wt 230 lb (104.3 kg)   SpO2 98%   BMI 28.00 kg/m²   Patient Currently in Pain: Yes     Pain Level: 6         Assessment/Plan: Patient was seen today for weekly visit. Dr Marie Hayward notified and examined pt.        Hemant Gee

## 2021-03-04 NOTE — PROGRESS NOTES
Pt here for C.4 tx with Carbo and first dose of Abraxane (switched from Taxol to Abraxane d/t infusion reaction). Spoke with Shayna Shirley. (pre-cert) Abraxane not approved yet. Tejas Quintanilla RN manager notified, she states to review with Dr. Yoli Chu. Dr Yoli Chu notified, order rec'd to proceed with Carbo only today. Pt arrived ambulatory with family member. Denies any new complaints. Labs drawn from port, results reviewed. Abraxane complete without incident. Pt d/c'd in stable condition. Addendum: Notified by Felipe Leyva, Abraxane approved. Dr. Yoli Chu notified, order rec'd to give Abraxane tomorrow. Pt scheduled for tomorrow at 11:00am, pt notified.

## 2021-03-04 NOTE — PROGRESS NOTES
[facility]            Radiation Oncology          [address] [facility]          [address], [address]        Lindsay Batch: [phone]        F: [phone]       [email or web address]             RADIATION ONCOLOGY WEEKLY PROGRESS NOTE  Patient ID:   Cristiano Johnston  : 1960   MRN: [id number]    DIAGNOSIS:  Cancer Staging  Malignant neoplasm of lower lobe of right lung ([address]  Staging form: Lung, AJCC 7th Edition  - Pathologic: Stage IIIB (T2a, N3, cM0) - Signed by Michelle Lynch MD on 2021      TREATMENT DETAILS:  Treatment Site: SCF and mediastinal LNs  Actual Dose: 3200cGy  Total Planned Dose: 6000cGy  Treatment Technique: IMRT  Fraction Technique: Daily  Therapy imaging monitoring: CBCT daily  Concurrent Chemotherapy: Carbo/taxol weekly    SUBJECTIVE:   Patient seen for their weekly on treatment evaluation today. Patient is doing well though he is having more irritation in his throat as well as hoarseness in his voice. Patient is using Magic mouthwash to help with his dysphagia. He also has skin erythema and is using moisturizer. He denies any changes in his appetite overall. He does have some fatigue but is still active. OBJECTIVE:   CHAPERONE: Not Required    ECO Symptomatic but completely ambulatory    VITAL SIGNS: /79   Pulse 86   Temp 98.7 °F (37.1 °C)   Resp 16   Wt 230 lb (104.3 kg)   SpO2 98%   BMI 28.00 kg/m²   Wt Readings from Last 5 Encounters:   21 230 lb (104.3 kg)   21 225 lb (102.1 kg)   21 227 lb 6.4 oz (103.1 kg)   21 227 lb (103 kg)   21 226 lb 6.4 oz (102.7 kg)     GENERAL:  General appearance is that of a well-nourished, well-developed in no apparent distress. HEENT: Normocephalic, atraumatic, EOMI, moist mucosa. NECK:  No adenopathy or a palpable thyroid mass, trachea is midline. SKIN: Mild diffuse erythema or desquamation.     LABS:  WBC   Date Value Ref Range Status   2021 4.7 3.5 - 11.0 k/uL Final Comment:            02/18/2021 5.9 3.5 - 11.0 k/uL Final   02/09/2021 8.5 3.5 - 11.0 k/uL Final     Segs Absolute   Date Value Ref Range Status   02/25/2021 4.27 1.8 - 7.7 k/uL Final     Comment:            02/18/2021 4.10 1.8 - 7.7 k/uL Final   02/09/2021 5.70 1.8 - 7.7 k/uL Final     Hemoglobin   Date Value Ref Range Status   02/25/2021 14.6 13.5 - 17.5 g/dL Final     Comment:            02/18/2021 15.3 13.5 - 17.5 g/dL Final   02/09/2021 15.8 13.5 - 17.5 g/dL Final     Platelets   Date Value Ref Range Status   02/25/2021 125 (L) 140 - 450 k/uL Final     Comment:            02/24/2021 130 (L) 138 - 453 k/uL Final   02/18/2021 163 140 - 450 k/uL Final     CREATININE   Date Value Ref Range Status   02/25/2021 0.81 0.70 - 1.20 mg/dL Final   02/18/2021 0.95 0.70 - 1.20 mg/dL Final   02/09/2021 1.00 0.70 - 1.20 mg/dL Final     No results found for: , CEA  PSA   Date Value Ref Range Status   09/10/2020 0.59 <4.1 ug/L Final     Comment:     The Roche \"ECLIA\" assay is used. Results obtained with different assay methods cannot be   used interchangeably. MEDICATIONS:    Current Outpatient Medications:     lidocaine-prilocaine (EMLA) 2.5-2.5 % cream, Apply topically as needed 1 hour prior to lab draws and treatment. , Disp: 30 g, Rfl: 2    oxyCODONE-acetaminophen (PERCOCET)  MG per tablet, Take 1 tablet by mouth every 6 hours as needed for Pain for up to 30 days. , Disp: 60 tablet, Rfl: 0    lidocaine-prilocaine (EMLA) 2.5-2.5 % cream, Apply topically Daily as needed. , Disp: 1 Tube, Rfl: 1    Magic Mouthwash (MIRACLE MOUTHWASH), Swish and spit 5 mLs 4 times daily as needed for Irritation, Disp: , Rfl:     atorvastatin (LIPITOR) 80 MG tablet, Take 1 tablet by mouth every evening, Disp: 90 tablet, Rfl: 3    omeprazole (PRILOSEC) 20 MG delayed release capsule, Take 1 capsule by mouth Daily, Disp: 180 capsule, Rfl: 3    lisinopril (PRINIVIL;ZESTRIL) 20 MG tablet, Take 1 tablet by mouth daily, Disp: 90 tablet, Rfl: 3    ciprofloxacin-dexamethasone (CIPRODEX) 0.3-0.1 % otic suspension, Place 4 drops into the left ear 2 times daily, Disp: 1 Bottle, Rfl: 1    ipratropium-albuterol (DUONEB) 0.5-2.5 (3) MG/3ML SOLN nebulizer solution, Inhale 3 mLs into the lungs every 6 hours as needed for Shortness of Breath, Disp: 360 mL, Rfl: 2    amitriptyline (ELAVIL) 25 MG tablet, Take 1 tablet by mouth nightly, Disp: 90 tablet, Rfl: 3    levothyroxine (SYNTHROID) 88 MCG tablet, take 1 tablet by mouth once daily, Disp: 90 tablet, Rfl: 1    ondansetron (ZOFRAN ODT) 8 MG TBDP disintegrating tablet, Take 1 tablet by mouth every 8 hours as needed for Nausea or Vomiting, Disp: 30 tablet, Rfl: 3    clonazePAM (KLONOPIN) 1 MG tablet, Take 1 tablet by mouth 2 times daily as needed for Anxiety for up to 30 days. , Disp: 60 tablet, Rfl: 2    FLUoxetine (PROZAC) 40 MG capsule, take 1 capsule by mouth once daily, Disp: 90 capsule, Rfl: 3      ASSESSMENT PLAN:   Treatment setup and plan reviewed. Port images/CBCT images reviewed. Appropriate laboratory work was reviewed. Treatment side effects and toxicities reviewed with the patient, and appropriate management was advised. Will continue radiation treatment as planned, and recommend patient contact us if they have any questions or concerns. Encourage to monitor sore throat symptoms. Patient is encouraged to use his oxycodone as well as Magic mouthwash to help with dysphagia and odynophagia symptoms. Encouraged to maintain good hydration nutrition and to use a soft or liquid diet if needed. Electronically signed by Christina Queen MD on 3/3/2021 at 8:14 PM      Drugs Prescribed:  New Prescriptions    No medications on file       Other Orders Placed:  No orders of the defined types were placed in this encounter.

## 2021-03-05 NOTE — FLOWSHEET NOTE
Situation:  Writer visited with Mr. Carolann La in the treatment cubicle of the [facility] clinic. Assessment:  Mr. Carolann La acknowledged his feelings, noting some \"depression,\" and feeling \"worthless. \" Pt asked if he could speak with writer at another time. Pt took writer's business card and told her he would call her. Intervention:  Writer offered supportive presence and explored Pt's coping and needs; offered empathy and concern; affirmed Pt's strengths; gave Pt her business card and told her she is available for support. Outcome:  Mr. Pratik Hidalgo and told her he would be in touch. 03/05/21 1310   Encounter Summary   Services provided to: Patient   Referral/Consult From: [address] [facility] Family members   Continue Visiting   (3/5/21)   Complexity of Encounter Low   Length of Encounter 15 minutes   Routine   Type Follow up   Spiritual/Moravian   Type Spiritual support   Assessment Calm; Approachable;Helplessness   Intervention Discussed meaning/purpose;Sustaining presence/ Ministry of presence; Explored coping resources; Explored feelings, thoughts, concerns; Active listening   Outcome Receptive; Expressed feelings/needs/concerns;Expressed gratitude     Electronically signed by Wilfredo Mejía, Oncology Outpatient [facility], [address] [facility] Radiation Oncology  3/5/2021  1:11 PM

## 2021-03-05 NOTE — PROGRESS NOTES
Pt here for C4D2 Abraxane infusion. Medication was not prior authorized to be given yesterday, per Dr Mundo Starkey, now approved and ok to be given today. Pt ambulatory; denies any new complaints. Labs reviewed from yesterday. Pt tolerated Abraxane infusion without incident. Discharged to home in stable condition. Pt returns 3/11/21 for follow up with Dr Mundo Starkey and C5 Carbo and Abraxane.

## 2021-03-10 NOTE — PROGRESS NOTES
Daniel Lundberg  3/10/2021  Wt Readings from Last 3 Encounters:   03/10/21 229 lb (103.9 kg)   03/05/21 227 lb (103 kg)   03/04/21 227 lb (103 kg)     Body mass index is 27.87 kg/m². Treatment Area: lung     Patient was seen today for weekly visit. Comfort Alteration  Fatigue: Moderate    Ventilation Alterations  Cough: No  Hemoptysis: No  Mucus Color: clear   Dyspnea: No      Nutritional Alteration  Anorexia: No  Nausea: Yes   Vomiting: No     Mucous Membrane Alteration  Voice Changes/ Stridor/Larynx: hoarseness   Pharynx & Esophagus:mild, using MMW     Elimination Alterations  Constipation: no  Diarrhea:  no    Skin Alteration   Sensation:intact     Radiation Dermatitis:  Intact [x]     Erythema  [x]     Discoloration  []     Rash []     Dry desquamation  []     Moist desquamation []       Emotional  Coping: effective      Injury, potential bleeding or infection: n/a     Lab Results   Component Value Date    WBC 5.4 03/04/2021     (L) 03/04/2021         /70   Pulse 75   Temp 97.8 °F (36.6 °C)   Resp 18   Wt 229 lb (103.9 kg)   SpO2 98%   BMI 27.87 kg/m²   Patient Currently in Pain: Yes     Pain Level: 3         Assessment/Plan: Patient was seen today for weekly visit. Dr Raf Mathews notified and examined keon Ha

## 2021-03-10 NOTE — PROGRESS NOTES
[facility]            Radiation Oncology          [address] [facility]          [address], [address]        [place] Borne: [phone]        F: [phone]       [employer]             RADIATION ONCOLOGY WEEKLY PROGRESS NOTE  Patient ID:   Leslye Joseph  : 1960   MRN: [id number]    DIAGNOSIS:  Cancer Staging  Malignant neoplasm of lower lobe of right lung ([address]  Staging form: Lung, AJCC 7th Edition  - Pathologic: Stage IIIB (T2a, N3, cM0) - Signed by Oscar Ponce MD on 2021      TREATMENT DETAILS:  Treatment Site: SCF and mediastinal LNs  Actual Dose: 4200cGy  Total Planned Dose: 6000cGy  Treatment Technique: IMRT  Fraction Technique: Daily  Therapy imaging monitoring: CBCT daily  Concurrent Chemotherapy: Carbo/taxol weekly    SUBJECTIVE:   Patient seen for their weekly on treatment evaluation today. Patient is doing well though he is having hoarseness in his voice still and soreness in his throat when eating. Patient is using Magic mouthwash to help with his dysphagia. He also has skin erythema and is using moisturizer. He denies any changes in his appetite overall. He does have some fatigue but is still active. OBJECTIVE:   CHAPERONE: Not Required    ECO Symptomatic but completely ambulatory    VITAL SIGNS: /70   Pulse 75   Temp 97.8 °F (36.6 °C)   Resp 18   Wt 229 lb (103.9 kg)   SpO2 98%   BMI 27.87 kg/m²   Wt Readings from Last 5 Encounters:   03/10/21 229 lb (103.9 kg)   21 227 lb (103 kg)   21 227 lb (103 kg)   21 230 lb (104.3 kg)   21 225 lb (102.1 kg)     GENERAL:  General appearance is that of a well-nourished, well-developed in no apparent distress. HEENT: Normocephalic, atraumatic, EOMI, moist mucosa. SKIN: Mild diffuse erythema on his chest no desquamation.     LABS:  WBC   Date Value Ref Range Status   2021 5.4 3.5 - 11.0 k/uL Final   2021 4.7 3.5 - 11.0 k/uL Final     Comment:            2021 5.9 3.5 - 11.0 k/uL Final     Segs Absolute   Date Value Ref Range Status   03/04/2021 5.08 1.8 - 7.7 k/uL Final   02/25/2021 4.27 1.8 - 7.7 k/uL Final     Comment:            02/18/2021 4.10 1.8 - 7.7 k/uL Final     Hemoglobin   Date Value Ref Range Status   03/04/2021 14.3 13.5 - 17.5 g/dL Final   02/25/2021 14.6 13.5 - 17.5 g/dL Final     Comment:            02/18/2021 15.3 13.5 - 17.5 g/dL Final     Platelets   Date Value Ref Range Status   03/04/2021 122 (L) 140 - 450 k/uL Final   02/25/2021 125 (L) 140 - 450 k/uL Final     Comment:            02/24/2021 130 (L) 138 - 453 k/uL Final     CREATININE   Date Value Ref Range Status   03/04/2021 0.78 0.70 - 1.20 mg/dL Final   02/25/2021 0.81 0.70 - 1.20 mg/dL Final   02/18/2021 0.95 0.70 - 1.20 mg/dL Final     No results found for: , CEA  PSA   Date Value Ref Range Status   09/10/2020 0.59 <4.1 ug/L Final     Comment:     The Roche \"ECLIA\" assay is used. Results obtained with different assay methods cannot be   used interchangeably. MEDICATIONS:    Current Outpatient Medications:     lidocaine-prilocaine (EMLA) 2.5-2.5 % cream, Apply topically as needed 1 hour prior to lab draws and treatment. , Disp: 30 g, Rfl: 2    oxyCODONE-acetaminophen (PERCOCET)  MG per tablet, Take 1 tablet by mouth every 6 hours as needed for Pain for up to 30 days. , Disp: 60 tablet, Rfl: 0    lidocaine-prilocaine (EMLA) 2.5-2.5 % cream, Apply topically Daily as needed. , Disp: 1 Tube, Rfl: 1    Magic Mouthwash (MIRACLE MOUTHWASH), Swish and spit 5 mLs 4 times daily as needed for Irritation, Disp: , Rfl:     atorvastatin (LIPITOR) 80 MG tablet, Take 1 tablet by mouth every evening, Disp: 90 tablet, Rfl: 3    omeprazole (PRILOSEC) 20 MG delayed release capsule, Take 1 capsule by mouth Daily, Disp: 180 capsule, Rfl: 3    lisinopril (PRINIVIL;ZESTRIL) 20 MG tablet, Take 1 tablet by mouth daily, Disp: 90 tablet, Rfl: 3    ciprofloxacin-dexamethasone (CIPRODEX) 0.3-0.1 % otic suspension, Place 4 drops into the left ear 2 times daily, Disp: 1 Bottle, Rfl: 1    ipratropium-albuterol (DUONEB) 0.5-2.5 (3) MG/3ML SOLN nebulizer solution, Inhale 3 mLs into the lungs every 6 hours as needed for Shortness of Breath, Disp: 360 mL, Rfl: 2    amitriptyline (ELAVIL) 25 MG tablet, Take 1 tablet by mouth nightly, Disp: 90 tablet, Rfl: 3    levothyroxine (SYNTHROID) 88 MCG tablet, take 1 tablet by mouth once daily, Disp: 90 tablet, Rfl: 1    ondansetron (ZOFRAN ODT) 8 MG TBDP disintegrating tablet, Take 1 tablet by mouth every 8 hours as needed for Nausea or Vomiting, Disp: 30 tablet, Rfl: 3    clonazePAM (KLONOPIN) 1 MG tablet, Take 1 tablet by mouth 2 times daily as needed for Anxiety for up to 30 days. , Disp: 60 tablet, Rfl: 2    FLUoxetine (PROZAC) 40 MG capsule, take 1 capsule by mouth once daily, Disp: 90 capsule, Rfl: 3      ASSESSMENT PLAN:   Treatment setup and plan reviewed. Port images/CBCT images reviewed. Appropriate laboratory work was reviewed. Treatment side effects and toxicities reviewed with the patient, and appropriate management was advised. Will continue radiation treatment as planned, and recommend patient contact us if they have any questions or concerns. Encourage to monitor sore throat symptoms. Patient is encouraged to use his oxycodone as well as Magic mouthwash to help with dysphagia and odynophagia symptoms. Encouraged to maintain good hydration nutrition and to use a soft or liquid diet if needed. Electronically signed by Meghana Wood MD on 3/10/2021 at 2:29 PM      Drugs Prescribed:  New Prescriptions    No medications on file       Other Orders Placed:  No orders of the defined types were placed in this encounter.

## 2021-03-10 NOTE — TELEPHONE ENCOUNTER
Patient called to change smoking cessation appt today as he does not feel well. States he had every intention of coming. However, he has a lot going on with chemo and radiation and wants to postpone next visit to April. I scheduled him for April 6th at [address] [place] [address] and asked patient to please call a couple days in advance if unable to make it.

## 2021-03-11 NOTE — PLAN OF CARE
Problem: Pain:  Goal: Control of acute pain  Description: Control of acute pain  Outcome: Ongoing     Problem: KNOWLEDGE DEFICIT  Goal: Patient/S.O. demonstrates understanding of disease process, treatment plan, medications, and discharge instructions.   Outcome: Met This Shift

## 2021-03-11 NOTE — TELEPHONE ENCOUNTER
AVS from 3/11/21     Proceed with chemo per orders if labs are ok .  Chemo and labs next week  rv 3/23 with labs     Proceed with tx as planned  rv scheduled for 3/23/21 @ 1:45pm  C7 d/c'd  Per dr navarro, per [facility] C7 already taken out of plan    Pt was given AVS and appt schedule

## 2021-03-11 NOTE — PLAN OF CARE
Problem: Pain:  Goal: Pain level will decrease  Description: Pain level will decrease  3/11/2021 1255 by Philly Sr RN  Outcome: Met This Shift  Goal: Control of acute pain  Description: Control of acute pain  3/11/2021 1255 by Philly Sr RN  Outcome: Met This Shift  Goal: Control of chronic pain  Description: Control of chronic pain  3/11/2021 1255 by Philly Sr RN  Outcome: Met This Shift

## 2021-03-11 NOTE — PROGRESS NOTES
DIAGNOSIS:   1. Adenocarcinoma of the right lower lobe of lung. Path stage is T2N1M0. Stage 2 disease. 2. COPD  3. HTN   4. Smoking addiction   5. Evidence of recurrent disease with metastases to supraclavicular lymph node as well as mediastinal lymph node  CURRENT THERAPY:  S/P right lower lobectomy. 9/2016  Received adjuvant chemotherapy, with Cisplatin and Alimta on 11/17/16, completed in February/2017  Plan chemoradiation with Taxol and carboplatin  Infusion reaction to Taxol, plan to transition to Abraxane  BRIEF CASE HISTORY:      Ирина Espinoza is a very pleasant 61 y.o. male who is referred to us for management recommendation of his recently resected lung cancer. He actually underwent a screening CT scan because of his smoking habits. CT scans showed right lower lobe spiculated mass measuring 2.9 x 2.5 cm abutting the medial pleura. There was another 4 mm pulmonary nodule that has actually been stable from previous imaging. No adenopathy was appreciated. More testing was done but ultimately the patient was taken to surgery. Prior to surgery, the clinical stage was stage I disease. The patient underwent right lower lobe lobectomy on 9/13/16 and pathology showed invasive moderately differentiated adenocarcinoma of the lung measuring 4 cm in greatest dimension, the tumor invaded the visceral pleura. All margins were negative. He had one positive intrapulmonary lymph node. Representative nodes from stations 4, 7, 9 were all negative. Final pathological staging is (T2 N1 M0) stage II disease  He is sent to us for a consultation, recovering from surgery. He continues to have some pain in the surgical area but otherwise has no complaints. He has minimal shortness of breath, no cough or hemoptysis. No headache or seizures or loss of weight. Performance status is ECOG 1, we discussed adjuvant chemotherapy with Cisplatin and Alimta. After completion of chemotherapy, we started surveillance.  He required bronchoscopy to clear thick mucous. He presented 09/2020 with significant weight loss and he was following with pulmonary service and CT scan showed pulmonary lesion. PET scan was done and unfortunately showed lung lesions and rib lesion, suggestive of recurrent disease. Brain MRI was done and negative. The patient was started on chemoradiation with weekly Taxol and carboplatin. With week 1 treatment, he had grade 1 infusion reaction that was managed with premedication and dose adjustment. However with second week, the infusion was much stronger and we had to hold Taxol altogether. We plan to replace with Abraxane. INTERIM HISTORY: The patient comes in today for a follow up for lung cancer and to receive week 5 of chemoradiation. He is feeling well and has no side effects from chemotherapy. He continues to struggle with dysphagia and painful swallowing with solids and liquids  No chest pain or shortness of breath. .  Unfortunately continues to smoke  He had a reaction to Taxol but he did very well with Abraxane without any issues  PAST MEDICAL HISTORY: has a past medical history of Anxiety, BiPAP (biphasic positive airway pressure) dependence, Cancer ([address] Chronic back pain, Clinical trial exam, COPD (chronic obstructive pulmonary disease) ([address] Diabetes ([address] Hyperlipidemia, Hypertension, Hypothyroidism, Lung cancer ([address] Neuropathy, Prolonged emergence from general anesthesia, Sleep apnea, Slow to wake up after anesthesia, SOB (shortness of breath), and Urine frequency. PAST SURGICAL HISTORY: has a past surgical history that includes laminectomy (12/2012); Testicle removal (Left, 1982); Lung removal, partial (Right, 2016); other surgical history (Right, 06/13/2017); back surgery (01/2013); US BIOPSY LYMPH NODE (12/21/2020); and IR PORT PLACEMENT > 5 YEARS (2/24/2021).      CURRENT MEDICATIONS:  has a current medication list which includes the following prescription(s): lidocaine-prilocaine, oxycodone-acetaminophen, lidocaine-prilocaine, magic mouthwash, atorvastatin, omeprazole, lisinopril, ciprofloxacin-dexamethasone, ipratropium-albuterol, amitriptyline, levothyroxine, ondansetron, clonazepam, and fluoxetine. ALLERGIES:  is allergic to paclitaxel and emend [fosaprepitant dimeglumine]. FAMILY HISTORY: Negative for any hematological or oncological conditions. SOCIAL HISTORY:  reports that he has been smoking cigarettes. He has a 10.00 pack-year smoking history. He has never used smokeless tobacco. He reports that he does not drink alcohol or use drugs. REVIEW OF SYSTEMS:   General: No fever or night sweats. Weight stable; +fatigue  ENT: No double or blurred vision, no tinnitus or hearing problem, or sore throat.  +dysphagia   Respiratory: Chronic shortness of breath and cough - some clear sputum - no hemoptysis, chest discomfort in the surgical area, chest congestion   Cardiovascular: Denies central chest pain, PND or orthopnea. No L E swelling or palpitations. Gastrointestinal: No vomiting, abdominal pain, diarrhea or constipation. +nausea   Genitourinary: Denies dysuria, hematuria, frequency, urgency or incontinence. Neurological: Denies headaches, decreased LOC, no sensory or motor focal deficits. Grade 1 neuropathy in hands and feet, predates chemo, diabetes related. Musculoskeletal: Diffuse aches and pains, no joint swelling. Trapezoid and neck pain as noted above  Skin: There are no rashes or bleeding. +psoriasis   Psychiatric:  History of bipolar disorder. Anxiety   Endocrine: No thyroid disease. Hematologic: No bleeding, no adenopathy. PHYSICAL EXAM: Shows a well appearing 61y.o.-year-old male who is not in pain or distress. Vital Signs: There were no vitals taken for this visit. HEENT: Slight redness in the throat. Normocephalic and atraumatic. Pupils are equal, round, reactive to light and accommodation. Extraocular muscles are intact.  Neck: Showed no JVD, no carotid bruit. Lungs: Port in place upper right chest - swelling and redness, no evidence of infection. Decreased air entry especially in the right lower lobe area. Minimal wheezing, bilaterally. Postoperative changes in the chest wall are healed well without any evidence of infection. Heart: Regular without any murmur. Abdomen: Soft, nontender. No hepatosplenomegaly. Extremities: Lower extremities show no edema, clubbing, or cyanosis. Neuro exam: intact cranial nerves bilaterally no motor or sensory deficit, gait is normal. Lymphatic: left level 4 lymph node is palpable and very tender - size reduction in the interim    REVIEW OF RADIOLOGICAL RESULTS:     REVIEW OF LABORATORY RESULTS:   Lab Results   Component Value Date    WBC 5.4 03/04/2021    HGB 14.3 03/04/2021    HCT 41.8 03/04/2021    MCV 96.7 03/04/2021     (L) 03/04/2021       Chemistry        Component Value Date/Time     03/04/2021 0926    K 4.6 03/04/2021 0926     03/04/2021 0926    CO2 25 03/04/2021 0926    BUN 25 (H) 03/04/2021 0926    CREATININE 0.78 03/04/2021 0926        Component Value Date/Time    CALCIUM 9.2 03/04/2021 0926    ALKPHOS 52 03/04/2021 0926    AST 19 03/04/2021 0926    ALT 21 03/04/2021 0926    BILITOT 0.48 03/04/2021 0926              IMPRESSION:   1. Adenocarcinoma of the right lower lobe of lung. Path stage is T2N1M0. Stage 2 disease. 2. S/P lobectomy. 3. received adjuvant chemotherapy, with cisplatin and Alimta. Plan to finish 4 cycles of adjuvant chemotherapy and then observe. 4. COPD  5. Smoking addiction, unable to quit   6. HTN uncontrolled. 7. Starting chemoradiation for recurrent disease in the mediastinum and supraclavicular area. 8. Infusion reaction to Taxol, escalated from grade 1 to grade 2 by the second week. We have to hold the Taxol and will plan to switch to Abraxane. 9. Dysphagia secondary to chemoradiation as the esophagus in the radiation field.   Plan to modify diet with more liquids. PLAN:  1. Samantha Richter is tolerating Abraxane extremely well. No side effects  2. We will continue on with treatment without any changes  3. He will finish radiation on 3/23  he will need 2 more doses of chemotherapy including today  4. Overall his dysphagia is worsening but he is able to tolerate solids and liquids  5. We talked about maximizing his fluid intake. 6. Return on 3/23, we will arrange for Imfinzi and PET scan after completion of radiation.

## 2021-03-11 NOTE — PROGRESS NOTES
Pt here for C.5D1 Abraxane/Carbo infusion. Arrives ambulatory; family member accompanied pt. Denies any new complaints. Labs drawn prior to arrival.  Pt was seen by Dr. Kristy Caldwell, order rec'd to proceed with tx. Abraxane/Carbo complete without incident. Pt d/c'd in stable condition. Returns 3/18/21 for C.6 Carbo and Abraxane.
PACU then floor

## 2021-03-12 NOTE — FLOWSHEET NOTE
Situation:  Patient asked to speak with writer after his radiation oncology treatment. Assessment:  Writer met with Patient in the treatment room. Pt was visibly upset. Pt shared that he was having a hard time. Pt vocalized the causes of his distress, which include feeling alone while going through treatment and feeling disconnected from his children. 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 Pt acknowledges that this is his purpose now but also that it is taking a toll on him. Patient identifies as a caregiver and someone who helps others. Pt is experiencing disappointment that others are not there for him as he was for them. Pt named his former wife and a daughter as two people who care about him. Patient was receptive to encouragement to focus on himself and to value himself. Patient noted that he has had a hard time doing this and that it is important. Pt stated that his priority is to get through his treatments. Writer contacted Patient by phone after this meeting. Pt is agreeable to having Angely Coronel, , contact him next week to offer additional emotional support. Intervention:  Writer provided supportive presence and explored Pt's coping and needs; inquired about Pt's sources of stress; offered empathy; affirmed Pt's strengths; encouraged Pt to take care of himself; asked Pt if he is open to talking with our . Outcome:  Ms. Jeremiah Argueta for the support. He is open to our  calling him some time next week.      03/12/21 1510   Encounter Summary   Services provided to: Patient   Referral/Consult From: Patient   Support System Family members   Continue Visiting   (3/12/21)   Complexity of Encounter Moderate   Length of Encounter 1 hour   Spiritual Assessment Completed Yes   Routine   Type Follow up   Crisis   Type Emotional distress   Assessment Approachable; Anxious; Helplessness; Loneliness Intervention Active listening;Explored feelings, thoughts, concerns;Explored coping resources;Sustaining presence/ Ministry of presence; Discussed meaning/purpose;Discussed relationship with God;Discussed belief system/Taoism practices/delfina;Discussed illness/injury and it's impact   Outcome Receptive; Hopeful;Encouraged;Coping;Expressed feelings/needs/concerns;Engaged in conversation;Expressed gratitude

## 2021-03-17 NOTE — PROGRESS NOTES
[facility]            Radiation Oncology          [address] [facility]          [address], [address]        Maryam Squire: [phone]        F: [phone]       [address]             RADIATION ONCOLOGY WEEKLY PROGRESS NOTE  Patient ID:   Dino Esipnosa  : 1960   MRN: [id number]    DIAGNOSIS:  Cancer Staging  Malignant neoplasm of lower lobe of right lung ([address]  Staging form: Lung, AJCC 7th Edition  - Pathologic: Stage IIIB (T2a, N3, cM0) - Signed by Rosario Lange MD on 2021      TREATMENT DETAILS:  Treatment Site: Supraclavicular and mediastinal lymph nodes  Actual Dose: 5200 cGy  Total Planned Dose: 6000 cGy  Treatment Technique: IMRT  Fraction Technique: Daily  Therapy imaging monitoring: CBCT daily  Concurrent Chemotherapy: Carboplatin and Taxol weekly. SUBJECTIVE:   Patient seen for their weekly on treatment evaluation today. He has dysphagia he is managing with Magic mouthwash. He is fatigued. He has some cough and a bit of dyspnea. OBJECTIVE:   CHAPERONE: Not Required    ECO Symptomatic but completely ambulatory    VITAL SIGNS: /69   Pulse 88   Temp 97.8 °F (36.6 °C)   Resp 18   Wt 227 lb (103 kg)   SpO2 97%   BMI 27.63 kg/m²   Wt Readings from Last 5 Encounters:   21 227 lb (103 kg)   21 228 lb 6.4 oz (103.6 kg)   03/10/21 229 lb (103.9 kg)   21 227 lb (103 kg)   21 227 lb (103 kg)     GENERAL:  General appearance is that of a well-nourished, well-developed in no apparent distress.       LABS:  WBC   Date Value Ref Range Status   2021 5.4 3.5 - 11.0 k/uL Final   2021 5.4 3.5 - 11.0 k/uL Final   2021 4.7 3.5 - 11.0 k/uL Final     Comment:              Segs Absolute   Date Value Ref Range Status   2021 4.98 1.8 - 7.7 k/uL Final   2021 5.08 1.8 - 7.7 k/uL Final   2021 4.27 1.8 - 7.7 k/uL Final     Comment:              Hemoglobin   Date Value Ref Range Status   2021 14.0 13.5 - 17.5 g/dL Final   03/04/2021 14.3 13.5 - 17.5 g/dL Final   02/25/2021 14.6 13.5 - 17.5 g/dL Final     Comment:              Platelets   Date Value Ref Range Status   03/11/2021 113 (L) 140 - 450 k/uL Final   03/04/2021 122 (L) 140 - 450 k/uL Final   02/25/2021 125 (L) 140 - 450 k/uL Final     Comment:              CREATININE   Date Value Ref Range Status   03/11/2021 0.91 0.70 - 1.20 mg/dL Final   03/04/2021 0.78 0.70 - 1.20 mg/dL Final   02/25/2021 0.81 0.70 - 1.20 mg/dL Final       PSA   Date Value Ref Range Status   09/10/2020 0.59 <4.1 ug/L Final     Comment:     The Roche \"ECLIA\" assay is used. Results obtained with different assay methods cannot be   used interchangeably. MEDICATIONS:    Current Outpatient Medications:     lidocaine-prilocaine (EMLA) 2.5-2.5 % cream, Apply topically as needed 1 hour prior to lab draws and treatment. , Disp: 30 g, Rfl: 2    oxyCODONE-acetaminophen (PERCOCET)  MG per tablet, Take 1 tablet by mouth every 6 hours as needed for Pain for up to 30 days. , Disp: 60 tablet, Rfl: 0    lidocaine-prilocaine (EMLA) 2.5-2.5 % cream, Apply topically Daily as needed. , Disp: 1 Tube, Rfl: 1    Magic Mouthwash (MIRACLE MOUTHWASH), Swish and spit 5 mLs 4 times daily as needed for Irritation, Disp: , Rfl:     atorvastatin (LIPITOR) 80 MG tablet, Take 1 tablet by mouth every evening, Disp: 90 tablet, Rfl: 3    omeprazole (PRILOSEC) 20 MG delayed release capsule, Take 1 capsule by mouth Daily, Disp: 180 capsule, Rfl: 3    lisinopril (PRINIVIL;ZESTRIL) 20 MG tablet, Take 1 tablet by mouth daily, Disp: 90 tablet, Rfl: 3    ciprofloxacin-dexamethasone (CIPRODEX) 0.3-0.1 % otic suspension, Place 4 drops into the left ear 2 times daily, Disp: 1 Bottle, Rfl: 1    ipratropium-albuterol (DUONEB) 0.5-2.5 (3) MG/3ML SOLN nebulizer solution, Inhale 3 mLs into the lungs every 6 hours as needed for Shortness of Breath, Disp: 360 mL, Rfl: 2    amitriptyline (ELAVIL) 25 MG tablet, Take 1 tablet

## 2021-03-17 NOTE — PROGRESS NOTES
Edison Evanskaylen  3/17/2021  Wt Readings from Last 3 Encounters:   03/17/21 227 lb (103 kg)   03/11/21 228 lb 6.4 oz (103.6 kg)   03/10/21 229 lb (103.9 kg)     Body mass index is 27.63 kg/m². Treatment Area: lung     Patient was seen today for weekly visit. Comfort Alteration  Fatigue: Moderate    Ventilation Alterations  Cough: Yes  Hemoptysis: No  Mucus Color: clear/white, thickened sputum   Dyspnea: Yes      Nutritional Alteration  Anorexia: No  Nausea: No   Vomiting: No     Mucous Membrane Alteration  Voice Changes/ Stridor/Larynx: yes -   Pharynx & Esophagus:dysphagia     Elimination Alterations  Constipation: no  Diarrhea:  no    Skin Alteration   Sensation:intact     Radiation Dermatitis:  Intact [x]     Erythema  []     Discoloration  []     Rash []     Dry desquamation  []     Moist desquamation []       Emotional  Coping: somewhat effective      Injury, potential bleeding or infection: n/a     Lab Results   Component Value Date    WBC 5.4 03/11/2021     (L) 03/11/2021         /69   Pulse 88   Temp 97.8 °F (36.6 °C)   Resp 18   Wt 227 lb (103 kg)   SpO2 97%   BMI 27.63 kg/m²   Patient Currently in Pain: Yes     Pain Level: 6         Assessment/Plan: Patient was seen today for weekly visit.  Dr Zafar Clayton notified and examined pt       Frankey Kirsch

## 2021-03-18 NOTE — PROGRESS NOTES
Patient here for c6d1 Abraxane Randene Winter. Patient denied any new issues. Patient completed treatment without incident. Stable and ambulatory accompanied by his ex-wife.

## 2021-03-18 NOTE — TELEPHONE ENCOUNTER
reached out to patient to discuss referral from [place]. Modesto Castelan reports that he is nearing the end of treatment with finishing chemo today and radiation next week. Modesto Castelan states he has moments of loneliness and times where he is scared but says he typically has a positive outlook and those times are not the norm. Modesto Castelan states speaking with  and other staff has been helpful.  offered emotional support. Patient has concerns about finances with medical bills growing. Patient turned in [employer] last month and asking about insurance information.  will look into [employer] application and other options for patient.

## 2021-03-23 NOTE — PROGRESS NOTES
Leonel Villanuevaanant  3/23/2021  Wt Readings from Last 3 Encounters:   03/23/21 232 lb 9.6 oz (105.5 kg)   03/18/21 229 lb (103.9 kg)   03/17/21 227 lb (103 kg)     Body mass index is 28.31 kg/m². Treatment Area: lung     Patient was seen today for weekly visit. Comfort Alteration  Fatigue: Moderate    Ventilation Alterations  Cough: Yes  Hemoptysis: Yes  Mucus Color: white  Dyspnea: No      Nutritional Alteration  Anorexia: No  Nausea: No   Vomiting: No     Mucous Membrane Alteration  Voice Changes/ Stridor/Larynx: yes - hoarseness is improving   Pharynx & Esophagus: n/a     Elimination Alterations  Constipation: no  Diarrhea:  no    Skin Alteration   Sensation:intact     Radiation Dermatitis:  Intact [x]     Erythema  []     Discoloration  []     Rash []     Dry desquamation  []     Moist desquamation []       Emotional  Coping: effective      Injury, potential bleeding or infection: n/a     Lab Results   Component Value Date    WBC 5.0 03/18/2021     (L) 03/18/2021         /63   Pulse 88   Temp 97.8 °F (36.6 °C)   Resp 18   Wt 232 lb 9.6 oz (105.5 kg)   SpO2 98%   BMI 28.31 kg/m²   Patient Currently in Pain: Yes     Pain Level: 4         Assessment/Plan: Patient was seen today for weekly visit/last visit. Dr Darryl aLwton notified and examined pt. Pt to f/u in 1 month.        Haroldo Mayfield

## 2021-03-23 NOTE — PROGRESS NOTES
[facility]            Radiation Oncology          [address] [facility]          [place], [address]        Amee Ro: [phone]        F: [phone]       SegundoHogar             RADIATION ONCOLOGY WEEKLY PROGRESS NOTE  Patient ID:   [place]  : 1960   MRN: [id number]    DIAGNOSIS:  Cancer Staging  Malignant neoplasm of lower lobe of right lung ([address]  Staging form: Lung, AJCC 7th Edition  - Pathologic: Stage IIIB (T2a, N3, cM0) - Signed by General Garrett MD on 2021      TREATMENT DETAILS:  Treatment Site: SCF and mediastinal LNs  Actual Dose: 6000cGy  Total Planned Dose: 6000cGy  Treatment Technique: IMRT  Fraction Technique: Daily  Therapy imaging monitoring: CBCT daily  Concurrent Chemotherapy: Carbo/taxol weekly    SUBJECTIVE:   Patient seen for their weekly on treatment evaluation today. Patient is doing well as well as his hoarseness is improving. He does have some dysphagia but is able to eat mostly regular diet. His weight has actually improved. His skin does have some erythema but he is using moisturizer. Patient completed his radiation treatments today and will follow up with medical oncology after this appointment. He does have fatigue but is happy to be done with treatments. OBJECTIVE:   CHAPERONE: Not Required    ECO Symptomatic but completely ambulatory    VITAL SIGNS: /63   Pulse 88   Temp 97.8 °F (36.6 °C)   Resp 18   Wt 232 lb 9.6 oz (105.5 kg)   SpO2 98%   BMI 28.31 kg/m²   Wt Readings from Last 5 Encounters:   21 232 lb 9.6 oz (105.5 kg)   21 229 lb (103.9 kg)   21 227 lb (103 kg)   21 228 lb 6.4 oz (103.6 kg)   03/10/21 229 lb (103.9 kg)     GENERAL:  General appearance is that of a well-nourished, well-developed in no apparent distress. HEENT: Normocephalic, atraumatic, EOMI, moist mucosa.   SKIN: Diffuse erythema on his neck and chest, small area of dry desquamation left chest.    LABS:  WBC Date Value Ref Range Status   03/18/2021 5.0 3.5 - 11.0 k/uL Final   03/11/2021 5.4 3.5 - 11.0 k/uL Final   03/04/2021 5.4 3.5 - 11.0 k/uL Final     Segs Absolute   Date Value Ref Range Status   03/18/2021 4.30 1.8 - 7.7 k/uL Final   03/11/2021 4.98 1.8 - 7.7 k/uL Final   03/04/2021 5.08 1.8 - 7.7 k/uL Final     Hemoglobin   Date Value Ref Range Status   03/18/2021 13.8 13.5 - 17.5 g/dL Final   03/11/2021 14.0 13.5 - 17.5 g/dL Final   03/04/2021 14.3 13.5 - 17.5 g/dL Final     Platelets   Date Value Ref Range Status   03/18/2021 126 (L) 140 - 450 k/uL Final   03/11/2021 113 (L) 140 - 450 k/uL Final   03/04/2021 122 (L) 140 - 450 k/uL Final     CREATININE   Date Value Ref Range Status   03/18/2021 0.91 0.70 - 1.20 mg/dL Final   03/11/2021 0.91 0.70 - 1.20 mg/dL Final   03/04/2021 0.78 0.70 - 1.20 mg/dL Final     No results found for: , CEA  PSA   Date Value Ref Range Status   09/10/2020 0.59 <4.1 ug/L Final     Comment:     The Roche \"ECLIA\" assay is used. Results obtained with different assay methods cannot be   used interchangeably. MEDICATIONS:    Current Outpatient Medications:     lidocaine-prilocaine (EMLA) 2.5-2.5 % cream, Apply topically as needed 1 hour prior to lab draws and treatment. , Disp: 30 g, Rfl: 2    oxyCODONE-acetaminophen (PERCOCET)  MG per tablet, Take 1 tablet by mouth every 6 hours as needed for Pain for up to 30 days. , Disp: 60 tablet, Rfl: 0    lidocaine-prilocaine (EMLA) 2.5-2.5 % cream, Apply topically Daily as needed. , Disp: 1 Tube, Rfl: 1    Magic Mouthwash (MIRACLE MOUTHWASH), Swish and spit 5 mLs 4 times daily as needed for Irritation, Disp: , Rfl:     atorvastatin (LIPITOR) 80 MG tablet, Take 1 tablet by mouth every evening, Disp: 90 tablet, Rfl: 3    omeprazole (PRILOSEC) 20 MG delayed release capsule, Take 1 capsule by mouth Daily, Disp: 180 capsule, Rfl: 3    lisinopril (PRINIVIL;ZESTRIL) 20 MG tablet, Take 1 tablet by mouth daily, Disp: 90 tablet, Rfl: 3    ciprofloxacin-dexamethasone (CIPRODEX) 0.3-0.1 % otic suspension, Place 4 drops into the left ear 2 times daily, Disp: 1 Bottle, Rfl: 1    ipratropium-albuterol (DUONEB) 0.5-2.5 (3) MG/3ML SOLN nebulizer solution, Inhale 3 mLs into the lungs every 6 hours as needed for Shortness of Breath, Disp: 360 mL, Rfl: 2    amitriptyline (ELAVIL) 25 MG tablet, Take 1 tablet by mouth nightly, Disp: 90 tablet, Rfl: 3    levothyroxine (SYNTHROID) 88 MCG tablet, take 1 tablet by mouth once daily, Disp: 90 tablet, Rfl: 1    ondansetron (ZOFRAN ODT) 8 MG TBDP disintegrating tablet, Take 1 tablet by mouth every 8 hours as needed for Nausea or Vomiting, Disp: 30 tablet, Rfl: 3    clonazePAM (KLONOPIN) 1 MG tablet, Take 1 tablet by mouth 2 times daily as needed for Anxiety for up to 30 days. , Disp: 60 tablet, Rfl: 2    FLUoxetine (PROZAC) 40 MG capsule, take 1 capsule by mouth once daily, Disp: 90 capsule, Rfl: 3      ASSESSMENT PLAN:   Treatment setup and plan reviewed. Port images/CBCT images reviewed. Appropriate laboratory work was reviewed. Treatment side effects and toxicities reviewed with the patient, and appropriate management was advised. Patient completed radiation treatment as planned, and recommend patient contact us if they have any questions or concerns. Encourage to monitor sore throat symptoms. Patient is encouraged to use his oxycodone as well as Magic mouthwash to help with dysphagia and odynophagia symptoms. Encouraged to maintain good hydration nutrition and to use a soft or liquid diet if needed. Patient will follow up in 2 months with a repeat CT of the chest and neck.     Electronically signed by Dontrell Kraft MD on 3/23/2021 at 1:35 PM      Drugs Prescribed:  New Prescriptions    No medications on file       Other Orders Placed:  Orders Placed This Encounter   Procedures    CT SOFT TISSUE NECK W CONTRAST    [id number] [employer]

## 2021-03-23 NOTE — PROGRESS NOTES
required bronchoscopy to clear thick mucous. He presented 09/2020 with significant weight loss and he was following with pulmonary service and CT scan showed pulmonary lesion. PET scan was done and unfortunately showed lung lesions and rib lesion, suggestive of recurrent disease. Brain MRI was done and negative. The patient was started on chemoradiation with weekly Taxol and carboplatin. With week 1 treatment, he had grade 1 infusion reaction that was managed with premedication and dose adjustment. However with second week, the infusion was much stronger and we had to hold Taxol altogether. We plan to replace with Abraxane. INTERIM HISTORY: The patient comes in today for a follow up for lung cancer. Today is his final fraction of radiation, he has some mild pain at site, mild nausea and dysphagia. He continues to smoke but plans to return to smoking cessation clinic. He denies any vomiting or issues with eating. PAST MEDICAL HISTORY: has a past medical history of Anxiety, BiPAP (biphasic positive airway pressure) dependence, Cancer ([address] Chronic back pain, Clinical trial exam, COPD (chronic obstructive pulmonary disease) ([address] Diabetes ([address] Hyperlipidemia, Hypertension, Hypothyroidism, Lung cancer ([address] Neuropathy, Prolonged emergence from general anesthesia, Sleep apnea, Slow to wake up after anesthesia, SOB (shortness of breath), and Urine frequency. PAST SURGICAL HISTORY: has a past surgical history that includes laminectomy (12/2012); Testicle removal (Left, 1982); Lung removal, partial (Right, 2016); other surgical history (Right, 06/13/2017); back surgery (01/2013); US BIOPSY LYMPH NODE (12/21/2020); and IR PORT PLACEMENT > 5 YEARS (2/24/2021).      CURRENT MEDICATIONS:  has a current medication list which includes the following prescription(s): oxycodone-acetaminophen, lidocaine-prilocaine, lidocaine-prilocaine, magic mouthwash, atorvastatin, omeprazole, lisinopril, more liquids. PLAN:  1. He will be completing radiation today and I reviewed plan for Imfinzi to start in 6 weeks and we reviewed goals of treatment. 2. I completed toxicity check. 3. He is doing well and stable. 4. We will plan for PET in 3 months. 5. I am refilling his pain medication and prednisone. 6. Return in 6 weeks.

## 2021-03-23 NOTE — TELEPHONE ENCOUNTER
called patient to discuss insurance concerns.  left message encouraging patient to call back at his earliest convenience.

## 2021-03-23 NOTE — PATIENT INSTRUCTIONS
See orders for Imfinzi  Return in 6 weeks with Imfinzi plus labs, CBC, CMP, TSH, cortisol, amylase and lipase

## 2021-03-24 NOTE — TELEPHONE ENCOUNTER
received call back from patient.  went over extra assistance through [employer] for [employer] and bills.  encouraged patient to call with any further questions.

## 2021-03-25 NOTE — TELEPHONE ENCOUNTER
Name: Mal Garcia  : 1960  MRN: [id number]    Oncology Navigation Follow-Up Note  Navigator reaching out to pt. offering assistance, Lanette Stafford remains pending. Pt. Denies needs at this time and appreciate SW notes.    Electronically signed by Erich Wolfe RN on 3/25/2021 at 11:55 AM

## 2021-03-30 PROBLEM — J43.9 PULMONARY EMPHYSEMA (HCC): Status: RESOLVED | Noted: 2021-01-01 | Resolved: 2021-01-01

## 2021-03-30 NOTE — PROGRESS NOTES
[address] [facility] PRIMARY CARE  [facility] [address] [facility] [address]  [address] [address]  Dept: [phone]  Dept Fax: [phone]    Dane Avelar is a 61 y.o. male who presentstoday for his medical conditions/complaints as noted below. Dane Avelar is c/o of  Chief Complaint   Patient presents with    Depression    Anxiety         HPI:     Here today for follow up  Reports completed chemo and radiation last week, reports will be needing immunotherapy for 1 year  States he tolerated well but feels very tired and fatigued  He is taking his Prozac and amitriptyline which he feels have been helpful but he states that he often struggles with mood/anxiety  Consequently he has not been successful with quitting smoking  Reports decided to attempt to quit once he was completed with his chemo and radiation. He has upcoming appointment with smoking cessation clinic    Complains of increased back pain, asking about Depo-Medrol injection as this helps him intermittently for a week or 2  Has also been taking Percocet as needed provided by his oncologist    Hypertension  This is a chronic problem. The current episode started more than 1 year ago. The problem is controlled. Pertinent negatives include no chest pain, headaches, palpitations, peripheral edema or shortness of breath. Past treatments include ACE inhibitors. The current treatment provides significant improvement. There are no compliance problems. There is no history of CAD/MI or CVA. Hemoglobin A1C (%)   Date Value   2021 5.6   2020 5.8   2019 6.0             ( goal A1C is < 7)   Microalb/Crt.  Ratio (mcg/mg creat)   Date Value   06/10/2017 60 (H)     LDL Cholesterol (mg/dL)   Date Value   09/10/2020 173 (H)   2019 CANNOT BE CALCULATED   2019 192 (H)       (goal LDL is <100)   AST (U/L)   Date Value   2021 15     ALT (U/L)   Date Value   2021 17     BUN (mg/dL)   Date Value   2021 27 (H)     BP Readings from Last 3 Encounters:   03/30/21 122/60   03/23/21 128/63   03/23/21 128/63          ([id number])    Past Medical History:   Diagnosis Date    Anxiety     BiPAP (biphasic positive airway pressure) dependence     Cancer (HCC)     Chronic back pain     Clinical trial exam 12/28/2016    COPD (chronic obstructive pulmonary disease) (HCC)     Diabetes (HCC)     Hyperlipidemia     Hypertension     Hypothyroidism     Lung [facility] [address]     Neuropathy     bilat feet    Prolonged emergence from general anesthesia     Sleep apnea     Slow to wake up after anesthesia     SOB (shortness of breath)     Urine frequency       Past Surgical History:   Procedure Laterality Date    BACK SURGERY  01/2013    10 days after lamenectomy, surgery was done in same area to remove Staff infection i    IR PORT PLACEMENT EQUAL OR GREATER THAN 5 YEARS  2/24/2021    IR PORT PLACEMENT EQUAL OR GREATER THAN 5 YEARS 2/24/2021 MD THA Mondragon SPECIAL PROCEDURES    LAMINECTOMY  12/2012    LUNG REMOVAL, PARTIAL Right 2016    lower lobe    OTHER SURGICAL HISTORY Right 06/13/2017    CT guided placement of right pleural drainage catheter     TESTICLE REMOVAL Left 1982    US LYMPH NODE BIOPSY  12/21/2020    US LYMPH NODE BIOPSY 12/21/2020 THA ULTRASOUND       Family History   Problem Relation Age of Onset    Cancer Mother     Cancer Father     Mental Illness Sister     Mental Illness Brother     Mental Illness Sister     Mental Illness Sister     Mental Illness Sister     Mental Illness Brother     Other Brother           Social History     Tobacco Use    Smoking status: Current Every Day Smoker     Packs/day: 0.25     Years: 40.00     Pack years: 10.00     Types: Cigarettes    Smokeless tobacco: Never Used    Tobacco comment: 2/24/2021   Substance Use Topics    Alcohol use: No      Current Outpatient Medications   Medication Sig Dispense Refill    oxyCODONE-acetaminophen (PERCOCET)  MG per tablet Take 1 tablet by mouth every 6 hours as needed for Pain for up to 30 days. 60 tablet 0    FLUoxetine (PROZAC) 40 MG capsule take 1 capsule by mouth once daily 90 capsule 3    lidocaine-prilocaine (EMLA) 2.5-2.5 % cream Apply topically as needed 1 hour prior to lab draws and treatment. 30 g 2    Magic Mouthwash (MIRACLE MOUTHWASH) Swish and spit 5 mLs 4 times daily as needed for Irritation      atorvastatin (LIPITOR) 80 MG tablet Take 1 tablet by mouth every evening 90 tablet 3    omeprazole (PRILOSEC) 20 MG delayed release capsule Take 1 capsule by mouth Daily 180 capsule 3    lisinopril (PRINIVIL;ZESTRIL) 20 MG tablet Take 1 tablet by mouth daily 90 tablet 3    ciprofloxacin-dexamethasone (CIPRODEX) 0.3-0.1 % otic suspension Place 4 drops into the left ear 2 times daily 1 Bottle 1    ipratropium-albuterol (DUONEB) 0.5-2.5 (3) MG/3ML SOLN nebulizer solution Inhale 3 mLs into the lungs every 6 hours as needed for Shortness of Breath 360 mL 2    amitriptyline (ELAVIL) 25 MG tablet Take 1 tablet by mouth nightly 90 tablet 3    levothyroxine (SYNTHROID) 88 MCG tablet take 1 tablet by mouth once daily 90 tablet 1    ondansetron (ZOFRAN ODT) 8 MG TBDP disintegrating tablet Take 1 tablet by mouth every 8 hours as needed for Nausea or Vomiting 30 tablet 3    clonazePAM (KLONOPIN) 1 MG tablet Take 1 tablet by mouth 2 times daily as needed for Anxiety for up to 30 days. 60 tablet 2     No current facility-administered medications for this visit.       Allergies   Allergen Reactions    Paclitaxel Shortness Of Breath     Flushing, hypertension      Emend [Fosaprepitant Dimeglumine] Other (See Comments)     Reported back pain, warmth all over, nausea & SOB       Health Maintenance   Topic Date Due    Hepatitis C screen  Never done    HIV screen  Never done    COVID-19 Vaccine (1) Never done    DTaP/Tdap/Td vaccine (1 - Tdap) Never done    Shingles Vaccine (1 of 2) Never done   [facility] Colon cancer screen colonoscopy  Never done    Diabetic microalbuminuria test  06/10/2018    Annual Wellness Visit (AWV)  Never done    Diabetic retinal exam  01/31/2020    Diabetic foot exam  03/30/2022 (Originally 1/31/2020)    Lipid screen  09/10/2021    TSH testing  09/10/2021    A1C test (Diabetic or Prediabetic)  01/28/2022    Potassium monitoring  03/18/2022    Creatinine monitoring  03/18/2022    Flu vaccine  Completed    Pneumococcal 0-64 years Vaccine  Completed    Hepatitis A vaccine  Aged Out    Hib vaccine  Aged Out    Meningococcal (ACWY) vaccine  Aged Out       Subjective:      Review of Systems   Constitutional: Positive for fatigue. Negative for activity change, appetite change, chills, fever and unexpected weight change. HENT: Negative for congestion, ear pain, hearing loss, sinus pressure, sore throat and trouble swallowing. Eyes: Negative for visual disturbance. Respiratory: Positive for cough (Chronic). Negative for shortness of breath and wheezing. Cardiovascular: Negative for chest pain, palpitations and leg swelling. Gastrointestinal: Negative for abdominal pain, blood in stool, constipation, diarrhea, nausea and vomiting. Endocrine: Negative for cold intolerance, heat intolerance, polydipsia, polyphagia and polyuria. Genitourinary: Negative for difficulty urinating, frequency, hematuria and urgency. Musculoskeletal: Positive for arthralgias and back pain. Negative for myalgias. Skin: Negative for rash. Allergic/Immunologic: Negative for environmental allergies. Neurological: Negative for dizziness, weakness, light-headedness and headaches. Psychiatric/Behavioral: Negative for confusion. The patient is not nervous/anxious. Objective:     Physical Exam  Constitutional:       Appearance: He is well-developed. HENT:      Head: Normocephalic.    Eyes:      Conjunctiva/sclera: Conjunctivae normal.      Pupils: Pupils are equal, round, and reactive to light.   Neck:      Musculoskeletal: Normal range of motion. Cardiovascular:      Rate and Rhythm: Normal rate and regular rhythm. Heart sounds: Normal heart sounds. No murmur. Pulmonary:      Effort: Pulmonary effort is normal.      Breath sounds: Rhonchi (Scattered expiratory) present. No wheezing. Abdominal:      General: Bowel sounds are normal. There is no distension. Palpations: Abdomen is soft. Musculoskeletal: Normal range of motion. Skin:     General: Skin is warm and dry. Neurological:      Mental Status: He is alert and oriented to person, place, and time. Psychiatric:         Behavior: Behavior normal.         Thought Content: Thought content normal.         Judgment: Judgment normal.       /60   Pulse 76   Resp 18   Wt 230 lb (104.3 kg)   SpO2 97%   BMI 28.00 kg/m²     Assessment:       Diagnosis Orders   1. Essential hypertension     2. Acquired hypothyroidism     3. Adenocarcinoma ([address]     4. Malignant neoplasm of lower lobe of right lung ([address]     5. Major depressive disorder with single episode, in partial remission ([address]     6. Anxiety     7. Type 2 diabetes mellitus without complication, without long-term current use of insulin ([address]     8. Mixed hyperlipidemia     9. Chronic midline low back pain without sciatica  methylPREDNISolone acetate (DEPO-MEDROL) injection 80 mg   10. Smoking addiction     11. Simple chronic bronchitis ([address]               Plan:      Return in about 6 months (around 9/30/2021) for hypertension check, depression/anxiety. Hypertension, hyperlipidemiawell-controlled on current medications  Diabetesdue to recent weight loss he is not currently treated, last A1c was well within normal range we will continue to monitor 1-2 times per year  Hypothyroidismrecent TSH therapeutic on current dose levothyroxine, has TSH pending per oncology with next lab draw  Adenocarcinoma, lung cancerreviewed chart, notes per oncology, labs.   Has just completed course of chemo and radiation, plan is for immunotherapy x1 year in the future. He will continue regular follow-up as planned  Anxietystable with current medications,  Chronic back painDepo injection, continue other meds  Smoking, bronchitishas upcoming appointment for smoking cessation clinic he states while he is aware he needs to quit his anxiety has interfered with use of 6     Orders Placed This Encounter   Medications    methylPREDNISolone acetate (DEPO-MEDROL) injection 80 mg       Patient given educational materials - see patient instructions. Discussed use, benefit, and side effects of prescribed medications. All patientquestions answered. Pt voiced understanding. Reviewed health maintenance. Instructedto continue current medications, diet and exercise. Patient agreed with treatmentplan. Follow up as directed.      Electronicallysigned by OSIEL Gonsales CNP on 3/30/2021 at 12:10 PM

## 2021-04-01 NOTE — PROGRESS NOTES
[facility]            Radiation Oncology          [address] [facility]          [address]        [employer]: [phone]        F: [phone]       hi5         Dear Dr Jillian Cary: Thank you for referring Shukri Nieto to me for evaluation and treatment. Below is a summary of the patient's recently completed radiation course. If you have questions, please do not hesitate to call me. I look forward to following this patient along with you. Sincerely,  Electronically signed by Thanh Soto MD on 21 at 11:45 AM EDT      CC: Patient Care Team:  OSIEL Garcia CNP as PCP - General (Family Nurse Practitioner)  OSIEL Garcia CNP as PCP - [facility]  Shahzad Sam RN as Nurse Navigator (Oncology)  ------------------------------------------------------------------------------------------------------------------------------------------------------------------------------------------        Date of Service: 3/23/2021     Location:  [facility] Radiation Oncology,   [address] [facility] [place] luisaramon   [phone]        RADIATION ONCOLOGY END OF TREATMENT SUMMARY:    Patient ID:   Shukri Nieto  : 1960   MRN: [id number]    DIAGNOSIS:  Cancer Staging  Malignant neoplasm of lower lobe of right lung [facility])  Staging form: Lung, AJCC 7th Edition  - Pathologic: Stage IIIB (T2a, N3, cM0) - Signed by Thanh Soto MD on 2021      TREATMENT DETAILS:    Treatment Technique: IMRT  Fraction Technique: Daily      Treatment Summary:  Radiation Oncology - Course: 1 Protocol:    Treatment Site Current Dose Modality From To Elapsed Days Fx. L [facility] Med LN  6,000 cGy x06  2021  3/23/2021  42 30                    Concurrent Chemotherapy: Carbo/Abraxane x6 weekly    CLINICAL COURSE:    Patient completed the treatment as prescribed and tolerated treatment as expected.  Patient developed sore throat, hoarseness in his voice, and skin erythema. Patient will come back in 2 months for a follow up visit and to assess treatment toxicity and response. Patient was advised to continue close follow up with medical oncology as well. Patient does have our contact information in case they have any questions or concerns in the interim.     Electronically signed by Kay No MD on 4/1/2021 at 11:45 AM

## 2021-04-06 NOTE — PROGRESS NOTES
[facility] Med[facility]  Smoking Cessation Program      Trish Rendon          1960  Rosangela Francis, APRN - CNP    Trish Rendon is a 61 y.o. male has been referred to our service by Dr. Ainsley Horner for Smoking Cessation Counseling and Medication Management per Consult Agreement. Patient acknowledges working in consult agreement with clinical pharmacist and referring physician. SUBJECTIVE     Previous Goal/Quit Date: Cut back    Previous Goal/Quit Date Achieved: No    Has the patient smoked ANY cigarettes (even 1 puff) in the last 7 days?: Yes    Current PPD: 1    Smoking Reduction Percent: 0%    Pharmacological Agent used: None    Compliant with regimen: No - was using patch and lozenge but stopped    OBJECTIVE     Past Medical History:      Past Medical History:   Diagnosis Date    Anxiety     BiPAP (biphasic positive airway pressure) dependence     Cancer (HCC)     Chronic back pain     Clinical trial exam 12/28/2016    COPD (chronic obstructive pulmonary disease) (HCC)     Diabetes (HCC)     Hyperlipidemia     Hypertension     Hypothyroidism     Lung cancer (HCC)     Neuropathy     bilat feet    Prolonged emergence from general anesthesia     Sleep apnea     Slow to wake up after anesthesia     SOB (shortness of breath)     Urine frequency      Wt Readings from Last 3 Encounters:   04/06/21 225 lb 11.2 oz (102.4 kg)   03/30/21 230 lb (104.3 kg)   03/23/21 232 lb 9.6 oz (105.5 kg)     ASSESSMENT     Current Smoking Status: Relapsed - back to 1 ppd    Lifestyle modifications:   What has worked well? Smoking outside, smoking partials   What hasnt worked well? 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 Recently has not made effort due to multiple stresses including recent chemo/radiation  What hasnt worked well? n/a    If Cut Back or Relapsed:  What continues to trigger you?  Taking care of elderly man    How are you

## 2021-04-07 NOTE — TELEPHONE ENCOUNTER
Chemo orders received, ht, wt, and bsa verified.    Dose of chemotherapy verified  Ht 76\"  Wt 225#  BSA 2.34  Imfinzi 10 mg/kg 102KG = 1020 mg  ts

## 2021-04-07 NOTE — TELEPHONE ENCOUNTER
Chemotherapy orders received:    Ht=76 inches  Gv=304 lbs  BSA=2.34  Chemotherapy doses verified:  Imfinzi 10mg/kg = 1020 mg  Jovany Crowell RN

## 2021-04-13 NOTE — PROGRESS NOTES
Port flush complete without incident. Pt reports red, scaly, itchy lesions to rt ribcage area, 2 lesions on mid back along spine and 2 on rt forehead at scalp. Also reports severe \"jock itch\". Dr. Chula Novoa notified and assessed pt, states he will send in an antifungal medication to pt's pharmacy. Noted scripts for Diflucan and Kenalog were sent to pt's pharmacy. Pt d/c'd in stable condition. Returns 5/4/21 for MD visit and C.1 Arcadio Tate.

## 2021-04-20 NOTE — TELEPHONE ENCOUNTER
Name: Lesli Cobian  : 1960  MRN: [id number]    Oncology Navigation Follow-Up Note    Navigator reaching out to pt. Offering assistance. Pt. Responding , \"I'm just really depressed today\" Writer encouraging discussion and prioritizing concerns. Pt. Talks about Bills and collectors calling the house? I shared with pt. Writer will reach out to [place] to check on status of PAP letter? Pt. 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 Pt. Has very little support from family members. Pt. Is with Uncle 24 hrs a day. Pt. Also c/o fatigue. Pt. Has spoken with [facility] [place] when in the [address] [place] [address] and I encouraged him to reach out to [facility] [place] as needed. Writer will call [facility] [place] with updates. Navigator updating [facility] as well.      Electronically signed by Anamaria Howard RN on 2021 at 11:56 AM

## 2021-04-21 NOTE — TELEPHONE ENCOUNTER
called patient to discuss [employer]. Patient reports he has not heard back yet from [facility] [place] [facility] and that he continues to get bills.  encouraged patient to call [employer] to check status of application. Patient states he is overwhelmed and not sure what to ask.  will call [employer] to check status of application.

## 2021-04-22 NOTE — TELEPHONE ENCOUNTER
spoke with [employer] who states patient's application was received and they had all paperwork. Application would be processed and patient should received determination letter in next 7-10 days.  updated patient.

## 2021-05-04 NOTE — PATIENT INSTRUCTIONS
Proceed with Imfinzi today per orders, return in 2 weeks with labs and immunotherapy, labs include CBC, CMP, amylase lipase and TSH and cortisol

## 2021-05-04 NOTE — FLOWSHEET NOTE
Situation:  Writer visited with Mr. Elieser Sheridan in the treatment cubicle of the infusion clinic. Assessment:  Mr. Elieser Sheridan smiled and greeted writer. He shared how he has been doing. 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 He acknowledged that it can be difficult and he is in need of help. He took the phone number for the [facility] and stated his intention to call the office. He identified his source of strength at [employer]. \" He acknowledged his strength and way of being as a \"giver. \" He shared that he has been practicing breathing and that this has helped him. He is aware that Titi Stone, the , and writer are available for support. Intervention:  Writer provided supportive presence and active listening; explored Pt's coping and needs; asked about Pt's sources of support and strength; affirmed Pt's strengths; offered words of support and encouragement. Outcome:  Mr. Leonardo Barragan. 05/04/21 1349   Encounter Summary   Services provided to: Patient   Referral/Consult From: [address] [facility] Family members   Continue Visiting   (5/4/21)   Complexity of Encounter Moderate   Routine   Type Follow up   Spiritual/Synagogue   Type Spiritual support   Assessment Calm; Approachable;Coping   Intervention Active listening;Explored feelings, thoughts, concerns;Explored coping resources;Sustaining presence/ Ministry of presence; Discussed meaning/purpose;Discussed relationship with God;Discussed belief system/Mandaen practices/delfina;Discussed illness/injury and it's impact   Outcome Receptive; Hopeful;Encouraged;Coping;Expressed feelings/needs/concerns;Engaged in conversation;Expressed gratitude     Electronically signed by Roxane Duverney, Oncology Outpatient [address], [address] [facility] Radiation Oncology  5/4/2021  1:50 PM

## 2021-05-04 NOTE — TELEPHONE ENCOUNTER
AVS from 5/4/21     Proceed with Imfinzi today per orders, return in 2 weeks with labs and immunotherapy, labs include CBC, CMP, amylase lipase and TSH and cortisol    Proceed with tx as planned  rv scheduled for 5/18/21 @ 10am with tx to follow    Pt was given AVS and appt schedule

## 2021-05-04 NOTE — PROGRESS NOTES
Pt here for C1D1 imfinzi. Denies any new complaints. Labs drawn from port and results reviewed. Pt was seen by Dr. Corky Hurst prior to treatment. TSH 35.93, amylase, 117, and lipase 142 discussed with Dr. Corky Hurst. Ok to proceed with treatment. Orders received for pt to take 2 of his levothyroxine(88 mcq) on Mondays /Thursdays and daily on remaining days. Pt instructed and voices understanding. Consent form signed for [address] [place] [address] pt education handout reviewed with pt. Pt was treated without incident and d/c'd in stable condition. Returns 5/18/21 for C2D1 and MD follow up.

## 2021-05-04 NOTE — PROGRESS NOTES
DIAGNOSIS:   1. Adenocarcinoma of the right lower lobe of lung. Path stage is T2N1M0. Stage 2 disease. 2. COPD  3. HTN   4. Smoking addiction   5. Evidence of recurrent disease with metastases to supraclavicular lymph node as well as mediastinal lymph node  CURRENT THERAPY:  S/P right lower lobectomy. 9/2016  Received adjuvant chemotherapy, with Cisplatin and Alimta on 11/17/16, completed in February/2017  Plan chemoradiation with Taxol and carboplatin  Infusion reaction to Taxol, plan to transition to Abraxane  After completion of chemoradiation, plan immunotherapy with Imfinzi, started 5/2021   BRIEF CASE HISTORY:      Larry Burr is a very pleasant 61 y.o. male who is referred to us for management recommendation of his recently resected lung cancer. He actually underwent a screening CT scan because of his smoking habits. CT scans showed right lower lobe spiculated mass measuring 2.9 x 2.5 cm abutting the medial pleura. There was another 4 mm pulmonary nodule that has actually been stable from previous imaging. No adenopathy was appreciated. More testing was done but ultimately the patient was taken to surgery. Prior to surgery, the clinical stage was stage I disease. The patient underwent right lower lobe lobectomy on 9/13/16 and pathology showed invasive moderately differentiated adenocarcinoma of the lung measuring 4 cm in greatest dimension, the tumor invaded the visceral pleura. All margins were negative. He had one positive intrapulmonary lymph node. Representative nodes from stations 4, 7, 9 were all negative. Final pathological staging is (T2 N1 M0) stage II disease  He is sent to us for a consultation, recovering from surgery. He continues to have some pain in the surgical area but otherwise has no complaints. He has minimal shortness of breath, no cough or hemoptysis. No headache or seizures or loss of weight.     Performance status is ECOG 1, we discussed adjuvant chemotherapy with Cisplatin and Alimta. After completion of chemotherapy, we started surveillance. He required bronchoscopy to clear thick mucous. He presented 09/2020 with significant weight loss and he was following with pulmonary service and CT scan showed pulmonary lesion. PET scan was done and unfortunately showed lung lesions and rib lesion, suggestive of recurrent disease. Brain MRI was done and negative. The patient was started on chemoradiation with weekly Taxol and carboplatin. With week 1 treatment, he had grade 1 infusion reaction that was managed with premedication and dose adjustment. However with second week, the infusion was much stronger and we had to hold Taxol altogether. We plan to replace with Abraxane. After completion of chemoradiation, we plan to offer immunotherapy with Imfinzi to start in May/2021. INTERIM HISTORY: The patient comes in today for a follow up for lung cancer. His dysphagia has resolved. He reports he hasn't been taking Lisinopril for the past month but continues to have some mild hypotension and is symptomatic with some dizziness. He continues to smoke 3/4 pack daily. His breathing is stable but continues to have some congestion. PAST MEDICAL HISTORY: has a past medical history of Anxiety, BiPAP (biphasic positive airway pressure) dependence, Cancer ([address] Chronic back pain, Clinical trial exam, COPD (chronic obstructive pulmonary disease) ([address] Diabetes ([address] Hyperlipidemia, Hypertension, Hypothyroidism, Lung cancer ([address] Neuropathy, Prolonged emergence from general anesthesia, Sleep apnea, Slow to wake up after anesthesia, SOB (shortness of breath), and Urine frequency. PAST SURGICAL HISTORY: has a past surgical history that includes laminectomy (12/2012); Testicle removal (Left, 1982);  Lung removal, partial (Right, 2016); other surgical history (Right, 06/13/2017); back surgery (01/2013); US BIOPSY LYMPH NODE (12/21/2020); and IR PORT PLACEMENT > 5 YEARS (2/24/2021). CURRENT MEDICATIONS:  has a current medication list which includes the following prescription(s): oxycodone-acetaminophen, clonazepam, triamcinolone, fluoxetine, lidocaine-prilocaine, magic mouthwash, atorvastatin, omeprazole, lisinopril, ciprofloxacin-dexamethasone, ipratropium-albuterol, amitriptyline, levothyroxine, and ondansetron. ALLERGIES:  is allergic to paclitaxel and emend [fosaprepitant dimeglumine]. FAMILY HISTORY: Negative for any hematological or oncological conditions. SOCIAL HISTORY:  reports that he has been smoking cigarettes. He has a 10.00 pack-year smoking history. He has never used smokeless tobacco. He reports that he does not drink alcohol or use drugs. REVIEW OF SYSTEMS:   General: No fever or night sweats. Weight stable; +fatigue  ENT: No double or blurred vision, no tinnitus or hearing problem, or sore throat.  +dysphagia - resolved  Respiratory: Chronic shortness of breath and cough - some clear sputum - no hemoptysis, chest discomfort in the surgical area, chest congestion   Cardiovascular: Denies central chest pain, PND or orthopnea. No L E swelling or palpitations. Gastrointestinal: No vomiting, abdominal pain, diarrhea or constipation, nausea   Genitourinary: Denies dysuria, hematuria, frequency, urgency or incontinence. Neurological: Denies headaches, decreased LOC, no sensory or motor focal deficits. Grade 1 neuropathy in hands and feet, predates chemo, diabetes related. Musculoskeletal: Diffuse aches and pains, no joint swelling. Trapezoid and neck pain as noted above  Skin: There are no rashes or bleeding. +psoriasis   Psychiatric:  History of bipolar disorder. Anxiety   Endocrine: No thyroid disease. Hematologic: No bleeding, no adenopathy. PHYSICAL EXAM: Shows a well appearing 61y.o.-year-old male who is not in pain or distress.  Vital Signs: Blood pressure 97/65, pulse 81, temperature 97.8 °F (36.6 °C), temperature source Oral, weight 227

## 2021-05-11 NOTE — TELEPHONE ENCOUNTER
Name: Tamar Singh  : 1960  MRN: [id number]    Oncology Navigation Follow-Up Note    Navigator reaching out to pt. Offering assistance. Pt. Sharing he may be looking for alternative housing soon. Writer reminding him Jamie available if needed. Pt. Very thankful for [place] assistance in the past. Pt. Laughing and joking today, plan to follow.    Electronically signed by Lise Singleton RN on 2021 at 1:19 PM

## 2021-05-13 NOTE — TELEPHONE ENCOUNTER
Called to reschedule appointment, patient states he is not able to reschedule at this time, requesting call back next week

## 2021-05-18 NOTE — FLOWSHEET NOTE
Situation:  Writer visited Patient in the waiting area of the infusion clinic and later in the infusion clinic. Assessment:  Mr. Sharita Omer shared some of his struggles, including with the care of the older adult with whom he is living. He acknowledged the impact of this role on him and the need for something to change. He responded to writer's questions about his sources of support, naming his sister in [place]. He exhibited some helplessness and voiced uncertainty and doubt about the effectiveness of asking for help. He acknowledged that he is not sleeping much due to caregiving. He agreed that something needs to change. He  was called to his appointment by his nurse. Later, in the infusion clinic. Mr. Sharita Omer showed receptivity to acknowledging his stress and the need for support. At this time, he would not like to talk to the  as he is not sure how this will help. He reflected his belief that his situation is happening for a reason. He draws strength from prayer and is waiting for an answer. He smiled and accessed his sense of humor. Intervention:  Writer inquired about Pt and asked how he is coping; offered listening with empathy; asked about Pt's support system; encouraged Pt to acknowledge his stress and connect with his support system; reminded Pt about the resource of the [facility] on Aging in [place]; affirmed Pt's strengths; assured Pt of her prayers; spoke with , Efrain Viera; asked if Pt would like to receive a call from Efrain Viera; stressed that the team and writer care about Pt. Outcome:  Mr. Lenny Cuadra for the support.       05/18/21 1522   Encounter Summary   Services provided to: Patient   Referral/Consult From: [facility] Family members   Continue Visiting   (5/18/21)   Complexity of Encounter Moderate   Length of Encounter 30 minutes   Spiritual Assessment Completed Yes   Routine   Type Follow up   Spiritual/Worship   Type Spiritual support   Assessment Approachable;Helplessness; Anticipatory grief;Concerns with suffering; Anxious   Intervention Active listening;Explored feelings, thoughts, concerns;Explored coping resources;Sustaining presence/ Ministry of presence; Discussed illness/injury and it's impact; Discussed meaning/purpose;Discussed relationship with God   Outcome Venting emotion;Expressed feelings/needs/concerns;Expressed gratitude;Engaged in conversation     Electronically signed by Jazmin Warren, Oncology Outpatient [facility], [facility] Radiation Oncology  5/18/2021  3:24 PM

## 2021-05-18 NOTE — PROGRESS NOTES
Pt here for C.2D1 Imfinzi. Arrives ambulatory by self. Denies any new complaints. Labs drawn from port per triage Rn; results reviewed. Pt was seen by Dr. Harley Ochoa, order rec'd to proceed with tx. Tx complete without incident. Pt d/c'd in stable condition. Returns 5/25/21 for port access for CT scan. Oriented - self; Oriented - place; Oriented - time

## 2021-05-18 NOTE — PROGRESS NOTES
DIAGNOSIS:   1. Adenocarcinoma of the right lower lobe of lung. Path stage is T2N1M0. Stage 2 disease. 2. COPD  3. HTN   4. Smoking addiction   5. Evidence of recurrent disease with metastases to supraclavicular lymph node as well as mediastinal lymph node  CURRENT THERAPY:  S/P right lower lobectomy. 9/2016  Received adjuvant chemotherapy, with Cisplatin and Alimta on 11/17/16, completed in February/2017  Plan chemoradiation with Taxol and carboplatin  Infusion reaction to Taxol, plan to transition to Abraxane  After completion of chemoradiation, plan immunotherapy with Imfinzi, started 5/2021   BRIEF CASE HISTORY:      Mandy Campbell is a very pleasant 61 y.o. male who is referred to us for management recommendation of his recently resected lung cancer. He actually underwent a screening CT scan because of his smoking habits. CT scans showed right lower lobe spiculated mass measuring 2.9 x 2.5 cm abutting the medial pleura. There was another 4 mm pulmonary nodule that has actually been stable from previous imaging. No adenopathy was appreciated. More testing was done but ultimately the patient was taken to surgery. Prior to surgery, the clinical stage was stage I disease. The patient underwent right lower lobe lobectomy on 9/13/16 and pathology showed invasive moderately differentiated adenocarcinoma of the lung measuring 4 cm in greatest dimension, the tumor invaded the visceral pleura. All margins were negative. He had one positive intrapulmonary lymph node. Representative nodes from stations 4, 7, 9 were all negative. Final pathological staging is (T2 N1 M0) stage II disease  He is sent to us for a consultation, recovering from surgery. He continues to have some pain in the surgical area but otherwise has no complaints. He has minimal shortness of breath, no cough or hemoptysis. No headache or seizures or loss of weight.     Performance status is ECOG 1, we discussed adjuvant chemotherapy with Cisplatin and Alimta. After completion of chemotherapy, we started surveillance. He required bronchoscopy to clear thick mucous. He presented 09/2020 with significant weight loss and he was following with pulmonary service and CT scan showed pulmonary lesion. PET scan was done and unfortunately showed lung lesions and rib lesion, suggestive of recurrent disease. Brain MRI was done and negative. The patient was started on chemoradiation with weekly Taxol and carboplatin. With week 1 treatment, he had grade 1 infusion reaction that was managed with premedication and dose adjustment. However with second week, the infusion was much stronger and we had to hold Taxol altogether. We plan to replace with Abraxane. After completion of chemoradiation, we plan to offer immunotherapy with Imfinzi to start in May/2021. Tomette s was started 05/2021. Prior to starting Leonette Hiss lab work showed baseline hypothyroidism and was started on Synthroid. INTERIM HISTORY: The patient comes in today for a follow up for lung cancer and cycle #2 of Imfinzi. He tolerated initial cycle of Imfinzi well with some fatigue. He has productive cough, no hemoptysis. He denies any diarrhea. He is able to eat with no limitations. He started the Synthroid as directed with no issues. PAST MEDICAL HISTORY: has a past medical history of Anxiety, BiPAP (biphasic positive airway pressure) dependence, Cancer ([address] Chronic back pain, Clinical trial exam, COPD (chronic obstructive pulmonary disease) ([address] Diabetes ([address] Hyperlipidemia, Hypertension, Hypothyroidism, Lung cancer ([address] Neuropathy, Prolonged emergence from general anesthesia, Sleep apnea, Slow to wake up after anesthesia, SOB (shortness of breath), and Urine frequency. PAST SURGICAL HISTORY: has a past surgical history that includes laminectomy (12/2012); Testicle removal (Left, 1982);  Lung removal, partial (Right, 2016); other surgical history (Right, 06/13/2017); back surgery (01/2013); US BIOPSY LYMPH NODE (12/21/2020); and IR PORT PLACEMENT > 5 YEARS (2/24/2021). CURRENT MEDICATIONS:  has a current medication list which includes the following prescription(s): oxycodone-acetaminophen, clonazepam, triamcinolone, fluoxetine, lidocaine-prilocaine, magic mouthwash, atorvastatin, omeprazole, lisinopril, ciprofloxacin-dexamethasone, ipratropium-albuterol, amitriptyline, levothyroxine, and ondansetron. ALLERGIES:  is allergic to paclitaxel and emend [fosaprepitant dimeglumine]. FAMILY HISTORY: Negative for any hematological or oncological conditions. SOCIAL HISTORY:  reports that he has been smoking cigarettes. He has a 10.00 pack-year smoking history. He has never used smokeless tobacco. He reports that he does not drink alcohol and does not use drugs. REVIEW OF SYSTEMS:   General: No fever or night sweats. Weight stable; +fatigue  ENT: No double or blurred vision, no tinnitus or hearing problem, or sore throat.  +dysphagia - resolved  Respiratory: Chronic shortness of breath and cough - some clear sputum - no hemoptysis, chest discomfort in the surgical area, chest congestion   Cardiovascular: Denies central chest pain, PND or orthopnea. No L E swelling or palpitations. Gastrointestinal: No vomiting, abdominal pain, diarrhea or constipation, nausea   Genitourinary: Denies dysuria, hematuria, frequency, urgency or incontinence. Neurological: Denies headaches, decreased LOC, no sensory or motor focal deficits. Grade 1 neuropathy in hands and feet, predates chemo, diabetes related. Musculoskeletal: Diffuse aches and pains, no joint swelling. Trapezoid and neck pain as noted above  Skin: There are no rashes or bleeding. +psoriasis   Psychiatric:  History of bipolar disorder. Anxiety   Endocrine: No thyroid disease. Hematologic: No bleeding, no adenopathy. PHYSICAL EXAM: Shows a well appearing 61y.o.-year-old male who is not in pain or distress.  Vital Signs: Blood pressure 123/84, pulse 77, temperature 97.5 °F (36.4 °C), temperature source Oral, resp. rate 16, weight 230 lb 8 oz (104.6 kg). HEENT: Slight redness in the throat. Normocephalic and atraumatic. Pupils are equal, round, reactive to light and accommodation. Extraocular muscles are intact. Neck: grade 1 radiation toxicity Showed no JVD, no carotid bruit. Lungs: Port in place upper right chest - swelling and redness, no evidence of infection. Decreased air entry especially in the right lower lobe area. Scattered rhonchi. Postoperative changes in the chest wall are healed well without any evidence of infection. Heart: Regular without any murmur. Abdomen: Soft, nontender. No hepatosplenomegaly. Extremities: Lower extremities show no edema, clubbing, or cyanosis. Neuro exam: intact cranial nerves bilaterally no motor or sensory deficit, gait is normal. Lymphatic: left level 4 lymph node is palpable and very tender - size reduction in the interim    REVIEW OF RADIOLOGICAL RESULTS:     REVIEW OF LABORATORY RESULTS:   Lab Results   Component Value Date    WBC 6.9 05/18/2021    HGB 13.7 05/18/2021    HCT 39.8 (L) 05/18/2021    .1 (H) 05/18/2021     05/18/2021       Chemistry        Component Value Date/Time     05/18/2021 1010    K 4.2 05/18/2021 1010     05/18/2021 1010    CO2 22 05/18/2021 1010    BUN 19 05/18/2021 1010    CREATININE 0.94 05/18/2021 1010        Component Value Date/Time    CALCIUM 8.7 05/18/2021 1010    ALKPHOS 55 05/18/2021 1010    AST 23 05/18/2021 1010    ALT 24 05/18/2021 1010    BILITOT 0.33 05/18/2021 1010              IMPRESSION:   1. Adenocarcinoma of the right lower lobe of lung. Path stage is T2N1M0. Stage 2 disease. 2. S/P lobectomy. 3. received adjuvant chemotherapy, with cisplatin and Alimta. Plan to finish 4 cycles of adjuvant chemotherapy and then observe. 4. COPD  5. Smoking addiction, unable to quit   6.  Starting chemoradiation for recurrent disease in the mediastinum and supraclavicular area. 7. Infusion reaction to Taxol, escalated from grade 1 to grade 2 by the second week. We have to hold the Taxol and will plan to switch to Abraxane. 8. Dysphagia secondary to chemoradiation as the esophagus in the radiation field. Improved after completion of chemoradiation  9. Hypertension and dizziness, we asked him to stop taking the lisinopril. I think it will improve as his oral intake improves    PLAN:  1. I completed toxicity check. 2. His counts are stable and electrolytes are in range, when TSH complete I will review and adjust medication if needed. 3. We will continue with Imfinzi unchanged. 4. Return in 4 weeks. Addendum, TSH is 41. We will adjust his Synthroid dose to 125 mcg every day.

## 2021-05-18 NOTE — PATIENT INSTRUCTIONS
Proceed with treatment today per orders, please inform me about the patient lab especially TSH  Return in 2 weeks with treatment and labs  Return to see me in 4 weeks with treatment and labs.   Labs every cycle include CBC, CMP, TSH, cortisol, amylase and lipase

## 2021-05-27 NOTE — PROGRESS NOTES
Luis Prey  5/27/2021  10:08 AM      Vitals:    05/27/21 1005   BP: 124/86   Pulse: 74   Resp: 18   Temp: 96.8 °F (36 °C)   SpO2: 96%    :  Patient Currently in Pain: Yes  Pain Assessment: 0-10          Wt Readings from Last 1 Encounters:   05/27/21 228 lb 12.8 oz (103.8 kg)                Current Outpatient Medications:     levothyroxine (SYNTHROID) 125 MCG tablet, Take 1 tablet by mouth daily, Disp: 30 tablet, Rfl: 5    oxyCODONE-acetaminophen (PERCOCET)  MG per tablet, take 1 tablet by mouth every 6 hours if needed for pain for up to 30 DAYS, Disp: 60 tablet, Rfl: 0    clonazePAM (KLONOPIN) 1 MG tablet, Take 1 tablet by mouth 2 times daily as needed for Anxiety for up to 30 days. , Disp: 60 tablet, Rfl: 0    triamcinolone (KENALOG) 0.025 % cream, Apply topically 2 times daily. , Disp: 1 Tube, Rfl: 1    FLUoxetine (PROZAC) 40 MG capsule, take 1 capsule by mouth once daily, Disp: 90 capsule, Rfl: 3    lidocaine-prilocaine (EMLA) 2.5-2.5 % cream, Apply topically as needed 1 hour prior to lab draws and treatment. , Disp: 30 g, Rfl: 2    Magic Mouthwash (MIRACLE MOUTHWASH), Swish and spit 5 mLs 4 times daily as needed for Irritation, Disp: , Rfl:     atorvastatin (LIPITOR) 80 MG tablet, Take 1 tablet by mouth every evening, Disp: 90 tablet, Rfl: 3    omeprazole (PRILOSEC) 20 MG delayed release capsule, Take 1 capsule by mouth Daily, Disp: 180 capsule, Rfl: 3    lisinopril (PRINIVIL;ZESTRIL) 20 MG tablet, Take 1 tablet by mouth daily, Disp: 90 tablet, Rfl: 3    ciprofloxacin-dexamethasone (CIPRODEX) 0.3-0.1 % otic suspension, Place 4 drops into the left ear 2 times daily, Disp: 1 Bottle, Rfl: 1    ipratropium-albuterol (DUONEB) 0.5-2.5 (3) MG/3ML SOLN nebulizer solution, Inhale 3 mLs into the lungs every 6 hours as needed for Shortness of Breath, Disp: 360 mL, Rfl: 2    amitriptyline (ELAVIL) 25 MG tablet, Take 1 tablet by mouth nightly, Disp: 90 tablet, Rfl: 3    ondansetron (ZOFRAN ODT) 8 MG TBDP disintegrating tablet, Take 1 tablet by mouth every 8 hours as needed for Nausea or Vomiting, Disp: 30 tablet, Rfl: 3  No current facility-administered medications for this encounter. Facility-Administered Medications Ordered in Other Encounters:     sodium chloride flush 0.9 % injection 10 mL, 10 mL, Intravenous, PRN, Radha Mendosa MD, 10 mL at 05/25/21 1012    Immunizations:    Influenza status:    [x]   Current   []   Patient declined    Pneumococcal status:  [x]   Current  []   Patient declined  Covid status:   [x]  Dose #1: J and J                      []  Dose #2:                []   Patient declined    Smoking Status:    [x] Smoker - PPD: 1PPD    [] Nonsmoker - Quit Date:               [] Never a smoker      Cancer Screening:  Colonoscopy   [] Current       [] Not current   [x] Not current, but scheduled   [] NA  Mammogram   [] Current       [] Not current   [] Not current, but scheduled   [x] NA  Prostate           [] Current       [] Not current   [x] Not current, but scheduled   [] NA  PAP/Pelvic      [] Current       [] Not current   [] Not current, but scheduled   [x] NA  Skin                 [] Current       [] Not current   [x] Not current, but scheduled   [] NA     FALLS RISK SCREEN  Instructions:  Assess the patient and enter the appropriate indicators that are present for fall risk identification. Total the numbers entered and assign a fall risk score from Table 2.  Reassess patient at a minimum every 12 weeks or with status change. Assessment   Date  5/27/2021     1. Mental Ability: confusion/cognitively impaired 0     2. Elimination Issues: incontinence, frequency 0       3. Ambulatory: use of assistive devices (walker, cane, off-loading devices),        attached to equipment (IV pole, oxygen) 0     4. Sensory Limitations: dizziness, vertigo, impaired vision 0     5. Age less than 65        0     6. Age 72 or greater 0     7.   Medication: diuretics, strong analgesics, hypnotics, sedatives,        antihypertensive agents 3   8. Falls:  recent history of falls within the last 3 months (not to include slipping or        tripping) 0   TOTAL 3    If score of 4 or greater was education given? No           TABLE 2   Risk Score Risk Level Plan of Care   0-3 Little or  No Risk 1. Provide assistance as indicated for ambulation activities  2. Reorient confused/cognitively impaired patient  3. Chair/bed in low position, stretcher/bed with siderails up except when performing patient care activities  5. Educate patient/family/caregiver on falls prevention  6.  Reassess in 12 weeks or with any noted change in patient condition which places them at a risk for a fall   4-6 Moderate Risk 1. Provide assistance as indicated for ambulation activities  2. Reorient confused/cognitively impaired patient  3. Chair/bed in low position, stretcher/bed with siderails up except when performing patient care activities  4. Educate patient/family/caregiver on falls prevention     7 or   Higher High Risk 1. Place patient in easily observable treatment room  2. Patient attended at all times by family member or staff  3. Provide assistance as indicated for ambulation activities  4. Reorient confused/cognitively impaired patient  5. Chair/bed in low position, stretcher/bed with siderails up except when performing patient care activities  6. Educate patient/family/caregiver on falls prevention         PLAN: Patient is seen today in follow up. Notes pain in the chest, dull heaviness and pain with deep breath. Pt had recent CT scan and is here to review results. Dr. Sondra Martinez updated and examined pt. Per MD pt will return to RO in 3 months. Called [place] to update that pt had progression on CT scan and will need his [place] appt moved up.          Carissa Pinedo RN

## 2021-06-03 NOTE — PROGRESS NOTES
Cisplatin and Alimta. After completion of chemotherapy, we started surveillance. He required bronchoscopy to clear thick mucous. He presented 09/2020 with significant weight loss and he was following with pulmonary service and CT scan showed pulmonary lesion. PET scan was done and unfortunately showed lung lesions and rib lesion, suggestive of recurrent disease. Brain MRI was done and negative. The patient was started on chemoradiation with weekly Taxol and carboplatin. With week 1 treatment, he had grade 1 infusion reaction that was managed with premedication and dose adjustment. However with second week, the infusion was much stronger and we had to hold Taxol altogether. We plan to replace with Abraxane. After completion of chemoradiation, we plan to offer immunotherapy with Imfinzi to start in May/2021. Bettyjane Rands was started 05/2021. Prior to starting Bettyjane Rands lab work showed baseline hypothyroidism and was started on Synthroid. INTERIM HISTORY: The patient comes in today for a follow up for lung cancer and cycle #3 of Imfinzi and to review imaging. He notes some fatigue with treatment, no nausea, vomiting, diarrhea, or rash. His shortness of breath persists with some mild chest heaviness and pain, no green sputum or fever, he continues to smoke with no reduction. PAST MEDICAL HISTORY: has a past medical history of Anxiety, BiPAP (biphasic positive airway pressure) dependence, Cancer ([address] Chronic back pain, Clinical trial exam, COPD (chronic obstructive pulmonary disease) ([address] Diabetes ([address] Hyperlipidemia, Hypertension, Hypothyroidism, Lung cancer ([address] Neuropathy, Prolonged emergence from general anesthesia, Sleep apnea, Slow to wake up after anesthesia, SOB (shortness of breath), and Urine frequency. PAST SURGICAL HISTORY: has a past surgical history that includes laminectomy (12/2012); Testicle removal (Left, 1982);  Lung removal, partial (Right, 2016); other surgical history (Right, 06/13/2017); back surgery (01/2013); US BIOPSY LYMPH NODE (12/21/2020); and IR PORT PLACEMENT > 5 YEARS (2/24/2021). CURRENT MEDICATIONS:  has a current medication list which includes the following prescription(s): oxycodone-acetaminophen, levothyroxine, triamcinolone, fluoxetine, lidocaine-prilocaine, magic mouthwash, atorvastatin, omeprazole, lisinopril, ciprofloxacin-dexamethasone, ipratropium-albuterol, amitriptyline, ondansetron, and clonazepam.    ALLERGIES:  is allergic to paclitaxel and emend [fosaprepitant dimeglumine]. FAMILY HISTORY: Negative for any hematological or oncological conditions. SOCIAL HISTORY:  reports that he has been smoking cigarettes. He has a 10.00 pack-year smoking history. He has never used smokeless tobacco. He reports that he does not drink alcohol and does not use drugs. REVIEW OF SYSTEMS:   General: No fever or night sweats. Weight stable; +fatigue  ENT: No double or blurred vision, no tinnitus or hearing problem, or sore throat  Respiratory: Chronic shortness of breath with some pain and cough - some clear sputum - no hemoptysis, chest discomfort in the surgical area, chest congestion   Cardiovascular: Denies central chest pain, PND or orthopnea. No L E swelling or palpitations. Gastrointestinal: No vomiting, abdominal pain, diarrhea or constipation, nausea   Genitourinary: Denies dysuria, hematuria, frequency, urgency or incontinence. Neurological: Denies headaches, decreased LOC, no sensory or motor focal deficits. Grade 1 neuropathy in hands and feet, predates chemo, diabetes related. Musculoskeletal: Diffuse aches and pains, no joint swelling. Trapezoid and neck pain as noted above  Skin: There are no rashes or bleeding. +psoriasis   Psychiatric:  History of bipolar disorder. Anxiety   Endocrine: No thyroid disease. Hematologic: No bleeding, no adenopathy. PHYSICAL EXAM: Shows a well appearing 61y.o.-year-old male who is not in pain or distress.  Vital Signs: Blood pressure (!) 147/98, pulse 78, temperature 95.9 °F (35.5 °C), temperature source Temporal, weight 231 lb 1.6 oz (104.8 kg). HEENT: Slight redness in the throat. Normocephalic and atraumatic. Pupils are equal, round, reactive to light and accommodation. Extraocular muscles are intact. Neck: grade 1 radiation toxicity Showed no JVD, no carotid bruit. Lungs: Port in place upper right chest - swelling and redness, no evidence of infection. Decreased air entry especially in the right lower lobe area - improved. Scattered rhonchi. Postoperative changes in the chest wall are healed well without any evidence of infection. Heart: Regular without any murmur. Abdomen: Soft, nontender. No hepatosplenomegaly. Extremities: Lower extremities show no edema, clubbing, or cyanosis. Neuro exam: intact cranial nerves bilaterally no motor or sensory deficit, gait is normal. Lymphatic: left level 4 lymph node is palpable and very tender - size reduction in the interim    REVIEW OF RADIOLOGICAL RESULTS:  05/25/2021 CT SOFT TISSUE NECK IMPRESSION:      1. Enlarged left level 4 lymph node with central decreased attenuation.  No   distinct fat plane is seen between this lymph node and the posterior aspect   of the left internal jugular vein as well as the anterior aspect of the left   scalene musculature.  This is likely slightly increased in size from the   prior PET-CT. 2. No additional cervical lymphadenopathy by size criteria. CT CHEST W CONTRAST IMPRESSION:   1. Interval progression of innumerable pulmonary nodules scattered throughout   both lungs, largest measuring 13 mm within the left lower lobe when compared   to the prior PET-CT study.  Findings suggest progression of disease. 2. New ill-defined consolidation involving the right upper lobe.  Developing   infectious process cannot be excluded. 3. Right adrenal gland nodule, reported stable since 2016 suggesting a benign   process.          REVIEW OF LABORATORY

## 2021-06-03 NOTE — TELEPHONE ENCOUNTER
AVS from 6/3/21     Proceed with treatment per orders.  Refer to [facility], second opinion for clinical trial .   Return TBD     Tx today as scheduled    Referral sent to [facility], receipt confirmed    Future chemo cancelled per Dr. Murriel Klinefelter, RV will be determined after consult with [facility]    PT was given AVS and an appt schedule    Electronically signed by Hetal Bowers on 6/3/2021 at 2:56 PM

## 2021-06-03 NOTE — FLOWSHEET NOTE
Situation:  Writer received referral to visit patient from [facility], who shared that Patient had received news that his cancer had progressed from the doctor visit today. Writer and [address] Intern visited with Pt in the treatment cubicle of the infusion clinic. Assessment:  Mr. Mal Bear was looking at his phone. He shared that he was telling people the \"bad news. \" He processed his thoughts and feelings about learning that his cancer had progressed to stage 4. He acknowledged that he may have \"months to live\" if he does not qualify for clinical trial treatment. He noted that he \"knew\" this was happening for the past several months. He has resigned himself to the outcome, noting some sadness, and also smiling and joking. He stated his intention to \"do what I want from now on.\" He spoke of his sister as being his main ally. He expressed gratitude for his doctor, who has been caring for him the past seven years and who also cared for his mother who  of cancer. He acknowledged his strength as someone who has \"helped others all of my life. \" He reflected on how he has helped others from a young age. He spoke of the challenges of his current caregiving situation. He accessed his sense of humor when writer asked him when he would be back to the clinic. Intervention:  Writer and  Intern offered empathy and active listening; explored Pt's feelings and thoughts about his diagnosis; affirmed Pt's strengths; offered words of support and encouragement. Outcome:  Mr. Asya Villarreal and [address] Intern for the visit. [id number]   Encounter Summary   Services provided to: Patient   Referral/Consult From: Nurse   Support System Family members; Children   Continue Visiting   (6/3/21)   Complexity of Encounter Moderate   Length of Encounter 30 minutes   Spiritual Assessment Completed Yes   Routine   Type Follow up   Spiritual/Restorationist   Type Spiritual support   Assessment Calm;Approachable; Anticipatory grief   Intervention Active listening;Explored feelings, thoughts, concerns;Explored coping resources;Sustaining presence/ Ministry of presence; Discussed belief system/Confucianist practices/delfina;Discussed illness/injury and it's impact; Discussed relationship with God;Discussed meaning/purpose   Outcome Expressed gratitude;Coping;Encouraged; Hopeful;Receptive;Engaged in conversation;Grieving     Electronically signed by Tanna Norman, Oncology Outpatient [facility], [address] [facility] Radiation Oncology  6/3/2021  2:51 PM

## 2021-06-03 NOTE — TELEPHONE ENCOUNTER
Name: Ramesh Rodney  : 1960  MRN: [id number]    Oncology Navigation Follow-Up Note    Navigator reviewing chart and pt. Referred to [facility] for clinical trial, plan to follow until established.   Electronically signed by Enrike Sky RN on 6/3/2021 at 2:36 PM

## 2021-06-03 NOTE — PROGRESS NOTES
Pt here for [address]. Pt seen by Dr Lashae Chávez prior to treatment, refer to his note. Labs drawn from port and results reviewed. Pt was treated without incident and d/c'd in stable condition. Pt will return on 6-17-21 for MD follow up and C4D1.

## 2021-06-09 NOTE — TELEPHONE ENCOUNTER
Name: Flora Pan  : 1960  MRN: [id number]    Oncology Navigation Follow-Up Note  Navigator reaching out to pt. And pt. Met with Dr. Leonel Griggs yesterday and testing initiated, plan to follow. From a distance .   Electronically signed by Everlina Habermann, RN on 2021 at 8:45 AM

## 2021-06-10 NOTE — TELEPHONE ENCOUNTER
Patient states he has a lot going on right now, requesting a call back at the beginning of July to reschedule missed appointment.

## 2021-06-10 NOTE — TELEPHONE ENCOUNTER
Rec'd call from Nini Beal, research nurse at [facility]. Requesting addtn'l [facility] results. Faxed to #[phone], confirmation rec'd.

## 2021-07-06 NOTE — TELEPHONE ENCOUNTER
Name: Cintia Rice  : 1960  MRN: [id number]    Oncology Navigation Follow-Up Note  Navigator reaching out to pt. Checking on status of study/clinical trial? Pt. Spoke with staff @ [facility] last week and was informed clinic would know something more after the ? Pt. Knows to call [facility] if concerns or reach out to navigator as needed.    Electronically signed by Tom Duke RN on 2021 at 9:28 AM

## 2021-07-13 NOTE — PROGRESS NOTES
[facility]  Smoking Cessation Program      August Rodriguez          1960  Jose De Guzman, APRN - CNP    August Rodriguez is a 61 y.o. male has been referred to our service by Dr. Gleda Castleman for Smoking Cessation Counseling and Medication Management per Consult Agreement. Patient acknowledges working in consult agreement with clinical pharmacist and referring physician. SUBJECTIVE     Previous Goal/Quit Date: Cut back    Previous Goal/Quit Date Achieved: No    Has the patient smoked ANY cigarettes (even 1 puff) in the last 7 days?: Yes    Current PPD: 1 PPD    Smoking Reduction Percent: 0%    Pharmacological Agent used: Patiet wore patch maybe 2 days then stopped    Compliant with regimen: No - no current used of any agent    Medication Adverse Effects: None    How do you feel? []  Improved breathing  []  Improved sense if smell  []  Improved sense of taste  []  Increase in energy  [x]  Other - Patient feels his condition deteriorating. OBJECTIVE     Past Medical History:       Past Medical History:   Diagnosis Date    Anxiety     BiPAP (biphasic positive airway pressure) dependence     Cancer (HCC)     Chronic back pain     Clinical trial exam 12/28/2016    COPD (chronic obstructive pulmonary disease) (HCC)     Diabetes (HCC)     Hyperlipidemia     Hypertension     Hypothyroidism     Lung cancer (HCC)     Neuropathy     bilat feet    Prolonged emergence from general anesthesia     Sleep apnea     Slow to wake up after anesthesia     SOB (shortness of breath)     Urine frequency      Wt Readings from Last 3 Encounters:   06/03/21 231 lb 1.6 oz (104.8 kg)   05/27/21 228 lb 12.8 oz (103.8 kg)   05/18/21 230 lb 8 oz (104.6 kg)     ASSESSMENT   Current Smoking Status: Relapsed - back to 1 PPD (was cut back to 0.5 PPD)    Lifestyle modifications:   What has worked well? Using the patch helped but recent health status has been overwhelming  What hasnt worked well?  News of Stage 4 cancer diagnosis has been very difficult    Behavior modifications:    What has worked well? Smoking half cigarettes  What hasnt worked well? n/a    If Cut Back or Relapsed:  What continues to trigger you? Stress of Cancer Dx    How are you avoiding triggers or planning for how to deal with them? Plan to cut back with smoking to improve breathing but quitting is not the goal at this point given terminal cancer. Patient states he was given months to live. How Motivated are you to quit on a scale from 1-10? Did not assess    How Confident are you that you can quit on a scale from 1-10? Did not assess    Withdrawal sxs? Other concerns: None    PLAN   Patient wishes to continue to attempt to cut back/quit smoking. He was debating this given recent stage 4 cancer diagnosis. - PharmD to follow up 2 weeks to assess progress.  -Spent more than 60 minutes discussing smoking cessation with patient during visit.     Electronically signed by NICKI Parr [facility] on 2021 at [address] [place] [address] Intervention Detail: Adherence Monitorin   Total # of Interventions Recommended: 1   Total # of Interventions Accepted: 1   Time Spent (min): 60

## 2021-07-14 NOTE — PROGRESS NOTES
Patient here for port flush. Port flushes and draws well and he was discharged home in stable condition. He is due to return 7/27 for MD visit.

## 2021-07-22 NOTE — ED PROVIDER NOTES
EMERGENCY DEPARTMENT ENCOUNTER    Pt Name: Chantale Abreu  MRN: [id number]  [place] 1960  Date of evaluation: 7/22/21  CHIEF COMPLAINT       Chief Complaint   Patient presents with    Chest Pain     HISTORY OF PRESENT ILLNESS   This is a 60-year-old male with a history of stage IV lung cancer that presents with complaints of chest pain, shortness of breath and not feeling well. Patient states that he always has a baseline level of chest pain or discomfort since his diagnosis, he has previously had radiation, he states over the past week he has had increasing pain discomfort and shortness of breath. He denies any hemoptysis, he denies any lower extremity swelling but does have some severe pain in his left femoral region. He denies any previous history of DVT or pulmonary embolism. He denies any previous cardiac history. Patient describes his symptoms as severe. REVIEW OF SYSTEMS     Review of Systems   Constitutional: Negative for chills and fever. HENT: Negative for rhinorrhea and sore throat. Eyes: Negative for discharge, redness and visual disturbance. Respiratory: Positive for shortness of breath. Negative for cough. Cardiovascular: Positive for chest pain. Negative for palpitations and leg swelling. Gastrointestinal: Negative for diarrhea, nausea and vomiting. Musculoskeletal: Negative for arthralgias, myalgias and neck pain. Skin: Negative for color change and rash. Neurological: Negative for seizures, weakness and headaches. Psychiatric/Behavioral: Negative for hallucinations, self-injury and suicidal ideas.      PASTMEDICAL HISTORY     Past Medical History:   Diagnosis Date    Anxiety     BiPAP (biphasic positive airway pressure) dependence     Cancer (HCC)     Chronic back pain     Clinical trial exam 12/28/2016    COPD (chronic obstructive pulmonary disease) (HCC)     Diabetes (HCC)     Hyperlipidemia     Hypertension     Hypothyroidism     Lung [facility] [address]     Neuropathy     bilat feet    Prolonged emergence from general anesthesia     Sleep apnea     Slow to wake up after anesthesia     SOB (shortness of breath)     Urine frequency      Past Problem List  Patient Active Problem List   Diagnosis Code    Malignant neoplasm of lower lobe of right lung (HCC) C34.31    Smoking addiction F17.200    Simple chronic bronchitis (HCC) J41.0    Type 2 diabetes mellitus without complication, without long-term current use of insulin (HCC) E11.9    Essential hypertension I10    Acquired hypothyroidism E03.9    Mixed hyperlipidemia E78.2    Major depressive disorder with single episode, in partial remission ([facility] [address] F32.4    Chronic midline low back pain without sciatica M54.5, G89.29    History of lumbar laminectomy Z98.890    Unilateral emphysema (HCC) J43.0    Obstructive sleep apnea syndrome G47.33    Atelectasis J98.11    Anxiety F41.9    Adenocarcinoma (HCC) C80.1    Gastroesophageal reflux disease K21.9    Difficulty sleeping G47.9     SURGICAL HISTORY       Past Surgical History:   Procedure Laterality Date    BACK SURGERY  01/2013    10 days after lamenectomy, surgery was done in same area to remove Staff infection i    IR PORT PLACEMENT EQUAL OR GREATER THAN 5 YEARS  2/24/2021    IR PORT PLACEMENT EQUAL OR GREATER THAN 5 YEARS 2/24/2021 MD THA Harper SPECIAL PROCEDURES    LAMINECTOMY  12/2012    LUNG REMOVAL, PARTIAL Right 2016    lower lobe    OTHER SURGICAL HISTORY Right 06/13/2017    CT guided placement of right pleural drainage catheter     TESTICLE REMOVAL Left 1982    US LYMPH NODE BIOPSY  12/21/2020    US LYMPH NODE BIOPSY 12/21/2020 [facility]     CURRENT MEDICATIONS       Current Discharge Medication List      CONTINUE these medications which have NOT CHANGED    Details   levothyroxine (SYNTHROID) 125 MCG tablet Take 1 tablet by mouth daily  Qty: 30 tablet, Refills: 5      clonazePAM (KLONOPIN) 1 MG TBDP disintegrating tablet Take 1 tablet by mouth every 8 hours as needed for Nausea or Vomiting  Qty: 30 tablet, Refills: 3           ALLERGIES     is allergic to paclitaxel and emend [fosaprepitant dimeglumine]. FAMILY HISTORY     He indicated that his mother is alive. He indicated that his father is . He indicated that all of his four sisters are alive. He indicated that all of his three brothers are alive. SOCIAL HISTORY       Social History     Tobacco Use    Smoking status: Current Every Day Smoker     Packs/day: 0.25     Years: 40.00     Pack years: 10.00     Types: Cigarettes    Smokeless tobacco: Never Used    Tobacco comment: 2021   Vaping Use    Vaping Use: Never used   Substance Use Topics    Alcohol use: No    Drug use: No     PHYSICAL EXAM     INITIAL VITALS: BP (!) 136/99   Pulse 86   Temp 98.2 °F (36.8 °C) (Oral)   Resp 21   SpO2 95%    Physical Exam  Constitutional:       Appearance: Normal appearance. He is well-developed. He is not ill-appearing or toxic-appearing. HENT:      Head: Normocephalic and atraumatic. Eyes:      Conjunctiva/sclera: Conjunctivae normal.      Pupils: Pupils are equal, round, and reactive to light. Neck:      Trachea: Trachea normal.   Cardiovascular:      Rate and Rhythm: Normal rate and regular rhythm. Heart sounds: S1 normal and S2 normal. No murmur heard. Pulmonary:      Effort: Pulmonary effort is normal. No accessory muscle usage or respiratory distress. Breath sounds: Normal breath sounds. Chest:      Chest wall: No deformity or tenderness. Abdominal:      General: Bowel sounds are normal. There is no distension or abdominal bruit. Palpations: Abdomen is not rigid. Tenderness: There is no abdominal tenderness. There is no guarding or rebound. Musculoskeletal:      Cervical back: Normal range of motion and neck supple. Left upper leg: Tenderness present.  No swelling, edema, deformity, lacerations or bony tenderness. Skin:     General: Skin is warm. Findings: No rash. Neurological:      Mental Status: He is alert and oriented to person, place, and time. GCS: GCS eye subscore is 4. GCS verbal subscore is 5. GCS motor subscore is 6. Psychiatric:         Speech: Speech normal.         MEDICAL DECISION MAKIN-year-old male, left leg pain, shortness of breath chest pain not feeling well. Plan is basic labs, cardiac enzymes, chest x-ray and reevaluation    5:32 PM EDT  Patient's CT scan shows progression of his metastatic disease but no evidence of acute pulmonary embolism. Troponins are negative, plan is discharged with outpatient follow-up, patient will follow up with his oncologist.  He is already taking Percocet tens, there is nothing stronger that I can write for him. CRITICAL CARE:       PROCEDURES:    Procedures    DIAGNOSTIC RESULTS   EKG:All EKG's are interpreted by the Emergency Department Physician who either signs or Co-signs this chart in the absence of a cardiologist.        RADIOLOGY:All plain film, CT, MRI, and formal ultrasound images (except ED bedside ultrasound) are read by the radiologist, see reports below, unless otherwisenoted in MDM or here. CT CHEST PULMONARY EMBOLISM W CONTRAST   Final Result   1. No evidence for acute pulmonary embolism. Interval progression of   pulmonary metastasis with increase in size and number of nodules. 2. Stable appearing small right pleural effusion pleural thickening. 3. Emphysema. XR FEMUR LEFT (MIN 2 VIEWS)   Final Result   1. A lytic lesion involving the cortex of the mid left femoral diaphysis   could be due to metastatic disease given patient history. Of note, prior   PET/CTs do not definitely extend this far distally. Consider further   evaluation with MRI or CT on a nonemergent basis. 2. No acute bony findings in the left thigh. 3. Degenerative changes as above.          XR CHEST PORTABLE   Final Result Increased nodular opacities compatible with progression known metastatic   disease; some hazy opacity right base, either superimposed structures or   possibly infiltrate. Correlate clinically. Right IJ port tip position   remains satisfactory. VL DUP LOWER EXTREMITY VENOUS LEFT    (Results Pending)     LABS: All lab results were reviewed by myself, and all abnormals are listed below. Labs Reviewed   BASIC METABOLIC PANEL - Abnormal; Notable for the following components:       Result Value    Glucose 169 (*)     All other components within normal limits   CBC WITH AUTO DIFFERENTIAL - Abnormal; Notable for the following components:    RBC 4.17 (*)     .3 (*)     MCH 34.5 (*)     Seg Neutrophils 92 (*)     Lymphocytes 5 (*)     Eosinophils % 0 (*)     Segs Absolute 9.65 (*)     Absolute Lymph # 0.53 (*)     All other components within normal limits   MAGNESIUM   TROPONIN   TROPONIN   LACTIC ACID   LACTIC ACID       EMERGENCY DEPARTMENTCOURSE:         Vitals:    Vitals:    07/22/21 1337   BP: (!) 136/99   Pulse: 86   Resp: 21   Temp: 98.2 °F (36.8 °C)   TempSrc: Oral   SpO2: 95%       The patient was given the following medications while in the emergency department:  Orders Placed This Encounter   Medications    fentaNYL (SUBLIMAZE) injection 50 mcg    iopamidol (ISOVUE-370) 76 % injection 75 mL    sodium chloride flush 0.9 % injection 10 mL    0.9 % sodium chloride bolus    HYDROmorphone HCl PF (DILAUDID) injection 1 mg     CONSULTS:  None    FINAL IMPRESSION      1. Chest pain, unspecified type    2. Metastatic malignant neoplasm, unspecified site ([address]    3.  Left leg pain          DISPOSITION/PLAN   DISPOSITION Decision To Discharge 07/22/2021 05:29:27 PM      PATIENT REFERRED TO:  Josefa Leroy MD  [facility] [address]  [phone]    Schedule an appointment as soon as possible for a visit in 2 days      DISCHARGE MEDICATIONS:  Current Discharge Medication List Ana Welch MD  Attending Emergency Physician                   Ana Welch MD  07/22/21 0442

## 2021-07-22 NOTE — TELEPHONE ENCOUNTER
Pt called into office stating he has the following symptoms   Pain in his thigh, shortness of breath and pain in his chest specifically around his throat, pt states that this has been going on for a \"few day\"  Writer informed pt that there is no provider in the office today and would recommend him getting this evaluated at the ER to r/o blood clots  Pt states if his symptoms worsen [facility] [address] [place] [place] [address] also [facility] that [facility] went to [facility] for evalution for a research study but he does not qualify since his thyroid levels are too high, he is seeing Dr Twan López 7/27 and would like to restart his Carlitos Torres RN

## 2021-07-22 NOTE — ED TRIAGE NOTES
Pt came in having chest pain on the left side radiating up through his neck. Pt states chest pain has been going on for the last week but worsening today. Pt has lung CA hx. Pt states he is attempting to go through a clinical trial. Pt was connected to cardiac monitor, EKG completed and IV established.  at bedside.

## 2021-07-27 PROBLEM — C79.51 BONE METASTASIS: Status: ACTIVE | Noted: 2021-01-01

## 2021-07-27 PROBLEM — D68.9 COAGULATION DEFECT (HCC): Status: ACTIVE | Noted: 2021-01-01

## 2021-07-27 PROBLEM — F11.20 OPIOID DEPENDENCE, UNCOMPLICATED (HCC): Status: ACTIVE | Noted: 2021-01-01

## 2021-07-27 PROBLEM — F11.99 OPIOID USE, UNSPECIFIED WITH UNSPECIFIED OPIOID-INDUCED DISORDER (HCC): Status: ACTIVE | Noted: 2021-01-01

## 2021-07-27 PROBLEM — F11.29 OPIOID DEPENDENCE WITH UNSPECIFIED OPIOID-INDUCED DISORDER (HCC): Status: ACTIVE | Noted: 2021-01-01

## 2021-07-27 PROBLEM — C77.0 METASTASIS TO SUPRACLAVICULAR LYMPH NODE (HCC): Status: ACTIVE | Noted: 2021-01-01

## 2021-07-27 NOTE — PROGRESS NOTES
Luis Patel  7/27/2021  3:01 PM      Vitals:    07/27/21 1500   BP: 120/76   Pulse: 92   Resp: 16   Temp: 98.2 °F (36.8 °C)    : Patient Currently in Pain: Yes             Wt Readings from Last 1 Encounters:   07/27/21 231 lb 3.2 oz (104.9 kg)                Current Outpatient Medications:     levothyroxine (SYNTHROID) 125 MCG tablet, Take 1 tablet by mouth daily, Disp: 30 tablet, Rfl: 5    clonazePAM (KLONOPIN) 1 MG tablet, Take 1 tablet by mouth 2 times daily as needed for Anxiety for up to 30 days. , Disp: 60 tablet, Rfl: 0    triamcinolone (KENALOG) 0.025 % cream, Apply topically 2 times daily. , Disp: 1 Tube, Rfl: 1    FLUoxetine (PROZAC) 40 MG capsule, take 1 capsule by mouth once daily, Disp: 90 capsule, Rfl: 3    lidocaine-prilocaine (EMLA) 2.5-2.5 % cream, Apply topically as needed 1 hour prior to lab draws and treatment. , Disp: 30 g, Rfl: 2    Magic Mouthwash (MIRACLE MOUTHWASH), Swish and spit 5 mLs 4 times daily as needed for Irritation, Disp: , Rfl:     atorvastatin (LIPITOR) 80 MG tablet, Take 1 tablet by mouth every evening, Disp: 90 tablet, Rfl: 3    omeprazole (PRILOSEC) 20 MG delayed release capsule, Take 1 capsule by mouth Daily, Disp: 180 capsule, Rfl: 3    lisinopril (PRINIVIL;ZESTRIL) 20 MG tablet, Take 1 tablet by mouth daily, Disp: 90 tablet, Rfl: 3    ciprofloxacin-dexamethasone (CIPRODEX) 0.3-0.1 % otic suspension, Place 4 drops into the left ear 2 times daily, Disp: 1 Bottle, Rfl: 1    ipratropium-albuterol (DUONEB) 0.5-2.5 (3) MG/3ML SOLN nebulizer solution, Inhale 3 mLs into the lungs every 6 hours as needed for Shortness of Breath, Disp: 360 mL, Rfl: 2    amitriptyline (ELAVIL) 25 MG tablet, Take 1 tablet by mouth nightly, Disp: 90 tablet, Rfl: 3    ondansetron (ZOFRAN ODT) 8 MG TBDP disintegrating tablet, Take 1 tablet by mouth every 8 hours as needed for Nausea or Vomiting, Disp: 30 tablet, Rfl: 3    Immunizations:    Influenza status:    [x]   Current   []   Patient declined    Pneumococcal status:  [x]   Current  []   Patient declined  Covid status:   [x]  Dose #1:                     []  Dose #2:               []   Patient declined    Smoking Status:    [x] Smoker - PPD:   [] Nonsmoker - Quit Date:               [] Never a smoker      Hormone:  Lupron []   Last dose given:           Next dose due:   Eligard []   Last dose given:           Next dose due:   Aromatase Inhibitors []   Medication name:   N/A:  [x]                 FALLS RISK SCREEN  Instructions:  Assess the patient and enter the appropriate indicators that are present for fall risk identification. Total the numbers entered and assign a fall risk score from Table 2.  Reassess patient at a minimum every 12 weeks or with status change. Assessment   Date  7/27/2021     1. Mental Ability: confusion/cognitively impaired 0     2. Elimination Issues: incontinence, frequency 0       3. Ambulatory: use of assistive devices (walker, cane, off-loading devices),        attached to equipment (IV pole, oxygen) 0     4. Sensory Limitations: dizziness, vertigo, impaired vision 0     5. Age less than 65        0     6. Age 72 or greater 0     7. Medication: diuretics, strong analgesics, hypnotics, sedatives,        antihypertensive agents 3   8. Falls:  recent history of falls within the last 3 months (not to include slipping or        tripping) 0   TOTAL 3    If score of 4 or greater was education given? No           TABLE 2   Risk Score Risk Level Plan of Care   0-3 Little or  No Risk 1. Provide assistance as indicated for ambulation activities  2. Reorient confused/cognitively impaired patient  3. Chair/bed in low position, stretcher/bed with siderails up except when performing patient care activities  5. Educate patient/family/caregiver on falls prevention  6.  Reassess in 12 weeks or with any noted change in patient condition which places them at a risk for a fall   4-6 Moderate Risk 1.   Provide assistance as indicated for ambulation activities  2. Reorient confused/cognitively impaired patient  3. Chair/bed in low position, stretcher/bed with siderails up except when performing patient care activities  4. Educate patient/family/caregiver on falls prevention     7 or   Higher High Risk 1. Place patient in easily observable treatment room  2. Patient attended at all times by family member or staff  3. Provide assistance as indicated for ambulation activities  4. Reorient confused/cognitively impaired patient  5. Chair/bed in low position, stretcher/bed with siderails up except when performing patient care activities  6. Educate patient/family/caregiver on falls prevention         PLAN: Patient is seen today in follow up to discuss palliative radiation to his left femur. Dr Garret Regan notified and examined pt. Pt signed consent and SIM completed.          Krista Pulido RN

## 2021-07-27 NOTE — PROGRESS NOTES
otherwise has no complaints. He has minimal shortness of breath, no cough or hemoptysis. No headache or seizures or loss of weight. Performance status is ECOG 1, we discussed adjuvant chemotherapy with Cisplatin and Alimta. After completion of chemotherapy, we started surveillance. He required bronchoscopy to clear thick mucous. He presented 09/2020 with significant weight loss and he was following with pulmonary service and CT scan showed pulmonary lesion. PET scan was done and unfortunately showed lung lesions and rib lesion, suggestive of recurrent disease. Brain MRI was done and negative. The patient was started on chemoradiation with weekly Taxol and carboplatin. With week 1 treatment, he had grade 1 infusion reaction that was managed with premedication and dose adjustment. However with second week, the infusion was much stronger and we had to hold Taxol altogether. We plan to replace with Abraxane. After completion of chemoradiation, we plan to offer immunotherapy with Imfinzi to start in May/2021. Mosetta Chew was started 05/2021. Prior to starting Mosetta Chew lab work showed baseline hypothyroidism and was started on Synthroid. In July/2021, he has further progression of disease with bone metastases, will try to manage him with clinical trial but he did not qualify, we decided to  start him on first-line treatment with Alimta carbo and bevacizumab  Meanwhile he presented to emergency room with severe pain in his left femur, x-ray showed lytic lesion in the femoral diaphysis. Plan palliative radiation  INTERIM HISTORY:   The patient presents with a complaint of severe pain in his chest as well as denosumab. We talked about options and we will plan to perform pulmonary radiation. We will continue pain medication for now. He understand his progressive disease and clinical trial did not come out because he did not qualify. The patient is demanding therapy as soon as possible.   Performance status has been deteriorating. He unfortunately continues to smoke    PAST MEDICAL HISTORY: has a past medical history of Anxiety, BiPAP (biphasic positive airway pressure) dependence, Cancer ([facility] [address] Chronic back pain, Clinical trial exam, COPD (chronic obstructive pulmonary disease) ([facility] [address] Diabetes ([facility] [address] Hyperlipidemia, Hypertension, Hypothyroidism, Lung cancer ([facility] [address] Neuropathy, Prolonged emergence from general anesthesia, Sleep apnea, Slow to wake up after anesthesia, SOB (shortness of breath), and Urine frequency. PAST SURGICAL HISTORY: has a past surgical history that includes laminectomy (12/2012); Testicle removal (Left, 1982); Lung removal, partial (Right, 2016); other surgical history (Right, 06/13/2017); back surgery (01/2013); US BIOPSY LYMPH NODE (12/21/2020); and IR PORT PLACEMENT > 5 YEARS (2/24/2021). CURRENT MEDICATIONS:  has a current medication list which includes the following prescription(s): levothyroxine, triamcinolone, fluoxetine, lidocaine-prilocaine, magic mouthwash, atorvastatin, omeprazole, lisinopril, ciprofloxacin-dexamethasone, ipratropium-albuterol, amitriptyline, ondansetron, and clonazepam.    ALLERGIES:  is allergic to paclitaxel and emend [fosaprepitant dimeglumine]. FAMILY HISTORY: Negative for any hematological or oncological conditions. SOCIAL HISTORY:  reports that he has been smoking cigarettes. He has a 10.00 pack-year smoking history. He has never used smokeless tobacco. He reports that he does not drink alcohol and does not use drugs. REVIEW OF SYSTEMS:   General: No fever or night sweats. Weight stable; +fatigue, bone pain in the left femur as discussed  ENT: No double or blurred vision, no tinnitus or hearing problem, or sore throat  Respiratory: Chronic shortness of breath with some pain and cough - some clear sputum - no hemoptysis, chest discomfort in the surgical area, chest congestion   Cardiovascular: Denies central chest pain, PND or orthopnea.  No L E swelling or palpitations. Gastrointestinal: No vomiting, abdominal pain, diarrhea or constipation, nausea   Genitourinary: Denies dysuria, hematuria, frequency, urgency or incontinence. Neurological: Denies headaches, decreased LOC, no sensory or motor focal deficits. Grade 1 neuropathy in hands and feet, predates chemo, diabetes related. Musculoskeletal: Diffuse aches and pains, no joint swelling. Severe pain in the left femoral area  Skin: There are no rashes or bleeding. +psoriasis   Psychiatric:  History of bipolar disorder. Anxiety   Endocrine: No thyroid disease. Hematologic: No bleeding, no adenopathy. PHYSICAL EXAM: Shows a well appearing 61y.o.-year-old male who is not in pain or distress. Vital Signs: Blood pressure 120/76, pulse 92, temperature 98.2 °F (36.8 °C), temperature source Oral, weight 231 lb 3.2 oz (104.9 kg), SpO2 96 %. HEENT: Slight redness in the throat. Normocephalic and atraumatic. Pupils are equal, round, reactive to light and accommodation. Extraocular muscles are intact. Neck: grade 1 radiation toxicity Showed no JVD, no carotid bruit. Lungs: Port in place upper right chest - swelling and redness, no evidence of infection. Decreased air entry especially in the right lower lobe area - improved. Scattered rhonchi. Postoperative changes in the chest wall are healed well without any evidence of infection. Heart: Regular without any murmur. Abdomen: Soft, nontender. No hepatosplenomegaly. Extremities: Lower extremities show no edema, clubbing, or cyanosis. Neuro exam: intact cranial nerves bilaterally no motor or sensory deficit, gait is normal. Lymphatic: left level 4 lymph node is palpable and very tender - size reduction in the interim    REVIEW OF RADIOLOGICAL RESULTS:  05/25/2021 CT SOFT TISSUE NECK IMPRESSION:      1.  Enlarged left level 4 lymph node with central decreased attenuation.  No   distinct fat plane is seen between this lymph node and the posterior aspect   of the left internal jugular vein as well as the anterior aspect of the left   scalene musculature.  This is likely slightly increased in size from the   prior PET-CT. 2. No additional cervical lymphadenopathy by size criteria. CT CHEST W CONTRAST IMPRESSION:   1. Interval progression of innumerable pulmonary nodules scattered throughout   both lungs, largest measuring 13 mm within the left lower lobe when compared   to the prior PET-CT study.  Findings suggest progression of disease. 2. New ill-defined consolidation involving the right upper lobe.  Developing   infectious process cannot be excluded. 3. Right adrenal gland nodule, reported stable since 2016 suggesting a benign   process. REVIEW OF LABORATORY RESULTS:   Lab Results   Component Value Date    WBC 10.5 07/22/2021    HGB 14.4 07/22/2021    HCT 43.5 07/22/2021    .3 (H) 07/22/2021     07/22/2021       Chemistry        Component Value Date/Time     07/22/2021 1338    K 3.9 07/22/2021 1338     07/22/2021 1338    CO2 23 07/22/2021 1338    BUN 20 07/22/2021 1338    CREATININE 1.04 07/22/2021 1338        Component Value Date/Time    CALCIUM 8.9 07/22/2021 1338    ALKPHOS 65 06/03/2021 1152    AST 22 06/03/2021 1152    ALT 23 06/03/2021 1152    BILITOT 0.49 06/03/2021 1152              IMPRESSION:   1. Adenocarcinoma of the right lower lobe of lung. Path stage is T2N1M0. Stage 2 disease. 2. S/P lobectomy. 3. received adjuvant chemotherapy, with cisplatin and Alimta. Plan to finish 4 cycles of adjuvant chemotherapy and then observe. 4. COPD  5. Smoking addiction, unable to quit   6. Starting chemoradiation for recurrent disease in the mediastinum and supraclavicular area. 7. Infusion reaction to Taxol, escalated from grade 1 to grade 2 by the second week. We have to hold the Taxol and will plan to switch to Abraxane. 8. Dysphagia secondary to chemoradiation as the esophagus in the radiation field.    Improved after completion of chemoradiation  9. Hypertension and dizziness, we asked him to stop taking the lisinopril. I think it will improve as his oral intake improves  10. Evidence of progressive disease in the chest as well as bone metastases, July/2021  11. Plan palliative radiation to the bone mets July/2021  12. Did not qualify for clinical trials, plan to treat with carbo Alimta and Avastin  13. We will add Xgeva after radiation to the bone mets    PLAN:  1.

## 2021-07-27 NOTE — PROGRESS NOTES
Radiation Oncology Office Note    Diagnosis/Treatment History:   Mr. Sonia Abdi is a 61 y.o. male with a history of a stage II ([id number]) NSCLC - adenocarcinoma - of the right lower lobe of lung S/P right lower lobectomy back on 2016. He Received adjuvant chemotherapy, with Cisplatin and Alimta on 16, completed in 2017. He then presenented with recurrent disease in the L SCF LN  (level N3) and completed concurrent chemoradiation - 60 Gy in 30 fractions - on 3/23/2021. He now presents with a painful left femur lytic lesion. Interval History:   Mr. Sonia Abdi returns after discussion with medical oncology regarding a painful left femur lesion. Overall, he reports that he was in the emergency room on 2021 with complaints of chest pains. He also had some left leg pain. He had imaging of the left femur which revealed a mid femoral lytic lesion. He states that the pain in the left femur is up to an 8 out of 10 and he uses Percocet with relief of the pain. He is ambulating okay. Interval Imaging  2021 x-ray left femur    Impression   1. A lytic lesion involving the cortex of the mid left femoral diaphysis   could be due to metastatic disease given patient history.  Of note, prior   PET/CTs do not definitely extend this far distally.  Consider further   evaluation with MRI or CT on a nonemergent basis. 2. No acute bony findings in the left thigh. 3. Degenerative changes as above. Physical Examination:   /76   Pulse 92   Temp 98.2 °F (36.8 °C)   Resp 16   ECO Symptomatic but completely ambulatory  General: Elderly gentleman answer questions appropriately, pleasant. Extremities: Point tenderness over left medial mid femur. Gait steady. Assessment/Plan: This is a patient with recurrent non-small cell lung cancer with now what appears to be metastatic disease to the left mid femur. There is no sign of a pathologic fracture.  I therefore offered the patient palliative radiation treatment to the left femur to help with his pain. The radiation logistics and potential side effects were discussed with the patient. Informed consent was obtained. CT simulation was subsequently performed. I plan to treat the left femur to a total dose of 20 Gy in 5 fractions with an AP PA plan starting later this week or early next week. He will then go on to receive systemic therapy at the direction of medical oncology. Total time spent on this case on the day of encounter is more than 15 minutes. This time includes combination of one or more of the following - review of necessary tests, review of pertinent medical records from the EMR, performing medically appropriate examination and evaluation, counseling and educating the patient/family/caregiver, ordering necessary medical tests, procedures etc., documenting the clinical information in the electronic medical record, care coordination, referring and communicating with other health care providers and interpretation of results independently.

## 2021-07-27 NOTE — TELEPHONE ENCOUNTER
AVS from 7/27/21     See orders for B12 today  Refer to rad onc, please need XRT to the left leg ASAP  See orders for chemo , plan treatment next week if approved, send for urgent approval   rv next week w ith chemo and labs     b12 added per avs    Rad onc appt today    AVS given to  to follow precert    Precert emailed to rush auth    RV to be scheduled with C1    PT was given AVS and an appt schedule    Electronically signed by Savanah Huffman on 7/27/2021 at 3:12 PM

## 2021-07-27 NOTE — PATIENT INSTRUCTIONS
See orders for B12 today  Refer to rad onc, please need XRT to the left leg ASAP  See orders for chemo , plan treatment next week if approved, send for urgent approval   rv next week w [facility] chemo and labs Current every day smoker

## 2021-07-29 NOTE — TELEPHONE ENCOUNTER
Name: Yuan Altamont  : 1960  MRN: [id number]    Oncology Navigation Follow-Up Note  Navigator reaching out to pt. And offering assistance. Reviewing auth and chemotherapy may be approved? Writer left message for Yara. To please follow up . MD notes wanting pt. Scheduled next week if possible.   Electronically signed by Jaclynn Dubin, RN on 2021 at 11:21 AM Not applicable

## 2021-08-03 NOTE — TELEPHONE ENCOUNTER
Chemotherapy orders received:    Ht=76 inches  Kd=468 lbs  BSA=2.37  Chemotherapy doses verified:  Zirabev 15 kg/m2 = 1560 mg  Alimta 500 mg/m2 = 1185 mg  Carboplatin AUC 6 to be determined day of treatment   Dom Vail RN

## 2021-08-04 NOTE — TELEPHONE ENCOUNTER
Chemo orders received, ht 76\", wt 231 lbs, and bsa 2.37 verified.    Dose of chemotherapy verified;  Zirabev 15mg/kg= 1560mg  Alimta 500mg/m2= 1185mg  Carbo AUC 6 to be calculated at tx

## 2021-08-04 NOTE — PROGRESS NOTES
[facility]            Radiation Oncology          [address] [facility] [address]          [place]        [place] Salguero: [phone]        F: [phone]       [email or web address]             RADIATION ONCOLOGY WEEKLY PROGRESS NOTE  Patient ID:   Cintia Rice  : 1960   MRN: [id number]    DIAGNOSIS:  Cancer Staging  Malignant neoplasm of lower lobe of right lung ([address]  Staging form: Lung, AJCC 7th Edition  - Pathologic: Stage IIIB (T2a, N3, cM0) - Signed by Rosi Merino MD on 2021      TREATMENT DETAILS:  Treatment Site: L Femur  Actual Dose: 1200cGy  Total Planned Dose: 2000cGy  Treatment Technique: 2D-CRT  Fraction Technique: Daily  Therapy imaging monitoring: KV match daily with MV ports  Concurrent Chemotherapy: NA    SUBJECTIVE:   Patient seen for their weekly on treatment evaluation today. Patient does note improvement in his leg pain and denies having a limp today. He notes his throat pain has improved as well since today. He denies any other symptoms at this time. OBJECTIVE:   CHAPERONE: Not Required    ECO Symptomatic but completely ambulatory    VITAL SIGNS: /78   Pulse 78   Temp 98.2 °F (36.8 °C)   Resp 18   Wt 230 lb 8 oz (104.6 kg)   SpO2 97%   BMI 28.06 kg/m²   Wt Readings from Last 5 Encounters:   21 230 lb 8 oz (104.6 kg)   21 231 lb 3.2 oz (104.9 kg)   21 229 lb 9.6 oz (104.1 kg)   21 231 lb 1.6 oz (104.8 kg)   21 228 lb 12.8 oz (103.8 kg)     GENERAL:  General appearance is that of a well-nourished, well-developed in no apparent distress. EXTREMITIES:  No clubbing, cyanosis, or edema. SKIN: No erythema or desquamation.     LABS:  WBC   Date Value Ref Range Status   2021 10.5 3.5 - 11.3 k/uL Final   2021 8.2 3.5 - 11.0 k/uL Final   2021 6.9 3.5 - 11.0 k/uL Final     Segs Absolute   Date Value Ref Range Status   2021 9.65 (H) 1.8 - 7.7 k/uL Final   2021 6.80 1.8 - 7.7 k/uL Final Take 1 tablet by mouth daily, Disp: 90 tablet, Rfl: 3    ciprofloxacin-dexamethasone (CIPRODEX) 0.3-0.1 % otic suspension, Place 4 drops into the left ear 2 times daily, Disp: 1 Bottle, Rfl: 1    ipratropium-albuterol (DUONEB) 0.5-2.5 (3) MG/3ML SOLN nebulizer solution, Inhale 3 mLs into the lungs every 6 hours as needed for Shortness of Breath, Disp: 360 mL, Rfl: 2    amitriptyline (ELAVIL) 25 MG tablet, Take 1 tablet by mouth nightly, Disp: 90 tablet, Rfl: 3    ondansetron (ZOFRAN ODT) 8 MG TBDP disintegrating tablet, Take 1 tablet by mouth every 8 hours as needed for Nausea or Vomiting, Disp: 30 tablet, Rfl: 3    Current Facility-Administered Medications:     cyanocobalamin injection 1,000 mcg, 1,000 mcg, Intramuscular, Once, Serina Vallejo MD      ASSESSMENT PLAN:   Treatment setup and plan reviewed. Port images/CBCT images reviewed. Appropriate laboratory work was reviewed. Treatment side effects and toxicities reviewed with the patient, and appropriate management was advised. Will continue radiation treatment as planned, and recommend patient contact us if they have any questions or concerns. Patient will follow up in 1 month after completing treatments. Electronically signed by Aleks Horta MD on 8/4/2021 at 5:47 PM      Drugs Prescribed:  New Prescriptions    No medications on file       Other Orders Placed:  No orders of the defined types were placed in this encounter.

## 2021-08-05 NOTE — TELEPHONE ENCOUNTER
AVS from 8/5/21     Please proceed with radiation today as per orders  Please proceed with chemotherapy today if labs are okay  We will start Xgeva with cycle #2  Return in 3 weeks with chemotherapy and Xgeva and labs, CBC and CMP.  I can see on treatment if needed     *AVS given to chemo  for Precert on xgeva once back will setup appt  *rv is sched for Dc@[facility] w/tx to follow w/draw cbc,cmp    PT was given AVS and an appt schedule

## 2021-08-05 NOTE — PATIENT INSTRUCTIONS
Please proceed with radiation today as per orders  Please proceed with chemotherapy today if labs are okay  We will start Xgeva with cycle #2  Return in 3 weeks with chemotherapy and Xgeva and labs, CBC and CMP.  I can see on treatment if needed

## 2021-08-05 NOTE — PROGRESS NOTES
DIAGNOSIS:   1. Adenocarcinoma of the right lower lobe of lung. Path stage is T2N1M0. Stage 2 disease. 2. COPD  3. HTN   4. Smoking addiction   5. Evidence of recurrent disease with metastases to supraclavicular lymph node as well as mediastinal lymph node May/2021  6. Evidence of bone metastases July/2021  CURRENT THERAPY:  S/P right lower lobectomy. 9/2016  Received adjuvant chemotherapy, with Cisplatin and Alimta on 11/17/16, completed in February/2017  Plan chemoradiation with Taxol and carboplatin  Infusion reaction to Taxol, plan to transition to Abraxane  After completion of chemoradiation, plan immunotherapy with Imfinzi, started 5/2021   Progressive disease with bone metastases July/2021, plan carboplatin Alimta and Avastin  Plan palliative radiation to painful bone mets in  the left femur  BRIEF CASE HISTORY:      Francesco Vital is a very pleasant 61 y.o. male who is referred to us for management recommendation of his recently resected lung cancer. He actually underwent a screening CT scan because of his smoking habits. CT scans showed right lower lobe spiculated mass measuring 2.9 x 2.5 cm abutting the medial pleura. There was another 4 mm pulmonary nodule that has actually been stable from previous imaging. No adenopathy was appreciated. More testing was done but ultimately the patient was taken to surgery. Prior to surgery, the clinical stage was stage I disease. The patient underwent right lower lobe lobectomy on 9/13/16 and pathology showed invasive moderately differentiated adenocarcinoma of the lung measuring 4 cm in greatest dimension, the tumor invaded the visceral pleura. All margins were negative. He had one positive intrapulmonary lymph node. Representative nodes from stations 4, 7, 9 were all negative. Final pathological staging is (T2 N1 M0) stage II disease  He is sent to us for a consultation, recovering from surgery.  He continues to have some pain in the surgical area but otherwise has no complaints. He has minimal shortness of breath, no cough or hemoptysis. No headache or seizures or loss of weight. Performance status is ECOG 1, we discussed adjuvant chemotherapy with Cisplatin and Alimta. After completion of chemotherapy, we started surveillance. He required bronchoscopy to clear thick mucous. He presented 09/2020 with significant weight loss and he was following with pulmonary service and CT scan showed pulmonary lesion. PET scan was done and unfortunately showed lung lesions and rib lesion, suggestive of recurrent disease. Brain MRI was done and negative. The patient was started on chemoradiation with weekly Taxol and carboplatin. With week 1 treatment, he had grade 1 infusion reaction that was managed with premedication and dose adjustment. However with second week, the infusion was much stronger and we had to hold Taxol altogether. We plan to replace with Abraxane. After completion of chemoradiation, we plan to offer immunotherapy with Imfinzi to start in May/2021. [place] was started 05/2021. Prior to starting [place] lab work showed baseline hypothyroidism and was started on Synthroid. In July/2021, he has further progression of disease with bone metastases, will try to manage him with clinical trial but he did not qualify, we decided to  start him on first-line treatment with Alimta carbo and bevacizumab  Meanwhile he presented to emergency room with severe pain in his left femur, x-ray showed lytic lesion in the femoral diaphysis. Plan palliative radiation  INTERIM HISTORY:   The patient presents today to commence with chemotherapy. He is undergoing radiation for the femoral metastases. The pain is already improving. He has 1 more fraction to finish. He is ready for chemotherapy. He received a B12 injection and is taking the multivitamin. He is complaining of worsening shortness of breath. He unfortunately continues to smoke.   He is using inhalers and nebulizers. PAST MEDICAL HISTORY: has a past medical history of Anxiety, BiPAP (biphasic positive airway pressure) dependence, Cancer ([facility] [address] Chronic back pain, Clinical trial exam, COPD (chronic obstructive pulmonary disease) ([facility] [address] Diabetes ([facility] [address] Hyperlipidemia, Hypertension, Hypothyroidism, Lung cancer ([facility] [address] Neuropathy, Prolonged emergence from general anesthesia, Sleep apnea, Slow to wake up after anesthesia, SOB (shortness of breath), and Urine frequency. PAST SURGICAL HISTORY: has a past surgical history that includes laminectomy (12/2012); Testicle removal (Left, 1982); Lung removal, partial (Right, 2016); other surgical history (Right, 06/13/2017); back surgery (01/2013); US BIOPSY LYMPH NODE (12/21/2020); and IR PORT PLACEMENT > 5 YEARS (2/24/2021). CURRENT MEDICATIONS:  has a current medication list which includes the following prescription(s): omeprazole, oxycodone-acetaminophen, levothyroxine, triamcinolone, fluoxetine, lidocaine-prilocaine, magic mouthwash, atorvastatin, lisinopril, ciprofloxacin-dexamethasone, ipratropium-albuterol, amitriptyline, ondansetron, and clonazepam, and the following Facility-Administered Medications: cyanocobalamin. ALLERGIES:  is allergic to paclitaxel and emend [fosaprepitant dimeglumine]. FAMILY HISTORY: Negative for any hematological or oncological conditions. SOCIAL HISTORY:  reports that he has been smoking cigarettes. He has a 10.00 pack-year smoking history. He has never used smokeless tobacco. He reports that he does not drink alcohol and does not use drugs. REVIEW OF SYSTEMS:   General: No fever or night sweats.  Weight stable; +fatigue, bone pain in the left femur is improving as discussed above  ENT: No double or blurred vision, no tinnitus or hearing problem, or sore throat  Respiratory: Chronic shortness of breath with some pain and cough - some clear sputum - no hemoptysis, chest discomfort in the surgical area, chest congestion Cardiovascular: Denies central chest pain, PND or orthopnea. No L E swelling or palpitations. Gastrointestinal: No vomiting, abdominal pain, diarrhea or constipation, nausea   Genitourinary: Denies dysuria, hematuria, frequency, urgency or incontinence. Neurological: Denies headaches, decreased LOC, no sensory or motor focal deficits. Grade 1 neuropathy in hands and feet, predates chemo, diabetes related. Musculoskeletal: Diffuse aches and pains, no joint swelling. Severe pain in the left femoral area  Skin: There are no rashes or bleeding. +psoriasis   Psychiatric:  History of bipolar disorder. Anxiety   Endocrine: No thyroid disease. Hematologic: No bleeding, no adenopathy. PHYSICAL EXAM: Shows a well appearing 61y.o.-year-old male who is not in pain or distress. Vital Signs: Weight is stable at 230. Blood pressure 134/88, pulse 80, temperature 98 °F (36.7 °C), temperature source Oral, weight 230 lb (104.3 kg). HEENT: Slight redness in the throat. Normocephalic and atraumatic. Pupils are equal, round, reactive to light and accommodation. Extraocular muscles are intact. Neck: grade 1 radiation toxicity Showed no JVD, no carotid bruit. Lungs: Port in place upper right chest - swelling and redness, no evidence of infection. Decreased air entry especially in the right lower lobe area - improved. Scattered rhonchi. Postoperative changes in the chest wall are healed well without any evidence of infection. Heart: Regular without any murmur. Abdomen: Soft, nontender. No hepatosplenomegaly. Extremities: Lower extremities show no edema, clubbing, or cyanosis. Neuro exam: intact cranial nerves bilaterally no motor or sensory deficit, gait is normal. Lymphatic: Cervical adenopathy improved significantly. REVIEW OF RADIOLOGICAL RESULTS:  05/25/2021 CT SOFT TISSUE NECK IMPRESSION:      1.  Enlarged left level 4 lymph node with central decreased attenuation.  No   distinct fat plane is seen between this lymph node and the posterior aspect   of the left internal jugular vein as well as the anterior aspect of the left   scalene musculature.  This is likely slightly increased in size from the   prior PET-CT. 2. No additional cervical lymphadenopathy by size criteria. CT CHEST W CONTRAST IMPRESSION:   1. Interval progression of innumerable pulmonary nodules scattered throughout   both lungs, largest measuring 13 mm within the left lower lobe when compared   to the prior PET-CT study.  Findings suggest progression of disease. 2. New ill-defined consolidation involving the right upper lobe.  Developing   infectious process cannot be excluded. 3. Right adrenal gland nodule, reported stable since 2016 suggesting a benign   process. REVIEW OF LABORATORY RESULTS:   Lab Results   Component Value Date    WBC 10.0 08/05/2021    HGB 13.6 08/05/2021    HCT 40.1 (L) 08/05/2021    .9 (H) 08/05/2021     08/05/2021       Chemistry        Component Value Date/Time     07/22/2021 1338    K 3.9 07/22/2021 1338     07/22/2021 1338    CO2 23 07/22/2021 1338    BUN 20 07/22/2021 1338    CREATININE 1.04 07/22/2021 1338        Component Value Date/Time    CALCIUM 8.9 07/22/2021 1338    ALKPHOS 65 06/03/2021 1152    AST 22 06/03/2021 1152    ALT 23 06/03/2021 1152    BILITOT 0.49 06/03/2021 1152              IMPRESSION:   1. Adenocarcinoma of the right lower lobe of lung. Path stage is T2N1M0. Stage 2 disease. 2. S/P lobectomy. 3. received adjuvant chemotherapy, with cisplatin and Alimta. Plan to finish 4 cycles of adjuvant chemotherapy and then observe. 4. COPD  5. Smoking addiction, unable to quit   6. Starting chemoradiation for recurrent disease in the mediastinum and supraclavicular area. 7. Infusion reaction to Taxol, escalated from grade 1 to grade 2 by the second week. We have to hold the Taxol and will plan to switch to Abraxane.   8. Dysphagia secondary to chemoradiation as the esophagus in the radiation field. Improved after completion of chemoradiation  9. Hypertension and dizziness, we asked him to stop taking the lisinopril. I think it will improve as his oral intake improves  10. Evidence of progressive disease in the chest as well as bone metastases, July/2021  11. Plan palliative radiation to the bone mets July/2021  12. Did not qualify for clinical trials, plan to treat with carbo Alimta and Avastin  13. We will add Xgeva after radiation to the bone mets    PLAN:  1. We will start with chemotherapy with Alimta carboplatin and Avastin  2. I debated waiting until the end of radiation but since he has only 1 fraction to go after today, I think it safe enough especially that his radiation is just to the femoral area and does not include the pelvis or the spine  3. We will watch closely for worsening hematologic toxicity  4. We will hold the Xgeva until he finishes radiation so likely I will started with cycle #2  5. The patient verbalized understanding and agreement to start. He is anxious as his breathing has been getting worse. We will start systemic therapy as soon as possible.   6. The patient was again counseled to quit smoking

## 2021-08-12 NOTE — TELEPHONE ENCOUNTER
Name: Sona Jaramillo  : 1960  MRN: [id number]    Oncology Navigation Follow-Up Note  Navigator reaching out to pt. Offering assistance if needed. Pt. Started new treatment regimen last week, but pt. Tolerating . Pt. Denies needs at this time.    Electronically signed by Tony Ragsdale RN on 2021 at 9:04 AM

## 2021-08-26 NOTE — PROGRESS NOTES
DIAGNOSIS:   1. Adenocarcinoma of the right lower lobe of lung. Path stage is T2N1M0. Stage 2 disease. 2. COPD  3. HTN   4. Smoking addiction   5. Evidence of recurrent disease with metastases to supraclavicular lymph node as well as mediastinal lymph node May/2021  6. Evidence of bone metastases July/2021  CURRENT THERAPY:  S/P right lower lobectomy. 9/2016  Received adjuvant chemotherapy, with Cisplatin and Alimta on 11/17/16, completed in February/2017  Plan chemoradiation with Taxol and carboplatin  Infusion reaction to Taxol, plan to transition to Abraxane  After completion of chemoradiation, plan immunotherapy with Imfinzi, started 5/2021   Progressive disease with bone metastases July/2021, plan carboplatin Alimta and Avastin  Plan palliative radiation to painful bone mets in  the left femur  BRIEF CASE HISTORY:      Heather Phillips is a very pleasant 61 y.o. male who is referred to us for management recommendation of his recently resected lung cancer. He actually underwent a screening CT scan because of his smoking habits. CT scans showed right lower lobe spiculated mass measuring 2.9 x 2.5 cm abutting the medial pleura. There was another 4 mm pulmonary nodule that has actually been stable from previous imaging. No adenopathy was appreciated. More testing was done but ultimately the patient was taken to surgery. Prior to surgery, the clinical stage was stage I disease. The patient underwent right lower lobe lobectomy on 9/13/16 and pathology showed invasive moderately differentiated adenocarcinoma of the lung measuring 4 cm in greatest dimension, the tumor invaded the visceral pleura. All margins were negative. He had one positive intrapulmonary lymph node. Representative nodes from stations 4, 7, 9 were all negative. Final pathological staging is (T2 N1 M0) stage II disease  He is sent to us for a consultation, recovering from surgery.  He continues to have some pain in the surgical area but otherwise has no complaints. He has minimal shortness of breath, no cough or hemoptysis. No headache or seizures or loss of weight. Performance status is ECOG 1, we discussed adjuvant chemotherapy with Cisplatin and Alimta. After completion of chemotherapy, we started surveillance. He required bronchoscopy to clear thick mucous. He presented 09/2020 with significant weight loss and he was following with pulmonary service and CT scan showed pulmonary lesion. PET scan was done and unfortunately showed lung lesions and rib lesion, suggestive of recurrent disease. Brain MRI was done and negative. The patient was started on chemoradiation with weekly Taxol and carboplatin. With week 1 treatment, he had grade 1 infusion reaction that was managed with premedication and dose adjustment. However with second week, the infusion was much stronger and we had to hold Taxol altogether. We plan to replace with Abraxane. After completion of chemoradiation, we plan to offer immunotherapy with Imfinzi to start in May/2021. [place] was started 05/2021. Prior to starting [place] lab work showed baseline hypothyroidism and was started on Synthroid. In July/2021, he has further progression of disease with bone metastases, will try to manage him with clinical trial but he did not qualify, we decided to  start him on first-line treatment with Alimta carbo and bevacizumab  Meanwhile he presented to emergency room with severe pain in his left femur, x-ray showed lytic lesion in the femoral diaphysis. Plan palliative radiation  INTERIM HISTORY:   The patient presents today to commence with chemotherapy. He finished radiation for the femoral metastases. He was started on chemotherapy and he is tolerating that well. His only complaints are worsening appetite, he lost up a couple pounds. He continues to have chronic shortness of breath and chest discomfort. Unfortunately continues to smoke.   Overall condition is unchanged    PAST MEDICAL HISTORY: has a past medical history of Anxiety, BiPAP (biphasic positive airway pressure) dependence, Cancer ([facility] [address] Chronic back pain, Clinical trial exam, COPD (chronic obstructive pulmonary disease) ([facility] [address] Diabetes ([facility] [address] Hyperlipidemia, Hypertension, Hypothyroidism, Lung cancer ([facility] [address] Neuropathy, Prolonged emergence from general anesthesia, Sleep apnea, Slow to wake up after anesthesia, SOB (shortness of breath), and Urine frequency. PAST SURGICAL HISTORY: has a past surgical history that includes laminectomy (12/2012); Testicle removal (Left, 1982); Lung removal, partial (Right, 2016); other surgical history (Right, 06/13/2017); back surgery (01/2013); US BIOPSY LYMPH NODE (12/21/2020); and IR PORT PLACEMENT > 5 YEARS (2/24/2021). CURRENT MEDICATIONS:  has a current medication list which includes the following prescription(s): prednisone, omeprazole, oxycodone-acetaminophen, levothyroxine, triamcinolone, fluoxetine, lidocaine-prilocaine, magic mouthwash, atorvastatin, lisinopril, ciprofloxacin-dexamethasone, ipratropium-albuterol, amitriptyline, ondansetron, and clonazepam, and the following Facility-Administered Medications: cyanocobalamin. ALLERGIES:  is allergic to paclitaxel and emend [fosaprepitant dimeglumine]. FAMILY HISTORY: Negative for any hematological or oncological conditions. SOCIAL HISTORY:  reports that he has been smoking cigarettes. He has a 10.00 pack-year smoking history. He has never used smokeless tobacco. He reports that he does not drink alcohol and does not use drugs. REVIEW OF SYSTEMS:   General: No fever or night sweats.  Weight loss; +fatigue, bone pain in the left femur is improving as discussed above  ENT: No double or blurred vision, no tinnitus or hearing problem, or sore throat  Respiratory: Chronic shortness of breath with some pain and cough - some clear sputum - no hemoptysis, chest discomfort in the surgical area, chest congestion is seen between this lymph node and the posterior aspect   of the left internal jugular vein as well as the anterior aspect of the left   scalene musculature.  This is likely slightly increased in size from the   prior PET-CT. 2. No additional cervical lymphadenopathy by size criteria. CT CHEST W CONTRAST IMPRESSION:   1. Interval progression of innumerable pulmonary nodules scattered throughout   both lungs, largest measuring 13 mm within the left lower lobe when compared   to the prior PET-CT study.  Findings suggest progression of disease. 2. New ill-defined consolidation involving the right upper lobe.  Developing   infectious process cannot be excluded. 3. Right adrenal gland nodule, reported stable since 2016 suggesting a benign   process. REVIEW OF LABORATORY RESULTS:   Lab Results   Component Value Date    WBC 7.0 08/26/2021    HGB 14.3 08/26/2021    HCT 41.6 08/26/2021    .0 (H) 08/26/2021     08/26/2021       Chemistry        Component Value Date/Time     08/26/2021 0916    K 4.5 08/26/2021 0916     08/26/2021 0916    CO2 25 08/26/2021 0916    BUN 23 08/26/2021 0916    CREATININE 1.00 08/26/2021 0916        Component Value Date/Time    CALCIUM 9.2 08/26/2021 0916    ALKPHOS 62 08/26/2021 0916    AST 16 08/26/2021 0916    ALT 15 08/26/2021 0916    BILITOT 0.21 (L) 08/26/2021 0916              IMPRESSION:   1. Adenocarcinoma of the right lower lobe of lung. Path stage is T2N1M0. Stage 2 disease. 2. S/P lobectomy. 3. received adjuvant chemotherapy, with cisplatin and Alimta. Plan to finish 4 cycles of adjuvant chemotherapy and then observe. 4. COPD  5. Smoking addiction, unable to quit   6. Starting chemoradiation for recurrent disease in the mediastinum and supraclavicular area. 7. Infusion reaction to Taxol, escalated from grade 1 to grade 2 by the second week. We have to hold the Taxol and will plan to switch to Abraxane.   8. Dysphagia secondary to chemoradiation as the esophagus in the radiation field. Improved after completion of chemoradiation  9. Hypertension and dizziness, we asked him to stop taking the lisinopril. I think it will improve as his oral intake improves  10. Evidence of progressive disease in the chest as well as bone metastases, July/2021  11. Plan palliative radiation to the bone mets July/2021  12. Did not qualify for clinical trials, plan to treat with carbo Alimta and Avastin  13. We will add Xgeva after radiation to the bone mets    PLAN:  1. We will proceed with cycle 2 of chemotherapy per orders  2. We will give him a cough syrup. I will refill his Percocet  3. We will give him Megace for appetite  4. Plan to proceed with 3 cycles to be followed by imaging study. Am hoping he is responding.

## 2021-08-26 NOTE — TELEPHONE ENCOUNTER
AVS from 8/26/2021     Continue chemo per orders, rv in 3 weeks with chemo and labs.      Tx today as scheduled    RV scheduled 9/14/21 @ 9:15am    PT was given AVS and an appt schedule    Electronically signed by Laya Julio on 8/26/2021 at 1:12 PM

## 2021-08-26 NOTE — PROGRESS NOTES
Pt here for C2D1 Raj Preciado, Alimta, Carbo and Xgeva injection. Pt seen by Dr Ashley Pierce prior to treatment, refer to his note. Labs drawn from port and results reviewed. Pt was treated without incident and d/c'd in stable condition. Pt will return on 9-14-21 for MD follow up and C3D1.

## 2021-09-02 NOTE — PROGRESS NOTES
Ashleigh Howell  9/2/2021  10:13 AM      Vitals:    09/02/21 1000   BP: (!) 142/89   Pulse: 84   Resp: 20   Temp: 97.8 °F (36.6 °C)   SpO2: 98%    :  Patient Currently in Pain: Yes     Pain Level: 8       Wt Readings from Last 1 Encounters:   09/02/21 225 lb 12.8 oz (102.4 kg)                Current Outpatient Medications:     oxyCODONE-acetaminophen (PERCOCET)  MG per tablet, Take 1 tablet by mouth every 6 hours as needed for Pain for up to 30 days. , Disp: 120 tablet, Rfl: 0    megestrol (MEGACE) 40 MG/ML suspension, Take 10 mLs by mouth daily, Disp: 300 mL, Rfl: 3    promethazine-codeine (PHENERGAN WITH CODEINE) 6.25-10 MG/5ML syrup, Take 5 mLs by mouth 4 times daily as needed for Cough for up to 30 days. , Disp: 240 mL, Rfl: 2    prochlorperazine (COMPAZINE) 10 MG tablet, Take 1 tablet by mouth every 6 hours as needed (nausea/vomiting), Disp: 120 tablet, Rfl: 3    predniSONE (DELTASONE) 10 MG tablet, take 1 tablet by mouth twice a day, Disp: 14 tablet, Rfl: 0    omeprazole (PRILOSEC) 20 MG delayed release capsule, Take 1 capsule by mouth Daily, Disp: 180 capsule, Rfl: 3    levothyroxine (SYNTHROID) 125 MCG tablet, Take 1 tablet by mouth daily, Disp: 30 tablet, Rfl: 5    clonazePAM (KLONOPIN) 1 MG tablet, Take 1 tablet by mouth 2 times daily as needed for Anxiety for up to 30 days. , Disp: 60 tablet, Rfl: 0    triamcinolone (KENALOG) 0.025 % cream, Apply topically 2 times daily. , Disp: 1 Tube, Rfl: 1    FLUoxetine (PROZAC) 40 MG capsule, take 1 capsule by mouth once daily, Disp: 90 capsule, Rfl: 3    lidocaine-prilocaine (EMLA) 2.5-2.5 % cream, Apply topically as needed 1 hour prior to lab draws and treatment. , Disp: 30 g, Rfl: 2    Magic Mouthwash (MIRACLE MOUTHWASH), Swish and spit 5 mLs 4 times daily as needed for Irritation, Disp: , Rfl:     atorvastatin (LIPITOR) 80 MG tablet, Take 1 tablet by mouth every evening, Disp: 90 tablet, Rfl: 3    lisinopril (PRINIVIL;ZESTRIL) 20 MG tablet, Take 1 tablet by mouth daily, Disp: 90 tablet, Rfl: 3    ciprofloxacin-dexamethasone (CIPRODEX) 0.3-0.1 % otic suspension, Place 4 drops into the left ear 2 times daily, Disp: 1 Bottle, Rfl: 1    ipratropium-albuterol (DUONEB) 0.5-2.5 (3) MG/3ML SOLN nebulizer solution, Inhale 3 mLs into the lungs every 6 hours as needed for Shortness of Breath, Disp: 360 mL, Rfl: 2    amitriptyline (ELAVIL) 25 MG tablet, Take 1 tablet by mouth nightly, Disp: 90 tablet, Rfl: 3    ondansetron (ZOFRAN ODT) 8 MG TBDP disintegrating tablet, Take 1 tablet by mouth every 8 hours as needed for Nausea or Vomiting, Disp: 30 tablet, Rfl: 3    Current Facility-Administered Medications:     cyanocobalamin injection 1,000 mcg, 1,000 mcg, IntraMUSCular, Once, Serina Vallejo MD           FALLS RISK SCREEN  Instructions:  Assess the patient and enter the appropriate indicators that are present for fall risk identification. Total the numbers entered and assign a fall risk score from Table 2.  Reassess patient at a minimum every 12 weeks or with status change. Assessment   Date  9/2/2021     1. Mental Ability: confusion/cognitively impaired 0     2. Elimination Issues: incontinence, frequency 0       3. Ambulatory: use of assistive devices (walker, cane, off-loading devices),        attached to equipment (IV pole, oxygen) 0     4. Sensory Limitations: dizziness, vertigo, impaired vision 0     5. Age less than 65        0     6. Age 72 or greater 0     7. Medication: diuretics, strong analgesics, hypnotics, sedatives,        antihypertensive agents 3   8. Falls:  recent history of falls within the last 3 months (not to include slipping or        tripping) 0   TOTAL 3    If score of 4 or greater was education given? No           TABLE 2   Risk Score Risk Level Plan of Care   0-3 Little or  No Risk 1. Provide assistance as indicated for ambulation activities  2. Reorient confused/cognitively impaired patient  3.   Chair/bed in low position, stretcher/bed with siderails up except when performing patient care activities  5. Educate patient/family/caregiver on falls prevention  6.  Reassess in 12 weeks or with any noted change in patient condition which places them at a risk for a fall   4-6 Moderate Risk 1. Provide assistance as indicated for ambulation activities  2. Reorient confused/cognitively impaired patient  3. Chair/bed in low position, stretcher/bed with siderails up except when performing patient care activities  4. Educate patient/family/caregiver on falls prevention     7 or   Higher High Risk 1. Place patient in easily observable treatment room  2. Patient attended at all times by family member or staff  3. Provide assistance as indicated for ambulation activities  4. Reorient confused/cognitively impaired patient  5. Chair/bed in low position, stretcher/bed with siderails up except when performing patient care activities  6. Educate patient/family/caregiver on falls prevention         PLAN: Patient is seen today in follow up. He reports ongoing pains in leg and chest. He states leg pain has improved from prior to treatment. Dr Monico Nageotte notified and examined pt.  Pt to f/u with SHIELA Pickard RN

## 2021-09-03 NOTE — PROGRESS NOTES
[facility]            Radiation Oncology          [address] [facility], [facility] [address]        Laila Simper: [phone]        F: [phone]       [email or web address]         Date of Service: 2021     Location:  [facility] [address] [place],   [address] [facility] [facility], [place]   [phone]        RADIATION ONCOLOGY FOLLOW UP NOTE    Patient ID:   Blaze Laguna  : 1960   MRN: [id number]    DIAGNOSIS:  Cancer Staging  Malignant neoplasm of lower lobe of right lung ([facility] [address]  Staging form: Lung, AJCC 7th Edition  - Pathologic: Stage IIIB (T2a, N3, cM0) - Signed by Dereck Santizo MD on 2021       -s/p RLL Lobectomy 16  -s/p adj chemo x4 cis/alimta  -s/p chemoRT for recurrence 60Gy + Carbo/abraxane 3/23/21  -s/p RT L Femur 20Gy 21  -on [place] Alimta [address]bo and [address]    INTERVAL HISTORY:   Blaze Laguna is a 61 y.o.. male with a history of right lung cancer for which he underwent a lobectomy in subsequently had recurrent mediastinal disease for which she had chemoradiation. Patient unfortunately recently developed bony metastasis and had palliative radiation to the left femur completing 1 month ago. Patient is here today for routine follow-up exam.  Overall he notes that he still continues to have pain in the left femur which exacerbates when he walks. He does continue to have chest congestion and dyspnea with exertion. He denies any headaches, dizziness, trouble swallowing, abdominal pain, nausea, weight loss, or any bleeding. He however does have decreased appetite and trouble with eating. He also has symptoms of chest pain for which he went to the ED and was worked up for a PE on 2021 at which time a CT was done and showed progression of his pulmonary metastases with innumerable nodules.   Patient is currently on systemic treatments and will be following with medical oncology for next cycle next week.    MEDICATIONS:    Current Outpatient Medications:     oxyCODONE-acetaminophen (PERCOCET)  MG per tablet, Take 1 tablet by mouth every 6 hours as needed for Pain for up to 30 days. , Disp: 120 tablet, Rfl: 0    megestrol (MEGACE) 40 MG/ML suspension, Take 10 mLs by mouth daily, Disp: 300 mL, Rfl: 3    promethazine-codeine (PHENERGAN WITH CODEINE) 6.25-10 MG/5ML syrup, Take 5 mLs by mouth 4 times daily as needed for Cough for up to 30 days. , Disp: 240 mL, Rfl: 2    prochlorperazine (COMPAZINE) 10 MG tablet, Take 1 tablet by mouth every 6 hours as needed (nausea/vomiting), Disp: 120 tablet, Rfl: 3    predniSONE (DELTASONE) 10 MG tablet, take 1 tablet by mouth twice a day, Disp: 14 tablet, Rfl: 0    omeprazole (PRILOSEC) 20 MG delayed release capsule, Take 1 capsule by mouth Daily, Disp: 180 capsule, Rfl: 3    levothyroxine (SYNTHROID) 125 MCG tablet, Take 1 tablet by mouth daily, Disp: 30 tablet, Rfl: 5    clonazePAM (KLONOPIN) 1 MG tablet, Take 1 tablet by mouth 2 times daily as needed for Anxiety for up to 30 days. , Disp: 60 tablet, Rfl: 0    triamcinolone (KENALOG) 0.025 % cream, Apply topically 2 times daily. , Disp: 1 Tube, Rfl: 1    FLUoxetine (PROZAC) 40 MG capsule, take 1 capsule by mouth once daily, Disp: 90 capsule, Rfl: 3    lidocaine-prilocaine (EMLA) 2.5-2.5 % cream, Apply topically as needed 1 hour prior to lab draws and treatment. , Disp: 30 g, Rfl: 2    Magic Mouthwash (MIRACLE MOUTHWASH), Swish and spit 5 mLs 4 times daily as needed for Irritation, Disp: , Rfl:     atorvastatin (LIPITOR) 80 MG tablet, Take 1 tablet by mouth every evening, Disp: 90 tablet, Rfl: 3    lisinopril (PRINIVIL;ZESTRIL) 20 MG tablet, Take 1 tablet by mouth daily, Disp: 90 tablet, Rfl: 3    ciprofloxacin-dexamethasone (CIPRODEX) 0.3-0.1 % otic suspension, Place 4 drops into the left ear 2 times daily, Disp: 1 Bottle, Rfl: 1    ipratropium-albuterol (DUONEB) 0.5-2.5 (3) MG/3ML SOLN nebulizer solution, Inhale 3 mLs into the lungs every 6 hours as needed for Shortness of Breath, Disp: 360 mL, Rfl: 2    amitriptyline (ELAVIL) 25 MG tablet, Take 1 tablet by mouth nightly, Disp: 90 tablet, Rfl: 3    ondansetron (ZOFRAN ODT) 8 MG TBDP disintegrating tablet, Take 1 tablet by mouth every 8 hours as needed for Nausea or Vomiting, Disp: 30 tablet, Rfl: 3    Current Facility-Administered Medications:     cyanocobalamin injection 1,000 mcg, 1,000 mcg, IntraMUSCular, Once, Juvenal Curtis MD    ALLERGIES:  Allergies   Allergen Reactions    Paclitaxel Shortness Of Breath     Flushing, hypertension      Emend [Fosaprepitant Dimeglumine] Other (See Comments)     Reported back pain, warmth all over, nausea & SOB         REVIEW OF SYSTEMS:    A full 14 point review of systems was performed and assessed and found to be negative except as noted above. PHYSICAL EXAMINATION:    CHAPERONE: Not Required    ECO Symptomatic but completely ambulatory    VITAL SIGNS: BP (!) 142/89   Pulse 84   Temp 97.8 °F (36.6 °C)   Resp 20   Wt 225 lb 12.8 oz (102.4 kg)   SpO2 98%   BMI 27.49 kg/m²   GENERAL:  General appearance is that of a well-nourished, well-developed in no apparent distress. HEENT: Normocephalic, atraumatic, EOMI, moist mucosa, no erythema. NECK:  No adenopathy or a palpable thyroid mass, trachea is midline. LYMPHATICS: No cervical, supraclavicular adenopathy. HEART:  Regular rate and rhythm, S1, S2, no murmurs. LUNGS:  Clear to auscultation bilaterally with no wheezing or crackles. ABDOMEN:  Soft, nontender, non distended. EXTREMITIES: (+) L Leg pain. No clubbing, cyanosis, or edema. No calf tenderness. MSK:  No CVA or spinal tenderness. NEUROLOGICAL: No focal deficits. CN II-XII intact. Strength and sensation intact bilaterally. SKIN: No erythema or desquamation.         LABS:  WBC   Date Value Ref Range Status   2021 7.0 3.5 - 11.0 k/uL Final     Segs Absolute   Date Value Ref Range Status   2021 5.39 1.8 - 7.7 k/uL Final     Hemoglobin   Date Value Ref Range Status   2021 14.3 13.5 - 17.5 g/dL Final     Platelets   Date Value Ref Range Status   2021 152 140 - 450 k/uL Final     No results found for: , CEA  PSA   Date Value Ref Range Status   09/10/2020 0.59 <4.1 ug/L Final     Comment:     The Roche \"ECLIA\" assay is used. Results obtained with different assay methods cannot be   used interchangeably. IMAGIN21 CT Chest  Impression   1. No evidence for acute pulmonary embolism.  Interval progression of   pulmonary metastasis with increase in size and number of nodules. 2. Stable appearing small right pleural effusion pleural thickening. 3. Emphysema. ASSESSMENT AND PLAN:  Casimiro Esquivel is a 61 y.o. male with a Cancer Staging  Malignant neoplasm of lower lobe of right lung ([address]  Staging form: Lung, AJCC 7th Edition  - Pathologic: Stage IIIB (T2a, N3, cM0) - Signed by Sherrill Gresham MD on 2021  . Patient comes in today for 1 month follow us after completion of palliative radiation to his left femur. Overall patient still notes some pain in the leg and also is having worsening chest congestion and symptoms likely from disease progression in his lungs. We did discuss the role of repeat imaging however he would like to wait as he was hoping it will improve with his next round of chemotherapy. Patient will be recommended to see palliative care as he is struggling with his symptom and was expressing possibility of quitting and going to hospice if he does not start feeling better. We offer the patient that option of repeat imaging of his left femur however he is not interested at this time and will notify us if he feels the pain get worse. Patient otherwise is going to continue close follow-up with medical oncology and see us on an as-needed basis as he has active ongoing systemic treatments.     Patient was in agreement with my recommendations. All questions were answered to their satisfaction. Patient was advised to contact us anytime should they have any questions or concerns. Electronically signed by Yudith Chan MD on 9/3/2021 at 2:32 PM        Medications Prescribed:   New Prescriptions    No medications on file       Orders:   Orders Placed This Encounter   Procedures   Flakito Oreilly NP, Palliative Care, [place]       CC:  Patient Care Team:  OSIEL Le CNP as PCP - General (Family Nurse Practitioner)  OSIEL Le CNP as PCP - [facility] Empaneled Provider  Idalmis Luke RN as Nurse Navigator (Oncology)     Total time spent on this case on the day of encounter is more than 30 minutes. This time includes combination of one or more of the following - review of necessary tests, review of pertinent medical records from the EMR, performing medically appropriate examination and evaluation, counseling and educating the patient/family/caregiver, ordering necessary medical tests, procedures etc., documenting the clinical information in the electronic medical record, care coordination, referring and communicating with other health care providers and interpretation of results independently. This note is created with the assistance of a speech recognition program.  While intending to generate a document that actually reflects the content of the visit, the document can still have some errors including those of syntax and sound a like substitutions which may escape proof reading. It such instances, actual meaning can be extrapolated by contextual diversion.

## 2021-09-14 NOTE — PROGRESS NOTES
Palliative Care Consultation Note    Patient: August Rodriguez  1960    Referring physician: No att. providers found  Consulting nurse practitioner: Matt Victoria       REASON FOR CONSULTATION:   Assist in symptom and pain control   Goals of care evaluation  Distress management  Facilitate communications  Non-pain symptoms:  Recommendations for the above    HISTORY OF PRESENT ILLNESS:   August Rodriguez is a 61 y.o. male with a history of  and follows with OSIEL Gentile - CNP . Palliative Care was consulted to help manage symptoms, facilitate communications and establish goals of care. Today, Mr. Elvie Collet is complaining of diabetes, HLD, SWATI- noncompliant with C-pap, Smoker, HTN, Neuropathy, hypothyroidism, COPD, urine frequency, chronic back pain, anxiety, and recurrent metastatic lung cancer. Patient is S/p RLL lobectomy 9/3/16. The patient was evaluated and examined, family were also interviewed. Long discussion to address issues mentioned above. Patient underwent CT scan initially d/t smoking history. CT scan showed RLL spiculated mass measuring 2.9x2.5cm abutting the medical pleura. Another 4MM pulmonary nodule was noted but was shown to be stable from prior imaging. No adenopathy was noted. The patient underwent RLL lobectomy 9/13/16, and pathology revealed invasive moderately differentiated adenocarcinoma with tumor invasion in the visceral pleura. All margins were negative. Patient had 1 positive intrapulmonary lymph node. Patient was noted to be Stage 2 disease (T2 N1 M0). Patient received adjuvant chemotherapy with Cisplatin and Alimta 11/17/16-2/2017. In 9/2020 patient had significant weight loss, and CT scan revealed pulmonary lesion suggestive of recurrent disease. PET scan done, and revealed metastatic disease with new bilateral sub centimeter pulmonary nodules measuring up to 6mm. Mild metabolic activity was noted within small mediastinal lymph nodes.  New bone lesion with metabolic activity in the anterior left 5th rib noted. MRI of brain was negative. The patient was then started on Taxol and carboplatin with radiation, but had med reaction so Taxol was replaced with Abraxane. PET scan repeated in 12/2020 that revealed new FDG avid left sided level 4 lymph node of the neck suggestive of progression of disease. Multiple bilateral solid noncalcified pulmonary nodules noted with some sub threshold FDG activity. FDG avid mediastinal and right hilar lymph nodes similar to prior study. Interval decreased in FDG activity identified involving the previously identified left 5th rib lesion. No new bone lesions noted. Patient completed chemoradiation, patient then started immunotherapy in 5/2021 with Angy Charlton. 5/2021 scans revealed disease progression with innumerable pulmonary nodules scattered throughout both lungs with largest measuring 13mm. Patient was noted to have further progression on 7/2021 scans with left femur x-ray revealing lytic lesion involving the cortex of the mid left femoral diaphysis. CT chest revealed progression of pulmonary mets with increase in size/number of nodules. Patient underwent radiation therapy to left femur lytic lesion in 8/2021. Patient was started on Carboplatin, Alimita, and Avastin on 8/5/2021, and is presently starting cycle 3 today. Patient reports tolerating treatment, but in last month is having worsening pain and is questioning if he is having disease progression.        SUBJECTIVE: The initial comprehensive symptom assessment is as follows:     Symptom Ratings:     Pain  8    Chest/back/left femur   Other Symptoms  0 (none) - 3 (severe)   Nausea yes    Anorexia yes    Depression not asked    Anxiety not asked    Fatigue yes    Weakness not asked    Dyspnea no    Pruritus no    Insomnia yes    Sedation no    Constipation yes Date of last BM: 9/12/21   Diarrhea no    Vomiting no    Delirium no        OARRS: Controlled Substances Monitoring: reviewed 9/14/21    RX Monitoring 10/10/2017   Attestation The Prescription Monitoring Report for this patient was reviewed today. Periodic Controlled Substance Monitoring No signs of potential drug abuse or diversion identified. ;Random urine drug screen sent today. has a past medical history of Anxiety, BiPAP (biphasic positive airway pressure) dependence, Cancer (HCC), Chronic back pain, Clinical trial exam, COPD (chronic obstructive pulmonary disease) ([facility] [address] Diabetes ([facility] [address] Hyperlipidemia, Hypertension, Hypothyroidism, Lung cancer ([facility] [address] Neuropathy, Prolonged emergence from general anesthesia, Sleep apnea, Slow to wake up after anesthesia, SOB (shortness of breath), and Urine frequency. has a past surgical history that includes laminectomy (12/2012); Testicle removal (Left, 1982); Lung removal, partial (Right, 2016); other surgical history (Right, 06/13/2017); back surgery (01/2013); US BIOPSY LYMPH NODE (12/21/2020); and IR PORT PLACEMENT > 5 YEARS (2/24/2021). family history includes Cancer in his father and mother; Mental Illness in his brother, brother, sister, sister, sister, and sister; Other in his brother. Allergies   Allergen Reactions    Paclitaxel Shortness Of Breath     Flushing, hypertension      Emend [Fosaprepitant Dimeglumine] Other (See Comments)     Reported back pain, warmth all over, nausea & SOB       Medications   has a current medication list which includes the following prescription(s): oxycodone-acetaminophen, megestrol, promethazine-codeine, prochlorperazine, prednisone, omeprazole, levothyroxine, clonazepam, triamcinolone, fluoxetine, lidocaine-prilocaine, magic mouthwash, atorvastatin, lisinopril, ciprofloxacin-dexamethasone, ipratropium-albuterol, and ondansetron, and the following Facility-Administered Medications: cyanocobalamin.     Personal, Social, and Family History  Marital Status: - patient states Vlad Shaver is Ex-wife  Living situation: alone  Importance of delfina/Voodoo/spiritual beliefs: Not asked  Distress: mild  Does patient understand diagnosis/treatment? yes  Does caregiver understand diagnosis/treatment? yes    Substance Abuse History:  Social History     Substance and Sexual Activity   Alcohol Use No     Social History     Substance and Sexual Activity   Drug Use No       Available records including labs and imaging results werereviewed. Recent and past history, med list, family history, social history and review of systems were personally reviewed and updated in EHR. ADVANCED CARE PLANNING:  Code status: Full Code- He is considering DNRCC-A code status change- will revisit next visit. Patient has capacity for medical decisions: yes  Health Care Power of : no  Living Will: no  Family aware of advance directives: not asked   Family agrees with advance directives: not asked    REVIEW OF SYSTEMS  Constitutional: no fever, no chills +weight loss/fatigue  Eyes: no eye pain or blurred vision  ENT: no hearing loss, congestion, or difficulty swallowing   Respiratory: no wheezing, chest tightness, or shortness of breath +Some clear production with cough but no hemoptysis. Cardiovascular: no chest pain or pressure, no palpitations, no diaphoresis   Gastrointestinal: no vomiting,abdominal pain, diarrhea, no melena +Nausea/poor appetite/constipation  Genitourinary: no dysuria, frequency,hematuria , or nocturia   Musculoskeletal: no myalgias or arthralgias, +Left femur pain/+chest/back pain-increases with coughing or movement  Skin: no rashes or sores   Neurological: no focal weakness, numbness, tingling, or headache, no seizures +bilateral N/T feet      PHYSICAL ASSESSMENT:  Constitutional: Alert and oriented to person, place, and time. Head: Normocephalic and atraumatic. Eyes: EOM are normal. Pupils are equal, round   Neck: Normal range of motion. Neck supple. No tracheal deviation present.    Cardiovascular: Normal rate and regular rhythm, S1, S2, no murmur   Pulmonary/Chest: Effort normal and breath sounds normal/diminshed. No rales or wheezes. Abdomen: Soft. No tenderness, not distended, no ascites, no organomegaly   Musculoskeletal: Normal range of motion. No edema lower ext. Neurological: CN II-XII grossly intact, no focal neurological deficits   Skin: Normal turgor, no bleeding, no bruising +chest port accessed- no signs of infection    Wt Readings from Last 3 Encounters:   09/14/21 229 lb (103.9 kg)   09/14/21 229 lb 8 oz (104.1 kg)   09/02/21 225 lb 12.8 oz (102.4 kg)       /88   Pulse 83   Temp 96.4 °F (35.8 °C) (Temporal)   Wt 229 lb (103.9 kg)   BMI 27.87 kg/m²     Impression and Plan:  Patient present today with Ex-wife. Dr. Erika Ortez asked for patient to be added on schedule for today for extra support for patient. I went to see patient, and introduced the palliative role in detail. We discussed patients cancer, and he reports originally being diagnosed around 2016. He reports reoccurrence, and has completed chemoradiation for this. He had further progression with bone mets, and completed radiation for left femur, and started chemotherapy 8/5/21. He presently has port accessed, and has cycle 3 planned for today. We discussed patients Stage IV cancer, and he is aware that treatment is palliative in nature. Patient reports having some improvement in left femur pain after radiation, but feels like last month this has worsened. We discussed patients other symptoms and these include; fatigue, weight loss, insomnia, poor appetite, and worsening chest/back pain. Patient reports taking 5-6 Percocet 10/325mg/24 hours. He reports that he will likely run out early because pain has worsened in last month. He reports more pain in chest/back than in left leg. He currently rates pain 8/10 on pain scale. He reports pain pill will decrease to 5 or 6 usually. Patient reports continuous fatigue, and insomnia.  He reports lying down at night at 11PM, and waking up every 2 hours. He reports sometimes it's due to pain, but other times he just can't sleep. He reports needing to take a nap by noon after he gets up in the AM. Patient reports losing a total of 55 pounds since Dx, but 25 of that in last 6 months. He reports starting a nutritional supplement from [employer], and drinks this once a day. I reviewed patients med list with him, and he reports never starting Amitriptyline for sleep in January from PCP. He also reports never starting Megace for appetite stimulant. We discussed this drug, he denies any previous clots, and will start if nausea meds and supplements don't improve his nutritional status. We discussed starting a ER pain medication for better pain control, and I also discussed this with his Oncologist Dr. Chelsie Dangelo. I have started him on MS Contin 15mg ER BID, and this was explained to him. He will continue with Percocet 10/325mg PRN but is also aware that he will likely require less. We discussed SE of these meds including sedation, and constipation. Patient already has some constipation, and does not have BM sometimes for 3 days. We discussed adding Colace and MiraLax PRN for this. Patient reports sometimes needing nausea meds even prior to meals. He reports usually meals with start to make him nauseated. He only uses Zofran, but is aware that he can use compazine as needed too. We discussed premedicating if nausea is always noted around meal times ect. In hopes he is able to tolerate his diet better. We discussed taking daily non strenuous walks for his fatigue. I asked patient if he has HCPOA, and he reports it being written up, but just needing HCPOA. He reports Ex-wife that is present as primary agent. We discussed [place] next of [facility] [employer] law, and patient is single with 3 biological children. Ex-wife reports these are not her children. Patient reports his children being estranged.  I discussed the importance of completing this document because the LOC full/confusion  ___50%    Mainly sit/lie; Extensive disease; Can't do any work; Considerable assist; intake normal or reduced; LOC full/confusion  ___40%    Mainly in bed; Extensive disease; Mainly assist; intake normal or reduced; occasional assist; LOC full/confusion  ___30%    Bed Bound; Extensive disease; Total care; intake reduced; LOC full/confusion  ___20%    Bed Bound; Extensive disease; Total care; intake minimal; Drowsy/coma  ___10%    Bed Bound; Extensive disease;  Total care; Mouth care only; Drowsy/coma  ___0               Death    Patient Active Problem List   Diagnosis    Malignant neoplasm of lower lobe of right lung ([address]    Smoking addiction    Simple chronic bronchitis (HCC)    Type 2 diabetes mellitus without complication, without long-term current use of insulin ([address]    Essential hypertension    Acquired hypothyroidism    Mixed hyperlipidemia    Major depressive disorder with single episode, in partial remission ([address]    Chronic midline low back pain without sciatica    History of lumbar laminectomy    Unilateral emphysema (HCC)    Obstructive sleep apnea syndrome    Atelectasis    Anxiety    Adenocarcinoma (HCC)    Gastroesophageal reflux disease    Difficulty sleeping    Opioid use, unspecified with unspecified opioid-induced disorder    Opioid dependence with unspecified opioid-induced disorder    Opioid dependence, uncomplicated    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)    Bone metastasis ([address]    Coagulation defect ([address]           Routine meeting  Discuss goals of care  Provide clinical updates and answer questions  Share information and provider education for the family  Listen to patient/family concerns  Assess family understanding, concerns, and coping  Interdiscplinary collaboration  Build trust  Provide emotional support to the family  Elicit patient's goals and values, and use these to establish or modify goals of care    3- Code Status Full      4- Other recommendations  Start MS Contin 15mg ER BID  Continue/decrease Percocet 10/325mg Q6 hours PRN   Start Colace 100mg BID PRN  MiraLax daily PRN  Start Trazodone 50 mg nightly for Insomnia  Decrease/stop Smoking  F/U Dr. Margaret Dunham in regards to sleep study/refitting for C-pap   Zofran/Compazine PRN for nausea  Take a light walk daily to assist with fatigue  Continue nutritional supplement daily and increase to BID if able  Megace- previous script for appetite stimulant if other ineffective  Oncology planning to repeat scans after this cycle.      Electronically signed by   OSIEL Bruno CNP  on 9/14/2021 at 10:01 AM    [facility]  Rosamaria Salmon, [address]

## 2021-09-14 NOTE — PATIENT INSTRUCTIONS
RTO in 2 weeks  Bring Medication to Next Appointment  Start MS Contin 15 mg ER in AM and PM.   Continue Percocet 10/325mg as needed every 6 hours but decrease as tolerated  Start Trazodone 50mg at bedtime for sleep  F/U Dr. Puja Grider in regards to new sleep study/fitting  Decrease/stop smoking  Continue Ensure in AM and increase to twice daily as tolerated  Continue Zofran/Compazine as needed for nausea  Megace for appetite stimulant

## 2021-09-14 NOTE — TELEPHONE ENCOUNTER
AVS from 9/14/21     Proceed with chemotherapy per orders, need CT scan in 2 weeks.   Return in 3 weeks with potential chemotherapy and labs    *chemo as sched   *CT sched for [email or web address]  *rv is sched for [email or web address] w/tx to follow  *F/U w/Palliative care [email or web address]    PT was given AVS and an appt schedule

## 2021-09-14 NOTE — PROGRESS NOTES
DIAGNOSIS:   1. Adenocarcinoma of the right lower lobe of lung. Path stage is T2N1M0. Stage 2 disease. 2. COPD  3. HTN   4. Smoking addiction   5. Evidence of recurrent disease with metastases to supraclavicular lymph node as well as mediastinal lymph node May/2021  6. Evidence of bone metastases July/2021  CURRENT THERAPY:  S/P right lower lobectomy. 9/2016  Received adjuvant chemotherapy, with Cisplatin and Alimta on 11/17/16, completed in February/2017  Plan chemoradiation with Taxol and carboplatin  Infusion reaction to Taxol, plan to transition to Abraxane  After completion of chemoradiation, plan immunotherapy with Imfinzi, started 5/2021   Progressive disease with bone metastases July/2021, plan carboplatin Alimta and Avastin  Plan palliative radiation to painful bone mets in  the left femur  BRIEF CASE HISTORY:      Milan Rose is a very pleasant 61 y.o. male who is referred to us for management recommendation of his recently resected lung cancer. He actually underwent a screening CT scan because of his smoking habits. CT scans showed right lower lobe spiculated mass measuring 2.9 x 2.5 cm abutting the medial pleura. There was another 4 mm pulmonary nodule that has actually been stable from previous imaging. No adenopathy was appreciated. More testing was done but ultimately the patient was taken to surgery. Prior to surgery, the clinical stage was stage I disease. The patient underwent right lower lobe lobectomy on 9/13/16 and pathology showed invasive moderately differentiated adenocarcinoma of the lung measuring 4 cm in greatest dimension, the tumor invaded the visceral pleura. All margins were negative. He had one positive intrapulmonary lymph node. Representative nodes from stations 4, 7, 9 were all negative. Final pathological staging is (T2 N1 M0) stage II disease  He is sent to us for a consultation, recovering from surgery.  He continues to have some pain in the surgical area but The Service to Behavioral Health order in workqueue [id number] requested on 5/7/2021 has been cancelled as, unable to contact. Ordering provider has been notified..   otherwise has no complaints. He has minimal shortness of breath, no cough or hemoptysis. No headache or seizures or loss of weight. Performance status is ECOG 1, we discussed adjuvant chemotherapy with Cisplatin and Alimta. After completion of chemotherapy, we started surveillance. He required bronchoscopy to clear thick mucous. He presented 09/2020 with significant weight loss and he was following with pulmonary service and CT scan showed pulmonary lesion. PET scan was done and unfortunately showed lung lesions and rib lesion, suggestive of recurrent disease. Brain MRI was done and negative. The patient was started on chemoradiation with weekly Taxol and carboplatin. With week 1 treatment, he had grade 1 infusion reaction that was managed with premedication and dose adjustment. However with second week, the infusion was much stronger and we had to hold Taxol altogether. We plan to replace with Abraxane. After completion of chemoradiation, we plan to offer immunotherapy with Imfinzi to start in May/2021. Ginger Morgans was started 05/2021. Prior to starting Ginger Morgans lab work showed baseline hypothyroidism and was started on Synthroid. In July/2021, he has further progression of disease with bone metastases, will try to manage him with clinical trial but he did not qualify, we decided to  start him on first-line treatment with Alimta carbo and bevacizumab  Meanwhile he presented to emergency room with severe pain in his left femur, x-ray showed lytic lesion in the femoral diaphysis. Plan palliative radiation  INTERIM HISTORY:   The patient presents today to receive cycle 3 of chemotherapy, he was seen by palliative care today and they recommend adjusting his pain and sleeping aids. He is continuing to complain of severe fatigue and uncontrolled pain. He continues to lose weight. Performance status is stable although he is worried that he is not responding to therapy.     PAST MEDICAL HISTORY: has a past medical history of Anxiety, BiPAP (biphasic positive airway pressure) dependence, Cancer ([facility] [address] Chronic back pain, Clinical trial exam, COPD (chronic obstructive pulmonary disease) ([facility] [address] Diabetes ([facility] [address] Hyperlipidemia, Hypertension, Hypothyroidism, Lung cancer ([facility] [address] Neuropathy, Prolonged emergence from general anesthesia, Sleep apnea, Slow to wake up after anesthesia, SOB (shortness of breath), and Urine frequency. PAST SURGICAL HISTORY: has a past surgical history that includes laminectomy (12/2012); Testicle removal (Left, 1982); Lung removal, partial (Right, 2016); other surgical history (Right, 06/13/2017); back surgery (01/2013); US BIOPSY LYMPH NODE (12/21/2020); and IR PORT PLACEMENT > 5 YEARS (2/24/2021). CURRENT MEDICATIONS:  has a current medication list which includes the following prescription(s): oxycodone-acetaminophen, megestrol, promethazine-codeine, prochlorperazine, prednisone, omeprazole, levothyroxine, triamcinolone, fluoxetine, lidocaine-prilocaine, magic mouthwash, atorvastatin, lisinopril, ciprofloxacin-dexamethasone, ipratropium-albuterol, ondansetron, and clonazepam, and the following Facility-Administered Medications: cyanocobalamin. ALLERGIES:  is allergic to paclitaxel and emend [fosaprepitant dimeglumine]. FAMILY HISTORY: Negative for any hematological or oncological conditions. SOCIAL HISTORY:  reports that he has been smoking cigarettes. He has a 10.00 pack-year smoking history. He has never used smokeless tobacco. He reports that he does not drink alcohol and does not use drugs. REVIEW OF SYSTEMS:   General: No fever or night sweats.  Weight loss; +fatigue, he has pain in his left femur  ENT: No double or blurred vision, no tinnitus or hearing problem, or sore throat  Respiratory: Chronic shortness of breath with some pain and cough - some clear sputum - no hemoptysis, chest discomfort in the surgical area, chest congestion   Cardiovascular: Denies central chest pain, PND or orthopnea. No L E swelling or palpitations. Gastrointestinal: No vomiting, abdominal pain, diarrhea or constipation, nausea   Genitourinary: Denies dysuria, hematuria, frequency, urgency or incontinence. Neurological: Denies headaches, decreased LOC, no sensory or motor focal deficits. Grade 1 neuropathy in hands and feet, predates chemo, diabetes related. Musculoskeletal: Diffuse aches and pains, no joint swelling. Severe pain in the left femoral area  Skin: There are no rashes or bleeding. +psoriasis   Psychiatric:  History of bipolar disorder. Anxiety   Endocrine: No thyroid disease. Hematologic: No bleeding, no adenopathy. PHYSICAL EXAM: Shows a well appearing 61y.o.-year-old male who is not in pain or distress. Vital Signs: Weight is stable at 230. Blood pressure 130/88, pulse 83, temperature 96.4 °F (35.8 °C), temperature source Temporal, weight 229 lb 8 oz (104.1 kg). HEENT: Slight redness in the throat. Normocephalic and atraumatic. Pupils are equal, round, reactive to light and accommodation. Extraocular muscles are intact. Neck: grade 1 radiation toxicity Showed no JVD, no carotid bruit. Lungs: Port in place upper right chest - swelling and redness, no evidence of infection. Decreased air entry especially in the right lower lobe area - improved. Scattered rhonchi. Postoperative changes in the chest wall are healed well without any evidence of infection. Heart: Regular without any murmur. Abdomen: Soft, nontender. No hepatosplenomegaly. Extremities: Lower extremities show no edema, clubbing, or cyanosis. Neuro exam: intact cranial nerves bilaterally no motor or sensory deficit, gait is normal. Lymphatic: Cervical adenopathy improved significantly. REVIEW OF RADIOLOGICAL RESULTS:  05/25/2021 CT SOFT TISSUE NECK IMPRESSION:      1.  Enlarged left level 4 lymph node with central decreased attenuation.  No   distinct fat plane is seen between this lymph node and the posterior aspect   of the left internal jugular vein as well as the anterior aspect of the left   scalene musculature.  This is likely slightly increased in size from the   prior PET-CT. 2. No additional cervical lymphadenopathy by size criteria. CT CHEST W CONTRAST IMPRESSION:   1. Interval progression of innumerable pulmonary nodules scattered throughout   both lungs, largest measuring 13 mm within the left lower lobe when compared   to the prior PET-CT study.  Findings suggest progression of disease. 2. New ill-defined consolidation involving the right upper lobe.  Developing   infectious process cannot be excluded. 3. Right adrenal gland nodule, reported stable since 2016 suggesting a benign   process. REVIEW OF LABORATORY RESULTS:   Lab Results   Component Value Date    WBC 6.7 09/14/2021    HGB 13.3 (L) 09/14/2021    HCT 38.0 (L) 09/14/2021    MCV 99.7 09/14/2021     09/14/2021       Chemistry        Component Value Date/Time     09/14/2021 0912    K 4.5 09/14/2021 0912     09/14/2021 0912    CO2 24 09/14/2021 0912    BUN 21 09/14/2021 0912    CREATININE 0.98 09/14/2021 0912        Component Value Date/Time    CALCIUM 8.8 09/14/2021 0912    ALKPHOS 57 09/14/2021 0912    AST 16 09/14/2021 0912    ALT 12 09/14/2021 0912    BILITOT 0.29 (L) 09/14/2021 0912              IMPRESSION:   1. Adenocarcinoma of the right lower lobe of lung. Path stage is T2N1M0. Stage 2 disease. 2. S/P lobectomy. 3. received adjuvant chemotherapy, with cisplatin and Alimta. Plan to finish 4 cycles of adjuvant chemotherapy and then observe. 4. COPD  5. Smoking addiction, unable to quit   6. Starting chemoradiation for recurrent disease in the mediastinum and supraclavicular area. 7. Infusion reaction to Taxol, escalated from grade 1 to grade 2 by the second week. We have to hold the Taxol and will plan to switch to Abraxane.   8. Dysphagia secondary to chemoradiation as the esophagus in the radiation field.   Improved after completion of chemoradiation  9. Hypertension and dizziness, we asked him to stop taking the lisinopril. I think it will improve as his oral intake improves  10. Evidence of progressive disease in the chest as well as bone metastases, July/2021  11. Plan palliative radiation to the bone mets July/2021  12. Did not qualify for clinical trials, plan to treat with carbo Alimta and Avastin  13. We will add Xgeva after radiation to the bone mets    PLAN:  1. We will proceed with cycle  3of chemotherapy per orders  2. We will order imaging after the cycle to see if he is responding  3. Appreciate palliative care input  4.  Continue supportive care

## 2021-09-14 NOTE — PATIENT INSTRUCTIONS
Proceed with chemotherapy per orders, need CT scan in 2 weeks.   Return in 3 weeks with potential chemotherapy and labs

## 2021-09-14 NOTE — PROGRESS NOTES
Patient here for [place] Risk C3D1. Labs reviewed. He saw Dr. Jing Murphy today see his dictation. He c/o itching flushed and nausea 15 minutes into carboplatin. Carbo stopped and saline started. He was given benedryl pepcid and compazine. BP elevated. Dr. Jing Murphy came up to see patient and ordered not to re challenge patient. Verbalized understanding. He felt better after 45 minutes and was discharged home with spouse in stable condition. He is due to return 10/7 for MD and C4D1.

## 2021-09-16 NOTE — TELEPHONE ENCOUNTER
Name: Fe Pickard  : 1960  MRN: [id number]    Oncology Navigation Follow-Up Note  Navigator reaching out to pt. Offering assistance. Pt. Received food from [facility] and having some difficulty with food prep. Pt. Asking about supplemental Medicaid / PAP? Writer will refer to Jamie for further questions. Pt. Denies needs other than financial questions. Plan to follow.    Electronically signed by Yesy Brown RN on 2021 at 1:17 PM

## 2021-09-21 PROBLEM — R07.89 ATYPICAL CHEST PAIN: Status: ACTIVE | Noted: 2021-01-01

## 2021-09-21 PROBLEM — G47.30 SLEEP APNEA: Status: ACTIVE | Noted: 2021-01-01

## 2021-09-21 PROBLEM — J44.9 COPD (CHRONIC OBSTRUCTIVE PULMONARY DISEASE) (HCC): Status: ACTIVE | Noted: 2021-01-01

## 2021-09-21 PROBLEM — E78.5 HYPERLIPIDEMIA: Status: ACTIVE | Noted: 2019-01-31

## 2021-09-21 NOTE — ED PROVIDER NOTES
[id number] Replaced by [facility] ED  [address] [facility] [address]  Phone: [phone]  Fax: Sarah Newton [phone]      Pt Name: Sarah Patel  MRN: [id number]  [place] 1960  Date of evaluation: 9/21/2021    CHIEF COMPLAINT       Chief Complaint   Patient presents with    Chest Pain     stage 4 lung ca hx       HISTORY OF PRESENT ILLNESS    Sarah Patel is a 61 y.o. male who presents to the emergency room with complaint of left-sided chest pain started. Persistent pain is worse with palpation and movement history of lung CA has had a partial lobectomy in the past.  Pain radiates through to his back. Chronic shortness of breath is also on chronic pain medicine. Had a heart cath done 15 years ago no recent stress test.  Was seen in July emergency department for chest pain and ultimately discharged home at that time. REVIEW OF SYSTEMS       Constitutional: No fevers or chills   HEENT: No sore throat, rhinorrhea, or earache   Eyes: No blurry vision or double vision no drainage   Cardiovascular: Positive chest pain  Respiratory: No wheezing positive chronic shortness of breath  Gastrointestinal: No nausea, vomiting, diarrhea, constipation, or abdominal pain   : No hematuria or dysuria   Musculoskeletal: No swelling or pain   Skin: No rash   Neurological: No focal neurologic complaints, paresthesias, weakness, or headache     PAST MEDICAL HISTORY    has a past medical history of Anxiety, BiPAP (biphasic positive airway pressure) dependence, Cancer ([address] Chronic back pain, Clinical trial exam, COPD (chronic obstructive pulmonary disease) ([address] Diabetes ([address] Hyperlipidemia, Hypertension, Hypothyroidism, Lung cancer ([address] Neuropathy, Prolonged emergence from general anesthesia, Sleep apnea, Slow to wake up after anesthesia, SOB (shortness of breath), and Urine frequency. SURGICAL HISTORY      has a past surgical history that includes laminectomy (12/2012);  Testicle removal (Left, 1982); Lung removal, partial (Right, 2016); other surgical history (Right, 06/13/2017); back surgery (01/2013); US BIOPSY LYMPH NODE (12/21/2020); and IR PORT PLACEMENT > 5 YEARS (2/24/2021). CURRENT MEDICATIONS       Previous Medications    ATORVASTATIN (LIPITOR) 80 MG TABLET    Take 1 tablet by mouth every evening    CIPROFLOXACIN-DEXAMETHASONE (CIPRODEX) 0.3-0.1 % OTIC SUSPENSION    Place 4 drops into the left ear 2 times daily    CLONAZEPAM (KLONOPIN) 1 MG TABLET    Take 1 tablet by mouth 2 times daily as needed for Anxiety for up to 30 days. DOCUSATE SODIUM (COLACE) 100 MG CAPSULE    Take 1 capsule by mouth 2 times daily as needed for Constipation    FLUOXETINE (PROZAC) 40 MG CAPSULE    take 1 capsule by mouth once daily    IPRATROPIUM-ALBUTEROL (DUONEB) 0.5-2.5 (3) MG/3ML SOLN NEBULIZER SOLUTION    Inhale 3 mLs into the lungs every 6 hours as needed for Shortness of Breath    LEVOTHYROXINE (SYNTHROID) 125 MCG TABLET    Take 1 tablet by mouth daily    LIDOCAINE-PRILOCAINE (EMLA) 2.5-2.5 % CREAM    Apply topically as needed 1 hour prior to lab draws and treatment. LISINOPRIL (PRINIVIL;ZESTRIL) 20 MG TABLET    Take 1 tablet by mouth daily    MEGESTROL (MEGACE) 40 MG/ML SUSPENSION    Take 10 mLs by mouth daily    MORPHINE (MS CONTIN) 15 MG EXTENDED RELEASE TABLET    Take 1 tablet by mouth 2 times daily for 14 days. NALOXONE 4 MG/0.1ML LIQD NASAL SPRAY    1 spray by Nasal route as needed for Opioid Reversal    OMEPRAZOLE (PRILOSEC) 20 MG DELAYED RELEASE CAPSULE    Take 1 capsule by mouth Daily    ONDANSETRON (ZOFRAN ODT) 8 MG TBDP DISINTEGRATING TABLET    Take 1 tablet by mouth every 8 hours as needed for Nausea or Vomiting    OXYCODONE-ACETAMINOPHEN (PERCOCET)  MG PER TABLET    Take 1 tablet by mouth every 6 hours as needed for Pain for up to 30 days.     POLYETHYLENE GLYCOL (GLYCOLAX) 17 GM/SCOOP POWDER    Take 17 g by mouth daily as needed (if still constipated after starting colace)    PREDNISONE (DELTASONE) 10 MG TABLET    take 1 tablet by mouth twice a day    PROCHLORPERAZINE (COMPAZINE) 10 MG TABLET    Take 1 tablet by mouth every 6 hours as needed (nausea/vomiting)    PROMETHAZINE-CODEINE (PHENERGAN WITH CODEINE) 6.25-10 MG/5ML SYRUP    Take 5 mLs by mouth 4 times daily as needed for Cough for up to 30 days. TRAZODONE (DESYREL) 50 MG TABLET    Take 1 tablet by mouth nightly    TRIAMCINOLONE (KENALOG) 0.025 % CREAM    Apply topically 2 times daily. ALLERGIES     is allergic to paclitaxel and emend [fosaprepitant dimeglumine]. FAMILY HISTORY     He indicated that his mother is alive. He indicated that his father is . He indicated that all of his four sisters are alive. He indicated that all of his three brothers are alive. family history includes Cancer in his father and mother; Mental Illness in his brother, brother, sister, sister, sister, and sister; Other in his brother. SOCIAL HISTORY      reports that he has been smoking cigarettes. He has a 40.00 pack-year smoking history. He has never used smokeless tobacco. He reports that he does not drink alcohol and does not use drugs. PHYSICAL EXAM       ED Triage Vitals   BP Temp Temp Source Pulse Resp SpO2 Height Weight   21 1317 21 1319 21 1317 21 1317 21 1317 21 1317 21 1317 21 1317   (!) 151/123 98.2 °F (36.8 °C) Oral 97 20 96 % 6' 4\" (1.93 m) 220 lb (99.8 kg)     Constitutional: Alert, oriented x3, nontoxic, answering questions appropriately, acting properly for age, chronic conversational dyspnea  HEENT: Extraocular muscles intact,  Neck: Trachea midline   Cardiovascular: Regular rhythm and rate no S3, S4, or murmurs   Respiratory: Diminished bilaterally right greater than left no hypoxia. Gastrointestinal: Soft, nontender, nondistended, positive bowel sounds. No rebound, rigidity, or guarding.    Musculoskeletal: No extremity pain or swelling no calf tenderness or asymmetry  Neurologic: Moving all 4 extremities without difficulty there are no gross focal neurologic deficits   Skin: Warm and dry     DIFFERENTIAL DIAGNOSIS/ MDM:     IV and labs. Pain control. CT chest.  Aspirin. EKG    HEART Risk Score:   2 = History   Highly Suspicious   2    Moderately Suspicious   1    Slight Suspicious  0  0 = EKG  Significant ST Depression 2    Nonspecific   1    Normal    0  1 = Age > or = 65   2    > 45 to < 65   1    < or = 45   0  2 = Risk Factors > or = 3   2    1 or 2    1    0    0  0 = Troponin > or = 3 times normal limit 2    > 1 and < 3 times limit  1    Normal    0  5 = Score  0 - 3    Low Risk     4 - 6    Moderate risk     7 - 10    High Risk  * Risk Factors include: Diabetes, Tobacco use, Hypertension, Hyperlipidemia, Family History of CAD and Obesity    PERC Criteria     y = Age      > or = 50  n = Heart Rate     > or = 100  n = Pulse Oximetry     < or = 95%  n = Previous History of DVT   Yes / No  n = Trauma or Surgery within 4 Weeks Yes / No  n = Hemoptysis    Yes / No  n = Exogenous Estrogen use  Yes / No  n = Unilateral Leg Swelling   Yes / No    Risk Criteria for Thoracic Aortic Dissection:     n = Sudden Onset, Maximal at Onset, with radiation to back  n = Neurologic Deficits with Chest Pain  n = Connective Tissue Disorder such as Marfan's or Julieta-Danlos  n = History of Aortic Valvular Disease  n = Pregnancy  n = Family History of Dissection  Elevated D-Dimer with any of the above    DIAGNOSTIC RESULTS     EKG: All EKG's are interpreted by the Emergency Department Physician who either signs or Co-signs this chart in the absence of a cardiologist.    1320 sinus rhythm rate 98    no acute ST or T wave changes. 1513 sinus rhythm rate 74  QRS 98  no acute ST or T wave changes.     Not indicated unless otherwise documented above    LABS:  Results for orders placed or performed during the hospital encounter of 09/21/21 COVID-19, Rapid    Specimen: Nasopharyngeal Swab   Result Value Ref Range    Specimen Description . NASOPHARYNGEAL SWAB     SARS-CoV-2, Rapid Not Detected Not Detected   Troponin   Result Value Ref Range    Troponin, High Sensitivity 8 0 - 22 ng/L    Troponin T NOT REPORTED <0.03 ng/mL    Troponin Interp NOT REPORTED    Troponin   Result Value Ref Range    Troponin, High Sensitivity 6 0 - 22 ng/L    Troponin T NOT REPORTED <0.03 ng/mL    Troponin Interp NOT REPORTED    CBC Auto Differential   Result Value Ref Range    WBC 2.1 (L) 3.5 - 11.0 k/uL    RBC 4.15 (L) 4.5 - 5.9 m/uL    Hemoglobin 14.2 13.5 - 17.5 g/dL    Hematocrit 41.2 41 - 53 %    MCV 99.3 80 - 100 fL    MCH 34.2 (H) 26 - 34 pg    MCHC 34.4 31 - 37 g/dL    RDW 14.0 12.5 - 15.4 %    Platelets 228 (L) 075 - 450 k/uL    MPV 6.8 6.0 - 12.0 fL    NRBC Automated NOT REPORTED per 100 WBC    Differential Type NOT REPORTED     Immature Granulocytes NOT REPORTED 0 %    Absolute Immature Granulocyte NOT REPORTED 0.00 - 0.30 k/uL    WBC Morphology NOT REPORTED     RBC Morphology NOT REPORTED     Platelet Estimate NOT REPORTED     Seg Neutrophils 81 (H) 36 - 66 %    Lymphocytes 8 (L) 24 - 44 %    Monocytes 8 (H) 1 - 7 %    Eosinophils % 3 1 - 4 %    Basophils 0 0 - 2 %    Segs Absolute 1.70 (L) 1.8 - 7.7 k/uL    Absolute Lymph # 0.17 (L) 1.0 - 4.8 k/uL    Absolute Mono # 0.17 0.1 - 0.8 k/uL    Absolute Eos # 0.06 0.0 - 0.4 k/uL    Basophils Absolute 0.00 0.0 - 0.2 k/uL    Morphology Normal    Basic Metabolic Panel   Result Value Ref Range    Glucose 125 (H) 70 - 99 mg/dL    BUN 19 8 - 23 mg/dL    CREATININE 0.81 0.70 - 1.20 mg/dL    Bun/Cre Ratio NOT REPORTED 9 - 20    Calcium 9.0 8.6 - 10.4 mg/dL    Sodium 136 135 - 144 mmol/L    Potassium 4.2 3.7 - 5.3 mmol/L    Chloride 101 98 - 107 mmol/L    CO2 24 20 - 31 mmol/L    Anion Gap 11 9 - 17 mmol/L    GFR Non-African American >60 >60 mL/min    GFR African American >60 >60 mL/min    GFR Comment          GFR Staging NOT REPORTED    Brain Natriuretic Peptide   Result Value Ref Range    Pro-BNP 65 <300 pg/mL    BNP Interpretation Pro-BNP Reference Range:        Not indicated unless otherwise documented above    RADIOLOGY:   I reviewed the radiologist interpretations:    CT CHEST PULMONARY EMBOLISM W CONTRAST   Final Result   Negative for acute pulmonary embolism      Enlarged left hilar lymph nodes      Decrease in size of innumerable pulmonary nodules again representing   metastatic disease      Increased density medially in the superior segment of the right lower lobe. Not indicated unless otherwise documented above    EMERGENCY DEPARTMENT COURSE:     The patient was given the following medications:  Orders Placed This Encounter   Medications    aspirin chewable tablet 324 mg    dexamethasone (DECADRON) injection 10 mg    0.9 % sodium chloride bolus    iopamidol (ISOVUE-370) 76 % injection 75 mL    sodium chloride flush 0.9 % injection 10 mL    sodium chloride (PF) 0.9 % injection     [place]: cabinet override    oxyCODONE-acetaminophen (PERCOCET) 5-325 MG per tablet 2 tablet        Vitals:   -------------------------  BP (!) 121/91   Pulse 77   Temp 98.2 °F (36.8 °C) (Oral)   Resp 18   Ht 6' 4\" (1.93 m)   Wt 99.8 kg (220 lb)   SpO2 96%   BMI 26.78 kg/m²     2:20 PM feeling much better after pain medicine and steroid. Awaiting CAT scan results. Normal troponin. 3:10 PM CT of the chest no pulmonary embolism. Metastatic changes as previously noted. Patient remains pain-free. Moderate risk chest pain. Does not have a cardiologist currently. Will discuss with hospitalist.    3:45 PM discussed with Adelaide Washburn agrees to admission. Awaiting second troponin. Repeat EKG no ST elevations. 5:05 PM repeat troponin negative. Patient admitted. CRITICAL CARE:    None    CONSULTS:    None    PROCEDURES:    None      OARRS Report if indicated             FINAL IMPRESSION      1.  Chest pain, unspecified type          DISPOSITION/PLAN   DISPOSITION Admitted 09/21/2021 03:36:10 PM        CONDITION ON DISPOSITION: STABLE       PATIENT REFERRED TO:  No follow-up provider specified.     DISCHARGE MEDICATIONS:  New Prescriptions    No medications on file       (Please note that portions of this note were completed with a voice recognition program.  Efforts were made to edit the dictations but occasionally words are mis-transcribed.)    Miladis Pickard DO   Attending Emergency Physician      Miladis Pickard DO  09/21/21 [id number]

## 2021-09-22 PROBLEM — R07.9 CHEST PAIN: Status: ACTIVE | Noted: 2021-01-01

## 2021-09-22 NOTE — PLAN OF CARE
Problem: Pain:  Goal: Pain level will decrease  Description: Pain level will decrease  Outcome: Ongoing  Pain scale preformed per protocol and pt treated for pain as documented. Education given. Problem: Coping:  Goal: Ability to cope will improve  Description: Ability to cope will improve  Outcome: Ongoing     Problem:  Activity:  Goal: Ability to tolerate increased activity will improve  Description: Ability to tolerate increased activity will improve  Outcome: Ongoing

## 2021-09-22 NOTE — PROGRESS NOTES
Writer advised pt of diet orders - clear liquids, no caffeine and then NPO at midnight. Pt. Brought partial bottle of regular Pepsi with him upon admission and said. \"well how about I just drink that now and we'll say you didn't tell me until 10 o'clock. \"  Writer advised pt that he came to hospital for our help and that writer was following and advising him of physician orders. Pt. Responded, \"That won't matter. \"  Pt. Complained ER physician told him he could have dinner and he \"never got any\". Writer offered pt. Clear liquid dinner of apple juice, popsicle and jello. Pt accepted and ate. Pt. Did not want phlebotomist to draw labwork stating he already had a line (referring to his peripheral IV in left wrist area). Writer explained to pt. that blood could not be drawn from a peripheral IV & in order to draw from a port would need an order to do so especially with stage IV lung CA. Pt. Then agreeable to allow a peripheral stick lab draw. Writer prepared and administered medication ordered. Pt. Upset, in regards to pain medication states \"has own regimen at home that (he) takes\" & refused but then agreed to take the MS Contin ordered & requested writer address issue with the DrYusuf Because morphine wouldn't work because he takes Percocet at home. Explained to pt. Do have some Percocet PRN ordered. Pt. Stated would take MS Contin now but would need something before midnight because \"pain will be getting worse before midnight\".

## 2021-09-22 NOTE — H&P
left.  He states the pain did radiate through to his back. He reported chronic shortness of breath. He is a current smoker. He states he had a heart cath done about 15 years ago but he does not report any recent stress test.  Patient states he was diagnosed initially cancer in the right lobe and underwent a lobectomy and chemotherapy. Says approximately 1 year ago he was found to have metastasis of that cancer to the left lung and to the lymph nodes and bone. Since that time he has been following with Dr. Ade Carballo and is currently receiving treatment. 2 EKGs were completed while in the ER both showed sinus rhythm. CTA of the chest showed no pulmonary embolus. Metastatic changes are noted. The patient was given a one-time dose of Decadron with relief of his symptoms. Patient does not currently follow with a cardiologist    Risk stratification: LOW per stress test    Patient does want to go home. Chest pain is resolved. Vitals have been stable. Dr Baljeet Georges updated.   Patient encouraged to follow-up with oncology and his PCP      Past Medical History:     Past Medical History:   Diagnosis Date    Anxiety     BiPAP (biphasic positive airway pressure) dependence     Cancer ([address]     Chronic back pain     Clinical trial exam 12/28/2016    COPD (chronic obstructive pulmonary disease) (HCC)     Diabetes (HCC)     Hyperlipidemia     Hypertension     Hypothyroidism     Lung cancer ([address]     Neuropathy     bilat feet    Prolonged emergence from general anesthesia     Sleep apnea     Slow to wake up after anesthesia     SOB (shortness of breath)     Urine frequency         Past Surgical History:     Past Surgical History:   Procedure Laterality Date    BACK SURGERY  01/2013    10 days after lamenectomy, surgery was done in same area to remove Staff infection i    CARDIAC CATHETERIZATION      CARDIOVASCULAR STRESS TEST      IR PORT PLACEMENT EQUAL OR GREATER THAN 5 YEARS  02/24/2021    IR PORT PLACEMENT EQUAL OR GREATER THAN 5 YEARS 2/24/2021 MD THA Alvarez SPECIAL PROCEDURES    LAMINECTOMY  12/2012    LUNG REMOVAL, PARTIAL Right 2016    lower lobe    OTHER SURGICAL HISTORY Right 06/13/2017    CT guided placement of right pleural drainage catheter     TESTICLE REMOVAL Left 1982    US LYMPH NODE BIOPSY  12/21/2020    US LYMPH NODE BIOPSY 12/21/2020 [facility]        Medications Prior to Admission:     Prior to Admission medications    Medication Sig Start Date End Date Taking? Authorizing Provider   morphine (MS CONTIN) 15 MG extended release tablet Take 1 tablet by mouth 2 times daily for 14 days. 9/14/21 9/28/21  OSIEL Holliday CNP   oxyCODONE-acetaminophen (PERCOCET)  MG per tablet Take 1 tablet by mouth every 6 hours as needed for Pain for up to 30 days. 9/21/21 10/21/21  OSIEL Holliday CNP   docusate sodium (COLACE) 100 MG capsule Take 1 capsule by mouth 2 times daily as needed for Constipation 9/14/21 10/14/21  OSIEL Holliday CNP   polyethylene glycol [facility]) 17 GM/SCOOP powder Take 17 g by mouth daily as needed (if still constipated after starting colace) 9/14/21 10/14/21  OSIEL Holliday CNP   traZODone (DESYREL) 50 MG tablet Take 1 tablet by mouth nightly 9/14/21   OSIEL Holliday CNP   naloxone 4 MG/0.1ML LIQD nasal spray 1 spray by Nasal route as needed for Opioid Reversal 9/14/21   OSIEL Holliday CNP   megestrol (MEGACE) 40 MG/ML suspension Take 10 mLs by mouth daily 8/26/21   Ricky Jerome MD   promethazine-codeine (PHENERGAN WITH CODEINE) 6.25-10 MG/5ML syrup Take 5 mLs by mouth 4 times daily as needed for Cough for up to 30 days.  8/26/21 9/25/21  Sariah Hampton MD   prochlorperazine (COMPAZINE) 10 MG tablet Take 1 tablet by mouth every 6 hours as needed (nausea/vomiting) 8/26/21   Sariah Hampton MD   predniSONE (DELTASONE) 10 MG tablet take 1 tablet by mouth twice a day 8/13/21   Josephine Cevallos, APRN - CNP   omeprazole (PRILOSEC) 20 MG delayed release capsule Take 1 capsule by mouth Daily 7/27/21   Martha Hampton MD   levothyroxine (SYNTHROID) 125 MCG tablet Take 1 tablet by mouth daily 5/18/21   Sariah Hampton MD   clonazePAM (KLONOPIN) 1 MG tablet Take 1 tablet by mouth 2 times daily as needed for Anxiety for up to 30 days. 4/28/21 5/28/21  Sariah Hampton MD   triamcinolone (KENALOG) 0.025 % cream Apply topically 2 times daily. 4/13/21   Sariah Hampton MD   FLUoxetine (PROZAC) 40 MG capsule take 1 capsule by mouth once daily 3/23/21   OSIEL Le CNP   lidocaine-prilocaine (EMLA) 2.5-2.5 % cream Apply topically as needed 1 hour prior to lab draws and treatment. 2/25/21   Sariah Hampton MD   atorvastatin (LIPITOR) 80 MG tablet Take 1 tablet by mouth every evening 1/28/21   OSIEL Le CNP   lisinopril (PRINIVIL;ZESTRIL) 20 MG tablet Take 1 tablet by mouth daily 1/28/21   OSIEL Le CNP   ciprofloxacin-dexamethasone (CIPRODEX) 0.3-0.1 % otic suspension Place 4 drops into the left ear 2 times daily 1/28/21   OSIEL Le CNP   ipratropium-albuterol (DUONEB) 0.5-2.5 (3) MG/3ML SOLN nebulizer solution Inhale 3 mLs into the lungs every 6 hours as needed for Shortness of Breath 1/28/21   OSIEL Le CNP   ondansetron (ZOFRAN ODT) 8 MG TBDP disintegrating tablet Take 1 tablet by mouth every 8 hours as needed for Nausea or Vomiting 1/21/21   Chelsea Carty MD        Allergies:     Paclitaxel and Emend [fosaprepitant dimeglumine]    Social History:     Tobacco:    reports that he has been smoking cigarettes. He has a 50.00 pack-year smoking history. He has never used smokeless tobacco.  Alcohol:      reports no history of alcohol use. Drug Use:  reports current drug use. Drug: Marijuana.     Family History:     Family History   Problem Relation Age of Onset    Lung Cancer Mother     Cancer Father     Mental Illness Sister     Mental Illness Sister     Mental Illness Sister     Mental Illness Sister     Mental Illness Brother     Mental Illness Brother     Other Brother        Review of Systems:     Positive and Negative as described in HPI. Review of Systems   Constitutional: Negative for chills, diaphoresis and fever. HENT: Negative for congestion and hearing loss. Respiratory: Positive for shortness of breath. Negative for cough, wheezing and stridor. Cardiovascular: Positive for chest pain. Negative for palpitations and leg swelling. Gastrointestinal: Positive for nausea. Negative for abdominal pain, blood in stool, constipation, diarrhea and vomiting. Genitourinary: Negative for dysuria and frequency. Musculoskeletal: Negative for myalgias. Skin: Negative for rash. Neurological: Negative for dizziness, seizures and headaches. Psychiatric/Behavioral: The patient is not nervous/anxious. Physical Exam:   /79   Pulse 98 Comment: V/O to end test per APRN-CNP  Temp 97.9 °F (36.6 °C) (Oral)   Resp 16   Ht 6' 4\" (1.93 m)   Wt 210 lb 6.4 oz (95.4 kg)   SpO2 98%   BMI 25.61 kg/m²   Temp (24hrs), Av.7 °F (36.5 °C), Min:97.5 °F (36.4 °C), Max:97.9 °F (36.6 °C)    No results for input(s): POCGLU in the last 72 hours. Intake/Output Summary (Last 24 hours) at 2021 1329  Last data filed at 2021 2357  Gross per 24 hour   Intake 330 ml   Output --   Net 330 ml       Physical Exam  Vitals and nursing note reviewed. Constitutional:       Appearance: Normal appearance. HENT:      Mouth/Throat:      Mouth: Mucous membranes are moist.   Eyes:      Extraocular Movements: Extraocular movements intact. Cardiovascular:      Rate and Rhythm: Normal rate and regular rhythm. Pulses: Normal pulses. Heart sounds: Normal heart sounds. Pulmonary:      Effort: Pulmonary effort is normal.      Breath sounds: Normal breath sounds.    Abdominal:      General: Bowel sounds are normal.      Palpations: Abdomen is soft. Musculoskeletal:         General: Normal range of motion. Cervical back: Neck supple. Skin:     General: Skin is warm and dry. Capillary Refill: Capillary refill takes less than 2 seconds. Neurological:      Mental Status: He is alert and oriented to person, place, and time. Psychiatric:         Mood and Affect: Mood normal.         Investigations:      Laboratory Testing:  Recent Results (from the past 24 hour(s))   EKG 12 Lead    Collection Time: 09/21/21  3:13 PM   Result Value Ref Range    Ventricular Rate 74 BPM    Atrial Rate 74 BPM    P-R Interval 152 ms    QRS Duration 98 ms    Q-T Interval 384 ms    QTc Calculation (Bazett) 426 ms    P Axis 28 degrees    R Axis 19 degrees    T Axis 47 degrees   COVID-19, Rapid    Collection Time: 09/21/21  3:20 PM    Specimen: Nasopharyngeal Swab   Result Value Ref Range    Specimen Description . NASOPHARYNGEAL SWAB     SARS-CoV-2, Rapid Not Detected Not Detected   Troponin    Collection Time: 09/21/21  4:30 PM   Result Value Ref Range    Troponin, High Sensitivity 6 0 - 22 ng/L    Troponin T NOT REPORTED <0.03 ng/mL    Troponin Interp NOT REPORTED    Troponin    Collection Time: 09/21/21  9:14 PM   Result Value Ref Range    Troponin, High Sensitivity <6 0 - 22 ng/L    Troponin T NOT REPORTED <0.03 ng/mL    Troponin Interp NOT REPORTED    Hemoglobin A1C    Collection Time: 09/21/21  9:14 PM   Result Value Ref Range    Hemoglobin A1C 6.3 (H) 4.0 - 6.0 %    Estimated Avg Glucose 134 mg/dL   Troponin    Collection Time: 09/22/21  1:00 AM   Result Value Ref Range    Troponin, High Sensitivity <6 0 - 22 ng/L    Troponin T NOT REPORTED <0.03 ng/mL    Troponin Interp NOT REPORTED    Comprehensive Metabolic Panel w/ Reflex to MG    Collection Time: 09/22/21  5:05 AM   Result Value Ref Range    Glucose 130 (H) 70 - 99 mg/dL    BUN 20 8 - 23 mg/dL    CREATININE 0.86 0.70 - 1.20 mg/dL    Bun/Cre Ratio NOT REPORTED 9 pulmonary nodules again representing metastatic disease Increased density medially in the superior segment of the right lower lobe. Assessment :      Hospital Problems         Last Modified POA    * (Principal) Atypical chest pain 9/21/2021 Yes    Diabetes ([facility] 9/21/2021 Yes    Hypertension 9/21/2021 Yes    Hypothyroidism 9/22/2021 Yes    Hyperlipidemia 9/21/2021 Yes    COPD (chronic obstructive pulmonary disease) ([facility] 9/21/2021 Yes    Sleep apnea 9/21/2021 Yes    [facility] 9/21/2021 Yes    Bone metastasis ([facility] 9/21/2021 Yes    BiPAP (biphasic positive airway pressure) dependence 9/21/2021 Yes          Plan:     Patient status observation in the Med/Surge    Atypical chest pain; troponins flat and negative. Stress test showed low stratification. Plan to discharge home with follow-up to PCP. Patient was currently on a statin but not taking. After discussion he understands importance of taking his statin. Also discharging on baby aspirin daily. Oncology aware    Patient has a history of diabetes per the chart. Hemoglobin A1c 6.3. Patient encouraged to follow-up with PCP regarding results    History of hypothyroidism; continue home Synthroid. TSH 11.45 which is down from his previous labs in August of this year at 57.17.   Patient encouraged to follow-up with his endocrinologist    Patient encouraged to be compliant with BiPAP related to history of sleep apnea    History of hypertension; continue lisinopril    Continue home dose of Prozac, MS Contin, Protonix, and Percocet    Consultations:   OSIEL Boothe CNP  9/22/2021  1:29 PM    Copy sent to OSIEL Way CNP

## 2021-09-22 NOTE — PLAN OF CARE
Problem: Coping:  Goal: Ability to cope will improve  Description: Ability to cope will improve  Outcome: Ongoing   Pt. Acknowledges diagnosis, however, further education recommended  Problem: Health Behavior:  Goal: Adoption of positive health habits will improve  Description: Adoption of positive health habits will improve  Outcome: Ongoing   Pt.  Acknowledges diagnosis, however, further education recommended  Problem: Safety:  Goal: Ability to remain free from injury will improve  Description: Ability to remain free from injury will improve  Outcome: Ongoing   No falls/injuries this shift, bed in lowest position, brakes on, bed alarm on, call light in reach, side rails up x2   Problem: Sensory:  Goal: Ability to develop a pain control plan will improve  Description: Ability to develop a pain control plan will improve  Outcome: Ongoing   pain rating <8-9 on scale 0-10, pain fairly well controlled with medication/repositioning

## 2021-09-22 NOTE — PROCEDURES
[facility]                [id number] [address] [place], [facility] [address]                              CARDIAC STRESS TEST    PATIENT NAME: Gigi FERNANDES                    :        1960  MED REC NO:   [id number]                             ROOM:       ER07  ACCOUNT NO:   [de-identified]                           ADMIT DATE: 2021  PROVIDER:     Caro Padron    CARDIOVASCULAR DIAGNOSTIC DEPARTMENT    DATE OF STUDY:  2021    ORDERING PROVIDER:  Jacqueline Chandler    PRIMARY CARE PROVIDER:  Wong Barnes    INTERPRETING PHYSICIAN:  PAOLA CHO    TEST TYPE: LEXISCAN CARDIOLYTE STRESS TEST  INDICATION: CHEST PAIN    RESTING HEART RATE: 89 bpm  RESTING BLOOD PRESSURE: 140/94 mmHg  PEAK HEART RATE: 118 bpm  PEAK BLOOD PRESSURE: 140/94 mmHg    MEDICATION(S) GIVEN: 0.4 mg IV LEXISCAN. PO CAFFEINE. REASON FOR TERMINATION: MEDICATION INFUSION COMPLETE  SYMPTOMS: NAUSEA POST INJECTION. RESOLVED IN RECOVERY. RESTING EKG: NORMAL. STRESS HEART RESPONSE: NORMAL RESPONSE.  BLOOD PRESSURE RESPONSE: APPROPRIATE. STRESS EKGs: NORMAL. CHEST DISCOMFORT: NO PAIN DURING STRESS. NO PAIN DURING RECOVERY. ISCHEMIC EKG CHANGES: NONE.    EKG IMPRESSION: NEGATIVE ELECTROCARDIOGRAPHICALLY LEXISCAN STRESS TEST. NUCLEAR SCAN TO FOLLOW.       Monica Falcon    D: 2021 11:59:14       T: 2021 12:07:14     AK/JNISSEN  Job#: [id number]     Doc#: Unknown    CC:    (Retain this field even if not dictated or not decipherable)

## 2021-09-22 NOTE — PROGRESS NOTES
Patient present for lexiscan stress test. Educated on procedure. All questions/concerns addressed. Allergies and medication reviewed. Patient prepped for procedure. Stress test will be supervised by ANDRE Lazcano.

## 2021-09-22 NOTE — PROGRESS NOTES
Lexiscan stress test completed and reviewed by ANDRE Landers. Patient experienced nausea and dizziness. Otherwise, tolerated test well. PO caffeine provided, and symptoms resolved in recovery. VS returned to baseline, see flow sheets for documented VS throughout procedure. Patient transported to PET scan waiting room to wait for further N/M imaging.

## 2021-09-22 NOTE — FLOWSHEET NOTE
Situation:  Writer encountered and visited with Mr. Carlotta Medina outside his room and then returned to his room where his Significant Other, Gerri Rangel, was. Assessment:  Mr. Carlotta Medina was outside his room. He greeted writer who was in the hallway. He shared the reason he was admitted to the hospital. He expressed hopes of being able to go home today. He asked for a blanket for his girl friend. Writer returned with a blanket to Patient's room and gave blanket to Significant Other, Gerri Rangel. Patient expressed gratitude for the support of his significant other who acknowledged his words. Patient noted how he and writer met and thanked writer for her support. Intervention:  Writer greeted Patient and inquired about his coping and needs; offered words of support and encouragement to Pt and Significant Other; gave blanket to Significant Other; wished Pt and Significant Other well. Outcome:  Patient voiced his hopes of being able to go home. He shared that he is waiting for test results to see about his treatment plan. He thanked writer. Plan:  Writer will provide follow up support and visits to Patient as needed.         09/22/21 1618   Encounter Summary   Services provided to: Patient;Patient and family together   Referral/Consult From: [facility] Family members;Significant other;Children   Continue Visiting   (9/22/21)   Complexity of Encounter Moderate   Length of Encounter 15 minutes   Spiritual Assessment Completed Yes   Routine   Type Follow up   Spiritual/Sabianism   Type Spiritual support   Assessment Approachable;Coping   Intervention Active listening;Explored feelings, thoughts, concerns;Explored coping resources;Sustaining presence/ Ministry of presence   Outcome Expressed gratitude;Coping;Expressed feelings/needs/concerns;Engaged in conversation;Receptive     Electronically signed by Bertie Dakins, Oncology Outpatient [address] [facility] Oncology  9/22/2021  4:22 PM

## 2021-09-22 NOTE — DISCHARGE SUMMARY
[facility]  Office: [address], DO, Johnblake Romero, DO, Melvina Levi, DO, Emmy Rios, DO, Remy Smith MD, Shira Serna MD, Darryle So, MD, Megan Hart MD, Narciso Clemons MD, Ad Ho MD, Di Peoples MD, Chelsea Espinoza, DO, Vickie Delacruz DO, Franco Dupont MD,  Rupa Dillon DO, Brenden Hernandez MD, Waylon Hassan MD, Trevor Parker MD, Ortiz Menchaca MD, , Jacob Dominguez MD, Iliana Nichols MD, Elizabeth Cotter MD, Reva Nunez, [facility], [facility], CNP, Charles Veras, CNP, Arvind Adams, CNS, Sathish Morris, CNP, Felipe Rosado, CNP, Carloz Pandya, CNP, Lloyd Wilder, CNP, Hardeep Omer, CNP, Jackie Sims PA-C, Johnie Payne, CALLUM, Jovanny Wallace, CNP, Alma Rubio, CNP, Kuldip Smith, CNP, Olegario Nails, CNP, Lianna Flores, CNP, Tawana Rubinstein, CNP, Roderick Delcid, CNP, Leobardo Wood [address]    Discharge Summary     Patient ID: Kj Sanz  :  1960   MRN: [id number]     ACCOUNT:  [de-identified]   Patient's PCP: OSIEL Angeles CNP  Admit Date: 2021   Discharge Date: 2021   Length of Stay: 0  Code Status:  Full Code  Admitting Physician: Di Peoples MD  Discharge Physician: OSIEL Gant CNP     Active Discharge Diagnoses:     Hospital Problem Lists:  Principal Problem:    Atypical chest pain  Active Problems:    Diabetes [facility])    Hypertension    Hypothyroidism    Hyperlipidemia    COPD (chronic obstructive pulmonary disease) (HCC)    Sleep apnea    [facility]    Bone metastasis (HCC)    BiPAP (biphasic positive airway pressure) dependence  Resolved Problems:    * No resolved hospital problems. *      Admission Condition:  fair     Discharged Condition: stable    Hospital Stay:     Hospital Course:  Kj Sanz is a 61 y.o.  Non- / non  male who presents with Chest Pain (stage 4 lung ca hx)   and is admitted to the hospital for the management of Atypical chest pain.     Patient presented to ER with complaints of left-sided chest pain that started in the morning when he got up and persistently got worse throughout the day with mild nausea. He states the pain is reproducible with palpation with any movement. Reports a history of lung cancer with a partial lobectomy on the right in the past and radiation to nodules on the left. He states the pain did radiate through to his back. He reported chronic shortness of breath. He is a current smoker. He states he had a heart cath done about 15 years ago but he does not report any recent stress test.  Patient states he was diagnosed initially cancer in the right lobe and underwent a lobectomy and chemotherapy. Says approximately 1 year ago he was found to have metastasis of that cancer to the left lung and to the lymph nodes and bone. Since that time he has been following with Dr. Dustin Kunz and is currently receiving treatment.      2 EKGs were completed while in the ER both showed sinus rhythm. CTA of the chest showed no pulmonary embolus. Metastatic changes are noted. The patient was given a one-time dose of Decadron with relief of his symptoms.      Patient does not currently follow with a cardiologist     Risk stratification: LOW per stress test     Patient does want to go home. Chest pain is resolved. Vitals have been stable. Dr Jose Antonio Mejia updated. Patient encouraged to follow-up with oncology and his PCP. Patient strongly encouraged to stop smoking.   Nicotine patch ordered to discharge        Significant therapeutic interventions: See above    Significant Diagnostic Studies:   Labs / Micro:  CBC:   Lab Results   Component Value Date    WBC 2.1 09/22/2021    RBC 3.80 09/22/2021    HGB 13.1 09/22/2021    HCT 37.7 09/22/2021    MCV 99.0 09/22/2021    MCH 34.3 09/22/2021    MCHC 34.7 09/22/2021    RDW 13.9 09/22/2021    PLT 91 09/22/2021     BMP:    Lab Results   Component Value Date GLUCOSE 130 09/22/2021     09/22/2021    K 4.2 09/22/2021    CL 99 09/22/2021    CO2 22 09/22/2021    ANIONGAP 10 09/22/2021    BUN 20 09/22/2021    CREATININE 0.86 09/22/2021    BUNCRER NOT REPORTED 09/22/2021    CALCIUM 8.6 09/22/2021    LABGLOM >60 09/22/2021    GFRAA >60 09/22/2021    GFR      09/22/2021    GFR NOT REPORTED 09/22/2021     HFP:    Lab Results   Component Value Date    PROT 7.6 09/22/2021     FLP:    Lab Results   Component Value Date    CHOL 309 09/22/2021    TRIG 84 09/22/2021    HDL 47 09/22/2021     TSH:    Lab Results   Component Value Date    TSH 11.45 09/22/2021        Radiology:  CT CHEST PULMONARY EMBOLISM W CONTRAST    Result Date: 9/21/2021  Negative for acute pulmonary embolism Enlarged left hilar lymph nodes Decrease in size of innumerable pulmonary nodules again representing metastatic disease Increased density medially in the superior segment of the right lower lobe. NM MYOCARDIAL SPECT REST EXERCISE OR RX    Result Date: 9/22/2021  Perfusion:  No significant reversible or fixed perfusion defects. Function:  Normal left ventricular ejection fraction is 65%. No wall motion abnormalities. Risk stratification: LOW Notes concerning risk stratification: Risk stratification incorporates both clinical history and some testing results. Final risk determination is the responsibility of the ordering provider as other patient information and test results may increase or decrease the risk assessment reported for this examination. Risk stratification criteria are adapted from \"Noninvasive Risk Stratification\" criteria from Pulmaggie Tovar. Al, ACC/AATS/AHA/ASE/ASNC/SCAI/SCCT/STS 2017 Appropriate Use Criteria For Coronary Revascularization in Patients With Stable Ischemic Heart Disease [facility] Volume 69, Issue 17, May 2017 High risk (>3% annual death or MI) 1. Severe resting LV dysfunction (LVEF <35%) not readily explained by non coronary causes 2.  Resting perfusion abnormalities greater than cholesterol    Diet: Low-fat low-cholesterol diet. Avoid high carbohydrate meals    Activity: As tolerated    Instructions to Patient:   Follow up with PCP in one week  Take medications as instructed  Call 911 or return to the ER if you experience a recurrence of your symptoms or start having fever, chills, chest pain or shortness of breath. Discharge Medications:      Medication List      START taking these medications    aspirin 81 MG chewable tablet  Take 1 tablet by mouth daily  Start taking on: September 23, 2021     nicotine 21 MG/24HR  Commonly known as: [address] [facility] 1 patch onto the skin daily  Start taking on: September 23, 2021        CONTINUE taking these medications    atorvastatin 80 MG tablet  Commonly known as: LIPITOR  Take 1 tablet by mouth every evening     ciprofloxacin-dexamethasone 0.3-0.1 % otic suspension  Commonly known as: Ciprodex  Place 4 drops into the left ear 2 times daily     clonazePAM 1 MG tablet  Commonly known as: KLONOPIN  Take 1 tablet by mouth 2 times daily as needed for Anxiety for up to 30 days. docusate sodium 100 MG capsule  Commonly known as: COLACE  Take 1 capsule by mouth 2 times daily as needed for Constipation     FLUoxetine 40 MG capsule  Commonly known as: PROZAC  take 1 capsule by mouth once daily     ipratropium-albuterol 0.5-2.5 (3) MG/3ML Soln nebulizer solution  Commonly known as: DUONEB  Inhale 3 mLs into the lungs every 6 hours as needed for Shortness of Breath     levothyroxine 125 MCG tablet  Commonly known as: SYNTHROID  Take 1 tablet by mouth daily     lidocaine-prilocaine 2.5-2.5 % cream  Commonly known as: EMLA  Apply topically as needed 1 hour prior to lab draws and treatment.      lisinopril 20 MG tablet  Commonly known as: PRINIVIL;ZESTRIL  Take 1 tablet by mouth daily     megestrol 40 MG/ML suspension  Commonly known as: MEGACE  Take 10 mLs by mouth daily     morphine 15 MG extended release tablet  Commonly known as: MS CONTIN  Take 1 tablet by mouth 2 times daily for 14 days. naloxone 4 MG/0.1ML Liqd nasal spray  1 spray by Nasal route as needed for Opioid Reversal     omeprazole 20 MG delayed release capsule  Commonly known as: PriLOSEC  Take 1 capsule by mouth Daily     ondansetron 8 MG Tbdp disintegrating tablet  Commonly known as: Zofran ODT  Take 1 tablet by mouth every 8 hours as needed for Nausea or Vomiting     oxyCODONE-acetaminophen  MG per tablet  Commonly known as: PERCOCET  Take 1 tablet by mouth every 6 hours as needed for Pain for up to 30 days. polyethylene glycol 17 GM/SCOOP powder  Commonly known as: GLYCOLAX  Take 17 g by mouth daily as needed (if still constipated after starting colace)     prochlorperazine 10 MG tablet  Commonly known as: COMPAZINE  Take 1 tablet by mouth every 6 hours as needed (nausea/vomiting)     promethazine-codeine 6.25-10 MG/5ML syrup  Commonly known as: PHENERGAN with CODEINE  Take 5 mLs by mouth 4 times daily as needed for Cough for up to 30 days. traZODone 50 MG tablet  Commonly known as: DESYREL  Take 1 tablet by mouth nightly     triamcinolone 0.025 % cream  Commonly known as: KENALOG  Apply topically 2 times daily. STOP taking these medications    predniSONE 10 MG tablet  Commonly known as: Araceli Limerick           Where to Get Your Medications      These medications were sent to [address] [place] [address] [phone] - F [address] [place] [id number]    Phone: [phone]   · aspirin 81 MG chewable tablet  · nicotine 21 MG/24HR         No discharge procedures on file. Time Spent on discharge is  31 mins in patient examination, evaluation, counseling as well as medication reconciliation, prescriptions for required medications, discharge plan and follow up.     Electronically signed by   OSIEL Ambriz CNP  9/22/2021  7:15 PM      Thank you OSIEL Manuel - CNP for the opportunity to be involved in this patient's care.

## 2021-09-22 NOTE — CONSULTS
Today's Date: 9/22/2021  Patient Name: Fe Pickard  Date of admission: 9/21/2021  1:11 PM  Patient's age: 61 y.o., 1960  Admission Dx: Atypical chest pain [R07.89]  Chest pain, unspecified type [R07.9]    Reason for Consult: management recommendations  Requesting Physician: Chiquis Watson MD    CHIEF COMPLAINT: Left-sided chest pain. History Obtained From:  patient, electronic medical record    HISTORY OF PRESENT ILLNESS:      The patient is a 61 y.o.  male who is admitted to the hospital for chief complaints of left-sided chest painAdmitted to the hospital.  Chest pain is reproducible. Work-up done including CT PE was negative for pulmonary embolism. EKG does not show any new changes. Troponin is negative. Per verbal report stress test is also negative. 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 Patient underwent right lower lobe lobectomy in September 2016 this was followed by adjuvant chemotherapy using cisplatin and Alimta which he completed in February 2017. Patient unfortunately developed progressive disease with bone metastasis and was started on carboplatin Alimta and Avastin. Patient cycle 3-day 1 was on 9/14/2021. Today is day #8 of cycle 3. Patient lab work-up shows hemoglobin of 13.1 platelet count 91 and WBC count 2.1. Yesterday A1c was 1.7. Patient also noted to have mild hyponatremia with sodium of 131. TSH is elevated with low free T4.    CT scan showed decrease in size of pulmonary nodules however there is increased density medially in the superior segment of the right lower lobe. There is no significant change in the mediastinal lymph node. However left hilar lymph nodes appear larger.     Past Medical History:   has a past medical history of Anxiety, BiPAP (biphasic positive airway pressure) dependence, Cancer (HCC), Chronic back pain, Clinical trial exam, COPD (chronic obstructive pulmonary disease) ([facility] Diabetes ([facility] [address] Hyperlipidemia, Hypertension, Hypothyroidism, Lung cancer ([facility] Neuropathy, Prolonged emergence from general anesthesia, Sleep apnea, Slow to wake up after anesthesia, SOB (shortness of breath), and Urine frequency. Past Surgical History:   has a past surgical history that includes laminectomy (12/2012); Testicle removal (Left, 1982); Lung removal, partial (Right, 2016); other surgical history (Right, 06/13/2017); back surgery (01/2013); US BIOPSY LYMPH NODE (12/21/2020); IR PORT PLACEMENT > 5 YEARS (02/24/2021); Cardiac catheterization; and cardiovascular stress test.     Medications:    Prior to Admission medications    Medication Sig Start Date End Date Taking? Authorizing Provider   aspirin 81 MG chewable tablet Take 1 tablet by mouth daily 9/23/21  Yes OSIEL Dubon CNP   nicotine (NICODERM CQ) 21 MG/24HR Place 1 patch onto the skin daily 9/23/21  Yes OSIEL Dubon CNP   morphine (MS CONTIN) 15 MG extended release tablet Take 1 tablet by mouth 2 times daily for 14 days. 9/14/21 9/28/21  OSIEL Martinez CNP   oxyCODONE-acetaminophen (PERCOCET)  MG per tablet Take 1 tablet by mouth every 6 hours as needed for Pain for up to 30 days.  9/21/21 10/21/21  OSIEL Martinez CNP   docusate sodium (COLACE) 100 MG capsule Take 1 capsule by mouth 2 times daily as needed for Constipation 9/14/21 10/14/21  OSIEL Martinez CNP   polyethylene glycol [facility]) 17 GM/SCOOP powder Take 17 g by mouth daily as needed (if still constipated after starting colace) 9/14/21 10/14/21  OSIEL Martinez CNP   traZODone (DESYREL) 50 MG tablet Take 1 tablet by mouth nightly 9/14/21   OSIEL Martinez CNP   naloxone 4 MG/0.1ML LIQD nasal spray 1 spray by Nasal route as needed for Opioid Reversal 9/14/21   OSIEL Martinez CNP   megestrol (MEGACE) 40 MG/ML suspension Take 10 mLs by mouth daily 8/26/21   Brisa Bella MD   [facility]INE) 6.25-10 MG/5ML syrup Take 5 mLs by mouth 4 times daily as needed for Cough for up to 30 days. 8/26/21 9/25/21  Sariah Hampton MD   prochlorperazine (COMPAZINE) 10 MG tablet Take 1 tablet by mouth every 6 hours as needed (nausea/vomiting) 8/26/21   Sariah Hampton MD   omeprazole (PRILOSEC) 20 MG delayed release capsule Take 1 capsule by mouth Daily 7/27/21   Rl Sill MD Berta   levothyroxine (SYNTHROID) 125 MCG tablet Take 1 tablet by mouth daily 5/18/21   Sariah Hampton MD   clonazePAM (KLONOPIN) 1 MG tablet Take 1 tablet by mouth 2 times daily as needed for Anxiety for up to 30 days. 4/28/21 5/28/21  Sariah Hampton MD   triamcinolone (KENALOG) 0.025 % cream Apply topically 2 times daily. 4/13/21   Sariah Hampton MD   FLUoxetine (PROZAC) 40 MG capsule take 1 capsule by mouth once daily 3/23/21   OSIEL Moon CNP   lidocaine-prilocaine (EMLA) 2.5-2.5 % cream Apply topically as needed 1 hour prior to lab draws and treatment.  2/25/21   Sariah Hampton MD   atorvastatin (LIPITOR) 80 MG tablet Take 1 tablet by mouth every evening 1/28/21   OSIEL Moon CNP   lisinopril (PRINIVIL;ZESTRIL) 20 MG tablet Take 1 tablet by mouth daily 1/28/21   OSIEL Moon CNP   ciprofloxacin-dexamethasone (CIPRODEX) 0.3-0.1 % otic suspension Place 4 drops into the left ear 2 times daily 1/28/21   OSIEL Moon CNP   ipratropium-albuterol (DUONEB) 0.5-2.5 (3) MG/3ML SOLN nebulizer solution Inhale 3 mLs into the lungs every 6 hours as needed for Shortness of Breath 1/28/21   OSIEL Moon CNP   ondansetron (ZOFRAN ODT) 8 MG TBDP disintegrating tablet Take 1 tablet by mouth every 8 hours as needed for Nausea or Vomiting 1/21/21   Rl Hampton MD     Current Facility-Administered Medications   Medication Dose Route Frequency Provider Last Rate Last Admin    sodium chloride flush 0.9 % injection 10 mL  10 mL IntraVENous PRN Anabell Dave, APRN - CNP   10 mL at 09/22/21 0945    levothyroxine (SYNTHROID) tablet 125 mcg  125 mcg Oral Daily Anabell Dave, APRN - CNP   125 mcg at 09/22/21 0830    sodium chloride flush 0.9 % injection 10 mL  10 mL IntraVENous PRN Jonathon Zimmerman DO   10 mL at 09/21/21 1427    atorvastatin (LIPITOR) tablet 80 mg  80 mg Oral QPM Anabell Dave, APRN - CNP   80 mg at 09/22/21 1708    FLUoxetine (PROZAC) capsule 40 mg  40 mg Oral Daily Anabell Dave, APRN - CNP   40 mg at 09/22/21 2679    lisinopril (PRINIVIL;ZESTRIL) tablet 20 mg  20 mg Oral Daily Anabell Dave, APRN - CNP   20 mg at 09/22/21 4175    pantoprazole (PROTONIX) tablet 40 mg  40 mg Oral QAM AC Anabell Dave, APRN - CNP   40 mg at 09/22/21 5933    morphine (MS CONTIN) extended release tablet 15 mg  15 mg Oral BID Anabell Dave, APRN - CNP   15 mg at 09/22/21 1303    polyethylene glycol (GLYCOLAX) packet 17 g  17 g Oral Daily PRN Anabell Dave APRN - CNP        traZODone (DESYREL) tablet 50 mg  50 mg Oral Nightly Anabell Dave, APRN - CNP   50 mg at 09/21/21 2258    sodium chloride flush 0.9 % injection 5-40 mL  5-40 mL IntraVENous 2 times per day Anabell Dave APRN - CNP   10 mL at 09/22/21 0855    sodium chloride flush 0.9 % injection 10 mL  10 mL IntraVENous PRN Anabell Dave, APRN - CNP        0.9 % sodium chloride infusion  25 mL IntraVENous PRN Anabell Dave, APRN - CNP        ondansetron (ZOFRAN-ODT) disintegrating tablet 4 mg  4 mg Oral Q8H PRN Anabell Dave, APRN - CNP        Or    ondansetron [facility]) injection 4 mg  4 mg IntraVENous Q6H PRN Anabell Dave, APRN - CNP        acetaminophen (TYLENOL) tablet 650 mg  650 mg Oral Q6H PRN OSIEL Jimenez CNP        Or    acetaminophen (TYLENOL) suppository 650 mg  650 mg Rectal Q6H PRN OSIEL Jimenez CNP        magnesium hydroxide (MILK OF MAGNESIA) 400 MG/5ML suspension 30 mL  30 mL Oral Daily PRN OSIEL Jimenez CNP        enoxaparin (LOVENOX) injection 40 mg  40 mg SubCUTAneous Daily OSIEL Camacho - CNP   40 mg at 09/22/21 3041    potassium chloride (KLOR-CON M) extended release tablet 40 mEq  40 mEq Oral PRN Zofia Wakefield, APRN - CNP        Or    potassium bicarb-citric acid (EFFER-K) effervescent tablet 40 mEq  40 mEq Oral PRN Zofia Wakefield, OSIEL - CNP        Or    potassium chloride 10 mEq/100 mL IVPB (Peripheral Line)  10 mEq IntraVENous PRN Zofia Wakefield, APRN - CNP        potassium chloride 10 mEq/100 mL IVPB (Peripheral Line)  10 mEq IntraVENous PRN Zofia Wakefield, OSIEL - CNP        magnesium sulfate 1000 mg in dextrose 5% 100 mL IVPB  1,000 mg IntraVENous PRN OSIEL Camacho - CNP        nitroGLYCERIN (NITROSTAT) SL tablet 0.4 mg  0.4 mg SubLINGual Q5 Min PRN OSIEL Camacho - CNP        nicotine (NICODERM CQ) 21 MG/24HR 1 patch  1 patch TransDERmal Daily Zofia Wakefield APRN - CNP   1 patch at 09/22/21 4777    aspirin chewable tablet 81 mg  81 mg Oral Daily OSIEL Camacho - CNP   81 mg at 09/22/21 0823    glucose (GLUTOSE) 40 % oral gel 15 g  15 g Oral PRN OSIEL aCmacho - CNP        dextrose 50 % IV solution  12.5 g IntraVENous PRN OSIEL Camacho - CNP        glucagon (rDNA) injection 1 mg  1 mg IntraMUSCular PRN OSIEL Camacho - CNP        dextrose 5 % solution  100 mL/hr IntraVENous PRN Zofia Wakefield, APRN - CNP        oxyCODONE-acetaminophen (PERCOCET) 5-325 MG per tablet 1 tablet  1 tablet Oral Q6H PRN OSIEL Camacho - CNP   1 tablet at 09/22/21 1444    And    oxyCODONE (ROXICODONE) immediate release tablet 5 mg  5 mg Oral Q6H PRN Zofia Wakefield, APRN - CNP   5 mg at 09/22/21 1444    ipratropium-albuterol (DUONEB) nebulizer solution 3 mL  1 vial Inhalation 4x Daily OSIEL Lovett CNP   3 mL at 09/22/21 1979    ipratropium-albuterol (DUONEB) nebulizer solution 1 ampule  1 ampule Inhalation Q6H PRN OSIEL Lovett CNP           Allergies:  Paclitaxel and Emend [fosaprepitant dimeglumine]    Social History:   reports that he has been smoking cigarettes. He has a 50.00 pack-year smoking history. He has never used smokeless tobacco. He reports current drug use. Drug: Marijuana. He reports that he does not drink alcohol. Family History: family history includes Cancer in his father; Gracia Booker in his mother; Mental Illness in his brother, brother, sister, sister, sister, and sister; Other in his brother. REVIEW OF SYSTEMS:      Constitutional: No fever or chills. No night sweats, no weight loss   Eyes: No eye discharge, double vision, or eye pain   HEENT: negative for sore mouth, sore throat, hoarseness and voice change   Respiratory: negative for  sputum, dyspnea, wheezing, hemoptysis, chest pain. Positive cough  Cardiovascular: negative for chest pain, dyspnea, palpitations, orthopnea, PND   Gastrointestinal: negative for nausea, vomiting, diarrhea, constipation, abdominal pain, Dysphagia, hematemesis and hematochezia   Genitourinary: negative for frequency, dysuria, nocturia, urinary incontinence, and hematuria   Integument: negative for rash, skin lesions, bruises.    Hematologic/Lymphatic: negative for easy bruising, bleeding, lymphadenopathy, or petechiae   Endocrine: negative for heat or cold intolerance,weight changes, change in bowel habits and hair loss   Musculoskeletal: negative for myalgias, arthralgias, pain, joint swelling,and bone pain   Neurological: negative for headaches, dizziness, seizures, weakness, numbness    PHYSICAL EXAM:        /78   Pulse 80   Temp 97.5 °F (36.4 °C) (Oral)   Resp 16   Ht 6' 4\" (1.93 m)   Wt 210 lb 6.4 oz (95.4 kg)   SpO2 97%   BMI 25.61 kg/m²    Temp (24hrs), Av.7 °F (36.5 °C), Min:97.5 °F (36.4 °C), Max:97.9 °F (36.6 °C)      General appearance - well appearing, no in pain or distress   Mental status - alert and cooperative   Eyes - pupils equal and reactive, extraocular eye movements intact   Ears - bilateral TM's and external ear canals normal   Mouth - mucous membranes moist, pharynx normal without lesions   Neck - supple, no significant adenopathy   Lymphatics - no palpable lymphadenopathy, no hepatosplenomegaly   Chest -decreased breathing sounds bilaterally  Heart - normal rate, regular rhythm, normal S1, S2, no murmurs  Abdomen - soft, nontender, nondistended, no masses or organomegaly   Neurological - alert, oriented, normal speech, no focal findings or movement disorder noted   Musculoskeletal - no joint tenderness, deformity or swelling   Extremities - peripheral pulses normal, no pedal edema, no clubbing or cyanosis   Skin - normal coloration and turgor, no rashes, no suspicious skin lesions noted ,      DATA:      Labs:     Results for orders placed or performed during the hospital encounter of 09/21/21   COVID-19, Rapid    Specimen: Nasopharyngeal Swab   Result Value Ref Range    Specimen Description . NASOPHARYNGEAL SWAB     SARS-CoV-2, Rapid Not Detected Not Detected   Troponin   Result Value Ref Range    Troponin, High Sensitivity 8 0 - 22 ng/L    Troponin T NOT REPORTED <0.03 ng/mL    Troponin Interp NOT REPORTED    Troponin   Result Value Ref Range    Troponin, High Sensitivity 6 0 - 22 ng/L    Troponin T NOT REPORTED <0.03 ng/mL    Troponin Interp NOT REPORTED    CBC Auto Differential   Result Value Ref Range    WBC 2.1 (L) 3.5 - 11.0 k/uL    RBC 4.15 (L) 4.5 - 5.9 m/uL    Hemoglobin 14.2 13.5 - 17.5 g/dL    Hematocrit 41.2 41 - 53 %    MCV 99.3 80 - 100 fL    MCH 34.2 (H) 26 - 34 pg    MCHC 34.4 31 - 37 g/dL    RDW 14.0 12.5 - 15.4 %    Platelets 976 (L) 467 - 450 k/uL    MPV 6.8 6.0 - 12.0 fL    NRBC Automated NOT REPORTED per 100 WBC    Differential Type NOT REPORTED     Immature Granulocytes NOT REPORTED 0 %    Absolute Immature Granulocyte NOT REPORTED 0.00 - 0.30 k/uL    WBC Morphology NOT REPORTED     RBC Morphology NOT REPORTED     Platelet Estimate NOT REPORTED     Seg Neutrophils 81 (H) 36 - 66 %    Lymphocytes 8 (L) 24 - 44 %    Monocytes 8 (H) 1 - 7 %    Eosinophils % 3 1 - 4 %    Basophils 0 0 - 2 %    Segs Absolute 1.70 (L) 1.8 - 7.7 k/uL    Absolute Lymph # 0.17 (L) 1.0 - 4.8 k/uL    Absolute Mono # 0.17 0.1 - 0.8 k/uL    Absolute Eos # 0.06 0.0 - 0.4 k/uL    Basophils Absolute 0.00 0.0 - 0.2 k/uL    Morphology Normal    Basic Metabolic Panel   Result Value Ref Range    Glucose 125 (H) 70 - 99 mg/dL    BUN 19 8 - 23 mg/dL    CREATININE 0.81 0.70 - 1.20 mg/dL    Bun/Cre Ratio NOT REPORTED 9 - 20    Calcium 9.0 8.6 - 10.4 mg/dL    Sodium 136 135 - 144 mmol/L    Potassium 4.2 3.7 - 5.3 mmol/L    Chloride 101 98 - 107 mmol/L    CO2 24 20 - 31 mmol/L    Anion Gap 11 9 - 17 mmol/L    GFR Non-African American >60 >60 mL/min    GFR African American >60 >60 mL/min    GFR Comment          GFR Staging NOT REPORTED    Brain Natriuretic Peptide   Result Value Ref Range    Pro-BNP 65 <300 pg/mL    BNP Interpretation Pro-BNP Reference Range:    Troponin   Result Value Ref Range    Troponin, High Sensitivity <6 0 - 22 ng/L    Troponin T NOT REPORTED <0.03 ng/mL    Troponin Interp NOT REPORTED    Troponin   Result Value Ref Range    Troponin, High Sensitivity <6 0 - 22 ng/L    Troponin T NOT REPORTED <0.03 ng/mL    Troponin Interp NOT REPORTED    Comprehensive Metabolic Panel w/ Reflex to MG   Result Value Ref Range    Glucose 130 (H) 70 - 99 mg/dL    BUN 20 8 - 23 mg/dL    CREATININE 0.86 0.70 - 1.20 mg/dL    Bun/Cre Ratio NOT REPORTED 9 - 20    Calcium 8.6 8.6 - 10.4 mg/dL    Sodium 131 (L) 135 - 144 mmol/L    Potassium 4.2 3.7 - 5.3 mmol/L    Chloride 99 98 - 107 mmol/L    CO2 22 20 - 31 mmol/L    Anion Gap 10 9 - 17 mmol/L    Alkaline Phosphatase 61 40 - 129 U/L    ALT 9 5 - 41 U/L    AST 16 <40 U/L    Total Bilirubin 0.38 0.3 - 1.2 mg/dL    Total Protein 7.6 6.4 - 8.3 g/dL    Albumin 3.9 3.5 - 5.2 g/dL    Albumin/Globulin Ratio 1.1 1.0 - 2.5    GFR Non-African American >60 >60 mL/min    GFR African American >60 >60 mL/min    GFR Comment          GFR Staging NOT REPORTED    Magnesium   Result Value Ref Range    Magnesium 2.2 1.6 - 2.6 mg/dL   CBC   Result Value Ref Range    WBC 2.1 (L) 3.5 - 11.0 k/uL    RBC 3.80 (L) 4.5 - 5.9 m/uL    Hemoglobin 13.1 (L) 13.5 - 17.5 g/dL    Hematocrit 37.7 (L) 41 - 53 %    MCV 99.0 80 - 100 fL    MCH 34.3 (H) 26 - 34 pg    MCHC 34.7 31 - 37 g/dL    RDW 13.9 12.5 - 15.4 %    Platelets 91 (L) 069 - 450 k/uL    MPV 7.1 6.0 - 12.0 fL    NRBC Automated NOT REPORTED per 100 WBC   Lipid panel - fasting   Result Value Ref Range    Cholesterol 309 (H) <200 mg/dL    HDL 47 >40 mg/dL    LDL Cholesterol 245 (H) 0 - 130 mg/dL    Chol/HDL Ratio 6.6 (H) <5    Triglycerides 84 <150 mg/dL    VLDL NOT REPORTED 1 - 30 mg/dL   Hemoglobin A1C   Result Value Ref Range    Hemoglobin A1C 6.3 (H) 4.0 - 6.0 %    Estimated Avg Glucose 134 mg/dL   TSH with Reflex   Result Value Ref Range    TSH 11.45 (H) 0.30 - 5.00 mIU/L   T4, Free   Result Value Ref Range    Thyroxine, Free 0.53 (L) 0.93 - 1.70 ng/dL   POC Glucose Fingerstick   Result Value Ref Range    POC Glucose 163 (H) 75 - 110 mg/dL   EKG 12 Lead   Result Value Ref Range    Ventricular Rate 98 BPM    Atrial Rate 98 BPM    P-R Interval 158 ms    QRS Duration 112 ms    Q-T Interval 366 ms    QTc Calculation (Bazett) 467 ms    P Axis 40 degrees    R Axis 5 degrees    T Axis 49 degrees   EKG 12 Lead   Result Value Ref Range    Ventricular Rate 74 BPM    Atrial Rate 74 BPM    P-R Interval 152 ms    QRS Duration 98 ms    Q-T Interval 384 ms    QTc Calculation (Bazett) 426 ms    P Axis 28 degrees    R Axis 19 degrees    T Axis 47 degrees   EKG 12 lead   Result Value Ref Range    Ventricular Rate 81 BPM    Atrial Rate 81 BPM    P-R Interval 170 ms    QRS Duration 118 ms    Q-T Interval 408 ms    QTc Calculation (Bazett) 473 ms    P Axis 53 degrees    R Axis 25 degrees    T Axis 59 degrees         IMAGING DATA:    CT CHEST PULMONARY EMBOLISM W CONTRAST    Result Date: 9/21/2021  EXAMINATION: CTA OF THE CHEST 9/21/2021 1:31 pm TECHNIQUE: CTA of the chest was performed after the administration of intravenous contrast.  Multiplanar reformatted images are provided for review. MIP images are provided for review. Dose modulation, iterative reconstruction, and/or weight based adjustment of the mA/kV was utilized to reduce the radiation dose to as low as reasonably achievable. COMPARISON: 07/22/2021 HISTORY: ORDERING SYSTEM PROVIDED HISTORY: Chest pain TECHNOLOGIST PROVIDED HISTORY: Chest pain Decision Support Exception - unselect if not a suspected or confirmed emergency medical condition->Emergency Medical Condition (MA) Reason for Exam: left side chest pain, sob. hx of lung cancer Acuity: Acute Type of Exam: Initial FINDINGS: Pulmonary Arteries: Pulmonary arteries are adequately opacified for evaluation. No evidence of intraluminal filling defect to suggest pulmonary embolism. Main pulmonary artery is normal in caliber. Mediastinum: No significant change in mediastinal lymph nodes. Left hilar lymph nodes appear larger. .  The heart and pericardium demonstrate no acute abnormality. There is no acute abnormality of the thoracic aorta. Lungs/pleura: Innumerable pulmonary nodules in both lungs. These appear slightly smaller than the previous exam.  No pleural effusion. No pneumothorax. Increased soft tissue density medially in the superior segment of the right lower lobe. Upper Abdomen: Right adrenal mass, not changed. Otherwise, unremarkable Soft Tissues/Bones: No acute bone or soft tissue abnormality. Negative for acute pulmonary embolism Enlarged left hilar lymph nodes Decrease in size of innumerable pulmonary nodules again representing metastatic disease Increased density medially in the superior segment of the right lower lobe.      NM MYOCARDIAL SPECT REST EXERCISE OR RX    Result Date: 9/22/2021  EXAMINATION: MYOCARDIAL PERFUSION IMAGING 9/22/2021 8:08 am TECHNIQUE: For the rest study, 11.26 mCi of Tc 99 labeled sestamibi were injected. SPECT images were acquired. Under cardiology supervision, 0.4mg Daryle Block was infused. After pharmacologic stress, 42.65 mCi of Tc 99 labeled sestamibi were injected. SPECT images with ECG gating were acquired. COMPARISON: None Available. HISTORY: ORDERING SYSTEM PROVIDED HISTORY: chest pain TECHNOLOGIST PROVIDED HISTORY: chest pain Reason for Exam: Chest pain Procedure Type->Rx Reason for Exam: Chest pain Type of Exam: Subsequent/Follow-up FINDINGS: Images interpreted utilizing [employer] system and [employer]. No significant reversible or fixed perfusion defect. Mild fixed defect involving the apex and inferior apical segments, 4% of left ventricular myocardial involvement. Perfusion scores are visually adjusted to account for artifact. Summed stress score:  3 Summed rest score:  2 Summed reversibility score:  1 Function: End diastolic volume:  [id number] Left ventricular ejection fraction:  65% Wall motion abnormalities:  None. TID score:  1.03 (scores greater than 1.39 are considered elevated for Lexiscan stress with Tc99m)     Perfusion:  No significant reversible or fixed perfusion defects. Function:  Normal left ventricular ejection fraction is 65%. No wall motion abnormalities. Risk stratification: LOW Notes concerning risk stratification: Risk stratification incorporates both clinical history and some testing results. Final risk determination is the responsibility of the ordering provider as other patient information and test results may increase or decrease the risk assessment reported for this examination. Risk stratification criteria are adapted from \"Noninvasive Risk Stratification\" criteria from [employer].   Al, ACC/AATS/AHA/ASE/ASNC/SCAI/SCCT/STS 2017 Appropriate Use Criteria For Coronary Revascularization in Patients With Stable Ischemic Heart Disease [facility] Volume 69, Issue 17, May 2017 High risk (>3% annual death or MI) 1. Severe resting LV dysfunction (LVEF <35%) not readily explained by non coronary causes 2. Resting perfusion abnormalities greater than 10% of the myocardium in patients without prior history or evidence of MI3. Stress-induced perfusion abnormalities encumbering greater than or equal to 10% myocardium or stress segmental scores indicating multiple vascular territories with abnormalities 4. Stress-induced LV dilatation (TID ratio greater than 1.19 for exercise and greater than 1.39 for regadenoson) Intermediate risk (1% to 3% annual death or MI) 1. Mild/moderate resting LV dysfunction (LVEF 35% to 49%) not readily explained by non coronary causes. 2. Resting perfusion abnormalities in 5%-9.9% of the myocardium in patients without a history or prior evidence of MI 3. Stress-induced perfusion abnormality encumbering 5%-9.9% of the myocardium or stress segmental scores indicating 1 vascular territory with abnormalities but without LV dilation 4. Small wall motion abnormality involving 1-2 segments and only 1 coronary bed. Low Risk (Less than 1% annual death or MI) 1. Normal or small myocardial perfusion defect at rest or with stress encumbering less than 5% of the myocardium. IMPRESSION:   Primary Problem  Atypical chest pain    Active Hospital Problems    Diagnosis Date Noted    Atypical chest pain [R07.89] 09/21/2021    BiPAP (biphasic positive airway pressure) dependence [Z99.89]     [facility] [C79.51] 07/27/2021    Sleep apnea [G47.30] 01/28/2021    COPD (chronic obstructive pulmonary disease) [facility] [J44.9] 01/28/2021    Cancer ([facility] [C80.1] 01/28/2021    Hyperlipidemia [E78.5] 01/31/2019    Hypothyroidism [E03.9] 01/31/2019    Hypertension [I10] 03/15/2018    Diabetes ([facility] [E11.9] 06/10/2017       1. Left-sided chest pain, nonspecific  2. Lung cancer with bone metastasis  3.  Cytopenia secondary to chemotherapy    RECOMMENDATIONS:  1. I personally reviewed results of lab work-up imaging studies and other relevant clinical data. 2. Reviewed previous medical records. Discussed with internal medicine team  3. Discussed with patient's significant other at bedside  4. Reviewed images of CT scan and reviewed pictures with the patient as well  5. Patient is currently cycle 3-day #8. I believe the cytopenias are secondary to chemotherapy. 6. Patient mediastinal lymph nodes are stable. Lung nodules have decreased in size. Clinically I believe patient responding to the therapy. He is however scheduled to get CT abdomen pelvis before his next office visit I encouraged the patient get the scans done for assessment of the disease  7. Patient chest pain is likely musculoskeletal in nature. Pain is better now. 8. Continue Synthroid  9. Okay to discharge from medical oncology standpoint. Discussed with patient and Nurse. Thank you for asking us to see this patient. Lillian Falk MD          This note is created with the assistance of a speech recognition program.  While intending to generate a document that actually reflects the content of the visit, the document can still have some errors including those of syntax and sound a like substitutions which may escape proof reading. It such instances, actual meaning can be extrapolated by contextual diversion.

## 2021-09-23 NOTE — TELEPHONE ENCOUNTER
[facility] Transitions Initial Follow Up Call    Outreach made within 2 business days of discharge: Yes    Patient: Jazzmine Pearson Patient : 1960   MRN: [id number]  Reason for Admission: There are no discharge diagnoses documented for the most recent discharge. Discharge Date: 21       Spoke with: patient    Discharge department/facility: [address] [place] [address] Interactive Patient Contact:  Was patient able to fill all prescriptions: Yes  Was patient instructed to bring all medications to the follow-up visit: Yes  Is patient taking all medications as directed in the discharge summary?  Yes  Does patient understand their discharge instructions: Yes  Does patient have questions or concerns that need addressed prior to 7-14 day follow up office visit: no    Patient states that he has a f/u appointment with Palliative care on 21    Scheduled appointment with PCP within 7-14 days    Follow Up  Future Appointments   Date Time Provider Sukhjinder Coley   2021 11:00 AM STA CT SCAN RM 1 [facility] CT SCAN [facility]A [facility]   2021  2:30 PM OSIEL Deng - CNP PB PALLIATIV [facility]   10/7/2021  9:00 AM Farhat Hampton MD [address] [place] [address]   10/7/2021 10:00 AM STV PB MED ONC CHAIR 01 [facility]   10/7/2021  3:30 PM STV PB MED ONC CHAIR [address]

## 2021-09-24 NOTE — TELEPHONE ENCOUNTER
received referral from Nurse Navigator.  called patient who reports he would like to apply for Medicaid.  went over application process and send application to patient.

## 2021-09-28 PROBLEM — C34.90 LUNG CANCER METASTATIC TO BONE (HCC): Status: ACTIVE | Noted: 2021-01-01

## 2021-09-28 PROBLEM — C79.51 LUNG CANCER METASTATIC TO BONE (HCC): Status: ACTIVE | Noted: 2021-01-01

## 2021-09-28 NOTE — PROGRESS NOTES
PALLIATIVE CARE PROGRESS NOTE     Patient: Janneth Issa  1960    Reason For Consult:  Goals of care evaluation  Distress management  Symptom Management  Guidance and support  Facilitate communications  Assistance in coordinating care  Recommendations for the above    Subjective: aJnneth Issa is a 61 y.o. male with a history of diabetes, hyperlipidemia, sleep apnea noncompliant with CPAP, anxiety, chronic back pain/surgery, neuropathy, HTN, hypothyroidism, COPD not on home O2, stage IV lung cancer, right lower lobe lobectomy, and follows with OSIEL Dixon CNP . Patient underwent CT scan initially d/t smoking history. CT scan showed RLL spiculated mass measuring 2.9x2.5cm abutting the medical pleura. Another 4MM pulmonary nodule was noted but was shown to be stable from prior imaging. No adenopathy was noted. The patient underwent RLL lobectomy 9/13/16, and pathology revealed invasive moderately differentiated adenocarcinoma with tumor invasion in the visceral pleura. All margins were negative. Patient had 1 positive intrapulmonary lymph node. Patient was noted to be Stage 2 disease (T2 N1 M0). Patient received adjuvant chemotherapy with Cisplatin and Alimta 11/17/16-2/2017. In 9/2020 patient had significant weight loss, and CT scan revealed pulmonary lesion suggestive of recurrent disease. PET scan done, and revealed metastatic disease with new bilateral sub centimeter pulmonary nodules measuring up to 6mm. Mild metabolic activity was noted within small mediastinal lymph nodes. New bone lesion with metabolic activity in the anterior left 5th rib noted. MRI of brain was negative. The patient was then started on Taxol and carboplatin with radiation, but had med reaction so Taxol was replaced with Abraxane. PET scan repeated in 12/2020 that revealed new FDG avid left sided level 4 lymph node of the neck suggestive of progression of disease.  Multiple bilateral solid noncalcified pulmonary inhaler that he can have on hand, and was instructed on this. He does have some chronic production but nothing increased, and denies any fever/chills. He reports nothing making it better besides steroids in the past. Patient reports trying MS Contin ER but this causing worsening nausea, and he stopped this drug. He reports currently taking Zofran and Compazine, he reports these being helpful. He is taking more than prescribed of Percocet 10/325mg d/t unrelieved pain. We discussed other ER medication, and he would like to try low dose Fentanyl patch. Patient reports appetite/weight is stable, and has not started Megace. He does report using Colace and MiraLax PRN and BMs are regular/normal.     Available records including labs and imaging results were reviewed. Recent and past history, med list, family history, social history and review of systems were personally reviewed and updated in EHR.     REVIEW OF SYSTEMS    []   UNABLE TO OBTAIN:     Constitutional:  []   Chills   [x]  Fatigue   []  Fevers   []  Malaise   []  Weight loss   [] Other:     Respiratory:   [x]  Cough    [x]  Shortness of breath    []  Chest pain    [] Other:     Cardiovascular:   []  Chest pain  []  Dyspnea    []  Exertional chest pressure/discomfort     [] Fatigue      []  Palpitations    []  Syncope   [] Other:     Gastrointestinal:   []  Abdominal pain   []  Constipation    []  Diarrhea    []   Dysphagia   []  Reflux             []  Vomiting   [] Other:     Genitourinary:  []  Dysuria     []  Frequency   []  Hematuria   [] Nocturia   []  Urinary incontinence   [] Other:     Musculoskeletal:   [x] Back pain    []  Muscle weakness   []  Myalgias    []  Neck pain   []  Stiff joints   [x]  Other:  Left leg pain    Behavioral/Psych:   [] Anxiety    []  Depression     []  Mood swings   [] Other:     PHYSICAL ASSESSMENT:     General: [x]  Oriented x3      [] well appearing      [] Intubated      [] ill appearing      [] Other:    Mental Status: [x] normal mental status exam      [] drowsy      [] Confused      [] Other:     Cardiovascular: [x]  Regular rate/rhythm      [] Arrhythmia      [] Other:     Chest: [x] Effort normal      [] lungs clear      [] respiratory distress      [] Tachypnea      [x]  Other: Some exp Wheezing noted. Abdomen: [x] Soft/non-tender      []  Normal appearance      [] Distended      [] Ascites      [] Other:    Neurological: [x] Normal Speech      [] Normal Sensation      []  Deficits present:      Extremity:  [x] normal skin color/temp      [] clubbing/cyanosis      [x]  No edema      [] Other:       Vital Signs:  /87   Pulse 95   Temp 96.5 °F (35.8 °C)   Resp 18   Wt 226 lb 14.4 oz (102.9 kg)   BMI 27.62 kg/m²      has a past medical history of Anxiety, BiPAP (biphasic positive airway pressure) dependence, Cancer (HCC), Chronic back pain, Clinical trial exam, COPD (chronic obstructive pulmonary disease) ([address] Diabetes ([address] Hyperlipidemia, Hypertension, Hypothyroidism, Lung cancer ([address] Neuropathy, Prolonged emergence from general anesthesia, Sleep apnea, Slow to wake up after anesthesia, SOB (shortness of breath), and Urine frequency.        Patient Active Problem List   Diagnosis    Malignant neoplasm of lower lobe of right lung (HCC)    Smoking addiction    Simple chronic bronchitis ([address]    Diabetes ([address]    Hypertension    Hypothyroidism    Hyperlipidemia    Major depressive disorder with single episode, in partial remission (HCC)    Chronic midline low back pain without sciatica    History of lumbar laminectomy    COPD (chronic obstructive pulmonary disease) (HCC)    Sleep apnea    Atelectasis    Anxiety    Cancer (HCC)    Gastroesophageal reflux disease    Difficulty sleeping    Opioid use, unspecified with unspecified opioid-induced disorder    Opioid dependence with unspecified opioid-induced disorder    Opioid dependence, uncomplicated    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 He reports having chronic chest/back pain from cancer, and left leg pain. He reports worsening dyspnea, and cough. He denies fever/chills. He reports steroids being effective in the past. He is unsure of respiratory medications at home, except reports starting a new inhaler called Breztri. He also talks about nebulizer us, and uses several times a day. Patient does have some wheezing. We discussed starting a prednisone taper, and he was agreeable. He is requesting albuterol inhaler for PRN. He was encouraged to stop smoking, and reports decreasing. He is planning to start Nicotine patch. He was told to keep pulmonary appointment for follow up. He had CT scan of chest today to review effectiveness of treatment. This showed interval decreased in size of pulmonary nodules, and no significant change in confluent lymphadenopathy in the left hilum with small mediastinal lymph nodes. Stable 3cm right adrenal nodule noted, and 0.8cm left renal lesion to small to characterize. He has follow up with Dr. Aidan Fernandez scheduled to further discuss. We discussed previous new meds, and patient reports only taking 4 doses of MS Contin, as it caused worsened nausea. He reports taking 5-6 Percocet PRN. Ex-wife is wondering if there is anything else that won't cause so much nausea, and we discussed possibly trying transdermal patch for pain. Patient is agreeable to this. He would like to have better sustained pain control. He reports pain usually being 9/10, and percocet bringing pain down to a 5 or 6 when used. He is able to complete is ADL's when pain is controled.      I offered patient and SO support at this time, and they were appreciative of visit. I later spoke to [facility] CNP Pulmonary, and discussed patients symptoms. I explained new orders, and she reports that we can consider Singular also. She encouraged for patient to move f/u up if no improvement. Principal Problem/Diagnosis:  1) COPD exacerbation-worsened cough/dyspnea- Start Prednisone Taper and albuterol as prescribed PRN  2) Stage IV Lung Cancer with bone mets  3) Smoker- discussed smoking cessation- advised to start Nicotine patch  4) Cancer associated pain- Stopped MS Contin prior. Start Fentanyl patch as prescribed. Percocet as prescribed PRN and decrease as able. 5) Nausea- Continue PRN meds    IMPRESSION/ PLAN  1- Follow-up to prior meeting  Discuss goals of care  Treatment options/plans  Provide clinical updates and answer questions  Listen to patient/family concerns  Interdiscplinary collaboration  Build trust    2-Code Status: Full Code    3-Other Recommendations:   RTO in 2 weeks- bring meds in for review. Get records from Dr. Castaneda/med list  Store unused MS Contin/Fentnayl Patches in Safe place.    Consider starting Singular     Electronically signed by   OSIEL Estrada - CNP  Palliative Care Team  on 9/28/2021 at 3:03 PM    [address] [phone]  Palliative Care Cell Phone: [phone]

## 2021-09-28 NOTE — PROGRESS NOTES
Is the person to be vaccinated sick today? no    Does the person to be vaccinated have an allergy to eggs or to a component of the vaccine? No    Has the person to be vaccinated ever had a serious reaction to influenza vaccine in the past? No    Has the person to be vaccinated ever had Guillan-Stonington' Syndrome?  No

## 2021-09-28 NOTE — PROGRESS NOTES
Post-Discharge Transitional Care Management Services or Hospital Follow Up      Cami Sinclair   YOB: 1960    Date of Office Visit:  9/28/2021  Date of Hospital Admission: 9/21/21  Date of Hospital Discharge: 9/22/21  Readmission Risk Score(high >=14%.  Medium >=10%):No data recorded    Care management risk score Rising risk (score 2-5) and Complex Care (Scores >=6): 3     Non face to face  following discharge, date last encounter closed (first attempt may have been earlier): 9/23/2021  4:09 PM 9/23/2021  4:09 PM    Call initiated 2 business days of discharge: Yes     Patient Active Problem List   Diagnosis    Malignant neoplasm of lower lobe of right lung ([address]    Smoking addiction    Simple chronic bronchitis ([address]    Diabetes ([address]    Hypertension    Hypothyroidism    Hyperlipidemia    Major depressive disorder with single episode, in partial remission ([address]    Chronic midline low back pain without sciatica    History of lumbar laminectomy    COPD (chronic obstructive pulmonary disease) ([address]    Sleep apnea    Atelectasis    Anxiety    Cancer ([address]    Gastroesophageal reflux disease    Difficulty sleeping    Opioid use, unspecified with unspecified opioid-induced disorder    Opioid dependence with unspecified opioid-induced disorder    Opioid dependence, uncomplicated    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)    Bone metastasis (HCC)    Coagulation defect (HCC)    BiPAP (biphasic positive airway pressure) dependence    Chest pain    Lung cancer metastatic to bone (HCC)       Allergies   Allergen Reactions    Paclitaxel Shortness Of Breath     Flushing, hypertension      Emend [Fosaprepitant Dimeglumine] Other (See Comments)     Reported back pain, warmth all over, nausea & SOB    Emend [Aprepitant] Nausea And Vomiting       Medications listed as ordered at the time of discharge from hospital   [place]    Home Medication Instructions ALISIA:    Printed on:09/28/21 1027   Medication Information                      aspirin 81 MG chewable tablet  Take 1 tablet by mouth daily             atorvastatin (LIPITOR) 80 MG tablet  Take 1 tablet by mouth every evening             ciprofloxacin-dexamethasone (CIPRODEX) 0.3-0.1 % otic suspension  Place 4 drops into the left ear 2 times daily             clonazePAM (KLONOPIN) 1 MG tablet  Take 1 tablet by mouth 2 times daily as needed for Anxiety for up to 30 days. docusate sodium (COLACE) 100 MG capsule  Take 1 capsule by mouth 2 times daily as needed for Constipation             FLUoxetine (PROZAC) 40 MG capsule  take 1 capsule by mouth once daily             Ibuprofen-Acetaminophen (ADVIL DUAL ACTION) 125-250 MG TABS  2 tablets as needed             ipratropium-albuterol (DUONEB) 0.5-2.5 (3) MG/3ML SOLN nebulizer solution  Inhale 3 mLs into the lungs every 6 hours as needed for Shortness of Breath             levothyroxine (SYNTHROID) 125 MCG tablet  Take 1 tablet by mouth daily             lidocaine-prilocaine (EMLA) 2.5-2.5 % cream  Apply topically as needed 1 hour prior to lab draws and treatment. lisinopril (PRINIVIL;ZESTRIL) 20 MG tablet  Take 1 tablet by mouth daily             megestrol (MEGACE) 40 MG/ML suspension  Take 10 mLs by mouth daily             morphine (MS CONTIN) 15 MG extended release tablet  Take 1 tablet by mouth 2 times daily for 14 days.              naloxone 4 MG/0.1ML LIQD nasal spray  1 spray by Nasal route as needed for Opioid Reversal             nicotine (NICODERM CQ) 21 MG/24HR  Place 1 patch onto the skin daily             omeprazole (PRILOSEC) 20 MG delayed release capsule  Take 1 capsule by mouth Daily             ondansetron (ZOFRAN ODT) 8 MG TBDP disintegrating tablet  Take 1 tablet by mouth every 8 hours as needed for Nausea or Vomiting             oxyCODONE-acetaminophen (PERCOCET)  MG per tablet  Take 1 tablet by mouth every 6 hours as needed for Pain for up to 30 days. polyethylene glycol (GLYCOLAX) 17 GM/SCOOP powder  Take 17 g by mouth daily as needed (if still constipated after starting colace)             prochlorperazine (COMPAZINE) 10 MG tablet  Take 1 tablet by mouth every 6 hours as needed (nausea/vomiting)             traZODone (DESYREL) 50 MG tablet  Take 1 tablet by mouth nightly             triamcinolone (KENALOG) 0.025 % cream  Apply topically 2 times daily. Medications marked \"taking\" at this time  Outpatient Medications Marked as Taking for the 9/28/21 encounter (Office Visit) with OSIEL Nava - CNP   Medication Sig Dispense Refill    Ibuprofen-Acetaminophen (ADVIL DUAL ACTION) 125-250 MG TABS 2 tablets as needed      aspirin 81 MG chewable tablet Take 1 tablet by mouth daily 30 tablet 0    nicotine (NICODERM CQ) 21 MG/24HR Place 1 patch onto the skin daily 30 patch 3    morphine (MS CONTIN) 15 MG extended release tablet Take 1 tablet by mouth 2 times daily for 14 days. 28 tablet 0    oxyCODONE-acetaminophen (PERCOCET)  MG per tablet Take 1 tablet by mouth every 6 hours as needed for Pain for up to 30 days.  120 tablet 0    docusate sodium (COLACE) 100 MG capsule Take 1 capsule by mouth 2 times daily as needed for Constipation 60 capsule 0    polyethylene glycol (GLYCOLAX) 17 GM/SCOOP powder Take 17 g by mouth daily as needed (if still constipated after starting colace) 507 g 0    traZODone (DESYREL) 50 MG tablet Take 1 tablet by mouth nightly 30 tablet 0    naloxone 4 MG/0.1ML LIQD nasal spray 1 spray by Nasal route as needed for Opioid Reversal 1 each 5    megestrol (MEGACE) 40 MG/ML suspension Take 10 mLs by mouth daily 300 mL 3    prochlorperazine (COMPAZINE) 10 MG tablet Take 1 tablet by mouth every 6 hours as needed (nausea/vomiting) 120 tablet 3    omeprazole (PRILOSEC) 20 MG delayed release capsule Take 1 capsule by mouth Daily 180 capsule 3    levothyroxine (SYNTHROID) 125 MCG tablet Take 1 tablet by mouth daily 30 tablet 5    triamcinolone (KENALOG) 0.025 % cream Apply topically 2 times daily. 1 Tube 1    FLUoxetine (PROZAC) 40 MG capsule take 1 capsule by mouth once daily 90 capsule 3    lidocaine-prilocaine (EMLA) 2.5-2.5 % cream Apply topically as needed 1 hour prior to lab draws and treatment.  30 g 2    atorvastatin (LIPITOR) 80 MG tablet Take 1 tablet by mouth every evening 90 tablet 3    lisinopril (PRINIVIL;ZESTRIL) 20 MG tablet Take 1 tablet by mouth daily 90 tablet 3    ciprofloxacin-dexamethasone (CIPRODEX) 0.3-0.1 % otic suspension Place 4 drops into the left ear 2 times daily 1 Bottle 1    ipratropium-albuterol (DUONEB) 0.5-2.5 (3) MG/3ML SOLN nebulizer solution Inhale 3 mLs into the lungs every 6 hours as needed for Shortness of Breath 360 mL 2    ondansetron (ZOFRAN ODT) 8 MG TBDP disintegrating tablet Take 1 tablet by mouth every 8 hours as needed for Nausea or Vomiting 30 tablet 3        Medications patient taking as of now reconciled against medications ordered at time of hospital discharge: Yes    Chief Complaint   Patient presents with   [place] Self Annual Exam     ED F/U 9/22       Here today for follow up after hospital stay  Had overnight stay for increased severe chest pain last week  He reports his chest pain resolved while in hospital, was treated with IV Decadron  CT scan indicated increased metastatic disease  He met with palliative care after seeing his oncologist 2 weeks ago  Today he seems to continue to struggle with the reality of his diagnosis/prognosis  For example he was concerned as was not taking his cholesterol medication regularly and recent labs indicated elevation  Since that time he has resumed his statin  He does acknowledge that his disease processes worsening and seems to understand his current treatment is for palliative care  He reports positive comments about his visit with palliative care nurse, is trying a new pain medication which seems to be helping some  He states his main complaint is chronic coughing with shortness of breath  Has appt with pulmonologist next week    Having a lot of trouble with nausea and poor appetite  He states he \"forces myself\" to eat  Has lost 5 pounds over the past 6 months  He states he is taking his Zofran as well as Compazine on a fairly regular basis, denies vomiting    Seeing specialist for management of thyroid condition, has improved since significant elevation was found in May but remains with elevated tsh      Inpatient course: Discharge summary reviewed- see chart. Interval history/Current status: stable    Review of Systems   Constitutional: Positive for appetite change (poor) and fatigue. Negative for activity change, chills, fever and unexpected weight change. HENT: Negative for congestion, ear pain, hearing loss, sinus pressure, sore throat and trouble swallowing. Eyes: Negative for visual disturbance. Respiratory: Positive for cough and shortness of breath. Negative for wheezing. Cardiovascular: Negative for chest pain, palpitations and leg swelling. Gastrointestinal: Positive for nausea. Negative for abdominal pain, blood in stool, constipation, diarrhea and vomiting. Endocrine: Negative for cold intolerance, heat intolerance, polydipsia, polyphagia and polyuria. Genitourinary: Negative for difficulty urinating, frequency, hematuria and urgency. Musculoskeletal: Positive for arthralgias and back pain. Negative for myalgias. Skin: Negative for rash. Allergic/Immunologic: Negative for environmental allergies. Neurological: Negative for dizziness, weakness, light-headedness and headaches. Psychiatric/Behavioral: Negative for confusion and dysphoric mood. The patient is not nervous/anxious. Vitals:    09/28/21 0756   Weight: 225 lb (102.1 kg)   Height: 6' 4\" (1.93 m)     Body mass index is 27.39 kg/m².    Wt Readings from Last 3 Encounters:   09/28/21 225 lb (102.1 kg)   09/22/21 210 lb 6.4 oz (95.4 kg)   09/14/21 229 lb (103.9 kg)     BP Readings from Last 3 Encounters:   09/22/21 101/61   09/14/21 (!) 150/99   09/14/21 130/88       Physical Exam  Constitutional:       Appearance: He is well-developed. He is ill-appearing. HENT:      Head: Normocephalic. Eyes:      Conjunctiva/sclera: Conjunctivae normal.      Pupils: Pupils are equal, round, and reactive to light. Cardiovascular:      Rate and Rhythm: Normal rate and regular rhythm. Heart sounds: Normal heart sounds. No murmur heard. Pulmonary:      Effort: Pulmonary effort is normal.      Breath sounds: Wheezing (throughout) and rales (scattered) present. Abdominal:      General: Bowel sounds are normal. There is no distension. Palpations: Abdomen is soft. Musculoskeletal:         General: Normal range of motion. Cervical back: Normal range of motion. Skin:     General: Skin is warm and dry. Neurological:      Mental Status: He is alert and oriented to person, place, and time. Psychiatric:         Behavior: Behavior normal.         Thought Content: Thought content normal.         Judgment: Judgment normal.             Assessment/Plan: Reviewed notes, labs and diagnostics from hospital stay and also recent visit with oncology and palliative care. Metastases seems to be progressing, he will follow-up with palliation later today and also with his oncologist as planned. He has follow-up next week with his pulmonologist, he is hopeful may be some assistance for his chronic coughing and shortness of breath in addition to what he is already using. He reports his pain is a little better with the alternative pain medication that palliative care started. His TSH remains elevated but is improved since May, he will follow-up with his specialist as planned his blood pressure is well controlled, he has resumed his statin     1. Atypical chest pain    - MD DISCHARGE MEDS RECONCILED W/ CURRENT OUTPATIENT MED LIST    2. Lung cancer metastatic to bone ([facility]    - [place] DISCHARGE MEDS RECONCILED W/ CURRENT OUTPATIENT MED LIST    3. Acquired hypothyroidism      4. Essential hypertension      5. Chronic obstructive pulmonary disease, unspecified COPD type ([facility]      6.  Need for influenza vaccination    - INFLUENZA, MDCK QUADV, 2 YRS AND OLDER, IM, PF, PREFILL SYR OR SDV, 0.5ML (FLUCELVAX QUADV, PF)        Medical Decision Making: high complexity

## 2021-09-29 NOTE — TELEPHONE ENCOUNTER
AVS from 9/29/21     RTO in 2 weeks or sooner as needed  Start Prednisone taper for worsening cough/SOB- monitor Sp02- should be above >92%- keep Appt. with pulmonary. Albuterol Rescue Inhaler as prescribed  Please get last note/med list from Dr. Sivan Lucio- pulmonary at . [facility]- patient reports starting new inhaler. Continue Nicotine patch- stop smoking. Do not smoke while on patch  Stop Morphine, and store in safe place. Start Fentanyl patch as prescribed. Percocet PRN as prescribed and reduce as able.      RV scheduled 10/12/21 @ 11am    Pt instructed on all meds and plan    Progress note requested from Dr. Kathi Foy office 9/29/21    PT was given AVS and an appt schedule    Electronically signed by Lennette Canavan on 9/29/2021 at 9:47 AM

## 2021-09-29 NOTE — PATIENT INSTRUCTIONS
RTO in 2 weeks or sooner as needed- bring meds in for review  Start Prednisone taper for worsening cough/SOB- monitor Sp02- should be above >92%- keep Appt. with pulmonary. Albuterol Rescue Inhaler as prescribed  Please get last note/med list from Dr. Dilia Snyder- pulmonary at [facility]- patient reports starting new inhaler. Continue Nicotine patch- stop smoking. Do not smoke while on patch  Stop Morphine, and store in safe place. Start Fentanyl patch as prescribed. Percocet PRN as prescribed and reduce as able.

## 2021-09-29 NOTE — TELEPHONE ENCOUNTER
I called patient to discuss new orders, and he reports starting steroid, but Fentanyl patch was not in yet. He will check with pharmacy again today. He was informed that I spoke to pulmonary NP, and she was agreeable with plan. He was encouraged to reach out with any worsened symptoms, and appreciative of call.

## 2021-10-07 NOTE — PROGRESS NOTES
Pt here for C4D1 Monse Kelly Carbo. Denies any new complaints. Labs drawn from port and results reviewed. Pt seen by Dr Jitendra Fitzgerald at chair side, stopping Carbo due to reaction that occurred with C3. Pt was treated without incident and d/c'd in stable condition. Pt will return on 10-28-21 for C5D1 and MD f/u.

## 2021-10-07 NOTE — PROGRESS NOTES
DIAGNOSIS:   1. Adenocarcinoma of the right lower lobe of lung. Path stage is T2N1M0. Stage 2 disease. 2. COPD  3. HTN   4. Smoking addiction   5. Evidence of recurrent disease with metastases to supraclavicular lymph node as well as mediastinal lymph node May/2021  6. Evidence of bone metastases July/2021  CURRENT THERAPY:  S/P right lower lobectomy. 9/2016  Received adjuvant chemotherapy, with Cisplatin and Alimta on 11/17/16, completed in February/2017  Plan chemoradiation with Taxol and carboplatin  Infusion reaction to Taxol, plan to transition to Abraxane  After completion of chemoradiation, plan immunotherapy with Imfinzi, started 5/2021   Progressive disease with bone metastases July/2021, plan carboplatin Alimta and Avastin  Plan palliative radiation to painful bone mets in  the left femur  BRIEF CASE HISTORY:      Ondina Mckoy is a very pleasant 61 y.o. male who is referred to us for management recommendation of his recently resected lung cancer. He actually underwent a screening CT scan because of his smoking habits. CT scans showed right lower lobe spiculated mass measuring 2.9 x 2.5 cm abutting the medial pleura. There was another 4 mm pulmonary nodule that has actually been stable from previous imaging. No adenopathy was appreciated. More testing was done but ultimately the patient was taken to surgery. Prior to surgery, the clinical stage was stage I disease. The patient underwent right lower lobe lobectomy on 9/13/16 and pathology showed invasive moderately differentiated adenocarcinoma of the lung measuring 4 cm in greatest dimension, the tumor invaded the visceral pleura. All margins were negative. He had one positive intrapulmonary lymph node. Representative nodes from stations 4, 7, 9 were all negative. Final pathological staging is (T2 N1 M0) stage II disease  He is sent to us for a consultation, recovering from surgery.  He continues to have some pain in the surgical area but otherwise has no complaints. He has minimal shortness of breath, no cough or hemoptysis. No headache or seizures or loss of weight. Performance status is ECOG 1, we discussed adjuvant chemotherapy with Cisplatin and Alimta. After completion of chemotherapy, we started surveillance. He required bronchoscopy to clear thick mucous. He presented 09/2020 with significant weight loss and he was following with pulmonary service and CT scan showed pulmonary lesion. PET scan was done and unfortunately showed lung lesions and rib lesion, suggestive of recurrent disease. Brain MRI was done and negative. The patient was started on chemoradiation with weekly Taxol and carboplatin. With week 1 treatment, he had grade 1 infusion reaction that was managed with premedication and dose adjustment. However with second week, the infusion was much stronger and we had to hold Taxol altogether. We plan to replace with Abraxane. After completion of chemoradiation, we plan to offer immunotherapy with Imfinzi to start in May/2021. Shellye Mcintosh was started 05/2021. Prior to starting Shellye Mcintosh lab work showed baseline hypothyroidism and was started on Synthroid. In July/2021, he has further progression of disease with bone metastases, will try to manage him with clinical trial but he did not qualify, we decided to  start him on first-line treatment with Alimta carbo and bevacizumab  Meanwhile he presented to emergency room with severe pain in his left femur, x-ray showed lytic lesion in the femoral diaphysis. Plan palliative radiation  INTERIM HISTORY:   The patient presents today to receive cycle 4 of chemotherapy, he is tolerating chemotherapy but he is having extreme fatigue. He had also an infusion reaction to carboplatin last time and this was held. We will plan to continue on with maintenance Alimta and Avastin. Shortness of breath is about the same. He has no fever chills or night sweats.   Most of his symptoms are fatigue and nausea. Controlled with medication    PAST MEDICAL HISTORY: has a past medical history of Anxiety, BiPAP (biphasic positive airway pressure) dependence, Cancer ([facility] [address] Chronic back pain, Clinical trial exam, COPD (chronic obstructive pulmonary disease) ([facility] [address] Diabetes ([facility] [address] Hyperlipidemia, Hypertension, Hypothyroidism, Lung cancer ([facility] [address] Neuropathy, Prolonged emergence from general anesthesia, Sleep apnea, Slow to wake up after anesthesia, SOB (shortness of breath), and Urine frequency. PAST SURGICAL HISTORY: has a past surgical history that includes laminectomy (12/2012); Testicle removal (Left, 1982); Lung removal, partial (Right, 2016); other surgical history (Right, 06/13/2017); back surgery (01/2013); US BIOPSY LYMPH NODE (12/21/2020); IR PORT PLACEMENT > 5 YEARS (02/24/2021); Cardiac catheterization; and cardiovascular stress test.     CURRENT MEDICATIONS:  has a current medication list which includes the following prescription(s): advil dual action, fentanyl, albuterol sulfate hfa, prednisone, [START ON 10/10/2021] prednisone, aspirin, nicotine, oxycodone-acetaminophen, docusate sodium, polyethylene glycol, trazodone, naloxone, megestrol, prochlorperazine, omeprazole, levothyroxine, triamcinolone, fluoxetine, lidocaine-prilocaine, atorvastatin, lisinopril, ciprofloxacin-dexamethasone, ipratropium-albuterol, ondansetron, and clonazepam, and the following Facility-Administered Medications: heparin flush, sodium chloride flush, sodium chloride, bevacizumab-bvzr (ZIRABEV) 1,600 mg in sodium chloride 0.9 % 100 mL chemo IVPB, PEMEtrexed (ALIMTA) 1,200 mg in sodium chloride 0.9 % 100 mL chemo infusion, and CARBOplatin (PARAPLATIN) 800 mg in sodium chloride 0.9 % 250 mL chemo IVPB. ALLERGIES:  is allergic to paclitaxel, emend [fosaprepitant dimeglumine], and emend [aprepitant]. FAMILY HISTORY: Negative for any hematological or oncological conditions.      SOCIAL HISTORY:  reports that he has been smoking cigarettes. He has a 50.00 pack-year smoking history. He has never used smokeless tobacco. He reports current drug use. Drug: Marijuana. He reports that he does not drink alcohol. REVIEW OF SYSTEMS:   General: No fever or night sweats. Weight loss; +fatigue, pain in the femoral area has improved  ENT: No double or blurred vision, no tinnitus or hearing problem, or sore throat  Respiratory: Chronic shortness of breath with some pain and cough - some clear sputum - no hemoptysis, chest discomfort in the surgical area, chest congestion   Cardiovascular: Denies central chest pain, PND or orthopnea. No L E swelling or palpitations. Gastrointestinal: No vomiting, abdominal pain, diarrhea or constipation, nausea controlled with medication   Genitourinary: Denies dysuria, hematuria, frequency, urgency or incontinence. Neurological: Denies headaches, decreased LOC, no sensory or motor focal deficits. Grade 1 neuropathy in hands and feet, predates chemo, diabetes related. Musculoskeletal: Diffuse aches and pains, no joint swelling. Severe pain in the left femoral area  Skin: There are no rashes or bleeding. +psoriasis   Psychiatric:  History of bipolar disorder. Anxiety   Endocrine: No thyroid disease. Hematologic: No bleeding, no adenopathy. PHYSICAL EXAM: Shows a well appearing 61y.o.-year-old male who is not in pain or distress. Vital Signs: Weight is stable at 230. Blood pressure 107/66, pulse 87, temperature 97.5 °F (36.4 °C), temperature source Temporal, weight 220 lb 9.6 oz (100.1 kg). HEENT: Slight redness in the throat. Normocephalic and atraumatic. Pupils are equal, round, reactive to light and accommodation. Extraocular muscles are intact. Neck: grade 1 radiation toxicity Showed no JVD, no carotid bruit. Lungs: Port in place upper right chest - swelling and redness, no evidence of infection. Decreased air entry especially in the right lower lobe area - improved. Scattered rhonchi.  Postoperative changes in the chest wall are healed well without any evidence of infection. Heart: Regular without any murmur. Abdomen: Soft, nontender. No hepatosplenomegaly. Extremities: Lower extremities show no edema, clubbing, or cyanosis. Neuro exam: intact cranial nerves bilaterally no motor or sensory deficit, gait is normal. Lymphatic: Cervical adenopathy improved significantly. REVIEW OF RADIOLOGICAL RESULTS:  CT chest abd and pelvis 9/28/2021    Impression   1.  Interval decrease in size of pulmonary nodules, as compared to 07/22/2021   exam.       2.  No significant change in confluent lymphadenopathy in the left hilum and   small mediastinal lymph nodes.       3.  Stable 3 cm right adrenal nodule.       4.  Indeterminate 0.8 cm left renal lesion is too small to characterize, for   which attention to on follow-up is recommended.           REVIEW OF LABORATORY RESULTS:   Lab Results   Component Value Date    WBC 8.6 10/07/2021    HGB 12.5 (L) 10/07/2021    HCT 35.8 (L) 10/07/2021    MCV 97.8 10/07/2021     10/07/2021       Chemistry        Component Value Date/Time     (L) 10/07/2021 0932    K 4.7 10/07/2021 0932    CL 99 10/07/2021 0932    CO2 24 10/07/2021 0932    BUN 24 (H) 10/07/2021 0932    CREATININE 0.96 10/07/2021 0932        Component Value Date/Time    CALCIUM 8.8 10/07/2021 0932    ALKPHOS 88 10/07/2021 0932    AST 27 10/07/2021 0932    ALT 17 10/07/2021 0932    BILITOT 0.34 10/07/2021 0932              IMPRESSION:   1. Adenocarcinoma of the right lower lobe of lung. Path stage is T2N1M0. Stage 2 disease. 2. S/P lobectomy. 3. received adjuvant chemotherapy, with cisplatin and Alimta. Plan to finish 4 cycles of adjuvant chemotherapy and then observe. 4. COPD  5. Smoking addiction, unable to quit   6. Starting chemoradiation for recurrent disease in the mediastinum and supraclavicular area. 7. Infusion reaction to Taxol, escalated from grade 1 to grade 2 by the second week.   We have to hold the Taxol and will plan to switch to Abraxane. 8. Dysphagia secondary to chemoradiation as the esophagus in the radiation field. Improved after completion of chemoradiation  9. Hypertension and dizziness, we asked him to stop taking the lisinopril. I think it will improve as his oral intake improves  10. Evidence of progressive disease in the chest as well as bone metastases, July/2021  11. Plan palliative radiation to the bone mets July/2021  12. Did not qualify for clinical trials, plan to treat with carbo Alimta and Avastin  13. We will add Xgeva after radiation to the bone mets    PLAN:  Continue with fourth cycle of chemotherapy  Imaging study was reviewed with the patient, showing good response to treatment  We will plan to continue on with Alimta plus Avastin maintenance until progression  Options unfortunately are limited otherwise.

## 2021-10-12 PROBLEM — R63.4 WEIGHT LOSS: Status: ACTIVE | Noted: 2021-01-01

## 2021-10-12 PROBLEM — Z91.199 NONCOMPLIANCE WITH CPAP TREATMENT: Status: ACTIVE | Noted: 2021-01-01

## 2021-10-12 PROBLEM — Z51.5 PALLIATIVE CARE ENCOUNTER: Status: ACTIVE | Noted: 2021-01-01

## 2021-10-12 PROBLEM — K59.03 DRUG-INDUCED CONSTIPATION: Status: ACTIVE | Noted: 2021-01-01

## 2021-10-12 PROBLEM — G89.3 CANCER RELATED PAIN: Status: ACTIVE | Noted: 2021-01-01

## 2021-10-12 PROBLEM — Z91.14 NONCOMPLIANCE WITH CPAP TREATMENT: Status: ACTIVE | Noted: 2021-01-01

## 2021-10-12 PROBLEM — R11.0 NAUSEA: Status: ACTIVE | Noted: 2021-01-01

## 2021-10-12 NOTE — PROGRESS NOTES
PALLIATIVE CARE PROGRESS NOTE     Patient: Reginald Narvaez  1960    Reason For Consult:  Goals of care evaluation  Distress management  Symptom Management  Guidance and support  Facilitate communications  Assistance in coordinating care  Recommendations for the above    Subjective: Reginald Narvaez is a 61 y.o. male with a history of diabetes, hyperlipidemia, sleep apnea noncompliant with CPAP, anxiety, chronic back pain/surgery, neuropathy, HTN, hypothyroidism, COPD not on home O2, stage IV lung cancer, right lower lobe lobectomy,  and follows with OSIEL Castañeda CNP . Patient underwent CT scan initially d/t smoking history. CT scan showed RLL spiculated mass measuring 2.9x2.5cm abutting the medical pleura. Another 4MM pulmonary nodule was noted but was shown to be stable from prior imaging. No adenopathy was noted. The patient underwent RLL lobectomy 9/13/16, and pathology revealed invasive moderately differentiated adenocarcinoma with tumor invasion in the visceral pleura. All margins were negative. Patient had 1 positive intrapulmonary lymph node. Patient was noted to be Stage 2 disease (T2 N1 M0). Patient received adjuvant chemotherapy with Cisplatin and Alimta 11/17/16-2/2017. In 9/2020 patient had significant weight loss, and CT scan revealed pulmonary lesion suggestive of recurrent disease. PET scan done, and revealed metastatic disease with new bilateral sub centimeter pulmonary nodules measuring up to 6mm. Mild metabolic activity was noted within small mediastinal lymph nodes. New bone lesion with metabolic activity in the anterior left 5th rib noted. MRI of brain was negative. The patient was then started on Taxol and carboplatin with radiation, but had med reaction so Taxol was replaced with Abraxane. PET scan repeated in 12/2020 that revealed new FDG avid left sided level 4 lymph node of the neck suggestive of progression of disease.  Multiple bilateral solid noncalcified pulmonary nodules noted with some sub threshold FDG activity. FDG avid mediastinal and right hilar lymph nodes similar to prior study. Interval decreased in FDG activity identified involving the previously identified left 5th rib lesion. No new bone lesions noted. Patient completed chemoradiation, patient then started immunotherapy in 5/2021 with [facility]. 5/2021 scans revealed disease progression with innumerable pulmonary nodules scattered throughout both lungs with largest measuring 13mm. Patient was noted to have further progression on 7/2021 scans with left femur x-ray revealing lytic lesion involving the cortex of the mid left femoral diaphysis. CT chest revealed progression of pulmonary mets with increase in size/number of nodules. Patient underwent radiation therapy to left femur lytic lesion in 8/2021. Patient was started on Carboplatin, Alimita, and Avastin on 8/5/2021, and started cycle 3 on 9/14/21. He reports tolerating treatment, without any complaints of SE.     [place]AR[place]: Controlled Substances Monitoring: reviewed 10/12/21    RX Monitoring 10/10/2017   Attestation The Prescription Monitoring Report for this patient was reviewed today. Periodic Controlled Substance Monitoring No signs of potential drug abuse or diversion identified. ;Random urine drug screen sent today. INTERIM EVENTS:  Patient reports Fentanyl patch did not decrease the amount of times he would need Perocet PRN. He reports continuing to need 4-5 doses/24 hours for right chest/back pain. He reports being groggy but arousable when on Fentanyl and Percocet combined. Available records including labs and imaging results were reviewed. Recent and past history, med list, family history, social history and review of systems were personally reviewed and updated in EHR.     REVIEW OF SYSTEMS    []   UNABLE TO OBTAIN:     Constitutional:  []   Chills   []  Fatigue   []  Fevers   []  Malaise   []  Weight loss   [] Other:     Respiratory:   []  Cough    [] Shortness of breath    []  Chest pain    [] Other:     Cardiovascular:   []  Chest pain  []  Dyspnea    []  Exertional chest pressure/discomfort     [] Fatigue      []  Palpitations    []  Syncope   [] Other:     Gastrointestinal:   []  Abdominal pain   []  Constipation    []  Diarrhea    []   Dysphagia   []  Reflux             []  Vomiting   [] Other:     Genitourinary:  []  Dysuria     []  Frequency   []  Hematuria   [] Nocturia   []  Urinary incontinence   [] Other:     Musculoskeletal:   [] Back pain    []  Muscle weakness   []  Myalgias    []  Neck pain   []  Stiff joints   []  Other:     Behavioral/Psych:   [] Anxiety    []  Depression     []  Mood swings   [] Other:     PHYSICAL ASSESSMENT:     General: []  Oriented x3      [] well appearing      [] Intubated      [] ill appearing      [] Other:    Mental Status: [] normal mental status exam      [] drowsy      [] Confused      [] Other:     Cardiovascular: []  Regular rate/rhythm      [] Arrhythmia      [] Other:     Chest: [] Effort normal      [] lungs clear      [] respiratory distress      [] Tachypnea      []  Other:    Abdomen: [] Soft/non-tender      []  Normal appearance      [] Distended      [] Ascites      [] Other:    Neurological: [] Normal Speech      [] Normal Sensation      []  Deficits present:      Extremity:  [] normal skin color/temp      [] clubbing/cyanosis      []  No edema      [] Other:       Vital Signs:  /80   Pulse 95   Temp 96.3 °F (35.7 °C) (Temporal)   Wt 219 lb 3.2 oz (99.4 kg)   SpO2 98%   BMI 26.68 kg/m²      has a past medical history of Anxiety, BiPAP (biphasic positive airway pressure) dependence, Cancer (HCC), Chronic back pain, Clinical trial exam, COPD (chronic obstructive pulmonary disease) ([facility] [address] Diabetes ([facility] [address] Hyperlipidemia, Hypertension, Hypothyroidism, [facility] Neuropathy, Prolonged emergence from general anesthesia, Sleep apnea, Slow to wake up after anesthesia, SOB (shortness of breath), and Urine frequency. Patient Active Problem List   Diagnosis    Malignant neoplasm of lower lobe of right lung ([address]    Smoker unmotivated to quit    Simple chronic bronchitis ([address]    Diabetes ([address]    Hypertension    Hypothyroidism    Hyperlipidemia    Major depressive disorder with single episode, in partial remission (HCC)    Chronic midline low back pain without sciatica    History of lumbar laminectomy    COPD (chronic obstructive pulmonary disease) (HCC)    Sleep apnea    Atelectasis    Anxiety    Cancer (HCC)    Gastroesophageal reflux disease    Difficulty sleeping    Opioid use, unspecified with unspecified opioid-induced disorder    Opioid dependence with unspecified opioid-induced disorder    Opioid dependence, uncomplicated    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 Patient reports to continue to have severe pain with current regimen of percocet only. He has tried MS Contin and this caused nausea to worsen, and we recently tried Fentanyl patch. Patient has history of neuropathy, and reports this worsening overtime with treatment. He reports getting N/T in hands INT, and more frequently in feet. We discussed medications for this if it interferes with patients ADL's. Patient is hesitant on starting more meds, and would like to wait. Patient reports BM's are regular with Colace once a day, and is aware that he can increase to BID, and add MiraLax. We also discussed the option for Senokot. Patient continues to use Zofran and Compazine atleast once daily.  He reports using Advil not need very many doses of Percocet as he is taking now. He was advised to cut back as tolerated, and to call with any SE. We discussed goal to be able to decrease pain/function, and not be sedated. I also offered therapy for his fatigue and pain, but he has not found this helpful in the past and declined. I offered support to patient, and he was appreciative of visit. We discussed QOL while undergoing treatment, and what is important to patient. Patient is hoping that treatment is controlling disease, and last scan did show this. He is hoping that his pain can become more controled so he doesn't have to \"watch the clock\", in order to get next dose in for relief. He was advised on the need for smoking cessation, but will further discuss with pulmonologist on next visit. Principal Problem/Diagnosis:   Diagnosis Orders   1. Lung cancer metastatic to bone ([facility] [address]  oxyCODONE (OXYCONTIN) 20 MG extended release tablet    Scot Godfrey MD, Pulmonology, [place]   2. Cancer related pain  oxyCODONE (OXYCONTIN) 20 MG extended release tablet   3. Palliative care encounter     4. Bone metastasis ([address]     5. Drug-induced constipation     6. Nausea     7. Noncompliance with CPAP treatment  Scot Godfrey MD, Pulmonology, [place]   8. Weight loss     9. Smoker unmotivated to quit  Scot Godfrey MD, Pulmonology, [place]   10. Malignant neoplasm of lower lobe of right lung ([facility] [address]     11. Upset stomach  pantoprazole (PROTONIX) 40 MG tablet   12. Gastroesophageal reflux disease without esophagitis  pantoprazole (PROTONIX) 40 MG tablet   13.  Encounter for chronic pain management       IMPRESSION/ PLAN  1- Follow-up to prior meeting  Discuss goals of care  Treatment options/plans  Provide clinical updates and answer questions  Share information and provider education for the family  Listen to patient/family concerns  Assess family understanding, concerns, and coping  [facility]inary

## 2021-10-12 NOTE — TELEPHONE ENCOUNTER
PA required for Oxycodone. I spoke to Luis Martinez at [employer]. PA completed and submitted. Will have fax determination with 24-72hrs.

## 2021-10-12 NOTE — PATIENT INSTRUCTIONS
RTO in 1 week  Start OxyContin ER BID   Percocet 10/325mg PRN decrease as able. Stop Prilosec, and start Protonix.

## 2021-10-12 NOTE — TELEPHONE ENCOUNTER
AVS from 10/12/21     RTO in 1 week  Start OxyContin ER BID   Percocet 10/325mg PRN decrease as able. Stop Prilosec, and start Protonix.      RV scheduled 10/19/21 @ 8am    Electronically signed by Chase Ernst on 10/12/2021 at 1:33 PM

## 2021-10-18 NOTE — PATIENT INSTRUCTIONS
RTO in 2 weeks  F/U with Pulmonary as scheduled  Get labs prior to next visit  Continue current pain regimen and decrease Percocet as able.

## 2021-10-18 NOTE — PROGRESS NOTES
PALLIATIVE CARE PROGRESS NOTE     Patient: Bridger Trinidad  1960    Reason For Consult:  Goals of care evaluation  Distress management  Symptom Management  Guidance and support  Facilitate communications  Assistance in coordinating care  Recommendations for the above    Subjective: Bridger Trinidad is a 61 y.o. male with a history of diabetes, hyperlipidemia, sleep apnea noncompliant with CPAP, anxiety, chronic back pain/surgery, neuropathy, HTN, hypothyroidism, COPD not on home O2, stage IV lung cancer, right lower lobe lobectomy, and follows with OSIEL Salazar CNP . Patient underwent CT scan initially d/t smoking history. CT scan showed RLL spiculated mass measuring 2.9x2.5cm abutting the medical pleura. Another 4MM pulmonary nodule was noted but was shown to be stable from prior imaging. No adenopathy was noted. The patient underwent RLL lobectomy 9/13/16, and pathology revealed invasive moderately differentiated adenocarcinoma with tumor invasion in the visceral pleura. All margins were negative. Patient had 1 positive intrapulmonary lymph node. Patient was noted to be Stage 2 disease (T2 N1 M0). Patient received adjuvant chemotherapy with Cisplatin and Alimta 11/17/16-2/2017. In 9/2020 patient had significant weight loss, and CT scan revealed pulmonary lesion suggestive of recurrent disease. PET scan done, and revealed metastatic disease with new bilateral sub centimeter pulmonary nodules measuring up to 6mm. Mild metabolic activity was noted within small mediastinal lymph nodes. New bone lesion with metabolic activity in the anterior left 5th rib noted. MRI of brain was negative. The patient was then started on Taxol and carboplatin with radiation, but had med reaction so Taxol was replaced with Abraxane. PET scan repeated in 12/2020 that revealed new FDG avid left sided level 4 lymph node of the neck suggestive of progression of disease.  Multiple bilateral solid noncalcified pulmonary nodules noted with some sub threshold FDG activity. FDG avid mediastinal and right hilar lymph nodes similar to prior study. Interval decreased in FDG activity identified involving the previously identified left 5th rib lesion. No new bone lesions noted. Patient completed chemoradiation, patient then started immunotherapy in 5/2021 with Ninfa Mendoza. 5/2021 scans revealed disease progression with innumerable pulmonary nodules scattered throughout both lungs with largest measuring 13mm. Patient was noted to have further progression on 7/2021 scans with left femur x-ray revealing lytic lesion involving the cortex of the mid left femoral diaphysis. CT chest revealed progression of pulmonary mets with increase in size/number of nodules. Patient underwent radiation therapy to left femur lytic lesion in 8/2021. Patient was started on Carboplatin, Alimita, and Bevacizumab on 8/5/2021, and is on cycle 4. Oconnorlu Tangnd was started on 8/26/21 Q6 weeks, and patient is on cycle 2. CT chest/abdomen on 9/28 showed interval decrease in size of pulmonary nodules, and no significant change in confluent lymphadenopathy in the left hilum with small mediastinal lymph nodes. Stable 3cm right adrenal nodule noted, and 0.8cm left renal lesion to small to characterize. He reports tolerating treatment, without any complaints of SE. He reports being in the ED 9/21-9/22 for atypical chest pain. He had reported that pain is reproducible with palpation or movement. Pain radiated through the back. EKGs showed NSR, and CTA chest revealed no PE. Nuclear stress test revealed no significant or fixed perfusion defect. Mild fixed defect involving the apex, and inferior apical segments, 4% of left ventricular myocardial involvement. Patient was given a 1x dose of Decadron that relieved his symptoms and was DC home. OPIOID RISK TOOL  Angel each Item Score Item Score  box that applies If Female If Male    1.  Family History of Substance Abuse   Alcohol Speed.Cocking ] Female 1 Male 3  Illegal Drugs Jamarcus.Mendiola ] Female 2 Male 3  Prescription Drugs Holiday.Marker ] Female 4 Male 4     2. Personal History of Substance Abuse   Alcohol Holiday.Marker ] Female 3 Male 3  Illegal Drugs Holiday.Marker ] Female 4 Male 4  Prescription Drugs Holiday.Marker ] Female 5 Male 5    3. Age Diego Jehovah's witness box if 12 39years old) Holiday.Marker ] Female 1 Male 1    4. History of Preadolescent Sexual Abuse Holiday.Marker ] Female 3 Male 0    5. Psychological Disease   Attention Deficit Disorder, Obsessive Compulsive Disorder, Bipolar, or Schizophrenia Holiday.Marker ] Female 2 Male 2    Depression Lynne.Schaumann ] Female 1 Male 1    TOTAL [ 8 ]    Total Score Risk Category: Low Risk 0  3 Moderate Risk 4  7 High Risk > 8    [place]AR[place]: Controlled Substances Monitoring: reviewed on 10/19/21    RX Monitoring 10/10/2017   Attestation The Prescription Monitoring Report for this patient was reviewed today. Periodic Controlled Substance Monitoring No signs of potential drug abuse or diversion identified. ;Random urine drug screen sent today. INTERIM EVENTS: Patient was started on OxyContin 20 mg ER BID, and given 7 days worth. He reports this medication is not working. He is still requiring 4 Percocet/24hours, and reports being too sedated with these combined. We discussed how these drugs are essentially the same but ER is timed released to keep pain steady, and in hopes he will not require as much PRN. Patient reports when he has severe pain episodes, he needs to take Percocet 10 mg immediately for any relief. He is asking what else he can do for immediate relief, as these pills still take some time to kick in. He is requesting to stop any ER medication, and go back to his Percocet PRN. Since starting the ER patient is more constipated. He reports no BM in 4 days. He has been taking Colace but ran out. He also reports taking MiraLax the last couple days. He reports feeling like he is going to go today. Available records including labs and imaging results were reviewed.  Recent and past history, med list, family history, social history and review of systems were personally reviewed and updated in EHR.     REVIEW OF SYSTEMS    []   UNABLE TO OBTAIN:     Constitutional:  []   Chills   [x]  Fatigue   []  Fevers   []  Malaise   []  Weight loss   [] Other:     Respiratory:   [x]  Cough    [x]  Shortness of breath    []  Chest pain    [x] Other:  Chronic Cough- mainly nonproductive    Cardiovascular:   []  Chest pain  []  Dyspnea    []  Exertional chest pressure/discomfort     [] Fatigue      []  Palpitations    []  Syncope   [] Other:     Gastrointestinal:   []  Abdominal pain   [x]  Constipation    []  Diarrhea    []   Dysphagia   []  Reflux             []  Vomiting   [] Other:     Genitourinary:  []  Dysuria     []  Frequency   []  Hematuria   [] Nocturia   []  Urinary incontinence   [] Other:     Musculoskeletal:   [x] Back pain    []  Muscle weakness   []  Myalgias    []  Neck pain   []  Stiff joints   [x]  Other: Right upper chest pain that is chronic in nature and can be reproduced with movement    Behavioral/Psych:   [x] Anxiety    [x]  Depression     []  Mood swings   [] Other:     PHYSICAL ASSESSMENT:     General: [x]  Oriented x3      [] well appearing      [] Intubated      [] ill appearing      [] Other:    Mental Status: [x] normal mental status exam      [] drowsy      [] Confused      [] Other:     Cardiovascular: [x]  Regular rate/rhythm      [] Arrhythmia      [] Other:     Chest: [x] Effort normal      [x] lungs clear      [] respiratory distress      [] Tachypnea      []  Other:    Abdomen: [x] Soft/non-tender      [x]  Normal appearance      [] Distended      [] Ascites      [x] Other: BS active X4    Neurological: [x] Normal Speech      [x] Normal Sensation      []  Deficits present:      Extremity:  [x] normal skin color/temp      [] clubbing/cyanosis      [x]  No edema      [] Other:       Vital Signs:  BP (!) 118/90   Pulse 92   Temp 97.9 °F (36.6 °C) (Oral)   Wt 218 lb 8 oz (99.1 kg)   BMI 26.60 kg/m² week, then transition off. We discussed patients constipation, and we will start him on Senokot today. He will continue MiraLax daily PRN. We discussed him taking a Dulcolax suppository OTC today. I asked how patientsTrazodone is working, but he reports with the new medication he was afraid to continue so has stopped. He reports occasionally feeling like he needs to restart because of many nights awakening Q2 hours. We discussed Opioid consent, and pain contract. Document signed with patient, and copy provided to him. Dr Andrea Flynn getting records prior to scheduling appointment. Taiwo Potter MA assisting with this, and faxed request to [facility] office. Patient had lost 1 pound since last week. He continues with nutritional supplements, and has not started appetite stimulant. He reports Santana Caicedo starting cooking again, and he is eating more. We discussed HCPOA, and he reports this not being completed yet. He has 3 bio children as Next of Kin without signed document. I offered to complete for him if he wants to bring document in, or another one could be filled out here. SO reports patient has been more winter lately. He reports feeling more down. I completed the depression screening and he has mild depression. He has been on Prozac for a long time, and we discussed adjusting dose. He would like to monitor at this time. I offered patient/SO support at this time. He was informed of how to contact me through the cancer center as needed. Principal Problem/Diagnosis:   Diagnosis Orders   1. Lung cancer metastatic to bone (HCC)  oxyCODONE (OXYCONTIN) 20 MG extended release tablet    oxyCODONE-acetaminophen (PERCOCET)  MG per tablet   2. Cancer related pain  oxyCODONE (OXYCONTIN) 20 MG extended release tablet    oxyCODONE-acetaminophen (PERCOCET)  MG per tablet   3. Drug-induced constipation  senna (SENOKOT) 8.6 MG tablet   4. Palliative care encounter     5.  Noncompliance with CPAP treatment     6. Smoker unmotivated to quit     7. Adenocarcinoma ([facility] [address]     8. Malignant neoplasm of lower lobe of right lung ([address]     9. 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 Mild depression ([address]     11. Anxiety     12. Insomnia, unspecified type            IMPRESSION/ PLAN  1- Follow-up to prior meeting  Provide clinical updates and answer questions  Listen to patient/family concerns  Interdiscplinary collaboration  Build trust  Elicit patient's goals and values, and use these to establish or modify goals of care    2-Code Status: Full Code    3-Other Recommendations:   Tox screen prior to next visit    Electronically signed by   OSIEL Mackey - CNP  Palliative Care Team  on 10/19/2021 at [place] [address] [phone]  Palliative Care Cell Phone: [phone]

## 2021-10-19 NOTE — TELEPHONE ENCOUNTER
AVS from 10/19/21     RTO in 2 weeks  F/U with Pulmonary as scheduled  Get labs prior to next visit  Continue current pain regimen and decrease Percocet as able.      RV scheduled 11/2/21 @ 10am    Request for records for Dr. Gabriel Capellan sent to [facility]    PT was given AVS and an appt schedule    Electronically signed by Maximus Cerna on 10/19/2021 at 8:59 AM

## 2021-10-28 NOTE — PROGRESS NOTES
DIAGNOSIS:   1. Adenocarcinoma of the right lower lobe of lung. Path stage is T2N1M0. Stage 2 disease. 2. COPD  3. HTN   4. Smoking addiction   5. Evidence of recurrent disease with metastases to supraclavicular lymph node as well as mediastinal lymph node May/2021  6. Evidence of bone metastases July/2021  CURRENT THERAPY:  S/P right lower lobectomy. 9/2016  Received adjuvant chemotherapy, with Cisplatin and Alimta on 11/17/16, completed in February/2017  Plan chemoradiation with Taxol and carboplatin  Infusion reaction to Taxol, plan to transition to Abraxane  After completion of chemoradiation, plan immunotherapy with Imfinzi, started 5/2021   Progressive disease with bone metastases July/2021, plan carboplatin Alimta and Avastin  Plan palliative radiation to painful bone mets in  the left femur  BRIEF CASE HISTORY:      Maggy Tate is a very pleasant 61 y.o. male who is referred to us for management recommendation of his recently resected lung cancer. He actually underwent a screening CT scan because of his smoking habits. CT scans showed right lower lobe spiculated mass measuring 2.9 x 2.5 cm abutting the medial pleura. There was another 4 mm pulmonary nodule that has actually been stable from previous imaging. No adenopathy was appreciated. More testing was done but ultimately the patient was taken to surgery. Prior to surgery, the clinical stage was stage I disease. The patient underwent right lower lobe lobectomy on 9/13/16 and pathology showed invasive moderately differentiated adenocarcinoma of the lung measuring 4 cm in greatest dimension, the tumor invaded the visceral pleura. All margins were negative. He had one positive intrapulmonary lymph node. Representative nodes from stations 4, 7, 9 were all negative. Final pathological staging is (T2 N1 M0) stage II disease  He is sent to us for a consultation, recovering from surgery.  He continues to have some pain in the surgical area but otherwise has no complaints. He has minimal shortness of breath, no cough or hemoptysis. No headache or seizures or loss of weight. Performance status is ECOG 1, we discussed adjuvant chemotherapy with Cisplatin and Alimta. After completion of chemotherapy, we started surveillance. He required bronchoscopy to clear thick mucous. He presented 09/2020 with significant weight loss and he was following with pulmonary service and CT scan showed pulmonary lesion. PET scan was done and unfortunately showed lung lesions and rib lesion, suggestive of recurrent disease. Brain MRI was done and negative. The patient was started on chemoradiation with weekly Taxol and carboplatin. With week 1 treatment, he had grade 1 infusion reaction that was managed with premedication and dose adjustment. However with second week, the infusion was much stronger and we had to hold Taxol altogether. We plan to replace with Abraxane. After completion of chemoradiation, we plan to offer immunotherapy with Imfinzi to start in May/2021. Diego Draft was started 05/2021. Prior to starting Diego Draft lab work showed baseline hypothyroidism and was started on Synthroid. In July/2021, he has further progression of disease with bone metastases, will try to manage him with clinical trial but he did not qualify, we decided to  start him on first-line treatment with Alimta carbo and bevacizumab  Meanwhile he presented to emergency room with severe pain in his left femur, x-ray showed lytic lesion in the femoral diaphysis. Plan palliative radiation  INTERIM HISTORY:   The patient comes in today for a follow-up, he is tolerating current therapy fairly well. He is continuing to complain of fatigue and cough unchanged. The chest pain is controlled with medication. He also has nausea controlled with medication. Overall symptoms and performance status is stable.   He unfortunately continues to smoke  PAST MEDICAL HISTORY: has a past medical history of Anxiety, BiPAP (biphasic positive airway pressure) dependence, Cancer ([facility] [address] Chronic back pain, Clinical trial exam, COPD (chronic obstructive pulmonary disease) ([facility] [address] Diabetes ([facility] [address] Hyperlipidemia, Hypertension, Hypothyroidism, Lung cancer ([facility] [address] Neuropathy, Prolonged emergence from general anesthesia, Sleep apnea, Slow to wake up after anesthesia, SOB (shortness of breath), and Urine frequency. PAST SURGICAL HISTORY: has a past surgical history that includes laminectomy (12/2012); Testicle removal (Left, 1982); Lung removal, partial (Right, 2016); other surgical history (Right, 06/13/2017); back surgery (01/2013); US BIOPSY LYMPH NODE (12/21/2020); IR PORT PLACEMENT > 5 YEARS (02/24/2021); Cardiac catheterization; and cardiovascular stress test.     CURRENT MEDICATIONS:  has a current medication list which includes the following prescription(s): senna, oxycodone-acetaminophen, pantoprazole, breztri aerosphere, advil dual action, albuterol sulfate hfa, aspirin, nicotine, trazodone, naloxone, megestrol, prochlorperazine, levothyroxine, triamcinolone, fluoxetine, lidocaine-prilocaine, atorvastatin, lisinopril, ciprofloxacin-dexamethasone, and ondansetron. ALLERGIES:  is allergic to paclitaxel, emend [fosaprepitant dimeglumine], and emend [aprepitant]. FAMILY HISTORY: Negative for any hematological or oncological conditions. SOCIAL HISTORY:  reports that he has been smoking cigarettes. He has a 50.00 pack-year smoking history. He has never used smokeless tobacco. He reports current drug use. Drug: Marijuana. He reports that he does not drink alcohol. REVIEW OF SYSTEMS:   General: No fever or night sweats.  Weight loss; +fatigue, pain in the femoral area has improved  ENT: No double or blurred vision, no tinnitus or hearing problem, or sore throat  Respiratory: Chronic shortness of breath with some pain and cough - some clear sputum - no hemoptysis, chest discomfort in the surgical area, chest congestion   Cardiovascular: Denies central chest pain, PND or orthopnea. No L E swelling or palpitations. Gastrointestinal: No vomiting, abdominal pain, diarrhea or constipation, nausea controlled with medication   Genitourinary: Denies dysuria, hematuria, frequency, urgency or incontinence. Neurological: Denies headaches, decreased LOC, no sensory or motor focal deficits. Grade 1 neuropathy in hands and feet, predates chemo, diabetes related. Musculoskeletal: Diffuse aches and pains, no joint swelling. Severe pain in the left femoral area  Skin: There are no rashes or bleeding. +psoriasis   Psychiatric:  History of bipolar disorder. Anxiety   Endocrine: No thyroid disease. Hematologic: No bleeding, no adenopathy. PHYSICAL EXAM: Shows a well appearing 61y.o.-year-old male who is not in pain or distress. Vital Signs: Weight is stable at 230. Blood pressure 116/82, pulse 98, temperature 96.6 °F (35.9 °C), temperature source Temporal, weight 215 lb 14.4 oz (97.9 kg), SpO2 97 %. HEENT: Slight redness in the throat. Normocephalic and atraumatic. Pupils are equal, round, reactive to light and accommodation. Extraocular muscles are intact. Neck: grade 1 radiation toxicity Showed no JVD, no carotid bruit. Lungs: Port in place upper right chest - swelling and redness, no evidence of infection. Decreased air entry especially in the right lower lobe area - improved. Scattered rhonchi. Postoperative changes in the chest wall are healed well without any evidence of infection. Heart: Regular without any murmur. Abdomen: Soft, nontender. No hepatosplenomegaly. Extremities: Lower extremities show no edema, clubbing, or cyanosis. Neuro exam: intact cranial nerves bilaterally no motor or sensory deficit, gait is normal. Lymphatic: Cervical adenopathy improved significantly.     REVIEW OF RADIOLOGICAL RESULTS:  CT chest abd and pelvis 9/28/2021    Impression   1.  Interval decrease in size of pulmonary nodules, as compared to 07/22/2021   exam.       2.  No significant change in confluent lymphadenopathy in the left hilum and   small mediastinal lymph nodes.       3.  Stable 3 cm right adrenal nodule.       4.  Indeterminate 0.8 cm left renal lesion is too small to characterize, for   which attention to on follow-up is recommended.           REVIEW OF LABORATORY RESULTS:   Lab Results   Component Value Date    WBC 9.9 10/28/2021    HGB 11.6 (L) 10/28/2021    HCT 33.0 (L) 10/28/2021    MCV 99.2 10/28/2021     10/28/2021       Chemistry        Component Value Date/Time     (L) 10/28/2021 0902    K 4.9 10/28/2021 0902    CL 95 (L) 10/28/2021 0902    CO2 25 10/28/2021 0902    BUN 23 10/28/2021 0902    CREATININE 1.21 (H) 10/28/2021 0902        Component Value Date/Time    CALCIUM 8.6 10/28/2021 0902    ALKPHOS 91 10/28/2021 0902    AST 19 10/28/2021 0902    ALT 10 10/28/2021 0902    BILITOT 0.49 10/28/2021 0902              IMPRESSION:   1. Adenocarcinoma of the right lower lobe of lung. Path stage is T2N1M0. Stage 2 disease. 2. S/P lobectomy. 3. received adjuvant chemotherapy, with cisplatin and Alimta. Plan to finish 4 cycles of adjuvant chemotherapy and then observe. 4. COPD  5. Smoking addiction, unable to quit   6. Starting chemoradiation for recurrent disease in the mediastinum and supraclavicular area. 7. Infusion reaction to Taxol, escalated from grade 1 to grade 2 by the second week. We have to hold the Taxol and will plan to switch to Abraxane. 8. Dysphagia secondary to chemoradiation as the esophagus in the radiation field. Improved after completion of chemoradiation  9. Hypertension and dizziness, we asked him to stop taking the lisinopril. I think it will improve as his oral intake improves  10. Evidence of progressive disease in the chest as well as bone metastases, July/2021  11. Plan palliative radiation to the bone mets July/2021  12.  Did not qualify for clinical trials, plan to treat

## 2021-10-28 NOTE — TELEPHONE ENCOUNTER
Name: Sakina Awad  : 1960  MRN: [id number]    Oncology Navigation Follow-Up Note  Navigator spoke with pt. Face to face and offering assistance if needed. Pt. Denies needs, pain relief is still somewhat of a work in progress. Plan to follow.    Electronically signed by Jody Velarde RN on 10/28/2021 at 11:51 AM

## 2021-10-28 NOTE — PROGRESS NOTES
Pt here for C5D1 Monse Kelly Carbo. Pt seen by Dr Chula Novoa prior to treatment, refer to his note. Labs drawn from port and results reviewed. Pt was treated without incident and d/c'd in stable condition. Pt will return on 11-18-21 for MD f/u and C6D1.

## 2021-11-02 NOTE — PROGRESS NOTES
PALLIATIVE CARE PROGRESS NOTE     Patient: Destiny Fry  1960    Reason For Consult:  Goals of care evaluation  Distress management  Symptom Management  Guidance and support  Facilitate communications  Assistance in coordinating care  Recommendations for the above    Subjective: Destiny Fry is a 61 y.o. male with a history of diabetes, hyperlipidemia, sleep apnea noncompliant with CPAP, anxiety, chronic back pain/surgery, neuropathy, HTN, hypothyroidism, COPD not on home O2, stage IV lung cancer, right lower lobe lobectomy, and follows with OSIEL Chavez CNP . Patient underwent CT scan initially d/t smoking history. CT scan showed RLL spiculated mass measuring 2.9x2.5cm abutting the medical pleura. Another 4MM pulmonary nodule was noted but was shown to be stable from prior imaging. No adenopathy was noted. The patient underwent RLL lobectomy 9/13/16, and pathology revealed invasive moderately differentiated adenocarcinoma with tumor invasion in the visceral pleura. All margins were negative. Patient had 1 positive intrapulmonary lymph node. Patient was noted to be Stage 2 disease (T2 N1 M0). Patient received adjuvant chemotherapy with Cisplatin and Alimta 11/17/16-2/2017. In 9/2020 patient had significant weight loss, and CT scan revealed pulmonary lesion suggestive of recurrent disease. PET scan done, and revealed metastatic disease with new bilateral sub centimeter pulmonary nodules measuring up to 6mm. Mild metabolic activity was noted within small mediastinal lymph nodes. New bone lesion with metabolic activity in the anterior left 5th rib noted. MRI of brain was negative. The patient was then started on Taxol and carboplatin with radiation, but had med reaction so Taxol was replaced with Abraxane. PET scan repeated in 12/2020 that revealed new FDG avid left sided level 4 lymph node of the neck suggestive of progression of disease.  Multiple bilateral solid noncalcified pulmonary nodules noted with some sub threshold FDG activity. FDG avid mediastinal and right hilar lymph nodes similar to prior study. Interval decreased in FDG activity identified involving the previously identified left 5th rib lesion. No new bone lesions noted. Patient completed chemoradiation, patient then started immunotherapy in 2021 with Mildred Huynh. 2021 scans revealed disease progression with innumerable pulmonary nodules scattered throughout both lungs with largest measuring 13mm. Patient was noted to have further progression on 2021 scans with left femur x-ray revealing lytic lesion involving the cortex of the mid left femoral diaphysis. CT chest revealed progression of pulmonary mets with increase in size/number of nodules. Patient underwent radiation therapy to left femur lytic lesion in 2021. Patient was started on Carboplatin, Alimita, and Bevacizumab on 2021, and is on cycle 5 with Alimta and Bevacizumab now. Ok Greg was started on 21 Q6 weeks, and patient is on cycle 2. CT chest/abdomen on  showed interval decrease in size of pulmonary nodules, and no significant change in confluent lymphadenopathy in the left hilum with small mediastinal lymph nodes. Stable 3cm right adrenal nodule noted, and 0.8cm left renal lesion to small to characterize. Patient reports tolerating treatment without any SE.     [place]AR[place]: Controlled Substances Monitorin/2 reviewed    RX Monitoring 10/10/2017   Attestation The Prescription Monitoring Report for this patient was reviewed today. Periodic Controlled Substance Monitoring No signs of potential drug abuse or diversion identified. ;Random urine drug screen sent today. INTERIM EVENTS: Patient reports having chills last few days since treatment. He reports intake has been poor but trying to drink water. Patient is using Promethazine Codeine Solution for cough. He reports having pulmonary visit this week for second opinion.  He also reports having nausea, and continuing his Zofran/Compazine as needed. He reports having some severe pain episodes that are waking him at night in chest/back. He reports needing to increase pain medication at these times. Available records including labs and imaging results were reviewed. Recent and past history, med list, family history, social history and review of systems were personally reviewed and updated in EHR.     REVIEW OF SYSTEMS    []   UNABLE TO OBTAIN:     Constitutional:  [x]   Chills   [x]  Fatigue   []  Fevers   [x]  Malaise   [x]  Weight loss   [] Other:     Respiratory:   [x]  Cough    [x]  Shortness of breath    []  Chest pain    [] Other:     Cardiovascular:   []  Chest pain  []  Dyspnea    []  Exertional chest pressure/discomfort     [] Fatigue      []  Palpitations    []  Syncope   [] Other:     Gastrointestinal:   []  Abdominal pain   []  Constipation    []  Diarrhea    []   Dysphagia   []  Reflux             []  Vomiting   [] Other:     Genitourinary:  []  Dysuria     []  Frequency   []  Hematuria   [] Nocturia   []  Urinary incontinence   [] Other:     Musculoskeletal:   [x] Back pain    [x]  Muscle weakness   []  Myalgias    []  Neck pain   []  Stiff joints   []  Other:     Behavioral/Psych:   [] Anxiety    []  Depression     []  Mood swings   [] Other:     PHYSICAL ASSESSMENT:     General: [x]  Oriented x3      [] well appearing      [] Intubated      [x] ill appearing      [] Other:    Mental Status: [x] normal mental status exam      [] drowsy      [] Confused      [] Other:     Cardiovascular: [x]  Regular rate/rhythm      [] Arrhythmia      [] Other:     Chest: [x] Effort normal      [] lungs clear      [] respiratory distress      [] Tachypnea      [x]  Other: Rhonchi/exp wheezing    Abdomen: [x] Soft/non-tender      [x]  Normal appearance      [] Distended      [] Ascites      [] Other:    Neurological: [x] Normal Speech      [] Normal Sensation      []  Deficits present:      Extremity:  [x] normal skin color/temp      [] clubbing/cyanosis      [x]  No edema      [] Other:       Vital Signs:  /80   Pulse 100   Temp 97.2 °F (36.2 °C) (Temporal)   Wt 215 lb (97.5 kg)   SpO2 100%   BMI 26.17 kg/m²      has a past medical history of Anxiety, BiPAP (biphasic positive airway pressure) dependence, Cancer (HCC), Chronic back pain, Clinical trial exam, COPD (chronic obstructive pulmonary disease) ([address] Diabetes ([address] Hyperlipidemia, Hypertension, Hypothyroidism, Lung cancer ([address] Neuropathy, Prolonged emergence from general anesthesia, Sleep apnea, Slow to wake up after anesthesia, SOB (shortness of breath), and Urine frequency. Patient Active Problem List   Diagnosis    Malignant neoplasm of lower lobe of right lung ([address]    Smoker unmotivated to quit    Simple chronic bronchitis ([address]    Diabetes ([address]    Hypertension    Hypothyroidism    Hyperlipidemia    Major depressive disorder with single episode, in partial remission (HCC)    Chronic midline low back pain without sciatica    History of lumbar laminectomy    COPD (chronic obstructive pulmonary disease) (HCC)    Sleep apnea    Atelectasis    Anxiety    Cancer (HCC)    Gastroesophageal reflux disease    Difficulty sleeping    Opioid use, unspecified with unspecified opioid-induced disorder    Opioid dependence with unspecified opioid-induced disorder    Opioid dependence, uncomplicated    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 Patient is wrapped in blanket, and reports not feeling well today. He reports chills last few days since treatment.  He also reports continued cough, that continues to be bothersome. He is taking Promethazine with codeine daily for his cough, but reports needing to increase this. We discussed him using this twice a day. He will have completed his prednisone 10 mg daily on Thursday, but has appointment with Pulmonologist this day, and was advised to address then. He previously reported that Dr. Dora Patel advised him to change my taper dosing to 10 mg daily until their visit. He reports having poor appetite, and has lost an additional 3 pounds since last visit. I reviewed previous labs from last treatment, and his Cr was slightly elevated at this time. Patient denies any darkened or decrease urine output, and has been trying to get his fluids in. He reports having PND, and is wondering about allergy medications. We discussed how Singulair can assist with allergies, and asthma symptoms so we will start this. He was advised on how to take. Patient reports pain in chest/back has worsened, and at times he is requiring more percocet than prescribed. He reports that he will take 1.5-2 tabs when pain is severe. Patient has not done well with ER medications in the past. We discussed adding Roxicodone for when he has severe pain, instead of him taking more percocet. We will send script today. Sp02 was WNL. Patient reports using rescue inhaler multiple times during week. I discussed patients continued smoking, and how this may also be contributing to his symptoms, and discussed lung cancer and smoking in more detail. Patient reports trying all options for assistance with cessation, and has failed. He reports still having the urge. I discussed getting labs with patient today d/t him not feeling well, to r/o infection or worsening kidney function. He is willing, but doesn't want to wait for them to results, so would like them called to him. I reviewed labs, and called patient.  He is aware that his sodium is slightly low, but that other labs are ok. He was advised to call if symptoms do not improve, or worsen so we can schedule him at cancer infusion center for fluids. Patient has pulmonary consult with Dr. Kim Mcginnis this week on Thursday- he was advised to keep this and address prednisone at this visit. DEVYN brought in that SO had prepared. She is asking we review, and have notary sign this. I paged chaplain Indira Lopez, and she reports that she will ask notary upstairs to assist. This was notarized today, and copy given to MA to scan on file. Dar Tanner is primary agent. I offered patient and SO support today, and they were appreciative of my visit. UPDATE: Spoke with Dr. Pan Bolton about patients symptoms today and blood work. He recommended starting patient on Azithromycin. Called and updated patient. Principal Problem/Diagnosis:   Diagnosis Orders   1. Malignant neoplasm of lower lobe of right lung ([address]     2. Cancer related pain  oxyCODONE (ROXICODONE) 5 MG immediate release tablet   3. Drug-induced constipation     4. Palliative care encounter     5. Smoker unmotivated to quit     6. Lung cancer metastatic to bone (HCC)  oxyCODONE (ROXICODONE) 5 MG immediate release tablet   7. Chills  CBC   8. Poor appetite  CBC   9. Nausea  Basic Metabolic Panel   10. Cough  montelukast (SINGULAIR) 10 MG tablet    CBC   11. Dehydration  Basic Metabolic Panel   12. Allergic rhinitis, unspecified seasonality, unspecified trigger  montelukast (SINGULAIR) 10 MG tablet   13. Noncompliance with CPAP treatment     14. Encounter for chronic pain management  Pain Management Drug Screen         IMPRESSION/ PLAN  1- Follow-up to prior meeting  Functional decline  Treatment options/plans  Listen to patient/family concerns  Assess family understanding, concerns, and coping  Interdiscplinary collaboration  Build trust  Provide emotional support to the family    2-Code Status: Full Code- SO mentioned that patient would not want tubes so will discuss next visit. 3-Other Recommendations:  Opioid consent and pain contract signed last visit on 10/19/21  HCPOA completed this visit- Elidia Apa is primary agent  Tox screen not entered- so entered this visit and can complete prior to next visit. Will discuss code status more next visit. Call if symptoms worsen- may need IV fluids.      Electronically signed by   OSIEL De Jesus - CNP  Palliative Care Team  on 11/2/2021 at 11:54 AM    Palliative Care Office [phone]  Palliative Care Cell Phone: [phone]

## 2021-11-02 NOTE — TELEPHONE ENCOUNTER
AVS from 11/2/21    RTO in 1 qeeks or sooner as needed    RV scheduled 11/9/21 @10am    PT was given AVS and an appt schedule    Electronically signed by Jose Carlos Cottrell on 11/2/2021 at 11:48 AM

## 2021-11-02 NOTE — PATIENT INSTRUCTIONS
RTO in 1 week  Keep Pulmonologist Appt. On 11/4/21  Continue to decrease smoking/stop  Start Singulair 10mg once daily   Start Roxicodone 5mg Q8 hour PRN with Percocet 10/325mg as needed for increased pain. Decrease as able. Will call labs results to you later today.    Lab prior to next visit

## 2021-11-05 NOTE — PROGRESS NOTES
REASON FOR THE CONSULTATION:  COPD and stage IV adenocarcinoma of the lung  HISTORY OF PRESENT ILLNESS:    Juan Sutherland is a 61y.o. year old male who is accompanied by his wife. Comes in because he has chronic cough and when he coughs it comes in episodes which causes him to have right chest discomfort. He has previous history of right lower lobe lobectomy followed by recurrence of tumor which is now metastatic to bones. He has been treated with adjuvant chemotherapy cisplatin and alimta . Followed by carboplatin and Taxol. He has been treated with immunotherapy. Has had palliative radiation to the left femur. He denies any purulent sputum. He does have chronic dyspnea grade 2-3 depending upon severity of exertion. He denies hemoptysis. He is compliant with his bronchodilator therapy believes that prednisone helps him the most bronchodilation and with his cough. He also uses codeine syrup as needed is also on narcotics for bone pain. He does understand side effects of chronic prednisone but he uses 5 to 10 mg on a day-to-day basis.            Immunization   Immunization History   Administered Date(s) Administered    COVID-19, J&J, PF, 0.5 mL 05/08/2021    Influenza Vaccine, unspecified formulation 12/12/2011, 10/15/2012, 09/24/2015    Influenza Virus Vaccine 10/23/2019, 11/12/2020    Influenza, MDCK Quadv, IM, PF (Flucelvax 2 yrs and older) 09/28/2021    Influenza, Shazia Saint, IM, (6 mo and older Fluzone, Flulaval, Fluarix and 3 yrs and older Afluria) 09/23/2016, 10/03/2017, 10/12/2018, 11/12/2020    Pneumococcal Conjugate 13-valent Waleska Dove) 08/02/2016    Pneumococcal Polysaccharide ([id number]) 04/18/2017, 02/12/2018        Pneumococcal Vaccine     [] Up to date    [] Indicated   [] Refused  [] Contraindicated       Influenza Vaccine   [] Up to date    [] Indicated   [] Refused  [] Contraindicated        Pulmonary Rehab   [] Completed   [] Indicated   [] Refused  [] Contraindicated   PAST MEDICAL HISTORY:       Diagnosis Date    Anxiety     BiPAP (biphasic positive airway pressure) dependence     Cancer (HCC)     Chronic back pain     Clinical trial exam 12/28/2016    COPD (chronic obstructive pulmonary disease) (HCC)     Diabetes (HCC)     Hyperlipidemia     Hypertension     Hypothyroidism     Lung cancer (HCC)     Neuropathy     bilat feet    Prolonged emergence from general anesthesia     Sleep apnea     Slow to wake up after anesthesia     SOB (shortness of breath)     Urine frequency          Family History:       Problem Relation Age of Onset    Lung Cancer Mother     Cancer Father     Mental Illness Sister     Mental Illness Sister     Mental Illness Sister     Mental Illness Sister     Mental Illness Brother     Mental Illness Brother     Other Brother        SURGICAL HISTORY:   Past Surgical History:   Procedure Laterality Date    BACK SURGERY  01/2013    10 days after lamenectomy, surgery was done in same area to remove Staff infection i    CARDIAC CATHETERIZATION      CARDIOVASCULAR STRESS TEST      IR PORT PLACEMENT EQUAL OR GREATER THAN 5 YEARS  02/24/2021    IR PORT PLACEMENT EQUAL OR GREATER THAN 5 YEARS 2/24/2021 MD THA Will SPECIAL PROCEDURES    LAMINECTOMY  12/2012    LUNG REMOVAL, PARTIAL Right 2016    lower lobe    OTHER SURGICAL HISTORY Right 06/13/2017    CT guided placement of right pleural drainage catheter     TESTICLE REMOVAL Left 1982    US LYMPH NODE BIOPSY  12/21/2020    US LYMPH NODE BIOPSY 12/21/2020 THA ULTRASOUND           SOCIAL AND OCCUPATIONAL HEALTH:    Social History     Socioeconomic History    Marital status:      Spouse name: None    Number of children: None    Years of education: None    Highest education level: None   Occupational History    None   Tobacco Use    Smoking status: Current Every Day Smoker     Packs/day: 0.50     Years: 50.00     Pack years: 25.00     Types: Cigarettes    Smokeless tobacco: Never Used    Tobacco comment: 2/24/2021   Vaping Use    Vaping Use: Never used   Substance and Sexual Activity    Alcohol use: No    Drug use: Not Currently     Types: Marijuana [place])     Comment: occasionally, stated stopping 11/4/21    Sexual activity: Yes     Partners: Female   Other Topics Concern    None   Social History Narrative    None     Social Determinants of Health     Financial Resource Strain: Low Risk     Difficulty of Paying Living Expenses: Not hard at all   Food Insecurity: No Food Insecurity    Worried About Running Out of Food in the Last Year: Never true    Adrienne of Food in the Last Year: Never true   Transportation Needs: No Transportation Needs    Lack of Transportation (Medical): No    Lack of Transportation (Non-Medical): No   Physical Activity:     Days of Exercise per Week:     Minutes of Exercise per Session:    Stress:     Feeling of Stress :    Social Connections:     Frequency of Communication with Friends and Family:     Frequency of Social Gatherings with Friends and Family:     Attends Rastafarian Services:     Active Member of Clubs or Organizations:     Attends Club or Organization Meetings:     Marital Status:    Intimate Partner Violence:     Fear of Current or Ex-Partner:     Emotionally Abused:     Physically Abused:     Sexually Abused:         TOBACCO:   reports that he has been smoking cigarettes. He has a 25.00 pack-year smoking history. He has never used smokeless tobacco.  ETOH:   reports no history of alcohol use. ALLERGIES:      Allergies   Allergen Reactions    Paclitaxel Shortness Of Breath     Flushing, hypertension      Emend [Fosaprepitant Dimeglumine] Other (See Comments)     Reported back pain, warmth all over, nausea & SOB    Emend [Aprepitant] Nausea And Vomiting         Home Meds:   Prior to Admission medications    Medication Sig Start Date End Date Taking?  Authorizing Provider   predniSONE (DELTASONE) 10 MG tablet Take 1 tablet by mouth daily 11/4/21 12/4/21 Yes Karan Vargas MD   Budeson-Glycopyrrol-Formoterol (BREZTRI AEROSPHERE) 160-9-4.8 MCG/ACT AERO Inhale 2 puffs into the lungs 2 times daily 11/4/21  Yes Karan Vargas MD   albuterol sulfate HFA (PROVENTIL HFA) 108 (90 Base) MCG/ACT inhaler Inhale 2 puffs into the lungs every 6 hours as needed for Wheezing 11/4/21  Yes Karan Vargas MD   azithromycin (ZITHROMAX) 250 MG tablet Take 1 tablet by mouth See Admin Instructions for 5 days 500mg on day 1 followed by 250mg on days 2 - 5 11/3/21 11/8/21 Yes OSIEL Davis CNP   oxyCODONE (ROXICODONE) 5 MG immediate release tablet Take 1 tablet by mouth every 8 hours as needed for Pain for up to 30 days. Intended supply: 30 days. Take lowest dose possible to manage pain 11/2/21 12/2/21 Yes OSIEL Davis CNP   montelukast (SINGULAIR) 10 MG tablet Take 1 tablet by mouth daily 11/2/21  Yes OSIEL Davis CNP   senna (SENOKOT) 8.6 MG tablet Take 1 tablet by mouth 2 times daily 10/19/21 12/18/21 Yes OSIEL Davis CNP   oxyCODONE-acetaminophen (PERCOCET)  MG per tablet Take 1 tablet by mouth every 6 hours as needed for Pain for up to 30 days.  10/19/21 11/18/21 Yes OSIEL Davis CNP   pantoprazole (PROTONIX) 40 MG tablet Take 1 tablet by mouth daily 10/12/21 11/11/21 Yes OSIEL Davis CNP   Ibuprofen-Acetaminophen (ADVIL DUAL ACTION) 125-250 MG TABS 2 tablets as needed   Yes Historical Provider, MD   aspirin 81 MG chewable tablet Take 1 tablet by mouth daily 9/23/21  Yes OSIEL Cornejo CNP   nicotine (NICODERM CQ) 21 MG/24HR Place 1 patch onto the skin daily 9/23/21  Yes Luis Phill, APRN - CNP   traZODone (DESYREL) 50 MG tablet Take 1 tablet by mouth nightly 9/14/21  Yes Marjorie Sails, APRN - CNP   naloxone 4 MG/0.1ML LIQD nasal spray 1 spray by Nasal route as needed for Opioid Reversal 9/14/21  Yes Marjorie Sails, APRN - CNP   megestrol (MEGACE) 40 MG/ML suspension Take 10 mLs by mouth daily 8/26/21  Yes Santos Carter MD   prochlorperazine (COMPAZINE) 10 MG tablet Take 1 tablet by mouth every 6 hours as needed (nausea/vomiting) 8/26/21  Yes Santos Carter MD   levothyroxine (SYNTHROID) 125 MCG tablet Take 1 tablet by mouth daily 5/18/21  Yes Santos Carter MD   triamcinolone (KENALOG) 0.025 % cream Apply topically 2 times daily. 4/13/21  Yes Santos Carter MD   FLUoxetine (PROZAC) 40 MG capsule take 1 capsule by mouth once daily 3/23/21  Yes OSIEL Calvin CNP   lidocaine-prilocaine (EMLA) 2.5-2.5 % cream Apply topically as needed 1 hour prior to lab draws and treatment.  2/25/21  Yes Santos Carter MD   atorvastatin (LIPITOR) 80 MG tablet Take 1 tablet by mouth every evening 1/28/21  Yes OSIEL Calvin CNP   lisinopril (PRINIVIL;ZESTRIL) 20 MG tablet Take 1 tablet by mouth daily 1/28/21  Yes OSIEL Lane CNP   ciprofloxacin-dexamethasone (CIPRODEX) 0.3-0.1 % otic suspension Place 4 drops into the left ear 2 times daily 1/28/21  Yes OSIEL Lane CNP   ondansetron (ZOFRAN ODT) 8 MG TBDP disintegrating tablet Take 1 tablet by mouth every 8 hours as needed for Nausea or Vomiting 1/21/21  Yes Santos Carter MD              REVIEW OF SYSTEMS:    CONSTITUTIONAL:  negative for  fevers, chills, sweats, fatigue, malaise, anorexia and weight loss  EYES:  negative for  double vision, blurred vision, dry eyes, eye discharge and redness  HEENT:  negative for  hearing loss, tinnitus, ear drainage, earaches and nasal congestion  RESPIRATORY:  See hpi  CARDIOVASCULAR:  negative for  chest pain,, palpitations, orthopnea, PND  GASTROINTESTINAL:  negative for nausea, vomiting, change in bowel habits, diarrhea, constipation, abdominal pain, pruritus, abdominal mass and abdominal distention      HEMATOLOGIC/LYMPHATIC: Lung cancer  ALLERGIC/IMMUNOLOGIC:  negative for recurrent infections, urticaria and drug reactions  ENDOCRINE:  negative for heat intolerance, cold intolerance, tremor, weight changes and change in bowel habits  MUSCULOSKELETAL: Bone pain  NEUROLOGICAL:  negative for headaches, dizziness, seizures, memory problems, speech problems, visual disturbance and coordination problems  BEHAVIOR/PSYCH: Chronic anxiety  Skin no rash no dermatitis  Vitals:  BP 97/72 (Site: Left Upper Arm, Position: Sitting)   Pulse 109   Temp 97.3 °F (36.3 °C) (Temporal)   Resp 17   Ht 6' 4\" (1.93 m)   Wt 213 lb (96.6 kg)   SpO2 98%   BMI 25.93 kg/m²     PHYSICAL EXAM:  General Appearance:    Alert, cooperative, no distress, appears stated age   Head:    Normocephalic, without obvious abnormality, atraumatic      Eyes:    PERRL, conjunctiva/corneas clear, EOM's intact   Ears:    Normal  external ear canals, both ears   Nose:   Nares normal, septum midline, mucosa normal, no drainage        or sinus tenderness   Throat:   Lips, mucosa, and tongue normal; teeth and gums normal   Neck:   Supple, symmetrical, trachea midline, no adenopathy;     thyroid:  no enlargement/tenderness/nodules; no carotid    bruit , noJVD   Back:     Symmetric, no curvature, ROM normal, no CVA tenderness   Lungs:    Prolonged expiratory phase with crackles in the base no wheezing no dullness no bronchial breath sound   Chest Wall:    No tenderness or deformity, right port      Heart:    Regular rate and rhythm, S1 and S2 normal, no murmur, rub        or gallop no rvh                           Abdomen:                                                 Pulses:                              Skin:                  Lymph nodes:                    Neurologic:                  Soft, non-tender, bowel sounds active all four quadrants,     no masses, no organomegaly         2+ and symmetric all extremities     Skin color, texture, turgor normal, no rashes or lesions       Cervical, supraclavicular not enlarged or matted or tender      CNII-XII intact, normal Barriers to medication compliance addressed. All patient questions answered. Pt voiced understanding. I hope this updates you on my evaluation and clinical thinking. Thank you for allowing me to participate in his care. Sincerely,      Electronically signed by Neri Yi MD on   11/5/21 at 6:14 PM EDT       Please note that this chart was generated using voice recognition Dragon dictation software. Although every effort was made to ensure the accuracy of this automated transcription, some errors in transcription may have occurred.

## 2021-11-09 NOTE — PROGRESS NOTES
PALLIATIVE CARE PROGRESS NOTE     Patient: Sandra Arroyo  1960    Reason For Consult:  Goals of care evaluation  Distress management  Symptom Management  Guidance and support  Facilitate communications  Assistance in coordinating care  Recommendations for the above    Subjective: Sandra Arroyo is a 61 y.o. male with a history of diabetes, hyperlipidemia, sleep apnea noncompliant with CPAP, anxiety, chronic back pain/surgery, neuropathy, HTN, hypothyroidism, COPD not on home O2, stage IV lung cancer, right lower lobe lobectomy, and follows with OSIEL Canchola CNP . Patient underwent CT scan initially d/t smoking history. CT scan showed RLL spiculated mass measuring 2.9x2.5cm abutting the medical pleura. Another 4MM pulmonary nodule was noted but was shown to be stable from prior imaging. No adenopathy was noted. The patient underwent RLL lobectomy 9/13/16, and pathology revealed invasive moderately differentiated adenocarcinoma with tumor invasion in the visceral pleura. All margins were negative. Patient had 1 positive intrapulmonary lymph node. Patient was noted to be Stage 2 disease (T2 N1 M0). Patient received adjuvant chemotherapy with Cisplatin and Alimta 11/17/16-2/2017. In 9/2020 patient had significant weight loss, and CT scan revealed pulmonary lesion suggestive of recurrent disease. PET scan done, and revealed metastatic disease with new bilateral sub centimeter pulmonary nodules measuring up to 6mm. Mild metabolic activity was noted within small mediastinal lymph nodes. New bone lesion with metabolic activity in the anterior left 5th rib noted. MRI of brain was negative. The patient was then started on Taxol and carboplatin with radiation, but had med reaction so Taxol was replaced with Abraxane. PET scan repeated in 12/2020 that revealed new FDG avid left sided level 4 lymph node of the neck suggestive of progression of disease.  Multiple bilateral solid noncalcified pulmonary nodules noted with some sub threshold FDG activity. FDG avid mediastinal and right hilar lymph nodes similar to prior study. Interval decreased in FDG activity identified involving the previously identified left 5th rib lesion. No new bone lesions noted. Patient completed chemoradiation, patient then started immunotherapy in 5/2021 with Michell Eduardo. 5/2021 scans revealed disease progression with innumerable pulmonary nodules scattered throughout both lungs with largest measuring 13mm. Patient was noted to have further progression on 7/2021 scans with left femur x-ray revealing lytic lesion involving the cortex of the mid left femoral diaphysis. CT chest revealed progression of pulmonary mets with increase in size/number of nodules. Patient underwent radiation therapy to left femur lytic lesion in 8/2021. Patient was started on Carboplatin, Alimita, and Bevacizumab on 8/5/2021, and is on cycle 5 with Alimta and Bevacizumab now. Marina Reas was started on 8/26/21 Q6 weeks, and patient is on cycle 2. CT chest/abdomen on 9/28 showed interval decrease in size of pulmonary nodules, and no significant change in confluent lymphadenopathy in the left hilum with small mediastinal lymph nodes. Stable 3cm right adrenal nodule noted, and 0.8cm left renal lesion to small to characterize. Patient reports tolerating treatment without any SE.    [place]AR[place]: Controlled Substances Monitoring: N/A     RX Monitoring 10/10/2017   Attestation The Prescription Monitoring Report for this patient was reviewed today. Periodic Controlled Substance Monitoring No signs of potential drug abuse or diversion identified. ;Random urine drug screen sent today. INTERIM EVENTS: Patient had new consult with Dr. Guillermo Maher Pulmonary on 11/4, and they discussed the risk of prolonged steroid use. Pulmonary discussed post radiation induced pulmonary fibrosis and bronchiectasis with patient.  Plan was to continue his bronchodilator, and prednisone to help with bronchodilation and control of his chronic cough. Patient reports still not feeling well. He reports completing antibiotic and not noticing any improvements. He reports cough being biggest complaints, and had 2 hour coughing fit last PM. He reports not being sure if cough med with codeine is effective. He does report a improvement in his pain with the added PRN Roxicodone. He reports using this approximately twice daily. Available records including labs and imaging results were reviewed. Recent and past history, med list, family history, social history and review of systems were personally reviewed and updated in EHR.     REVIEW OF SYSTEMS    []   UNABLE TO OBTAIN:     Constitutional:  []   Chills   [x]  Fatigue   []  Fevers   []  Malaise   []  Weight loss   [] Other:     Respiratory:   [x]  Cough    []  Shortness of breath    []  Chest pain    [x] Other:  Unable to get up at times    Cardiovascular:   []  Chest pain  []  Dyspnea    []  Exertional chest pressure/discomfort     [] Fatigue      []  Palpitations    []  Syncope   [] Other:     Gastrointestinal:   []  Abdominal pain   []  Constipation    []  Diarrhea    []   Dysphagia   []  Reflux             []  Vomiting   [] Other:     Genitourinary:  []  Dysuria     []  Frequency   []  Hematuria   [] Nocturia   []  Urinary incontinence   [] Other:     Musculoskeletal:   [] Back pain    []  Muscle weakness   []  Myalgias    []  Neck pain   []  Stiff joints   [x]  Other:  N/T bilateral feet    Behavioral/Psych:   [] Anxiety    []  Depression     []  Mood swings   [] Other:     PHYSICAL ASSESSMENT:     General: [x]  Oriented x3      [] well appearing      [] Intubated      [] ill appearing      [] Other:    Mental Status: [x] normal mental status exam      [] drowsy      [] Confused      [] Other:     Cardiovascular: [x]  Regular rate/rhythm      [] Arrhythmia      [] Other:     Chest: [x] Effort normal      [x] lungs clear      [] respiratory distress      [] Tachypnea      [x] Other: RLL absent    Abdomen: [x] Soft/non-tender      [x]  Normal appearance      [] Distended      [] Ascites      [] Other:    Neurological: [x] Normal Speech      [] Normal Sensation      []  Deficits present:      Extremity:  [x] normal skin color/temp      [] clubbing/cyanosis      [x]  No edema      [] Other:       Vital Signs:  /69   Pulse 105   Temp 98.5 °F (36.9 °C) (Oral)   Wt 216 lb 4.8 oz (98.1 kg)   SpO2 97%   BMI 26.33 kg/m²      has a past medical history of Anxiety, BiPAP (biphasic positive airway pressure) dependence, Cancer (HCC), Chronic back pain, Clinical trial exam, COPD (chronic obstructive pulmonary disease) ([address] Diabetes ([address] Hyperlipidemia, Hypertension, Hypothyroidism, Lung cancer ([address] Neuropathy, Prolonged emergence from general anesthesia, Sleep apnea, Slow to wake up after anesthesia, SOB (shortness of breath), and Urine frequency.        Patient Active Problem List   Diagnosis    Malignant neoplasm of lower lobe of right lung ([address]    Smoker unmotivated to quit    Simple chronic bronchitis ([address]    Diabetes ([address]    Hypertension    Hypothyroidism    Hyperlipidemia    Major depressive disorder with single episode, in partial remission (HCC)    Chronic midline low back pain without sciatica    History of lumbar laminectomy    COPD (chronic obstructive pulmonary disease) (HCC)    Sleep apnea    Atelectasis    Anxiety    Cancer (HCC)    Gastroesophageal reflux disease    Difficulty sleeping    Opioid use, unspecified with unspecified opioid-induced disorder    Opioid dependence with unspecified opioid-induced disorder    Opioid dependence, uncomplicated    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 Patient reports still not feeling any better. He reports seeing pulmonologist, and discussing how his symptoms are related to his chronic disease and post radiation effects. We discussed prednisone long term risk vs benefit, and patient reports benefits outweigh the risk. Pulmonary refilled inhalers and steroid. I discussed palliative care in more detail, and how this is extra layer of support to try and improve his QOL. I discussed when QOL is greatly affected, that we always want to make sure we reassess patients goals. I discussed hospice care, and patient reports not being ready for that. He reports wanting to continue treatment. We discussed how his symptom control may be the best we can do with his given conditions. We discussed cough medicine with codeine, and he would like to try this a little longer. He reports wanting something to help bring up mucous and for cough. We discussed Mucinex DM OTC, and he will try this. He has already started Singular, and has noticed any differences. He reports new pain regimen being greatly improved, so we will stay with Percocet PRN, and Roxicodone PRN as added breakthrough. MA walked patient around office to check Sp02, and patient remains above 94%. We discussed 02 for comfort, and he reports that pulmonologist told him with given condition this would not be beneficial to him. He also reports that he can not afford anything out of pocket. I discussed patients code status, and he reports not wanting CPR or intubation. We discussed DNRCC-A no intubation code status, and he is agreeable to this. DNR form signed and MA provided copy to scan on file. Original given to patient. We discussed if EMS was ever called that they would need to notify them of this form, and the wishes of no intubation.      I offered patient and SO much emotional support. SO reports son being hospitalized at [facility] currently and not being able to help patient as much. Patient is interested in home care, and would like to see if he qualifies for any mobile meals. He is on a fixed income, and reports that he can not have out of pocket expense. I informed him that I would reach out to navigator to see if she can arrange home care with PCP, and look into other resources for him. He reports appreciation with this. Principal Problem/Diagnosis:   Diagnosis Orders   1. Malignant neoplasm of lower lobe of right lung ([facility]     2. Chronic cough  Dextromethorphan-guaiFENesin (MUCINEX DM)  MG TB12    promethazine-codeine (PHENERGAN WITH CODEINE) 6.25-10 MG/5ML syrup   3. Palliative care encounter     4. Smoker unmotivated to quit     5. Cancer related pain  promethazine-codeine (PHENERGAN WITH CODEINE) 6.25-10 MG/5ML syrup   6. Lung cancer metastatic to bone ([facility]     7. Bronchiectasis without complication [facility])           IMPRESSION/ PLAN  1- Follow-up to prior meeting  Treatment options/plans  Provide clinical updates and answer questions  Share information and provider education for the family  Listen to patient/family concerns  Assess family understanding, concerns, and coping  Interdiscplinary collaboration  Build trust  Provide emotional support to the family  Elicit patient's goals and values, and use these to establish or modify goals of care    2-Code Status: DNRCC-A no intubation     3-Other Recommendations:  Opioid Consent/Pain Contract signed 10/19/21  HCPOA completed last visit and scanned in- Guillaume Manuel is primary agent. Tox screen today?   RTO in 3 weeks    Electronically signed by   OSIEL Burton - CNP  Palliative Care Team  on 11/9/2021 at 11:23 AM    [address] [phone]  Palliative Care Cell Phone: [phone]

## 2021-11-09 NOTE — TELEPHONE ENCOUNTER
AVS from 11/9/21     RTO in 3 weeks. Continue Bronchodilator, steroids, and pain medication. Decrease pain meds as able.    Continue to work on smoking cessation       rv scheduled for 11/30/21 @ 10am    Pt was given AVS and appt schedule

## 2021-11-09 NOTE — PATIENT INSTRUCTIONS
RTO in 3 weeks. Continue Bronchodilator, steroids, and pain medication. Decrease pain meds as able.    Continue to work on smoking cessation

## 2021-11-18 NOTE — TELEPHONE ENCOUNTER
Ceci from [facility] called stating the DME order faxed to her was not considered an order. She also needs a face sheet, last progress note, and the documentation for the oxygen test to continue with O2 order. She states if we fax those documents, she can fax our office a completed order sheet for MD to sign. Documentation requested faxed to her at [phone] with confirmation.

## 2021-11-18 NOTE — PROGRESS NOTES
DIAGNOSIS:   1. Adenocarcinoma of the right lower lobe of lung. Path stage is T2N1M0. Stage 2 disease. 2. COPD  3. HTN   4. Smoking addiction   5. Evidence of recurrent disease with metastases to supraclavicular lymph node as well as mediastinal lymph node May/2021  6. Evidence of bone metastases July/2021  CURRENT THERAPY:  S/P right lower lobectomy. 9/2016  Received adjuvant chemotherapy, with Cisplatin and Alimta on 11/17/16, completed in February/2017  Plan chemoradiation with Taxol and carboplatin  Infusion reaction to Taxol, plan to transition to Abraxane  After completion of chemoradiation, plan immunotherapy with Imfinzi, started 5/2021   Progressive disease with bone metastases July/2021, plan carboplatin Alimta and Avastin  Plan palliative radiation to painful bone mets in  the left femur  BRIEF CASE HISTORY:      Luan Vasquez is a very pleasant 61 y.o. male who is referred to us for management recommendation of his recently resected lung cancer. He actually underwent a screening CT scan because of his smoking habits. CT scans showed right lower lobe spiculated mass measuring 2.9 x 2.5 cm abutting the medial pleura. There was another 4 mm pulmonary nodule that has actually been stable from previous imaging. No adenopathy was appreciated. More testing was done but ultimately the patient was taken to surgery. Prior to surgery, the clinical stage was stage I disease. The patient underwent right lower lobe lobectomy on 9/13/16 and pathology showed invasive moderately differentiated adenocarcinoma of the lung measuring 4 cm in greatest dimension, the tumor invaded the visceral pleura. All margins were negative. He had one positive intrapulmonary lymph node. Representative nodes from stations 4, 7, 9 were all negative. Final pathological staging is (T2 N1 M0) stage II disease  He is sent to us for a consultation, recovering from surgery.  He continues to have some pain in the surgical area but otherwise has no complaints. He has minimal shortness of breath, no cough or hemoptysis. No headache or seizures or loss of weight. Performance status is ECOG 1, we discussed adjuvant chemotherapy with Cisplatin and Alimta. After completion of chemotherapy, we started surveillance. He required bronchoscopy to clear thick mucous. He presented 09/2020 with significant weight loss and he was following with pulmonary service and CT scan showed pulmonary lesion. PET scan was done and unfortunately showed lung lesions and rib lesion, suggestive of recurrent disease. Brain MRI was done and negative. The patient was started on chemoradiation with weekly Taxol and carboplatin. With week 1 treatment, he had grade 1 infusion reaction that was managed with premedication and dose adjustment. However with second week, the infusion was much stronger and we had to hold Taxol altogether. We plan to replace with Abraxane. After completion of chemoradiation, we plan to offer immunotherapy with Imfinzi to start in May/2021. Hurman Reji was started 05/2021. Prior to starting Hurman Reji lab work showed baseline hypothyroidism and was started on Synthroid. In July/2021, he has further progression of disease with bone metastases, will try to manage him with clinical trial but he did not qualify, we decided to  start him on first-line treatment with Alimta carbo and bevacizumab  Meanwhile he presented to emergency room with severe pain in his left femur, x-ray showed lytic lesion in the femoral diaphysis. Plan palliative radiation  INTERIM HISTORY:   The patient comes in today for a follow-up, shortness of breath has been worsening. Performance status has been slowly declining. He is now unable to walk without having significant shortness of breath even short distance. In the office today, pulse ox dropped to 88% even with going to the bathroom. We will arrange for home oxygen to be delivered.   He was seen by the problem, or sore throat  Respiratory: C worsening dyspnea, no hemoptysis, chest discomfort  Cardiovascular: Denies central chest pain, PND or orthopnea. No L E swelling or palpitations. Gastrointestinal: No vomiting, abdominal pain, diarrhea or constipation, nausea controlled with medication   Genitourinary: Denies dysuria, hematuria, frequency, urgency or incontinence. Neurological: Denies headaches, decreased LOC, no sensory or motor focal deficits. Grade 1 neuropathy in hands and feet, predates chemo, diabetes related. Musculoskeletal: Diffuse aches and pains, no joint swelling. Severe pain in the left femoral area  Skin: There are no rashes or bleeding. +psoriasis   Psychiatric:  History of bipolar disorder. Anxiety   Endocrine: No thyroid disease. Hematologic: No bleeding, no adenopathy. PHYSICAL EXAM: Shows a well appearing 61y.o.-year-old male who is not in pain or distress. Vital Signs: Weight is stable at 230. Blood pressure 113/78, pulse 98, temperature 96 °F (35.6 °C), temperature source Temporal, weight 212 lb (96.2 kg), SpO2 (!) 88 %. Estimated performance status is ECOG 2  HEENT: Slight redness in the throat. Normocephalic and atraumatic. Pupils are equal, round, reactive to light and accommodation. Extraocular muscles are intact. Neck: grade 1 radiation toxicity Showed no JVD, no carotid bruit. Lungs: Port in place upper right chest - swelling and redness, no evidence of infection. Decreased air entry especially in the right lower lobe area - improved. Scattered rhonchi. Postoperative changes in the chest wall are healed well without any evidence of infection. Heart: Regular without any murmur. Abdomen: Soft, nontender. No hepatosplenomegaly. Extremities: Lower extremities show no edema, clubbing, or cyanosis. Neuro exam: intact cranial nerves bilaterally no motor or sensory deficit, gait is normal. Lymphatic: Cervical adenopathy improved significantly.     REVIEW OF RADIOLOGICAL RESULTS:  CT chest abd and pelvis 9/28/2021    Impression   1.  Interval decrease in size of pulmonary nodules, as compared to 07/22/2021   exam.       2.  No significant change in confluent lymphadenopathy in the left hilum and   small mediastinal lymph nodes.       3.  Stable 3 cm right adrenal nodule.       4.  Indeterminate 0.8 cm left renal lesion is too small to characterize, for   which attention to on follow-up is recommended.           REVIEW OF LABORATORY RESULTS:   Lab Results   Component Value Date    WBC 13.5 (H) 11/18/2021    HGB 10.5 (L) 11/18/2021    HCT 30.4 (L) 11/18/2021    .6 (H) 11/18/2021     11/18/2021       Chemistry        Component Value Date/Time     (L) 11/18/2021 0915    K 5.2 11/18/2021 0915    CL 95 (L) 11/18/2021 0915    CO2 23 11/18/2021 0915    BUN 22 11/18/2021 0915    CREATININE 0.92 11/18/2021 0915        Component Value Date/Time    CALCIUM 8.6 11/18/2021 0915    ALKPHOS 104 11/18/2021 0915    AST 26 11/18/2021 0915    ALT 11 11/18/2021 0915    BILITOT 0.37 11/18/2021 0915              IMPRESSION:   1. Adenocarcinoma of the right lower lobe of lung. Path stage is T2N1M0. Stage 2 disease. 2. S/P lobectomy. 3. received adjuvant chemotherapy, with cisplatin and Alimta. Plan to finish 4 cycles of adjuvant chemotherapy and then observe. 4. COPD  5. Smoking addiction, unable to quit   6. Starting chemoradiation for recurrent disease in the mediastinum and supraclavicular area. 7. Infusion reaction to Taxol, escalated from grade 1 to grade 2 by the second week. We have to hold the Taxol and will plan to switch to Abraxane. 8. Dysphagia secondary to chemoradiation as the esophagus in the radiation field. Improved after completion of chemoradiation  9. Hypertension and dizziness, we asked him to stop taking the lisinopril. I think it will improve as his oral intake improves  10.  Evidence of progressive disease in the chest as well as bone metastases, July/2021  11. Plan palliative radiation to the bone mets July/2021  12. Did not qualify for clinical trials, plan to treat with carbo Alimta and Avastin  13. We will add Xgeva after radiation to the bone mets    PLAN:  While the last CT scan showed some response to chemotherapy, the patient clinical status seems to be deteriorating. I asked him to increase his steroids to 20 mg and we will arrange for home oxygen  We will obtain another CT scan of the chest to exclude progression which is suspected clinically. The patient options are limited. Especially with his deteriorating performance status. We will see if he qualifies for the clinical trial whether met inhibitors. Patient verbalized understanding and agreement. Concern for his clinical status. We will evaluate after the CT scan  I am hoping performance status will improve after addressing his worsening COPD which is the major contributor to his symptoms  We talked about smoking cessation. The patient is cutting down and hoping to quit in the near future. Chemotherapy-induced anemia, hemoglobin still above 10. No indication for transfusion  Hyponatremia and hypochloremia, likely secondary to dehydration, I asked him to increase his fluid intake. We will check the patient thyroid function today.

## 2021-11-18 NOTE — TELEPHONE ENCOUNTER
AVS from 11/18/21    Plan  Proceed with treatment today per orders  Please add TSH to today's labs  Needs CT scan in 2 weeks  Please help me arrange for home oxygen  Return in 3 weeks with possible chemotherapy and labs    Tx today as scheduled    Confirmed with Phle[facility] that labs could be added as requestted    CT Scheduled 11/29/21 @ 9am    DME order faxed to [employer], contact number provided to patient as well as orders    RV scheduled 12/9/21 @ 9am    PT was given AVS and an appt schedule    Electronically signed by Lita Mari on 11/18/2021 at 11:05 AM

## 2021-11-18 NOTE — PATIENT INSTRUCTIONS
Plan  Proceed with treatment today per orders  Please add TSH to today's labs   Needs CT scan in 2 weeks  Please help me arrange for home oxygen  Return in 3 weeks with possible chemotherapy and labs.

## 2021-11-18 NOTE — TELEPHONE ENCOUNTER
received call from [facility] stating patient received order for oxygen.  faxed order, note, and facesheet to [employer] [facility].

## 2021-11-18 NOTE — PROGRESS NOTES
Patient arrived for zirabev, alimta, xgeva and b12  Tolerated w/o incident   Return 11/29 ct access   Elizabeth Jean Baptiste RN

## 2021-11-22 NOTE — TELEPHONE ENCOUNTER
Name: Marissa Villa  : 1960  MRN: [id number]    Oncology Navigation Follow-Up Note  Navigator spoke with pt. And has not received Oxygen yet and pt. Sharing he may need assistance with his medication? Plan to follow.    Electronically signed by Dian Fleming RN on 2021 at 1:04 PM

## 2021-11-23 NOTE — TELEPHONE ENCOUNTER
Name: Oliva Foster  : 1960  MRN: [id number]    Oncology Navigation Follow-Up Note  Navigator reaching out to pt. And checking status of oxygen therapy? Pt. Has not heard from [facility]. Number for [facility] given to pt. Pt. Asking about status of pain medication scripts? Text message sent to [address] [place] [address] perfect serve to sign scripts. Writer reaching out to [facility] to check on status of oxygen? Writer on hold for 10 minutes, writer could not wait.     Electronically signed by Renteta Barragan RN on 2021 at 11:40 AM

## 2021-11-30 NOTE — PATIENT INSTRUCTIONS
Please call [facility] this AM to schedule informational meeting for patient and [facility] Close today- please have them call me with time and give them [employer]'s phone number to schedule.    Increase Roxicodone to 15mg PRN

## 2021-11-30 NOTE — PROGRESS NOTES
PALLIATIVE CARE PROGRESS NOTE     Patient: Carmen Hahn  1960    Reason For Consult:  Goals of care evaluation  Distress management  Symptom Management  Guidance and support  Facilitate communications  Assistance in coordinating care  Recommendations for the above    Subjective: Carmen Hahn is a 61 y.o. male with a history of diabetes, hyperlipidemia, sleep apnea noncompliant with CPAP, anxiety, chronic back pain/surgery, neuropathy, HTN, hypothyroidism, COPD not on home O2, stage IV lung cancer, right lower lobe lobectomy, and follows with OSIEL Boyce CNP . Patient underwent CT scan initially d/t smoking history. CT scan showed RLL spiculated mass measuring 2.9x2.5cm abutting the medical pleura. Another 4MM pulmonary nodule was noted but was shown to be stable from prior imaging. No adenopathy was noted. The patient underwent RLL lobectomy 9/13/16, and pathology revealed invasive moderately differentiated adenocarcinoma with tumor invasion in the visceral pleura. All margins were negative. Patient had 1 positive intrapulmonary lymph node. Patient was noted to be Stage 2 disease (T2 N1 M0). Patient received adjuvant chemotherapy with Cisplatin and Alimta 11/17/16-2/2017. In 9/2020 patient had significant weight loss, and CT scan revealed pulmonary lesion suggestive of recurrent disease. PET scan done, and revealed metastatic disease with new bilateral sub centimeter pulmonary nodules measuring up to 6mm. Mild metabolic activity was noted within small mediastinal lymph nodes. New bone lesion with metabolic activity in the anterior left 5th rib noted. MRI of brain was negative. The patient was then started on Taxol and carboplatin with radiation, but had med reaction so Taxol was replaced with Abraxane. PET scan repeated in 12/2020 that revealed new FDG avid left sided level 4 lymph node of the neck suggestive of progression of disease.  Multiple bilateral solid noncalcified pulmonary nodules noted with some sub threshold FDG activity. FDG avid mediastinal and right hilar lymph nodes similar to prior study. Interval decreased in FDG activity identified involving the previously identified left 5th rib lesion. No new bone lesions noted. Patient completed chemoradiation, patient then started immunotherapy in 5/2021 with Jacinta Pill. 5/2021 scans revealed disease progression with innumerable pulmonary nodules scattered throughout both lungs with largest measuring 13mm. Patient was noted to have further progression on 7/2021 scans with left femur x-ray revealing lytic lesion involving the cortex of the mid left femoral diaphysis. CT chest revealed progression of pulmonary mets with increase in size/number of nodules. Patient underwent radiation therapy to left femur lytic lesion in 8/2021. Patient was started on Carboplatin, Alimita, and Bevacizumab on 8/5/2021, and is on cycle 5 with Alimta and Bevacizumab now. Arnetha Bannister was started on 8/26/21 Q6 weeks, and patient is on cycle 2. CT chest/abdomen on 9/28 showed interval decrease in size of pulmonary nodules, and no significant change in confluent lymphadenopathy in the left hilum with small mediastinal lymph nodes. Stable 3cm right adrenal nodule noted, and 0.8cm left renal lesion to small to characterize. Patient reports tolerating treatment without any SE.    CT chest yesterday revealed  Impression   1.  Interval development of bilateral ground-glass opacities, for which   multifocal inflammatory process or infection should be considered. Developing edema is considered less likely.       2.  A left hilar lymph node has increased in the interval, which may   represent disease progression or reactive to the inflammatory process.  The   remainder of the previously measured lymph nodes appears stable or slightly   smaller.       3.  No significant change in innumerable pulmonary nodules.       4.  Renal unchanged right adrenal mass.      OARRS: Controlled Substances Monitoring: reviewed on 11/30/21    RX Monitoring 10/10/2017   Attestation The Prescription Monitoring Report for this patient was reviewed today. Periodic Controlled Substance Monitoring No signs of potential drug abuse or diversion identified. ;Random urine drug screen sent today. INTERIM EVENTS: Patient seen by Oncologist on 11/18/21, and SOB has been worsening. He was having SOB even with short distances. Sp02 dropped to 88%, and home 02 was to be arranged. Steroids were increased to 20 mg daily. Oncology ordered CT chest d/t progression of disease being suspected. Smoking cessation again discussed, and patient is cutting down. Patient was encouraged to increase fluid intake. CT scan completed yesterday and revealed interval development of bilateral ground-glass opacities, and left hilar lymph node has increased in interval. No changes noted in innumerable pulmonary nodules, and right adrenal mass. Patient reports. ... Available records including labs and imaging results were reviewed. Recent and past history, med list, family history, social history and review of systems were personally reviewed and updated in EHR.     REVIEW OF SYSTEMS    []   UNABLE TO OBTAIN:     Constitutional:  []   Chills   [x]  Fatigue   []  Fevers   []  Malaise   [x]  Weight loss   [] Other:     Respiratory:   [x]  Cough    [x]  Shortness of breath    []  Chest pain    [] Other:     Cardiovascular:   []  Chest pain  []  Dyspnea    []  Exertional chest pressure/discomfort     [] Fatigue      []  Palpitations    []  Syncope   [] Other:     Gastrointestinal:   []  Abdominal pain   []  Constipation    []  Diarrhea    []   Dysphagia   []  Reflux             []  Vomiting   [x] Other:  Poor appetite    Genitourinary:  []  Dysuria     []  Frequency   []  Hematuria   [] Nocturia   []  Urinary incontinence   [] Other:     Musculoskeletal:   [] Back pain    [x]  Muscle weakness   [x]  Myalgias    []  Neck pain   []  Stiff joints   [] Other: Behavioral/Psych:   [] Anxiety    []  Depression     []  Mood swings   [] Other:     PHYSICAL ASSESSMENT:     General: [x]  Oriented x3      [] well appearing      [] Intubated      [x] ill appearing      [] Other:    Mental Status: [x] normal mental status exam      [] drowsy      [] Confused      [] Other:     Cardiovascular: []  Regular rate/rhythm      [] Arrhythmia      [] Other:     Chest: [x] Effort normal      [] lungs clear      [] respiratory distress      [] Tachypnea      []  Other:    Abdomen: [] Soft/non-tender      []  Normal appearance      [] Distended      [] Ascites      [] Other:    Neurological: [x] Normal Speech      [] Normal Sensation      []  Deficits present:      Extremity:  [x] normal skin color/temp      [] clubbing/cyanosis      []  No edema      [] Other:       Vital Signs: There were no vitals taken for this visit. has a past medical history of Anxiety, BiPAP (biphasic positive airway pressure) dependence, Cancer ([facility]), Chronic back pain, Clinical trial exam, COPD (chronic obstructive pulmonary disease) ([facility] [address] Diabetes ([address] Hyperlipidemia, Hypertension, Hypothyroidism, Lung cancer ([address] Neuropathy, Prolonged emergence from general anesthesia, Sleep apnea, Slow to wake up after anesthesia, SOB (shortness of breath), and Urine frequency.          Patient Active Problem List   Diagnosis    Malignant neoplasm of lower lobe of right lung ([address]    Smoker unmotivated to quit    Simple chronic bronchitis ([address]    Diabetes ([address]    Hypertension    Hypothyroidism    Hyperlipidemia    Major depressive disorder with single episode, in partial remission ([facility])    Chronic midline low back pain without sciatica    History of lumbar laminectomy    COPD (chronic obstructive pulmonary disease) ([facility])    Sleep apnea    Atelectasis    Anxiety    Cancer ([facility])    Gastroesophageal reflux disease    Difficulty sleeping    Opioid use, unspecified with unspecified opioid-induced disorder    Opioid dependence with unspecified opioid-induced disorder    Opioid dependence, uncomplicated    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 Tres Chan in regards to CT chest results, and we discussed how this is likely pneumonitis and disease progression. Patient is to continue Prednisone 20mg daily for treatment. We discussed how patients performance status has been declining, and how patient may benefit from hospice care. Patient changed visit to virtual d/t decreased activity. He also reports poor appetite, and weight loss. He is presently lying in bed. Alyssa Hartman is near by and contributing to conversation at times. I updated patient on CT scan results, and he reports feeling that he is declining d/t disease. He reports using 02 PRN, and is presently not on it. He reports checking Sp02 and this being WNL. He states that he is unable to even get up to BR without feeling SOB. He was previously treated for COPD exacerbation and pneumonia. He has had no relief. Oncology increased steroids recently, and patient reports some benefit from this but not much. He continues to have chronic cough, and worsening pain. He reports needing extra dose of Roxicodone more often, and we discussed changing prescription to Roxicodone 15 mg PRN, and he agrees that this is needed. He denies any excessive sedation from medications. We discussed patients progressive decline, and likely not being eligible for further treatment. We discussed the option to focus on comfort at home with hospice care.  Patient reports being afraid to lose his doctors, and does not want to be oversedated or decrease the length of his life. We discussed hospice in more detail, and how symptom management is the same as palliative care just that goals change, and more support is provided to him in the home. I informed him that hospice patients do not come to the hospital to seek treatment, but that hospice will manage his symptoms with the goal to be providing comfort. Patient is wanting informational meeting with [address] [place] [address] today or tomorrow. He reports his mother used this company prior. I informed him that I would have staff call to arrange. I informed patient to reschedule if hospice is declined by him. Patient is agreeable. I offered him much emotional support and prayer at this time. Obinna Jing was also present at this time, and reports that patient will be moved to her home by the weekend. They denied home care or meals on wheels services being started prior. Principal Problem/Diagnosis:   Diagnosis Orders   1. Lung cancer metastatic to bone [facility])  External Referral To Hospice    oxyCODONE (ROXICODONE) 15 MG immediate release tablet   2. Chronic cough  promethazine-codeine (PHENERGAN WITH CODEINE) 6.25-10 MG/5ML syrup   3. Cancer related pain  External Referral To Hospice    oxyCODONE (ROXICODONE) 15 MG immediate release tablet    promethazine-codeine (PHENERGAN WITH CODEINE) 6.25-10 MG/5ML syrup   4. Upset stomach     5. Gastroesophageal reflux disease without esophagitis  omeprazole (PRILOSEC) 20 MG delayed release capsule   6.  Nausea  ondansetron (ZOFRAN ODT) 8 MG TBDP disintegrating tablet        IMPRESSION/ PLAN  1- Follow-up to prior meeting  Disease progression  Functional decline  Discuss goals of care  Provide clinical updates and answer questions  Listen to patient/family concerns  Assess family understanding, concerns, and coping  Interdiscplinary collaboration  Address patient/family distress  Poor prognosis is anticipated  Accomplish end of life planning  Elicit patient's goals and values, and use these to establish or modify goals of care    2-Code Status: DNRCC-A no intubation-signed last visit and downloaded to [employer]. Hospice will further discuss [facility] code status after appointment today. 3-Other Recommendations:  Opioid Consent/Pain Contract signed 10/19/21  HCPOA completed and [employer] is POA  Tox screen completed on 11/18/21- Patient was positive for Marijuana- patient has been using this to assist with appetite, nausea, and pain control. Hospice NWO informational meeting today with patient and SO. Call office to schedule if not agreeable to services. Oliva Foster, was evaluated through a synchronous (real-time) audio-video encounter. The patient (or guardian if applicable) is aware that this is a billable service. Verbal consent to proceed has been obtained within the past 12 months. The visit was conducted pursuant to the emergency declaration under the [facility] [address] authority and the [facility] and [employer] General Act. Patient identification was verified, and a caregiver was present when appropriate. The patient was located in a state where the provider was credentialed to provide care.   Electronically signed by     OSIEL Hamilton - CNP  Palliative Care Team  on 11/30/2021 at 12:36 PM    Palliative Care Office [phone]  Palliative Care Cell Phone: [phone]

## 2021-12-03 NOTE — TELEPHONE ENCOUNTER
Name: Josephine Dean  : 1960  MRN: [id number]    Oncology Navigation Follow-Up Note  Navigator reaching out to pt. And checking on status of hospice? Pt. Difficulty speaking due to SOB and gurgling. Pt. Sounding raspy when speaking . Writer informing pt. Will have Hospice check on him now . Writer calling hospice and they will send triage nurse to his home . Peter Charlton Updated via e-mail and left .  2:37 Writer spoke with pt. And hospice to his home, pt. sounding much clearer on phone. Donato Son. Updated.    Electronically signed by Scarlett Flores RN on 12/3/2021 at 8:35 AM

## 2021-12-06 ENCOUNTER — TELEPHONE (OUTPATIENT)
Dept: PRIMARY CARE CLINIC | Age: 61
End: 2021-12-06